# Patient Record
Sex: FEMALE | Race: WHITE | Employment: OTHER | URBAN - METROPOLITAN AREA
[De-identification: names, ages, dates, MRNs, and addresses within clinical notes are randomized per-mention and may not be internally consistent; named-entity substitution may affect disease eponyms.]

---

## 2017-01-03 LAB — DIABETIC RETINOPATHY: NORMAL

## 2017-01-04 ENCOUNTER — OFFICE VISIT (OUTPATIENT)
Dept: CARDIOLOGY CLINIC | Age: 77
End: 2017-01-04

## 2017-01-04 VITALS
SYSTOLIC BLOOD PRESSURE: 124 MMHG | BODY MASS INDEX: 33.38 KG/M2 | HEIGHT: 60 IN | HEART RATE: 60 BPM | DIASTOLIC BLOOD PRESSURE: 68 MMHG | WEIGHT: 170 LBS

## 2017-01-04 DIAGNOSIS — I25.10 CORONARY ARTERY DISEASE INVOLVING NATIVE CORONARY ARTERY OF NATIVE HEART WITHOUT ANGINA PECTORIS: Primary | ICD-10-CM

## 2017-01-04 PROCEDURE — 99214 OFFICE O/P EST MOD 30 MIN: CPT | Performed by: INTERNAL MEDICINE

## 2017-01-09 ENCOUNTER — HOSPITAL ENCOUNTER (OUTPATIENT)
Dept: WOMENS IMAGING | Age: 77
Discharge: HOME | End: 2017-01-09
Attending: INTERNAL MEDICINE | Admitting: INTERNAL MEDICINE

## 2017-01-09 DIAGNOSIS — M81.0 OSTEOPOROSIS: ICD-10-CM

## 2017-01-09 DIAGNOSIS — Z12.31 VISIT FOR SCREENING MAMMOGRAM: ICD-10-CM

## 2017-01-09 DIAGNOSIS — M81.0 AGE-RELATED OSTEOPOROSIS WITHOUT CURRENT PATHOLOGICAL FRACTURE: ICD-10-CM

## 2017-01-13 RX ORDER — SIMVASTATIN 20 MG
TABLET ORAL
Qty: 90 TABLET | Refills: 0 | Status: SHIPPED | OUTPATIENT
Start: 2017-01-13 | End: 2017-04-27 | Stop reason: SDUPTHER

## 2017-01-18 ENCOUNTER — HOSPITAL ENCOUNTER (OUTPATIENT)
Dept: OTHER | Age: 77
Discharge: HOME | End: 2017-01-18
Attending: FAMILY MEDICINE

## 2017-01-21 LAB
CULTURE: NORMAL
ORGANISM: NORMAL
REPORT STATUS: NORMAL
REQUEST PROBLEM: NORMAL
SPECIMEN: NORMAL
TOTAL COLONY COUNT: NORMAL

## 2017-02-01 ENCOUNTER — TELEPHONE (OUTPATIENT)
Dept: INTERNAL MEDICINE CLINIC | Age: 77
End: 2017-02-01

## 2017-02-22 DIAGNOSIS — E11.40 TYPE 2 DIABETES MELLITUS WITH DIABETIC NEUROPATHY, WITHOUT LONG-TERM CURRENT USE OF INSULIN (HCC): ICD-10-CM

## 2017-02-22 LAB
A/G RATIO: 1.6 (ref 1.1–2.2)
ALBUMIN SERPL-MCNC: 4.5 G/DL (ref 3.4–5)
ALP BLD-CCNC: 70 U/L (ref 40–129)
ALT SERPL-CCNC: 28 U/L (ref 10–40)
ANION GAP SERPL CALCULATED.3IONS-SCNC: 19 MMOL/L (ref 3–16)
AST SERPL-CCNC: 33 U/L (ref 15–37)
BASOPHILS ABSOLUTE: 0 K/UL (ref 0–0.2)
BASOPHILS RELATIVE PERCENT: 0.7 %
BILIRUB SERPL-MCNC: 0.8 MG/DL (ref 0–1)
BUN BLDV-MCNC: 20 MG/DL (ref 7–20)
CALCIUM SERPL-MCNC: 9.7 MG/DL (ref 8.3–10.6)
CHLORIDE BLD-SCNC: 97 MMOL/L (ref 99–110)
CHOLESTEROL, TOTAL: 182 MG/DL (ref 0–199)
CO2: 25 MMOL/L (ref 21–32)
CREAT SERPL-MCNC: 0.8 MG/DL (ref 0.6–1.2)
EOSINOPHILS ABSOLUTE: 0.2 K/UL (ref 0–0.6)
EOSINOPHILS RELATIVE PERCENT: 4.1 %
GFR AFRICAN AMERICAN: >60
GFR NON-AFRICAN AMERICAN: >60
GLOBULIN: 2.9 G/DL
GLUCOSE BLD-MCNC: 123 MG/DL (ref 70–99)
HCT VFR BLD CALC: 39.3 % (ref 36–48)
HDLC SERPL-MCNC: 36 MG/DL (ref 40–60)
HEMOGLOBIN: 13 G/DL (ref 12–16)
LDL CHOLESTEROL CALCULATED: 103 MG/DL
LYMPHOCYTES ABSOLUTE: 2.2 K/UL (ref 1–5.1)
LYMPHOCYTES RELATIVE PERCENT: 40.2 %
MCH RBC QN AUTO: 30.5 PG (ref 26–34)
MCHC RBC AUTO-ENTMCNC: 33.2 G/DL (ref 31–36)
MCV RBC AUTO: 92 FL (ref 80–100)
MONOCYTES ABSOLUTE: 0.4 K/UL (ref 0–1.3)
MONOCYTES RELATIVE PERCENT: 7.5 %
NEUTROPHILS ABSOLUTE: 2.6 K/UL (ref 1.7–7.7)
NEUTROPHILS RELATIVE PERCENT: 47.5 %
PDW BLD-RTO: 12.5 % (ref 12.4–15.4)
PLATELET # BLD: 183 K/UL (ref 135–450)
PMV BLD AUTO: 8.3 FL (ref 5–10.5)
POTASSIUM SERPL-SCNC: 4.2 MMOL/L (ref 3.5–5.1)
RBC # BLD: 4.27 M/UL (ref 4–5.2)
SODIUM BLD-SCNC: 141 MMOL/L (ref 136–145)
TOTAL PROTEIN: 7.4 G/DL (ref 6.4–8.2)
TRIGL SERPL-MCNC: 214 MG/DL (ref 0–150)
VLDLC SERPL CALC-MCNC: 43 MG/DL
WBC # BLD: 5.4 K/UL (ref 4–11)

## 2017-02-23 LAB
ESTIMATED AVERAGE GLUCOSE: 128.4 MG/DL
HBA1C MFR BLD: 6.1 %

## 2017-02-28 ENCOUNTER — OFFICE VISIT (OUTPATIENT)
Dept: INTERNAL MEDICINE CLINIC | Age: 77
End: 2017-02-28

## 2017-02-28 VITALS
WEIGHT: 166 LBS | BODY MASS INDEX: 32.42 KG/M2 | HEART RATE: 71 BPM | OXYGEN SATURATION: 96 % | RESPIRATION RATE: 14 BRPM | DIASTOLIC BLOOD PRESSURE: 76 MMHG | SYSTOLIC BLOOD PRESSURE: 120 MMHG

## 2017-02-28 DIAGNOSIS — F32.A ANXIETY AND DEPRESSION: ICD-10-CM

## 2017-02-28 DIAGNOSIS — I63.30 CEREBROVASCULAR ACCIDENT (CVA) DUE TO THROMBOSIS OF CEREBRAL ARTERY (HCC): ICD-10-CM

## 2017-02-28 DIAGNOSIS — F41.9 ANXIETY AND DEPRESSION: ICD-10-CM

## 2017-02-28 DIAGNOSIS — I10 ESSENTIAL HYPERTENSION: ICD-10-CM

## 2017-02-28 DIAGNOSIS — M05.79 RHEUMATOID ARTHRITIS INVOLVING MULTIPLE SITES WITH POSITIVE RHEUMATOID FACTOR (HCC): ICD-10-CM

## 2017-02-28 DIAGNOSIS — E11.40 TYPE 2 DIABETES MELLITUS WITH DIABETIC NEUROPATHY, WITHOUT LONG-TERM CURRENT USE OF INSULIN (HCC): Primary | ICD-10-CM

## 2017-02-28 DIAGNOSIS — I69.351 HEMIPLEGIA AND HEMIPARESIS FOLLOWING CEREBRAL INFARCTION AFFECTING RIGHT DOMINANT SIDE (HCC): ICD-10-CM

## 2017-02-28 DIAGNOSIS — B37.2 CANDIDAL SKIN INFECTION: ICD-10-CM

## 2017-02-28 DIAGNOSIS — I25.10 CORONARY ARTERY DISEASE INVOLVING NATIVE CORONARY ARTERY OF NATIVE HEART WITHOUT ANGINA PECTORIS: ICD-10-CM

## 2017-02-28 PROCEDURE — 4040F PNEUMOC VAC/ADMIN/RCVD: CPT | Performed by: INTERNAL MEDICINE

## 2017-02-28 PROCEDURE — G8484 FLU IMMUNIZE NO ADMIN: HCPCS | Performed by: INTERNAL MEDICINE

## 2017-02-28 PROCEDURE — G8399 PT W/DXA RESULTS DOCUMENT: HCPCS | Performed by: INTERNAL MEDICINE

## 2017-02-28 PROCEDURE — G8598 ASA/ANTIPLAT THER USED: HCPCS | Performed by: INTERNAL MEDICINE

## 2017-02-28 PROCEDURE — 1036F TOBACCO NON-USER: CPT | Performed by: INTERNAL MEDICINE

## 2017-02-28 PROCEDURE — 1090F PRES/ABSN URINE INCON ASSESS: CPT | Performed by: INTERNAL MEDICINE

## 2017-02-28 PROCEDURE — G8427 DOCREV CUR MEDS BY ELIG CLIN: HCPCS | Performed by: INTERNAL MEDICINE

## 2017-02-28 PROCEDURE — 1123F ACP DISCUSS/DSCN MKR DOCD: CPT | Performed by: INTERNAL MEDICINE

## 2017-02-28 PROCEDURE — 99214 OFFICE O/P EST MOD 30 MIN: CPT | Performed by: INTERNAL MEDICINE

## 2017-02-28 PROCEDURE — G8417 CALC BMI ABV UP PARAM F/U: HCPCS | Performed by: INTERNAL MEDICINE

## 2017-02-28 RX ORDER — CLOPIDOGREL BISULFATE 75 MG/1
TABLET ORAL
Qty: 90 TABLET | Refills: 1 | Status: SHIPPED | OUTPATIENT
Start: 2017-02-28 | End: 2018-01-20 | Stop reason: SDUPTHER

## 2017-02-28 RX ORDER — HYDROCHLOROTHIAZIDE 12.5 MG/1
CAPSULE, GELATIN COATED ORAL
Qty: 90 CAPSULE | Refills: 1 | Status: SHIPPED | OUTPATIENT
Start: 2017-02-28 | End: 2017-10-30 | Stop reason: SDUPTHER

## 2017-02-28 RX ORDER — AMLODIPINE BESYLATE 2.5 MG/1
2.5 TABLET ORAL DAILY
Qty: 90 TABLET | Refills: 1 | Status: SHIPPED | OUTPATIENT
Start: 2017-02-28 | End: 2017-08-30 | Stop reason: SDUPTHER

## 2017-02-28 RX ORDER — LOSARTAN POTASSIUM 100 MG/1
TABLET ORAL
Qty: 90 TABLET | Refills: 1 | Status: SHIPPED | OUTPATIENT
Start: 2017-02-28 | End: 2017-10-30 | Stop reason: SDUPTHER

## 2017-02-28 RX ORDER — CLOTRIMAZOLE AND BETAMETHASONE DIPROPIONATE 10; .64 MG/G; MG/G
CREAM TOPICAL
Qty: 45 G | Refills: 1 | Status: SHIPPED | OUTPATIENT
Start: 2017-02-28 | End: 2017-11-30 | Stop reason: ALTCHOICE

## 2017-02-28 RX ORDER — BUSPIRONE HYDROCHLORIDE 10 MG/1
10 TABLET ORAL 2 TIMES DAILY PRN
Qty: 180 TABLET | Refills: 1 | Status: SHIPPED | OUTPATIENT
Start: 2017-02-28 | End: 2018-02-27

## 2017-04-27 RX ORDER — SIMVASTATIN 20 MG
TABLET ORAL
Qty: 90 TABLET | Refills: 0 | Status: SHIPPED | OUTPATIENT
Start: 2017-04-27 | End: 2017-08-02 | Stop reason: SDUPTHER

## 2017-05-01 ENCOUNTER — HOSPITAL ENCOUNTER (OUTPATIENT)
Dept: ULTRASOUND IMAGING | Age: 77
Discharge: HOME | End: 2017-05-01
Attending: INTERNAL MEDICINE | Admitting: INTERNAL MEDICINE

## 2017-05-01 DIAGNOSIS — E04.1 NONTOXIC SINGLE THYROID NODULE: ICD-10-CM

## 2017-05-01 DIAGNOSIS — E04.1 THYROID NODULE: ICD-10-CM

## 2017-05-17 DIAGNOSIS — E11.40 TYPE 2 DIABETES MELLITUS WITH DIABETIC NEUROPATHY, WITHOUT LONG-TERM CURRENT USE OF INSULIN (HCC): ICD-10-CM

## 2017-05-17 DIAGNOSIS — M05.79 RHEUMATOID ARTHRITIS INVOLVING MULTIPLE SITES WITH POSITIVE RHEUMATOID FACTOR (HCC): ICD-10-CM

## 2017-05-17 LAB
A/G RATIO: 1.7 (ref 1.1–2.2)
ALBUMIN SERPL-MCNC: 4.6 G/DL (ref 3.4–5)
ALP BLD-CCNC: 68 U/L (ref 40–129)
ALT SERPL-CCNC: 26 U/L (ref 10–40)
ANION GAP SERPL CALCULATED.3IONS-SCNC: 17 MMOL/L (ref 3–16)
AST SERPL-CCNC: 30 U/L (ref 15–37)
BASOPHILS ABSOLUTE: 0 K/UL (ref 0–0.2)
BASOPHILS RELATIVE PERCENT: 0.3 %
BILIRUB SERPL-MCNC: 0.9 MG/DL (ref 0–1)
BUN BLDV-MCNC: 24 MG/DL (ref 7–20)
CALCIUM SERPL-MCNC: 9.6 MG/DL (ref 8.3–10.6)
CHLORIDE BLD-SCNC: 99 MMOL/L (ref 99–110)
CHOLESTEROL, TOTAL: 164 MG/DL (ref 0–199)
CO2: 24 MMOL/L (ref 21–32)
CREAT SERPL-MCNC: 0.8 MG/DL (ref 0.6–1.2)
EOSINOPHILS ABSOLUTE: 0.2 K/UL (ref 0–0.6)
EOSINOPHILS RELATIVE PERCENT: 3 %
GFR AFRICAN AMERICAN: >60
GFR NON-AFRICAN AMERICAN: >60
GLOBULIN: 2.7 G/DL
GLUCOSE BLD-MCNC: 119 MG/DL (ref 70–99)
HCT VFR BLD CALC: 38.6 % (ref 36–48)
HDLC SERPL-MCNC: 35 MG/DL (ref 40–60)
HEMOGLOBIN: 12.7 G/DL (ref 12–16)
LDL CHOLESTEROL CALCULATED: 89 MG/DL
LYMPHOCYTES ABSOLUTE: 2.2 K/UL (ref 1–5.1)
LYMPHOCYTES RELATIVE PERCENT: 35.3 %
MCH RBC QN AUTO: 30.9 PG (ref 26–34)
MCHC RBC AUTO-ENTMCNC: 32.8 G/DL (ref 31–36)
MCV RBC AUTO: 94.1 FL (ref 80–100)
MONOCYTES ABSOLUTE: 0.5 K/UL (ref 0–1.3)
MONOCYTES RELATIVE PERCENT: 8 %
NEUTROPHILS ABSOLUTE: 3.3 K/UL (ref 1.7–7.7)
NEUTROPHILS RELATIVE PERCENT: 53.4 %
PDW BLD-RTO: 13.2 % (ref 12.4–15.4)
PLATELET # BLD: 193 K/UL (ref 135–450)
PMV BLD AUTO: 8.1 FL (ref 5–10.5)
POTASSIUM SERPL-SCNC: 4.4 MMOL/L (ref 3.5–5.1)
RBC # BLD: 4.1 M/UL (ref 4–5.2)
SODIUM BLD-SCNC: 140 MMOL/L (ref 136–145)
TOTAL PROTEIN: 7.3 G/DL (ref 6.4–8.2)
TRIGL SERPL-MCNC: 202 MG/DL (ref 0–150)
VLDLC SERPL CALC-MCNC: 40 MG/DL
WBC # BLD: 6.2 K/UL (ref 4–11)

## 2017-05-18 LAB
ESTIMATED AVERAGE GLUCOSE: 131.2 MG/DL
HBA1C MFR BLD: 6.2 %
SEDIMENTATION RATE, ERYTHROCYTE: 14 MM/HR (ref 0–30)

## 2017-05-23 ENCOUNTER — HOSPITAL ENCOUNTER (OUTPATIENT)
Dept: GENERAL RADIOLOGY | Age: 77
Discharge: HOME | End: 2017-05-23
Attending: INTERNAL MEDICINE | Admitting: INTERNAL MEDICINE

## 2017-05-23 ENCOUNTER — OFFICE VISIT (OUTPATIENT)
Dept: INTERNAL MEDICINE CLINIC | Age: 77
End: 2017-05-23

## 2017-05-23 VITALS
HEART RATE: 60 BPM | RESPIRATION RATE: 15 BRPM | DIASTOLIC BLOOD PRESSURE: 68 MMHG | OXYGEN SATURATION: 96 % | SYSTOLIC BLOOD PRESSURE: 120 MMHG | WEIGHT: 172 LBS | HEIGHT: 60 IN | BODY MASS INDEX: 33.77 KG/M2

## 2017-05-23 DIAGNOSIS — I63.30 CEREBROVASCULAR ACCIDENT (CVA) DUE TO THROMBOSIS OF CEREBRAL ARTERY (HCC): ICD-10-CM

## 2017-05-23 DIAGNOSIS — I25.10 CORONARY ARTERY DISEASE INVOLVING NATIVE CORONARY ARTERY OF NATIVE HEART WITHOUT ANGINA PECTORIS: ICD-10-CM

## 2017-05-23 DIAGNOSIS — M79.642 LEFT HAND PAIN: Primary | ICD-10-CM

## 2017-05-23 DIAGNOSIS — Z23 NEED FOR PNEUMOCOCCAL VACCINATION: ICD-10-CM

## 2017-05-23 DIAGNOSIS — I10 ESSENTIAL HYPERTENSION: ICD-10-CM

## 2017-05-23 DIAGNOSIS — M79.642 LEFT HAND PAIN: ICD-10-CM

## 2017-05-23 DIAGNOSIS — E11.40 TYPE 2 DIABETES MELLITUS WITH DIABETIC NEUROPATHY, WITHOUT LONG-TERM CURRENT USE OF INSULIN (HCC): ICD-10-CM

## 2017-05-23 PROCEDURE — 90670 PCV13 VACCINE IM: CPT | Performed by: INTERNAL MEDICINE

## 2017-05-23 PROCEDURE — G8598 ASA/ANTIPLAT THER USED: HCPCS | Performed by: INTERNAL MEDICINE

## 2017-05-23 PROCEDURE — G8417 CALC BMI ABV UP PARAM F/U: HCPCS | Performed by: INTERNAL MEDICINE

## 2017-05-23 PROCEDURE — G8427 DOCREV CUR MEDS BY ELIG CLIN: HCPCS | Performed by: INTERNAL MEDICINE

## 2017-05-23 PROCEDURE — 99214 OFFICE O/P EST MOD 30 MIN: CPT | Performed by: INTERNAL MEDICINE

## 2017-05-23 PROCEDURE — 1123F ACP DISCUSS/DSCN MKR DOCD: CPT | Performed by: INTERNAL MEDICINE

## 2017-05-23 PROCEDURE — G0009 ADMIN PNEUMOCOCCAL VACCINE: HCPCS | Performed by: INTERNAL MEDICINE

## 2017-05-23 PROCEDURE — 1036F TOBACCO NON-USER: CPT | Performed by: INTERNAL MEDICINE

## 2017-05-23 PROCEDURE — 1090F PRES/ABSN URINE INCON ASSESS: CPT | Performed by: INTERNAL MEDICINE

## 2017-05-23 PROCEDURE — 4040F PNEUMOC VAC/ADMIN/RCVD: CPT | Performed by: INTERNAL MEDICINE

## 2017-05-23 PROCEDURE — G8399 PT W/DXA RESULTS DOCUMENT: HCPCS | Performed by: INTERNAL MEDICINE

## 2017-08-02 DIAGNOSIS — I10 ESSENTIAL HYPERTENSION: ICD-10-CM

## 2017-08-02 DIAGNOSIS — I25.10 CORONARY ARTERY DISEASE INVOLVING NATIVE CORONARY ARTERY OF NATIVE HEART WITHOUT ANGINA PECTORIS: ICD-10-CM

## 2017-08-02 RX ORDER — SIMVASTATIN 20 MG
TABLET ORAL
Qty: 90 TABLET | Refills: 0 | Status: SHIPPED | OUTPATIENT
Start: 2017-08-02 | End: 2017-11-04 | Stop reason: SDUPTHER

## 2017-08-24 DIAGNOSIS — I63.30 CEREBROVASCULAR ACCIDENT (CVA) DUE TO THROMBOSIS OF CEREBRAL ARTERY (HCC): ICD-10-CM

## 2017-08-24 DIAGNOSIS — I25.10 CORONARY ARTERY DISEASE INVOLVING NATIVE CORONARY ARTERY OF NATIVE HEART WITHOUT ANGINA PECTORIS: ICD-10-CM

## 2017-08-24 DIAGNOSIS — E11.40 TYPE 2 DIABETES MELLITUS WITH DIABETIC NEUROPATHY, WITHOUT LONG-TERM CURRENT USE OF INSULIN (HCC): ICD-10-CM

## 2017-08-24 LAB
A/G RATIO: 1.5 (ref 1.1–2.2)
ALBUMIN SERPL-MCNC: 4.6 G/DL (ref 3.4–5)
ALP BLD-CCNC: 69 U/L (ref 40–129)
ALT SERPL-CCNC: 22 U/L (ref 10–40)
ANION GAP SERPL CALCULATED.3IONS-SCNC: 15 MMOL/L (ref 3–16)
AST SERPL-CCNC: 25 U/L (ref 15–37)
BASOPHILS ABSOLUTE: 0 K/UL (ref 0–0.2)
BASOPHILS RELATIVE PERCENT: 0.6 %
BILIRUB SERPL-MCNC: 0.7 MG/DL (ref 0–1)
BUN BLDV-MCNC: 21 MG/DL (ref 7–20)
CALCIUM SERPL-MCNC: 10.1 MG/DL (ref 8.3–10.6)
CHLORIDE BLD-SCNC: 96 MMOL/L (ref 99–110)
CHOLESTEROL, TOTAL: 185 MG/DL (ref 0–199)
CO2: 27 MMOL/L (ref 21–32)
CREAT SERPL-MCNC: 0.8 MG/DL (ref 0.6–1.2)
EOSINOPHILS ABSOLUTE: 0.2 K/UL (ref 0–0.6)
EOSINOPHILS RELATIVE PERCENT: 4.3 %
GFR AFRICAN AMERICAN: >60
GFR NON-AFRICAN AMERICAN: >60
GLOBULIN: 3.1 G/DL
GLUCOSE BLD-MCNC: 131 MG/DL (ref 70–99)
HCT VFR BLD CALC: 38.8 % (ref 36–48)
HDLC SERPL-MCNC: 35 MG/DL (ref 40–60)
HEMOGLOBIN: 13.2 G/DL (ref 12–16)
LDL CHOLESTEROL CALCULATED: 93 MG/DL
LYMPHOCYTES ABSOLUTE: 2 K/UL (ref 1–5.1)
LYMPHOCYTES RELATIVE PERCENT: 38.7 %
MCH RBC QN AUTO: 31.4 PG (ref 26–34)
MCHC RBC AUTO-ENTMCNC: 33.9 G/DL (ref 31–36)
MCV RBC AUTO: 92.5 FL (ref 80–100)
MONOCYTES ABSOLUTE: 0.4 K/UL (ref 0–1.3)
MONOCYTES RELATIVE PERCENT: 7.6 %
NEUTROPHILS ABSOLUTE: 2.5 K/UL (ref 1.7–7.7)
NEUTROPHILS RELATIVE PERCENT: 48.8 %
PDW BLD-RTO: 12.9 % (ref 12.4–15.4)
PLATELET # BLD: 198 K/UL (ref 135–450)
PMV BLD AUTO: 8 FL (ref 5–10.5)
POTASSIUM SERPL-SCNC: 4.7 MMOL/L (ref 3.5–5.1)
RBC # BLD: 4.19 M/UL (ref 4–5.2)
SODIUM BLD-SCNC: 138 MMOL/L (ref 136–145)
TOTAL PROTEIN: 7.7 G/DL (ref 6.4–8.2)
TRIGL SERPL-MCNC: 285 MG/DL (ref 0–150)
VLDLC SERPL CALC-MCNC: 57 MG/DL
WBC # BLD: 5.1 K/UL (ref 4–11)

## 2017-08-25 LAB
ESTIMATED AVERAGE GLUCOSE: 131.2 MG/DL
HBA1C MFR BLD: 6.2 %

## 2017-08-30 ENCOUNTER — HOSPITAL ENCOUNTER (OUTPATIENT)
Dept: GENERAL RADIOLOGY | Age: 77
Discharge: HOME | End: 2017-08-30
Attending: INTERNAL MEDICINE | Admitting: INTERNAL MEDICINE

## 2017-08-30 ENCOUNTER — OFFICE VISIT (OUTPATIENT)
Dept: INTERNAL MEDICINE CLINIC | Age: 77
End: 2017-08-30

## 2017-08-30 VITALS
OXYGEN SATURATION: 96 % | RESPIRATION RATE: 16 BRPM | HEART RATE: 70 BPM | DIASTOLIC BLOOD PRESSURE: 86 MMHG | SYSTOLIC BLOOD PRESSURE: 138 MMHG | WEIGHT: 173 LBS | BODY MASS INDEX: 33.79 KG/M2

## 2017-08-30 DIAGNOSIS — M25.552 LEFT HIP PAIN: ICD-10-CM

## 2017-08-30 DIAGNOSIS — Z23 NEED FOR INFLUENZA VACCINATION: ICD-10-CM

## 2017-08-30 DIAGNOSIS — I25.10 CORONARY ARTERY DISEASE INVOLVING NATIVE CORONARY ARTERY OF NATIVE HEART WITHOUT ANGINA PECTORIS: ICD-10-CM

## 2017-08-30 DIAGNOSIS — I63.30 CEREBROVASCULAR ACCIDENT (CVA) DUE TO THROMBOSIS OF CEREBRAL ARTERY (HCC): ICD-10-CM

## 2017-08-30 DIAGNOSIS — M17.0 PRIMARY OSTEOARTHRITIS OF BOTH KNEES: ICD-10-CM

## 2017-08-30 DIAGNOSIS — Z00.00 ROUTINE GENERAL MEDICAL EXAMINATION AT A HEALTH CARE FACILITY: Primary | ICD-10-CM

## 2017-08-30 DIAGNOSIS — I10 ESSENTIAL HYPERTENSION: ICD-10-CM

## 2017-08-30 DIAGNOSIS — E11.40 TYPE 2 DIABETES MELLITUS WITH DIABETIC NEUROPATHY, WITHOUT LONG-TERM CURRENT USE OF INSULIN (HCC): ICD-10-CM

## 2017-08-30 PROCEDURE — 90662 IIV NO PRSV INCREASED AG IM: CPT | Performed by: INTERNAL MEDICINE

## 2017-08-30 PROCEDURE — G0008 ADMIN INFLUENZA VIRUS VAC: HCPCS | Performed by: INTERNAL MEDICINE

## 2017-08-30 PROCEDURE — G0439 PPPS, SUBSEQ VISIT: HCPCS | Performed by: INTERNAL MEDICINE

## 2017-08-30 RX ORDER — AMLODIPINE BESYLATE 5 MG/1
5 TABLET ORAL DAILY
Qty: 90 TABLET | Refills: 1 | Status: SHIPPED | OUTPATIENT
Start: 2017-08-30 | End: 2018-02-11 | Stop reason: SDUPTHER

## 2017-08-30 ASSESSMENT — ANXIETY QUESTIONNAIRES: GAD7 TOTAL SCORE: 0

## 2017-08-30 ASSESSMENT — PATIENT HEALTH QUESTIONNAIRE - PHQ9: SUM OF ALL RESPONSES TO PHQ QUESTIONS 1-9: 1

## 2017-08-30 ASSESSMENT — LIFESTYLE VARIABLES: HOW OFTEN DO YOU HAVE A DRINK CONTAINING ALCOHOL: 0

## 2017-10-13 ENCOUNTER — CARE COORDINATION (OUTPATIENT)
Dept: CARE COORDINATION | Age: 77
End: 2017-10-13

## 2017-10-17 ENCOUNTER — TELEPHONE (OUTPATIENT)
Dept: CARDIOLOGY CLINIC | Age: 77
End: 2017-10-17

## 2017-10-17 NOTE — TELEPHONE ENCOUNTER
Alyce Sarkar from [employer] office called. They are doing a revision of her left total knee, it is loose. This is scheduled for November 21st.    Pt is infection free right now and the office wants to make sure her surgery date does not get delayed. Pt has an appointment with us on November 8th. The office is requesting surgical clearance and wants to be sure that if the pt waits until this appointment time and has to have any testing that she will be able to complete it in time. Please advise pt and Alyce Sarkar.     Office phone [phone]  Office fax [phone]

## 2017-10-24 ENCOUNTER — TELEPHONE (OUTPATIENT)
Dept: CARDIOLOGY CLINIC | Age: 77
End: 2017-10-24

## 2017-10-24 NOTE — LETTER
[address]  [facility]  [facility], [address] [place] [address],[address]  Arnold Ortiz  Phone: [phone]    Fax [phone]                  Una Collado MD, Ritchie Todd MD, Liyah Martino MD, Lsia Cardozo MD, MD Vahid Linares MD Luellen Hoffmann, MD    Cardiac Risk Assessment for Surgery    10/24/2017    Surgery Date: 11/21/17       Surgery: Revision (L) Total Knee       Anesthesia Type: General       Fax Number: [phone]  To: Dr. Joann Carrizales  From : Dr. Pepe Mcneill    Patient Name: Pelon Ferris   YOB: 1940      Portia Hernandez was evaluated from a cardiac standpoint for her surgery and based on her history, diagnosis, recent cardiac testing, she is considered a medium risk candidate for any b[place]-operative cardiac complications. Please continue patient's current medical regimen. Patient may hold Aspirin and Plavix for a period of 5-7 days prior to surgery or procedure. Please resume ASAP. Please call with any further questions.     Respectfully,     Una Collado MD, [facility] [place]  FA/bjd

## 2017-10-24 NOTE — TELEPHONE ENCOUNTER
Cardiac Risk assessment letter faxed to Dr. Izaiah Valentine for   Revision (R) Total Knee surgery.   [phone]

## 2017-10-30 DIAGNOSIS — I25.10 CORONARY ARTERY DISEASE INVOLVING NATIVE CORONARY ARTERY OF NATIVE HEART WITHOUT ANGINA PECTORIS: ICD-10-CM

## 2017-10-30 DIAGNOSIS — I10 ESSENTIAL HYPERTENSION: ICD-10-CM

## 2017-10-30 DIAGNOSIS — E11.40 TYPE 2 DIABETES MELLITUS WITH DIABETIC NEUROPATHY, WITHOUT LONG-TERM CURRENT USE OF INSULIN (HCC): ICD-10-CM

## 2017-10-30 RX ORDER — HYDROCHLOROTHIAZIDE 12.5 MG/1
CAPSULE, GELATIN COATED ORAL
Qty: 90 CAPSULE | Refills: 1 | Status: SHIPPED | OUTPATIENT
Start: 2017-10-30 | End: 2018-04-19 | Stop reason: SDUPTHER

## 2017-10-30 RX ORDER — LOSARTAN POTASSIUM 100 MG/1
TABLET ORAL
Qty: 90 TABLET | Refills: 1 | Status: SHIPPED | OUTPATIENT
Start: 2017-10-30 | End: 2018-04-19 | Stop reason: SDUPTHER

## 2017-11-01 ENCOUNTER — OFFICE VISIT (OUTPATIENT)
Dept: CARDIOLOGY CLINIC | Age: 77
End: 2017-11-01

## 2017-11-01 VITALS
SYSTOLIC BLOOD PRESSURE: 164 MMHG | WEIGHT: 171 LBS | DIASTOLIC BLOOD PRESSURE: 84 MMHG | HEIGHT: 60 IN | HEART RATE: 76 BPM | BODY MASS INDEX: 33.57 KG/M2

## 2017-11-01 DIAGNOSIS — I25.10 CORONARY ARTERY DISEASE INVOLVING NATIVE CORONARY ARTERY OF NATIVE HEART WITHOUT ANGINA PECTORIS: Primary | ICD-10-CM

## 2017-11-01 PROCEDURE — 99213 OFFICE O/P EST LOW 20 MIN: CPT | Performed by: INTERNAL MEDICINE

## 2017-11-01 PROCEDURE — 1123F ACP DISCUSS/DSCN MKR DOCD: CPT | Performed by: INTERNAL MEDICINE

## 2017-11-01 PROCEDURE — G8484 FLU IMMUNIZE NO ADMIN: HCPCS | Performed by: INTERNAL MEDICINE

## 2017-11-01 PROCEDURE — 1036F TOBACCO NON-USER: CPT | Performed by: INTERNAL MEDICINE

## 2017-11-01 PROCEDURE — G8427 DOCREV CUR MEDS BY ELIG CLIN: HCPCS | Performed by: INTERNAL MEDICINE

## 2017-11-01 PROCEDURE — 1090F PRES/ABSN URINE INCON ASSESS: CPT | Performed by: INTERNAL MEDICINE

## 2017-11-01 PROCEDURE — G8417 CALC BMI ABV UP PARAM F/U: HCPCS | Performed by: INTERNAL MEDICINE

## 2017-11-01 PROCEDURE — G8598 ASA/ANTIPLAT THER USED: HCPCS | Performed by: INTERNAL MEDICINE

## 2017-11-01 PROCEDURE — 4040F PNEUMOC VAC/ADMIN/RCVD: CPT | Performed by: INTERNAL MEDICINE

## 2017-11-01 PROCEDURE — G8399 PT W/DXA RESULTS DOCUMENT: HCPCS | Performed by: INTERNAL MEDICINE

## 2017-11-01 NOTE — PROGRESS NOTES
CARDIOLOGY NOTE      11/1/2017    RE: Tonio Eason  (1940)                               TO:  Dr. Daniela Rangel MD      Thank you for involving me in taking care of your  patient Tonio Eason, who is a  65y.o. year old      female with past medical  history of  CAD, HTN, hyperlipidimea  is  seen today Patient  during this  visit has no cardiac complains. Lt knee hurts causing high BP. Vitals:    11/01/17 1657   BP: (!) 164/84   Pulse:        Current Outpatient Prescriptions   Medication Sig Dispense Refill    metoprolol tartrate (LOPRESSOR) 25 MG tablet TAKE ONE TABLET BY MOUTH TWICE DAILY 180 tablet 0    hydrochlorothiazide (MICROZIDE) 12.5 MG capsule TAKE ONE CAPSULE BY MOUTH ONCE DAILY IN THE MORNING 90 capsule 1    losartan (COZAAR) 100 MG tablet TAKE ONE TABLET BY MOUTH ONCE DAILY 90 tablet 1    amLODIPine (NORVASC) 5 MG tablet Take 1 tablet by mouth daily 90 tablet 1    simvastatin (ZOCOR) 20 MG tablet TAKE ONE TABLET BY MOUTH ONCE DAILY IN THE EVENING 90 tablet 0    busPIRone (BUSPAR) 10 MG tablet Take 1 tablet by mouth 2 times daily as needed (anxiety) 180 tablet 1    clopidogrel (PLAVIX) 75 MG tablet TAKE ONE TABLET BY MOUTH ONCE DAILY 90 tablet 1    metFORMIN (GLUCOPHAGE) 500 MG tablet Take 500 mg by mouth daily      clotrimazole-betamethasone (LOTRISONE) 1-0.05 % cream Apply topically 2 times daily. 45 g 1    fluticasone (FLONASE) 50 MCG/ACT nasal spray 2 sprays by Nasal route daily into each nostril every day 1 Bottle 3    aspirin 81 MG tablet Take 81 mg by mouth nightly       Garlic 0536 MG CAPS Take 1 tablet by mouth nightly       multivitamin (THERAGRAN) per tablet Take 1 tablet by mouth daily.  Potassium 99 MG TABS Take 1 tablet by mouth nightly       Calcium Carbonate-Vitamin D (CALCIUM + D) 600-200 MG-UNIT TABS Take 1 tablet by mouth nightly       Omega-3 Fatty Acids (FISH OIL BURP-LESS) 1000 MG CAPS Take 1 tablet by mouth 2 times daily.  Misc Natural Products (OSTEO BI-FLEX JOINT SHIELD PO) Take 1 tablet by mouth 2 times daily       nitroGLYCERIN (NITROSTAT) 0.4 MG SL tablet Place 0.4 mg under the tongue. Take one tab sublingually at onset, may take every 5 minutes, no more than 3 tabs in 15 mins, as needed       Omeprazole Magnesium (PRILOSEC OTC PO) Take 1 tablet by mouth See Admin Instructions take1 tablet on Sun TUES THURS AND SAT       No current facility-administered medications for this visit. Allergies: Actonel [risedronate sodium]; Ciprofloxacin; Darvocet [propoxyphene n-acetaminophen]; Lopid [gemfibrozil]; Mevacor [lovastatin];  Neosporin [neomycin-polymyx-gramicid]; and Pravachol [pravastatin sodium]  Past Medical History:   Diagnosis Date    AK (actinic keratosis)     Irizarry Grooms following    CAD (coronary artery disease)     5/2005 S/P PTCA and stents X2 - Dr Jones [facility])     skin    Carotid stenosis     Carotid stenosis, left     monitored by Dr Greg Escamilla- 50% stenosis noted on CTA 10/2016    Coronary artery disease     Dr Greg Escamilla    Cough secondary to angiotensin converting enzyme inhibitor (ACE-I)     inactive    CTS (carpal tunnel syndrome)     inactive-S/P right CTS surg 4/2001- Dr Severo Medina DDD (degenerative disc disease), cervical     Degenerative disc disease, cervical     Diabetes mellitus with neuropathy ([facility] [address]     Dr Vero Sarmiento,     GERD without esophagitis     H/O 24 hour EKG monitoring 9/07 9/27/07 SR, max , min HR 47, supraventricular ectopy is in the fashio of a-fib, very short episodes, infrequent vent and SVT ectopy noted    H/O cardiac catheterization 5/05 5/16/05 Circ stent 80% prior 0% post, EF 60%    H/O cardiovascular stress test 3/29/10,   9/08,    3/29/10 post stress myocardial perfusion show norm pattern of perfusion in all regions, post left vent is norm, rest EF 84%, LV systolic  is norm, vent func preserved,     H/O cardiovascular stress test 9/11/14    EF70% Normal study.     H/O cardiovascular stress test 2/12/2016    lexiscan-normal,70%    H/O Doppler ultrasound 10/2011, 8/09    carotid 10/4/11 moderat stenosis left internal carotid atery est 50-69%, progression in stenotic changes left internal carotid artery, right WNL    H/O echocardiogram 10/2011, 4/09    90/67/[id number] AR , LV systolic function WNL, EF 55%    H/O echocardiogram 9/11/14    EF 60%. Mild aortic insufficiency.  H/O echocardiogram 2/12/2016    EF 55% mild lvh, stage 1 diast dysf, mild PI    H/O mammogram     1/16- recheck 1/17    H/O tilt table evaluation  7/09 7/20/09 WNL    Hyperlipidemia     Hypertension     IFG (impaired fasting glucose)     mother w hx of DM    Memory loss     MMSE 23/30-- 11/15/13    Neuropathy ([address]     Osteoarthritis, knee     Peripheral neuropathy (HCC)     burning discomfort in feet.  Postsurgical menopause     Rheumatoid arthritis(714.0)     SCC (squamous cell carcinoma), leg     Dr Diann Smith removed fr legs 10/16    Thyroid nodule     on u/s 10/2016, recheck May 2017- stable--- RECHECK MAY 2018 RECOMMENDED    TMJ (temporomandibular joint syndrome)     Unspecified cerebral artery occlusion with cerebral infarction     2016-Right hemiparesis, aphasia, dysphagia, gait disturbance,     Past Surgical History:   Procedure Laterality Date    BREAST BIOPSY  1993    bilateral    CARPAL TUNNEL RELEASE      Right wrist - 4/2011-Dr Diann Smith    CATARACT REMOVAL WITH IMPLANT Left 02/06/2017    Dr Hudson Jozef    umbilical   [address]    Right thumb- giant cell tumor    HYSTERECTOMY, TOTAL ABDOMINAL  1963    KNEE ARTHROSCOPY  2/21/2012    Left knee- Dr Rodgers Sero ARTHROSCOPY Left 9/23/14    Dr Aneesh William    ?     PTCA  5/2005    PTCA with stent X2 - Dr Irina Haas  10/16/2013    both legs    TONSILLECTOMY  1972    TOTAL KNEE ARTHROPLASTY Left 7/28/15    Dr Richards  - Dr Leilani Olmos AST 25 08/24/2017     TSH:    Lab Results   Component Value Date    TSH 2.00 02/04/2016         QUALITY MEASURES:  CAD:  Yes   CHOL LOWERING:  Yes- if No Why  ANTIPLATELET:  Yes - if No why  BETA BLOCKER    yes,   IF  NO WHY  SMOKING HISTORY no COUNSELLED no  ATRIAL FIBRILLATIONno ANTICOAG: no    Assessment/ Plan:     Patient seen , interviewed and examined                 -     CORONARY ARTERY DISEASE: Patient  currently as per anginal classification has   No symptoms           - changes in  treatment:   no  All CAD tests available in records reviewed. -  Hypertension: Patients blood pressure is normal. Patient is advised about low sodium diet. Present medical regimen will not be changed. -  LIPID MANAGEMENT:  Available lipid  lab data reviewed  and patient was given dietary advice. NCEP- ATP III guidelines reviewed with patient. -   Changes  in medicines made:  No                         MODERATE CARDIAC RISK FOR KNEE SURGERY

## 2017-11-03 ENCOUNTER — HOSPITAL ENCOUNTER (OUTPATIENT)
Dept: GENERAL RADIOLOGY | Age: 77
Discharge: HOME | End: 2017-11-03
Attending: INTERNAL MEDICINE | Admitting: INTERNAL MEDICINE

## 2017-11-03 DIAGNOSIS — R31.9 URINARY TRACT INFECTION WITH HEMATURIA, SITE UNSPECIFIED: Primary | ICD-10-CM

## 2017-11-03 DIAGNOSIS — N39.0 URINARY TRACT INFECTION WITH HEMATURIA, SITE UNSPECIFIED: Primary | ICD-10-CM

## 2017-11-03 PROBLEM — E11.40 TYPE 2 DIABETES MELLITUS WITH DIABETIC NEUROPATHY, WITHOUT LONG-TERM CURRENT USE OF INSULIN (HCC): Status: ACTIVE | Noted: 2017-11-03

## 2017-11-03 LAB
BACTERIA: NEGATIVE /HPF
BILIRUBIN URINE: NEGATIVE MG/DL
BLOOD, URINE: NEGATIVE
CLARITY: CLEAR
COLOR: ABNORMAL
GLUCOSE, URINE: NEGATIVE MG/DL
KETONES, URINE: NEGATIVE MG/DL
LEUKOCYTE ESTERASE, URINE: NEGATIVE
MUCUS: ABNORMAL HPF
NITRITE URINE, QUANTITATIVE: NEGATIVE
PH, URINE: 7 (ref 5–8)
PROTEIN UA: NEGATIVE MG/DL
RBC URINE: <1 /HPF (ref 0–6)
SPECIFIC GRAVITY UA: 1.01 (ref 1–1.03)
SQUAMOUS EPITHELIAL: <1 /HPF
TRICHOMONAS: ABNORMAL /HPF
UROBILINOGEN, URINE: NORMAL MG/DL (ref 0.2–1)
WBC UA: 1 /HPF (ref 0–5)

## 2017-11-04 LAB
CULTURE: NORMAL
REPORT STATUS: NORMAL
REQUEST PROBLEM: NORMAL
SPECIMEN: NORMAL
TOTAL COLONY COUNT: NORMAL

## 2017-11-06 RX ORDER — SIMVASTATIN 20 MG
TABLET ORAL
Qty: 90 TABLET | Refills: 0 | Status: SHIPPED | OUTPATIENT
Start: 2017-11-06 | End: 2018-02-11 | Stop reason: SDUPTHER

## 2017-11-06 RX ORDER — NITROFURANTOIN 25; 75 MG/1; MG/1
100 CAPSULE ORAL 2 TIMES DAILY
Qty: 14 CAPSULE | Refills: 0 | Status: SHIPPED | OUTPATIENT
Start: 2017-11-06 | End: 2017-11-13

## 2017-11-17 ENCOUNTER — TELEPHONE (OUTPATIENT)
Dept: INTERNAL MEDICINE CLINIC | Age: 77
End: 2017-11-17

## 2017-11-30 ENCOUNTER — OFFICE VISIT (OUTPATIENT)
Dept: INTERNAL MEDICINE CLINIC | Age: 77
End: 2017-11-30

## 2017-11-30 VITALS
RESPIRATION RATE: 16 BRPM | DIASTOLIC BLOOD PRESSURE: 80 MMHG | BODY MASS INDEX: 32.22 KG/M2 | OXYGEN SATURATION: 97 % | WEIGHT: 165 LBS | HEART RATE: 77 BPM | SYSTOLIC BLOOD PRESSURE: 114 MMHG

## 2017-11-30 DIAGNOSIS — E11.40 TYPE 2 DIABETES MELLITUS WITH DIABETIC NEUROPATHY, WITHOUT LONG-TERM CURRENT USE OF INSULIN (HCC): Primary | ICD-10-CM

## 2017-11-30 DIAGNOSIS — I25.10 CORONARY ARTERY DISEASE INVOLVING NATIVE CORONARY ARTERY OF NATIVE HEART WITHOUT ANGINA PECTORIS: ICD-10-CM

## 2017-11-30 DIAGNOSIS — M17.0 PRIMARY OSTEOARTHRITIS OF BOTH KNEES: ICD-10-CM

## 2017-11-30 DIAGNOSIS — I10 ESSENTIAL HYPERTENSION: ICD-10-CM

## 2017-11-30 PROCEDURE — 1123F ACP DISCUSS/DSCN MKR DOCD: CPT | Performed by: INTERNAL MEDICINE

## 2017-11-30 PROCEDURE — G8399 PT W/DXA RESULTS DOCUMENT: HCPCS | Performed by: INTERNAL MEDICINE

## 2017-11-30 PROCEDURE — G8484 FLU IMMUNIZE NO ADMIN: HCPCS | Performed by: INTERNAL MEDICINE

## 2017-11-30 PROCEDURE — 4040F PNEUMOC VAC/ADMIN/RCVD: CPT | Performed by: INTERNAL MEDICINE

## 2017-11-30 PROCEDURE — 1036F TOBACCO NON-USER: CPT | Performed by: INTERNAL MEDICINE

## 2017-11-30 PROCEDURE — G8598 ASA/ANTIPLAT THER USED: HCPCS | Performed by: INTERNAL MEDICINE

## 2017-11-30 PROCEDURE — G8427 DOCREV CUR MEDS BY ELIG CLIN: HCPCS | Performed by: INTERNAL MEDICINE

## 2017-11-30 PROCEDURE — G8417 CALC BMI ABV UP PARAM F/U: HCPCS | Performed by: INTERNAL MEDICINE

## 2017-11-30 PROCEDURE — 99214 OFFICE O/P EST MOD 30 MIN: CPT | Performed by: INTERNAL MEDICINE

## 2017-11-30 PROCEDURE — 1090F PRES/ABSN URINE INCON ASSESS: CPT | Performed by: INTERNAL MEDICINE

## 2017-11-30 RX ORDER — OXYCODONE HYDROCHLORIDE AND ACETAMINOPHEN 5; 325 MG/1; MG/1
2 TABLET ORAL EVERY 4 HOURS PRN
COMMUNITY
Start: 2017-11-15 | End: 2018-02-27 | Stop reason: ALTCHOICE

## 2017-11-30 NOTE — PROGRESS NOTES
reviewed. ASSESSMENT:    1. Type 2 diabetes mellitus with diabetic neuropathy, without long-term current use of insulin ([address]    2. Essential hypertension    3. Coronary artery disease involving native coronary artery of native heart without angina pectoris    4. Primary osteoarthritis of both knees        PLAN:    Pantera Strickland was seen today for 3 month follow-up. Diagnoses and all orders for this visit:    Type 2 diabetes mellitus with diabetic neuropathy, without long-term current use of insulin ([address] - DM relatively well controlled and will continue current regimen. Screening reviewed. See orders. SUGARS LIKELY IMPROVING AFTER DECR PAIN FR REVISION TKR.  -     Hemoglobin A1C  -     Lipid Panel  -     Comprehensive Metabolic Panel  -     CBC Auto Differential    Essential hypertension - Blood pressure stable and will continue current regimen. Will plan periodic monitoring of renal function, electrolytes, lipid profile. BP ALSO IMPROVED W DECR PAIN, ~120S  -     Lipid Panel  -     Comprehensive Metabolic Panel  -     CBC Auto Differential    Coronary artery disease involving native coronary artery of native heart without angina pectoris - Coronary artery disease stable and asymptomatic, will continue to monitor for any signs of instability. Will periodically monitor lipid profile and liver function, cardiac risk factors.   Continue current medical regimen.      -     Lipid Panel  -     Comprehensive Metabolic Panel  -     CBC Auto Differential    Primary osteoarthritis of both knees- S/P L TKR, NOW AMBULATING BETTER AND CONT HHC, ORTHO F/U

## 2017-12-04 LAB
A/G RATIO: 1.3 (CALC) (ref 0.8–2.6)
ALBUMIN SERPL-MCNC: 4.4 GM/DL (ref 3.5–5.2)
ALP BLD-CCNC: 108 U/L (ref 23–144)
ALT SERPL-CCNC: 13 U/L (ref 0–60)
AST SERPL-CCNC: 27 U/L (ref 0–55)
BASOPHILS ABSOLUTE: 0 K/MM3 (ref 0–0.3)
BASOPHILS RELATIVE PERCENT: 0.8 % (ref 0–2)
BILIRUB SERPL-MCNC: 0.7 MG/DL (ref 0–1.2)
BUN / CREAT RATIO: 18 (CALC) (ref 7–25)
BUN BLDV-MCNC: 16 MG/DL (ref 3–29)
CALCIUM SERPL-MCNC: 9.9 MG/DL (ref 8.5–10.5)
CHLORIDE BLD-SCNC: 98 MEQ/L (ref 96–110)
CHOLESTEROL, TOTAL: 185 MG/DL
CO2: 28 MEQ/L (ref 19–32)
COPY(IES) SENT TO:: NORMAL
CREAT SERPL-MCNC: 0.9 MG/DL
EOSINOPHILS ABSOLUTE: 0.3 K/MM3 (ref 0–0.6)
EOSINOPHILS RELATIVE PERCENT: 4.9 % (ref 0–7)
GFR SERPL CREATININE-BSD FRML MDRD: 61 ML/MIN/1.73M2
GLOBULIN: 3.3 GM/DL (CALC) (ref 1.9–3.6)
GLUCOSE BLD-MCNC: 130 MG/DL
HBA1C MFR BLD: 5.2 % TOTAL HGB
HCT VFR BLD CALC: 31.3 % (ref 35–46)
HDLC SERPL-MCNC: 30 MG/DL
HEMOGLOBIN: 10.1 G/DL (ref 12–15.6)
LDL CHOLESTEROL: 98 MG/DL (CALC)
LEUKOCYTES, UA: 5.9 K/MM3 (ref 3.8–10.8)
LYMPHOCYTES ABSOLUTE: 1.7 K/MM3 (ref 0.9–4.1)
LYMPHOCYTES RELATIVE PERCENT: 28.3 % (ref 14–51)
MCH RBC QN AUTO: 30.2 PG (ref 27–33)
MCHC RBC AUTO-ENTMCNC: 32.3 G/DL (ref 32–36)
MCV RBC AUTO: 93.5 FL (ref 80–100)
MONOCYTES ABSOLUTE: 0.5 K/MM3 (ref 0.2–1.1)
MONOCYTES RELATIVE PERCENT: 8.2 % (ref 0–14)
NEUTROPHILS ABSOLUTE: 3.4 K/MM3 (ref 1.5–7.8)
PDW BLD-RTO: 13.8 % (ref 9–15)
PLATELET # BLD: 378 K/MM3 (ref 130–400)
POTASSIUM SERPL-SCNC: 4.5 MEQ/L (ref 3.4–5.3)
RBC # BLD: 3.35 M/MM3 (ref 3.9–5.2)
SEGMENTED NEUTROPHILS RELATIVE PERCENT: 57.8 % (ref 40–76)
SODIUM BLD-SCNC: 140 MEQ/L (ref 135–148)
TOTAL PROTEIN: 7.7 GM/DL (ref 6–8.3)
TRIGL SERPL-MCNC: 286 MG/DL
VLDLC SERPL CALC-MCNC: 57 MG/DL (CALC) (ref 4–38)

## 2017-12-11 ENCOUNTER — CARE COORDINATION (OUTPATIENT)
Dept: CARE COORDINATION | Age: 77
End: 2017-12-11

## 2017-12-11 NOTE — CARE COORDINATION
Call to pt to enroll in Care Coordination, possible needs after knee revision? Was informed she is on another line right now. Provided call back information. Teresita Rosas RN  Nurse Care Coordinator  [phone]   Received call back from pt. She states she is doing well after her knee revision, only minimal swelling at times, denies any s/s of infection. She does not need HC services, she is driving, doing therapy on her knee from the exercises she received last time, and shopping. Denies the need for additional resources or support from [address]. Advised pt to contact physician's office if needs arise.   Teresita Rosas RN  Nurse Care Coordinator  [phone]

## 2018-01-20 DIAGNOSIS — I63.30 CEREBROVASCULAR ACCIDENT (CVA) DUE TO THROMBOSIS OF CEREBRAL ARTERY (HCC): ICD-10-CM

## 2018-01-22 RX ORDER — CLOPIDOGREL BISULFATE 75 MG/1
TABLET ORAL
Qty: 90 TABLET | Refills: 1 | Status: SHIPPED | OUTPATIENT
Start: 2018-01-22 | End: 2018-07-23 | Stop reason: SDUPTHER

## 2018-01-31 DIAGNOSIS — I10 ESSENTIAL HYPERTENSION: ICD-10-CM

## 2018-01-31 DIAGNOSIS — I25.10 CORONARY ARTERY DISEASE INVOLVING NATIVE CORONARY ARTERY OF NATIVE HEART WITHOUT ANGINA PECTORIS: ICD-10-CM

## 2018-02-02 ENCOUNTER — HOSPITAL ENCOUNTER (OUTPATIENT)
Dept: OTHER | Age: 78
Discharge: HOME | End: 2018-02-02

## 2018-02-02 LAB
RAPID INFLUENZA  B AGN: NEGATIVE
RAPID INFLUENZA A AGN: NEGATIVE

## 2018-02-04 LAB
CULTURE: NORMAL
REPORT STATUS: NORMAL
REQUEST PROBLEM: NORMAL
SPECIMEN: NORMAL

## 2018-02-11 DIAGNOSIS — I10 ESSENTIAL HYPERTENSION: ICD-10-CM

## 2018-02-12 RX ORDER — AMLODIPINE BESYLATE 5 MG/1
TABLET ORAL
Qty: 90 TABLET | Refills: 1 | Status: SHIPPED | OUTPATIENT
Start: 2018-02-12 | End: 2018-08-17

## 2018-02-12 RX ORDER — SIMVASTATIN 20 MG
TABLET ORAL
Qty: 90 TABLET | Refills: 0 | Status: SHIPPED | OUTPATIENT
Start: 2018-02-12 | End: 2018-05-10 | Stop reason: SDUPTHER

## 2018-02-27 ENCOUNTER — OFFICE VISIT (OUTPATIENT)
Dept: INTERNAL MEDICINE CLINIC | Age: 78
End: 2018-02-27

## 2018-02-27 VITALS
BODY MASS INDEX: 31.44 KG/M2 | DIASTOLIC BLOOD PRESSURE: 78 MMHG | WEIGHT: 161 LBS | OXYGEN SATURATION: 97 % | HEART RATE: 65 BPM | SYSTOLIC BLOOD PRESSURE: 132 MMHG | RESPIRATION RATE: 16 BRPM

## 2018-02-27 DIAGNOSIS — I10 ESSENTIAL HYPERTENSION: ICD-10-CM

## 2018-02-27 DIAGNOSIS — I69.351 HEMIPLEGIA AND HEMIPARESIS FOLLOWING CEREBRAL INFARCTION AFFECTING RIGHT DOMINANT SIDE (HCC): ICD-10-CM

## 2018-02-27 DIAGNOSIS — M05.79 RHEUMATOID ARTHRITIS INVOLVING MULTIPLE SITES WITH POSITIVE RHEUMATOID FACTOR (HCC): ICD-10-CM

## 2018-02-27 DIAGNOSIS — E11.40 TYPE 2 DIABETES MELLITUS WITH DIABETIC NEUROPATHY, WITHOUT LONG-TERM CURRENT USE OF INSULIN (HCC): Primary | ICD-10-CM

## 2018-02-27 DIAGNOSIS — I25.10 CORONARY ARTERY DISEASE INVOLVING NATIVE CORONARY ARTERY OF NATIVE HEART WITHOUT ANGINA PECTORIS: ICD-10-CM

## 2018-02-27 DIAGNOSIS — I63.30 CEREBROVASCULAR ACCIDENT (CVA) DUE TO THROMBOSIS OF CEREBRAL ARTERY (HCC): ICD-10-CM

## 2018-02-27 PROCEDURE — 4040F PNEUMOC VAC/ADMIN/RCVD: CPT | Performed by: INTERNAL MEDICINE

## 2018-02-27 PROCEDURE — G8484 FLU IMMUNIZE NO ADMIN: HCPCS | Performed by: INTERNAL MEDICINE

## 2018-02-27 PROCEDURE — 1090F PRES/ABSN URINE INCON ASSESS: CPT | Performed by: INTERNAL MEDICINE

## 2018-02-27 PROCEDURE — 1123F ACP DISCUSS/DSCN MKR DOCD: CPT | Performed by: INTERNAL MEDICINE

## 2018-02-27 PROCEDURE — 1036F TOBACCO NON-USER: CPT | Performed by: INTERNAL MEDICINE

## 2018-02-27 PROCEDURE — G8427 DOCREV CUR MEDS BY ELIG CLIN: HCPCS | Performed by: INTERNAL MEDICINE

## 2018-02-27 PROCEDURE — G8399 PT W/DXA RESULTS DOCUMENT: HCPCS | Performed by: INTERNAL MEDICINE

## 2018-02-27 PROCEDURE — G8417 CALC BMI ABV UP PARAM F/U: HCPCS | Performed by: INTERNAL MEDICINE

## 2018-02-27 PROCEDURE — 99214 OFFICE O/P EST MOD 30 MIN: CPT | Performed by: INTERNAL MEDICINE

## 2018-02-27 PROCEDURE — G8598 ASA/ANTIPLAT THER USED: HCPCS | Performed by: INTERNAL MEDICINE

## 2018-02-27 NOTE — PROGRESS NOTES
Gwyn Lipscomb  1940 02/27/18    SUBJECTIVE:    Coronary artery disease has been asymptomatic w/o any ongoing sx of CP, SOB/MOORE, palpitations, lt headedness, diaphoresis. Is continuing medications regularly. HTN- bp at times high to 160 but typically 120-130. No sx cp  Recheck L arm was ok at 134.84    DM- sugars stable ~100-120 and last a1c good range. Lab Results   Component Value Date    LABA1C 5.2 12/04/2017    LABA1C 6.2 08/24/2017    LABA1C 6.2 05/17/2017     Lab Results   Component Value Date    GLUF 128 (H) 10/14/2016    LABMICR YES 11/28/2016    LDLCALC 93 08/24/2017    CREATININE 0.9 12/04/2017     RA- no current joint swelling, has mild stiffness in am    Hx of CVA 2016 w dysarthria and r sided weakness but no focal sx and completely resolved. OBJECTIVE:    /78   Pulse 65   Resp 16   Wt 161 lb (73 kg)   SpO2 97%   BMI 31.44 kg/m²     Physical Exam   Constitutional: She appears well-developed and well-nourished. No distress. HENT:   Head: Normocephalic and atraumatic. Right Ear: External ear normal.   Left Ear: External ear normal.   Nose: Nose normal.   Mouth/Throat: Oropharynx is clear and moist. No oropharyngeal exudate. Eyes: Conjunctivae and EOM are normal. Pupils are equal, round, and reactive to light. Right eye exhibits no discharge. Left eye exhibits no discharge. No scleral icterus. Neck: Neck supple. No tracheal deviation present. Cardiovascular: Normal rate, regular rhythm, normal heart sounds and intact distal pulses. Exam reveals no gallop and no friction rub. No murmur heard. Pulmonary/Chest: Effort normal and breath sounds normal. No respiratory distress. She has no wheezes. She has no rales. Abdominal: Soft. Bowel sounds are normal. She exhibits no distension and no mass. There is no tenderness. There is no rebound and no guarding. Musculoskeletal: She exhibits no edema. Lymphadenopathy:     She has no cervical adenopathy.    Neurological: She is alert. Skin: Skin is warm and dry. Psychiatric: She has a normal mood and affect. Vitals reviewed. ASSESSMENT:    1. Type 2 diabetes mellitus with diabetic neuropathy, without long-term current use of insulin ([address]    2. Essential hypertension    3. Cerebrovascular accident (CVA) due to thrombosis of cerebral artery ([address]    4. Coronary artery disease involving native coronary artery of native heart without angina pectoris    5. Rheumatoid arthritis involving multiple sites with positive rheumatoid factor (HCC)    6. Hemiplegia and hemiparesis following cerebral infarction affecting right dominant side ([address]        PLAN:    Tameka Urbina was seen today for 3 month follow-up. Diagnoses and all orders for this visit:    Type 2 diabetes mellitus with diabetic neuropathy, without long-term current use of insulin ([address] - DM relatively well controlled and will continue current regimen. Screening reviewed. See orders. W LAST A1C LOW WILL MONITOR FOR ANY HYPOGLYCEMIC SPELLS  -     Hemoglobin A1C  -     Lipid Panel  -     Comprehensive Metabolic Panel    Essential hypertension - Blood pressure stable and will continue current regimen. Will plan periodic monitoring of renal function, electrolytes, lipid profile. Cerebrovascular accident (CVA) due to thrombosis of cerebral artery ([address] - Remains stable neurologically w/o evidence of acute changes/focal deficits. Cont regimen. APPEARS TO CLINICALLY HAVE MADE COMPLETE RECOVERY  -     Lipid Panel  -     Comprehensive Metabolic Panel    Coronary artery disease involving native coronary artery of native heart without angina pectoris - Coronary artery disease stable and asymptomatic, will continue to monitor for any signs of instability. Will periodically monitor lipid profile and liver function, cardiac risk factors.   Continue current medical regimen.      -     Lipid Panel  -     Comprehensive Metabolic Panel    Rheumatoid arthritis involving multiple

## 2018-04-19 DIAGNOSIS — I10 ESSENTIAL HYPERTENSION: ICD-10-CM

## 2018-04-19 DIAGNOSIS — E11.40 TYPE 2 DIABETES MELLITUS WITH DIABETIC NEUROPATHY, WITHOUT LONG-TERM CURRENT USE OF INSULIN (HCC): ICD-10-CM

## 2018-04-19 DIAGNOSIS — I25.10 CORONARY ARTERY DISEASE INVOLVING NATIVE CORONARY ARTERY OF NATIVE HEART WITHOUT ANGINA PECTORIS: ICD-10-CM

## 2018-04-19 RX ORDER — LOSARTAN POTASSIUM 100 MG/1
TABLET ORAL
Qty: 90 TABLET | Refills: 0 | Status: SHIPPED | OUTPATIENT
Start: 2018-04-19 | End: 2018-07-23 | Stop reason: SDUPTHER

## 2018-04-19 RX ORDER — HYDROCHLOROTHIAZIDE 12.5 MG/1
CAPSULE, GELATIN COATED ORAL
Qty: 90 CAPSULE | Refills: 0 | Status: SHIPPED | OUTPATIENT
Start: 2018-04-19 | End: 2018-07-23 | Stop reason: SDUPTHER

## 2018-05-11 RX ORDER — SIMVASTATIN 20 MG
TABLET ORAL
Qty: 90 TABLET | Refills: 0 | Status: SHIPPED | OUTPATIENT
Start: 2018-05-11 | End: 2018-08-18 | Stop reason: SDUPTHER

## 2018-05-23 ENCOUNTER — OFFICE VISIT (OUTPATIENT)
Dept: INTERNAL MEDICINE CLINIC | Age: 78
End: 2018-05-23

## 2018-05-23 VITALS
HEART RATE: 68 BPM | SYSTOLIC BLOOD PRESSURE: 140 MMHG | BODY MASS INDEX: 32.61 KG/M2 | RESPIRATION RATE: 16 BRPM | WEIGHT: 167 LBS | DIASTOLIC BLOOD PRESSURE: 82 MMHG

## 2018-05-23 DIAGNOSIS — I25.10 CORONARY ARTERY DISEASE INVOLVING NATIVE CORONARY ARTERY OF NATIVE HEART WITHOUT ANGINA PECTORIS: ICD-10-CM

## 2018-05-23 DIAGNOSIS — I10 ESSENTIAL HYPERTENSION: ICD-10-CM

## 2018-05-23 DIAGNOSIS — I63.30 CEREBROVASCULAR ACCIDENT (CVA) DUE TO THROMBOSIS OF CEREBRAL ARTERY (HCC): ICD-10-CM

## 2018-05-23 DIAGNOSIS — E11.40 TYPE 2 DIABETES MELLITUS WITH DIABETIC NEUROPATHY, WITHOUT LONG-TERM CURRENT USE OF INSULIN (HCC): Primary | ICD-10-CM

## 2018-05-23 PROCEDURE — G8417 CALC BMI ABV UP PARAM F/U: HCPCS | Performed by: INTERNAL MEDICINE

## 2018-05-23 PROCEDURE — 1036F TOBACCO NON-USER: CPT | Performed by: INTERNAL MEDICINE

## 2018-05-23 PROCEDURE — G8399 PT W/DXA RESULTS DOCUMENT: HCPCS | Performed by: INTERNAL MEDICINE

## 2018-05-23 PROCEDURE — G8598 ASA/ANTIPLAT THER USED: HCPCS | Performed by: INTERNAL MEDICINE

## 2018-05-23 PROCEDURE — 4040F PNEUMOC VAC/ADMIN/RCVD: CPT | Performed by: INTERNAL MEDICINE

## 2018-05-23 PROCEDURE — G8427 DOCREV CUR MEDS BY ELIG CLIN: HCPCS | Performed by: INTERNAL MEDICINE

## 2018-05-23 PROCEDURE — 99214 OFFICE O/P EST MOD 30 MIN: CPT | Performed by: INTERNAL MEDICINE

## 2018-05-23 PROCEDURE — 1123F ACP DISCUSS/DSCN MKR DOCD: CPT | Performed by: INTERNAL MEDICINE

## 2018-05-23 PROCEDURE — 1090F PRES/ABSN URINE INCON ASSESS: CPT | Performed by: INTERNAL MEDICINE

## 2018-07-23 DIAGNOSIS — I63.30 CEREBROVASCULAR ACCIDENT (CVA) DUE TO THROMBOSIS OF CEREBRAL ARTERY (HCC): ICD-10-CM

## 2018-07-23 DIAGNOSIS — E11.40 TYPE 2 DIABETES MELLITUS WITH DIABETIC NEUROPATHY, WITHOUT LONG-TERM CURRENT USE OF INSULIN (HCC): ICD-10-CM

## 2018-07-23 DIAGNOSIS — I10 ESSENTIAL HYPERTENSION: ICD-10-CM

## 2018-07-23 RX ORDER — HYDROCHLOROTHIAZIDE 12.5 MG/1
CAPSULE, GELATIN COATED ORAL
Qty: 90 CAPSULE | Refills: 0 | Status: SHIPPED | OUTPATIENT
Start: 2018-07-23 | End: 2019-02-27

## 2018-07-23 RX ORDER — CLOPIDOGREL BISULFATE 75 MG/1
TABLET ORAL
Qty: 90 TABLET | Refills: 1 | Status: SHIPPED | OUTPATIENT
Start: 2018-07-23 | End: 2018-10-01 | Stop reason: SDUPTHER

## 2018-07-23 RX ORDER — LOSARTAN POTASSIUM 100 MG/1
TABLET ORAL
Qty: 90 TABLET | Refills: 0 | Status: SHIPPED | OUTPATIENT
Start: 2018-07-23 | End: 2018-10-01 | Stop reason: SDUPTHER

## 2018-07-24 DIAGNOSIS — I10 ESSENTIAL HYPERTENSION: ICD-10-CM

## 2018-07-24 DIAGNOSIS — I25.10 CORONARY ARTERY DISEASE INVOLVING NATIVE CORONARY ARTERY OF NATIVE HEART WITHOUT ANGINA PECTORIS: ICD-10-CM

## 2018-08-17 ENCOUNTER — OFFICE VISIT (OUTPATIENT)
Dept: CARDIOLOGY CLINIC | Age: 78
End: 2018-08-17

## 2018-08-17 VITALS
BODY MASS INDEX: 32.59 KG/M2 | WEIGHT: 166 LBS | HEIGHT: 60 IN | HEART RATE: 68 BPM | DIASTOLIC BLOOD PRESSURE: 70 MMHG | SYSTOLIC BLOOD PRESSURE: 148 MMHG

## 2018-08-17 DIAGNOSIS — I65.22 CAROTID STENOSIS, LEFT: ICD-10-CM

## 2018-08-17 DIAGNOSIS — R07.9 CHEST PAIN, UNSPECIFIED TYPE: ICD-10-CM

## 2018-08-17 DIAGNOSIS — I10 ESSENTIAL HYPERTENSION: ICD-10-CM

## 2018-08-17 DIAGNOSIS — I25.10 CORONARY ARTERY DISEASE INVOLVING NATIVE CORONARY ARTERY OF NATIVE HEART WITHOUT ANGINA PECTORIS: Primary | ICD-10-CM

## 2018-08-17 DIAGNOSIS — E78.2 MIXED HYPERLIPIDEMIA: ICD-10-CM

## 2018-08-17 DIAGNOSIS — E11.40 TYPE 2 DIABETES MELLITUS WITH DIABETIC NEUROPATHY, WITHOUT LONG-TERM CURRENT USE OF INSULIN (HCC): ICD-10-CM

## 2018-08-17 PROCEDURE — 99214 OFFICE O/P EST MOD 30 MIN: CPT | Performed by: NURSE PRACTITIONER

## 2018-08-17 PROCEDURE — 93000 ELECTROCARDIOGRAM COMPLETE: CPT | Performed by: NURSE PRACTITIONER

## 2018-08-17 RX ORDER — AMLODIPINE BESYLATE 10 MG/1
10 TABLET ORAL DAILY
Qty: 30 TABLET | Refills: 5 | Status: SHIPPED | OUTPATIENT
Start: 2018-08-17 | End: 2018-10-01 | Stop reason: SDUPTHER

## 2018-08-17 NOTE — PROGRESS NOTES
MG TABS Take 1 tablet by mouth nightly       Calcium Carbonate-Vitamin D (CALCIUM + D) 600-200 MG-UNIT TABS Take 1 tablet by mouth nightly       Omega-3 Fatty Acids (FISH OIL BURP-LESS) 1000 MG CAPS Take 1 tablet by mouth 2 times daily.  Misc Natural Products (OSTEO BI-FLEX JOINT SHIELD PO) Take 1 tablet by mouth 2 times daily       Omeprazole Magnesium (PRILOSEC OTC PO) Take 1 tablet by mouth See Admin Instructions take1 tablet on Sun TUES THURS AND SAT      nitroGLYCERIN (NITROSTAT) 0.4 MG SL tablet Place 0.4 mg under the tongue. Take one tab sublingually at onset, may take every 5 minutes, no more than 3 tabs in 15 mins, as needed        No current facility-administered medications for this visit. Allergies: Actonel [risedronate sodium]; Ciprofloxacin; Darvocet [propoxyphene n-acetaminophen]; Lopid [gemfibrozil]; Mevacor [lovastatin];  Neosporin [neomycin-polymyx-gramicid]; and Pravachol [pravastatin sodium]  Past Medical History:   Diagnosis Date    AK (actinic keratosis)     Arelis Varela Grooms following    CAD (coronary artery disease)     5/2005 S/P PTCA and stents X2 - Dr Jeanne Pack [facility])     skin    Carotid stenosis     Carotid stenosis, left     monitored by Dr Alondra Morrissey- 50% stenosis noted on CTA 10/2016    Coronary artery disease     Dr Alondra Morrissey    Cough secondary to angiotensin converting enzyme inhibitor (ACE-I)     inactive    CTS (carpal tunnel syndrome)     inactive-S/P right CTS surg 4/2001- Dr Mariaa Reyes DDD (degenerative disc disease), cervical     Degenerative disc disease, cervical     Diabetes mellitus with neuropathy ([facility] [address]     Dr Pearson Severe,     GERD without esophagitis     H/O 24 hour EKG monitoring 9/07 9/27/07 SR, max , min HR 47, supraventricular ectopy is in the fashio of a-fib, very short episodes, infrequent vent and SVT ectopy noted    H/O cardiac catheterization 5/05 5/16/05 Circ stent 80% prior 0% post, EF 60%    H/O cardiovascular stress test ejection fraction is approximately  55%. Left ventricle shows mild concentric hypertrophy and grade I diastolic  dysfunction. 2.  Borderline size left atrium. 3.  Mild dilated right ventricle. 4.  Normal valves. 5.  No pericardial effusion. 6.  Doppler evaluation reveals mild mitral regurgitation, trace aortic  insufficiency, mild tricuspid regurgitation, mild pulmonary hypertension.     Hyperlipidemia  Labs noted- HDL 37  LDL 94  triglycerides 291   Cot with fish oil and statin       Patient is encouraged to exercise even a brisk walk for 30 minutes at least 3 to 4 times a week. Lifestyle and risk factor modificatons discussed. Various goals are discussed and questions answered. Continue current medications. Appropriate prescriptions are addressed. Questions answered and patient verbalizes understanding.    Call for any problems, questions, or concerns.

## 2018-08-20 RX ORDER — SIMVASTATIN 20 MG
TABLET ORAL
Qty: 90 TABLET | Refills: 0 | Status: SHIPPED | OUTPATIENT
Start: 2018-08-20 | End: 2018-10-01 | Stop reason: SDUPTHER

## 2018-08-22 DIAGNOSIS — I10 ESSENTIAL HYPERTENSION: ICD-10-CM

## 2018-08-22 DIAGNOSIS — E11.40 TYPE 2 DIABETES MELLITUS WITH DIABETIC NEUROPATHY, WITHOUT LONG-TERM CURRENT USE OF INSULIN (HCC): ICD-10-CM

## 2018-08-22 DIAGNOSIS — I25.10 CORONARY ARTERY DISEASE INVOLVING NATIVE CORONARY ARTERY OF NATIVE HEART WITHOUT ANGINA PECTORIS: ICD-10-CM

## 2018-08-22 DIAGNOSIS — I63.30 CEREBROVASCULAR ACCIDENT (CVA) DUE TO THROMBOSIS OF CEREBRAL ARTERY (HCC): ICD-10-CM

## 2018-08-22 LAB
A/G RATIO: 1.8 (ref 1.1–2.2)
ALBUMIN SERPL-MCNC: 5 G/DL (ref 3.4–5)
ALP BLD-CCNC: 83 U/L (ref 40–129)
ALT SERPL-CCNC: 18 U/L (ref 10–40)
ANION GAP SERPL CALCULATED.3IONS-SCNC: 15 MMOL/L (ref 3–16)
AST SERPL-CCNC: 23 U/L (ref 15–37)
BASOPHILS ABSOLUTE: 0 K/UL (ref 0–0.2)
BASOPHILS RELATIVE PERCENT: 0.7 %
BILIRUB SERPL-MCNC: 0.7 MG/DL (ref 0–1)
BUN BLDV-MCNC: 20 MG/DL (ref 7–20)
CALCIUM SERPL-MCNC: 10.4 MG/DL (ref 8.3–10.6)
CHLORIDE BLD-SCNC: 97 MMOL/L (ref 99–110)
CHOLESTEROL, TOTAL: 184 MG/DL (ref 0–199)
CO2: 27 MMOL/L (ref 21–32)
CREAT SERPL-MCNC: 0.9 MG/DL (ref 0.6–1.2)
CREATININE URINE: 19.9 MG/DL (ref 28–259)
EOSINOPHILS ABSOLUTE: 0.2 K/UL (ref 0–0.6)
EOSINOPHILS RELATIVE PERCENT: 2.5 %
GFR AFRICAN AMERICAN: >60
GFR NON-AFRICAN AMERICAN: >60
GLOBULIN: 2.8 G/DL
GLUCOSE BLD-MCNC: 120 MG/DL (ref 70–99)
HCT VFR BLD CALC: 39.1 % (ref 36–48)
HDLC SERPL-MCNC: 34 MG/DL (ref 40–60)
HEMOGLOBIN: 13.2 G/DL (ref 12–16)
LDL CHOLESTEROL CALCULATED: 94 MG/DL
LYMPHOCYTES ABSOLUTE: 2.1 K/UL (ref 1–5.1)
LYMPHOCYTES RELATIVE PERCENT: 30.5 %
MCH RBC QN AUTO: 31 PG (ref 26–34)
MCHC RBC AUTO-ENTMCNC: 33.8 G/DL (ref 31–36)
MCV RBC AUTO: 91.8 FL (ref 80–100)
MICROALBUMIN UR-MCNC: <1.2 MG/DL
MICROALBUMIN/CREAT UR-RTO: ABNORMAL MG/G (ref 0–30)
MONOCYTES ABSOLUTE: 0.4 K/UL (ref 0–1.3)
MONOCYTES RELATIVE PERCENT: 6.3 %
NEUTROPHILS ABSOLUTE: 4.1 K/UL (ref 1.7–7.7)
NEUTROPHILS RELATIVE PERCENT: 60 %
PDW BLD-RTO: 13.3 % (ref 12.4–15.4)
PLATELET # BLD: 234 K/UL (ref 135–450)
PMV BLD AUTO: 7.6 FL (ref 5–10.5)
POTASSIUM SERPL-SCNC: 5 MMOL/L (ref 3.5–5.1)
RBC # BLD: 4.26 M/UL (ref 4–5.2)
SODIUM BLD-SCNC: 139 MMOL/L (ref 136–145)
TOTAL PROTEIN: 7.8 G/DL (ref 6.4–8.2)
TRIGL SERPL-MCNC: 282 MG/DL (ref 0–150)
VLDLC SERPL CALC-MCNC: 56 MG/DL
WBC # BLD: 6.9 K/UL (ref 4–11)

## 2018-08-23 LAB
ESTIMATED AVERAGE GLUCOSE: 134.1 MG/DL
HBA1C MFR BLD: 6.3 %

## 2018-08-27 ENCOUNTER — OFFICE VISIT (OUTPATIENT)
Dept: INTERNAL MEDICINE CLINIC | Age: 78
End: 2018-08-27

## 2018-08-27 VITALS
RESPIRATION RATE: 16 BRPM | DIASTOLIC BLOOD PRESSURE: 88 MMHG | SYSTOLIC BLOOD PRESSURE: 138 MMHG | BODY MASS INDEX: 32.22 KG/M2 | WEIGHT: 165 LBS | HEART RATE: 68 BPM | OXYGEN SATURATION: 97 %

## 2018-08-27 DIAGNOSIS — R30.0 DYSURIA: ICD-10-CM

## 2018-08-27 DIAGNOSIS — I25.10 CORONARY ARTERY DISEASE INVOLVING NATIVE CORONARY ARTERY OF NATIVE HEART WITHOUT ANGINA PECTORIS: ICD-10-CM

## 2018-08-27 DIAGNOSIS — E11.40 TYPE 2 DIABETES MELLITUS WITH DIABETIC NEUROPATHY, WITHOUT LONG-TERM CURRENT USE OF INSULIN (HCC): Primary | ICD-10-CM

## 2018-08-27 DIAGNOSIS — I10 ESSENTIAL HYPERTENSION: ICD-10-CM

## 2018-08-27 DIAGNOSIS — Z23 NEED FOR IMMUNIZATION AGAINST INFLUENZA: ICD-10-CM

## 2018-08-27 LAB
BILIRUBIN, POC: NORMAL
BLOOD URINE, POC: NORMAL
CLARITY, POC: NORMAL
COLOR, POC: YELLOW
GLUCOSE URINE, POC: NORMAL
KETONES, POC: NORMAL
LEUKOCYTE EST, POC: NORMAL
NITRITE, POC: NORMAL
PH, POC: 7
PROTEIN, POC: NORMAL
SPECIFIC GRAVITY, POC: 1.01
UROBILINOGEN, POC: 0.2

## 2018-08-27 PROCEDURE — 1123F ACP DISCUSS/DSCN MKR DOCD: CPT | Performed by: INTERNAL MEDICINE

## 2018-08-27 PROCEDURE — G8399 PT W/DXA RESULTS DOCUMENT: HCPCS | Performed by: INTERNAL MEDICINE

## 2018-08-27 PROCEDURE — 90662 IIV NO PRSV INCREASED AG IM: CPT | Performed by: INTERNAL MEDICINE

## 2018-08-27 PROCEDURE — 99214 OFFICE O/P EST MOD 30 MIN: CPT | Performed by: INTERNAL MEDICINE

## 2018-08-27 PROCEDURE — G0008 ADMIN INFLUENZA VIRUS VAC: HCPCS | Performed by: INTERNAL MEDICINE

## 2018-08-27 PROCEDURE — 1101F PT FALLS ASSESS-DOCD LE1/YR: CPT | Performed by: INTERNAL MEDICINE

## 2018-08-27 PROCEDURE — G8598 ASA/ANTIPLAT THER USED: HCPCS | Performed by: INTERNAL MEDICINE

## 2018-08-27 PROCEDURE — 1036F TOBACCO NON-USER: CPT | Performed by: INTERNAL MEDICINE

## 2018-08-27 PROCEDURE — G8417 CALC BMI ABV UP PARAM F/U: HCPCS | Performed by: INTERNAL MEDICINE

## 2018-08-27 PROCEDURE — 4040F PNEUMOC VAC/ADMIN/RCVD: CPT | Performed by: INTERNAL MEDICINE

## 2018-08-27 PROCEDURE — 81002 URINALYSIS NONAUTO W/O SCOPE: CPT | Performed by: INTERNAL MEDICINE

## 2018-08-27 PROCEDURE — 1090F PRES/ABSN URINE INCON ASSESS: CPT | Performed by: INTERNAL MEDICINE

## 2018-08-27 PROCEDURE — G8427 DOCREV CUR MEDS BY ELIG CLIN: HCPCS | Performed by: INTERNAL MEDICINE

## 2018-08-27 RX ORDER — SULFAMETHOXAZOLE AND TRIMETHOPRIM 800; 160 MG/1; MG/1
1 TABLET ORAL 2 TIMES DAILY
Qty: 14 TABLET | Refills: 0 | Status: SHIPPED | OUTPATIENT
Start: 2018-08-27 | End: 2018-09-03

## 2018-08-27 RX ORDER — METOPROLOL TARTRATE 50 MG/1
TABLET, FILM COATED ORAL
Qty: 180 TABLET | Refills: 1 | Status: SHIPPED | OUTPATIENT
Start: 2018-08-27 | End: 2018-10-01 | Stop reason: SDUPTHER

## 2018-08-27 NOTE — PROGRESS NOTES
Osmin Leija  1940 08/27/18    SUBJECTIVE:  Past two weeks w lower abd pain and dysuria, no fever, chills, hematuria or LBP. UA pos today. CAD_ stents 13 yrs ago, no sx cp or sob    Hypertension: Stable. Denies CP, SOB, cough, visual changes, dizziness, palpitations or HA. cnp w cardio incr amlodipine to 10mg daily which has helped. RECHECK STILL HIGH 150/84 ON L ARM    DM- STATES NO HYPOGLYCEMIA, CONT ON DIET  Lab Results   Component Value Date    LABA1C 6.3 08/22/2018    LABA1C 6.0 05/16/2018    LABA1C 5.2 12/04/2017     Lab Results   Component Value Date    GLUF 128 (H) 10/14/2016    LABMICR <1.20 08/22/2018    LDLCALC 94 08/22/2018    CREATININE 0.9 08/22/2018   has had small but enlarging cysts noted bilat feet, Dr Damon Net monitoring. OBJECTIVE:    BP (!) 158/76   Pulse 68   Resp 16   Wt 165 lb (74.8 kg)   LMP  (LMP Unknown)   SpO2 97%   BMI 32.22 kg/m²     Physical Exam   Constitutional: She appears well-developed and well-nourished. No distress. HENT:   Head: Normocephalic and atraumatic. Right Ear: External ear normal.   Left Ear: External ear normal.   Nose: Nose normal.   Mouth/Throat: Oropharynx is clear and moist. No oropharyngeal exudate. Eyes: Pupils are equal, round, and reactive to light. Conjunctivae and EOM are normal. Right eye exhibits no discharge. Left eye exhibits no discharge. No scleral icterus. Neck: Neck supple. No tracheal deviation present. Cardiovascular: Normal rate, regular rhythm, normal heart sounds and intact distal pulses. Exam reveals no gallop and no friction rub. No murmur heard. Pulmonary/Chest: Effort normal and breath sounds normal. No respiratory distress. She has no wheezes. She has no rales. Abdominal: Soft. Bowel sounds are normal. She exhibits no distension and no mass. There is no tenderness. There is no rebound and no guarding. Musculoskeletal: She exhibits no edema. Feet:    Sl tender cysts noted, ?tendon cysts? Comprehensive Metabolic Panel;  Future  -     CBC Auto Differential; Future  -     metoprolol tartrate (LOPRESSOR) 50 MG tablet; TAKE 1 TABLET BY MOUTH TWICE DAILY    Need for immunization against influenza  -     INFLUENZA, HIGH DOSE, 65 YRS +, IM, PF, PREFILL SYR, 0.5ML (FLUZONE HD)

## 2018-09-25 ENCOUNTER — OFFICE VISIT (OUTPATIENT)
Dept: INTERNAL MEDICINE CLINIC | Age: 78
End: 2018-09-25
Payer: MEDICARE

## 2018-09-25 VITALS
SYSTOLIC BLOOD PRESSURE: 142 MMHG | DIASTOLIC BLOOD PRESSURE: 67 MMHG | RESPIRATION RATE: 15 BRPM | HEART RATE: 62 BPM | OXYGEN SATURATION: 96 %

## 2018-09-25 DIAGNOSIS — I10 ESSENTIAL HYPERTENSION: Primary | ICD-10-CM

## 2018-09-25 PROCEDURE — 1101F PT FALLS ASSESS-DOCD LE1/YR: CPT | Performed by: INTERNAL MEDICINE

## 2018-09-25 PROCEDURE — G8598 ASA/ANTIPLAT THER USED: HCPCS | Performed by: INTERNAL MEDICINE

## 2018-09-25 PROCEDURE — 1123F ACP DISCUSS/DSCN MKR DOCD: CPT | Performed by: INTERNAL MEDICINE

## 2018-09-25 PROCEDURE — 99213 OFFICE O/P EST LOW 20 MIN: CPT | Performed by: INTERNAL MEDICINE

## 2018-09-25 PROCEDURE — G8417 CALC BMI ABV UP PARAM F/U: HCPCS | Performed by: INTERNAL MEDICINE

## 2018-09-25 PROCEDURE — 1036F TOBACCO NON-USER: CPT | Performed by: INTERNAL MEDICINE

## 2018-09-25 PROCEDURE — G8399 PT W/DXA RESULTS DOCUMENT: HCPCS | Performed by: INTERNAL MEDICINE

## 2018-09-25 PROCEDURE — 1090F PRES/ABSN URINE INCON ASSESS: CPT | Performed by: INTERNAL MEDICINE

## 2018-09-25 PROCEDURE — G8427 DOCREV CUR MEDS BY ELIG CLIN: HCPCS | Performed by: INTERNAL MEDICINE

## 2018-09-25 PROCEDURE — 4040F PNEUMOC VAC/ADMIN/RCVD: CPT | Performed by: INTERNAL MEDICINE

## 2018-09-25 RX ORDER — HYDROCHLOROTHIAZIDE 25 MG/1
25 TABLET ORAL DAILY
Qty: 90 TABLET | Refills: 1 | Status: SHIPPED | OUTPATIENT
Start: 2018-09-25 | End: 2018-10-01 | Stop reason: SDUPTHER

## 2018-09-25 ASSESSMENT — PATIENT HEALTH QUESTIONNAIRE - PHQ9
SUM OF ALL RESPONSES TO PHQ QUESTIONS 1-9: 0
1. LITTLE INTEREST OR PLEASURE IN DOING THINGS: 0
2. FEELING DOWN, DEPRESSED OR HOPELESS: 0
SUM OF ALL RESPONSES TO PHQ9 QUESTIONS 1 & 2: 0
SUM OF ALL RESPONSES TO PHQ QUESTIONS 1-9: 0

## 2018-09-25 NOTE — PROGRESS NOTES
Reina Abelardo  1940 09/25/18    SUBJECTIVE:  9/21 had argument w her grandson w severe stress. Had headaches but no sx cp. Lips when  Numb w some problems word finding. Taken to ED-  Has hx of TIA- bp improved later w rest and additional metoprolol, one more norvasc. Eval/ED note as follows. At the time of signout patient's blood pressure continued to be elevated. Dr. Mone Aguilar had just written for metoprolol. Patient's blood pressure did not and proved with this. Patient was given a 2nd dose of her Norvasc. Her blood pressure responded and patient was feeling better. Discussed symptoms that should prompt outpatient return to the ED. Patient to follow up with PCP for further evaluation and an adjustment of her blood pressure. Patient verbalized understanding. Rocio Pugh Miriam,   09/22/18 0105      Back to top of Miscellaneous Notes  ED Provider Notes - Lupillo Romero MD - 09/21/2018 6:08 PM EDT  Formatting of this note may be different from the original.  History     Chief Complaint   Patient presents with    Hypertension   pt arrives to the ED with slurred seech, unsteady gait intially, elevated blood pressure, this all happened after a family \"dispute\", pt is very flushed, headache, lips were numb, pressure behind the eyes, pt states she feels slightly better now     CC: Worried she might be having a stroke    About an hour ago, the patient was involved in an intense family argument. She was very upset. Noticed she was very flushed, headache was developing behind her eyes, her lips went numb, and then she had trouble finding words. Gait felt a little unsteady. Blood pressure elevated. She had 2 previous TIAs, so family was concerned she might be having another stroke. They brought her here for evaluation. At this time she feels better. She still has a pressure behind her eyes, but her speech is clear and she no longer has any numbness around her lips.  She thinks it was likely because she was so sounds are normal. She exhibits no distension. There is no tenderness. Musculoskeletal: She exhibits no edema. Neurological: She is alert. Psychiatric: She has a normal mood and affect. Vitals reviewed. ASSESSMENT:    1. Essential hypertension        PLAN:    Herminia Gonzalez was seen today for follow-up from hospital and hypertension. Diagnoses and all orders for this visit:    Essential hypertension- BP STILL LABILE, MUCH BETTER AFTER HER AC STRESSFUL EPISODE AND W/O RESIDUAL DEFICITS. FOR BETTER LONG TERM CONTROL WILL INCR HCTZ FR 12.5-25MG DAILY AND REASSESS NEXT VISIT  -     hydrochlorothiazide (HYDRODIURIL) 25 MG tablet;  Take 1 tablet by mouth daily

## 2018-09-25 NOTE — PATIENT INSTRUCTIONS
FOR BP INCREASE HCTZ WATER PILL TO 25MG DAILY  CAN FINISH THE 12.5 MG TAKING TWO A DAY TILL GONE.   CALL US BACK W BPS IN TWO WEEKS

## 2018-10-01 DIAGNOSIS — E11.40 TYPE 2 DIABETES MELLITUS WITH DIABETIC NEUROPATHY, WITHOUT LONG-TERM CURRENT USE OF INSULIN (HCC): ICD-10-CM

## 2018-10-01 DIAGNOSIS — I10 ESSENTIAL HYPERTENSION: ICD-10-CM

## 2018-10-01 DIAGNOSIS — I25.10 CORONARY ARTERY DISEASE INVOLVING NATIVE CORONARY ARTERY OF NATIVE HEART WITHOUT ANGINA PECTORIS: ICD-10-CM

## 2018-10-01 DIAGNOSIS — I63.30 CEREBROVASCULAR ACCIDENT (CVA) DUE TO THROMBOSIS OF CEREBRAL ARTERY (HCC): ICD-10-CM

## 2018-10-01 RX ORDER — METOPROLOL TARTRATE 50 MG/1
TABLET, FILM COATED ORAL
Qty: 180 TABLET | Refills: 1 | Status: SHIPPED | OUTPATIENT
Start: 2018-10-01 | End: 2019-02-27 | Stop reason: SDUPTHER

## 2018-10-01 RX ORDER — LOSARTAN POTASSIUM 100 MG/1
TABLET ORAL
Qty: 90 TABLET | Refills: 1 | Status: SHIPPED | OUTPATIENT
Start: 2018-10-01 | End: 2019-02-27 | Stop reason: SDUPTHER

## 2018-10-01 RX ORDER — HYDROCHLOROTHIAZIDE 25 MG/1
25 TABLET ORAL DAILY
Qty: 90 TABLET | Refills: 1 | Status: SHIPPED | OUTPATIENT
Start: 2018-10-01 | End: 2019-02-27 | Stop reason: SDUPTHER

## 2018-10-01 RX ORDER — SIMVASTATIN 20 MG
TABLET ORAL
Qty: 90 TABLET | Refills: 1 | Status: SHIPPED | OUTPATIENT
Start: 2018-10-01 | End: 2018-10-08 | Stop reason: SDUPTHER

## 2018-10-01 RX ORDER — CLOPIDOGREL BISULFATE 75 MG/1
TABLET ORAL
Qty: 90 TABLET | Refills: 1 | Status: SHIPPED | OUTPATIENT
Start: 2018-10-01 | End: 2019-02-27 | Stop reason: SDUPTHER

## 2018-10-01 RX ORDER — AMLODIPINE BESYLATE 10 MG/1
10 TABLET ORAL DAILY
Qty: 90 TABLET | Refills: 1 | Status: SHIPPED | OUTPATIENT
Start: 2018-10-01 | End: 2019-02-27 | Stop reason: SDUPTHER

## 2018-10-08 DIAGNOSIS — E11.40 TYPE 2 DIABETES MELLITUS WITH DIABETIC NEUROPATHY, WITHOUT LONG-TERM CURRENT USE OF INSULIN (HCC): ICD-10-CM

## 2018-10-08 RX ORDER — SIMVASTATIN 20 MG
TABLET ORAL
Qty: 90 TABLET | Refills: 1 | Status: SHIPPED | OUTPATIENT
Start: 2018-10-08 | End: 2019-02-27 | Stop reason: SDUPTHER

## 2018-11-01 LAB — DIABETIC RETINOPATHY: NEGATIVE

## 2018-11-20 DIAGNOSIS — I25.10 CORONARY ARTERY DISEASE INVOLVING NATIVE CORONARY ARTERY OF NATIVE HEART WITHOUT ANGINA PECTORIS: ICD-10-CM

## 2018-11-20 DIAGNOSIS — E11.40 TYPE 2 DIABETES MELLITUS WITH DIABETIC NEUROPATHY, WITHOUT LONG-TERM CURRENT USE OF INSULIN (HCC): ICD-10-CM

## 2018-11-20 DIAGNOSIS — I10 ESSENTIAL HYPERTENSION: ICD-10-CM

## 2018-11-20 LAB
A/G RATIO: 1.8 (ref 1.1–2.2)
ALBUMIN SERPL-MCNC: 4.9 G/DL (ref 3.4–5)
ALP BLD-CCNC: 73 U/L (ref 40–129)
ALT SERPL-CCNC: 20 U/L (ref 10–40)
ANION GAP SERPL CALCULATED.3IONS-SCNC: 16 MMOL/L (ref 3–16)
AST SERPL-CCNC: 30 U/L (ref 15–37)
BASOPHILS ABSOLUTE: 0 K/UL (ref 0–0.2)
BASOPHILS RELATIVE PERCENT: 0.5 %
BILIRUB SERPL-MCNC: 1.1 MG/DL (ref 0–1)
BUN BLDV-MCNC: 18 MG/DL (ref 7–20)
CALCIUM SERPL-MCNC: 10.2 MG/DL (ref 8.3–10.6)
CHLORIDE BLD-SCNC: 102 MMOL/L (ref 99–110)
CHOLESTEROL, TOTAL: 194 MG/DL (ref 0–199)
CO2: 26 MMOL/L (ref 21–32)
CREAT SERPL-MCNC: 0.9 MG/DL (ref 0.6–1.2)
EOSINOPHILS ABSOLUTE: 0.2 K/UL (ref 0–0.6)
EOSINOPHILS RELATIVE PERCENT: 4.5 %
GFR AFRICAN AMERICAN: >60
GFR NON-AFRICAN AMERICAN: >60
GLOBULIN: 2.8 G/DL
GLUCOSE BLD-MCNC: 135 MG/DL (ref 70–99)
HCT VFR BLD CALC: 38.7 % (ref 36–48)
HDLC SERPL-MCNC: 35 MG/DL (ref 40–60)
HEMOGLOBIN: 13 G/DL (ref 12–16)
LDL CHOLESTEROL CALCULATED: 102 MG/DL
LYMPHOCYTES ABSOLUTE: 1.6 K/UL (ref 1–5.1)
LYMPHOCYTES RELATIVE PERCENT: 35.8 %
MCH RBC QN AUTO: 31.3 PG (ref 26–34)
MCHC RBC AUTO-ENTMCNC: 33.6 G/DL (ref 31–36)
MCV RBC AUTO: 93.2 FL (ref 80–100)
MONOCYTES ABSOLUTE: 0.4 K/UL (ref 0–1.3)
MONOCYTES RELATIVE PERCENT: 7.9 %
NEUTROPHILS ABSOLUTE: 2.3 K/UL (ref 1.7–7.7)
NEUTROPHILS RELATIVE PERCENT: 51.3 %
PDW BLD-RTO: 13.3 % (ref 12.4–15.4)
PLATELET # BLD: 228 K/UL (ref 135–450)
PMV BLD AUTO: 8.1 FL (ref 5–10.5)
POTASSIUM SERPL-SCNC: 4.5 MMOL/L (ref 3.5–5.1)
RBC # BLD: 4.15 M/UL (ref 4–5.2)
SODIUM BLD-SCNC: 144 MMOL/L (ref 136–145)
TOTAL PROTEIN: 7.7 G/DL (ref 6.4–8.2)
TRIGL SERPL-MCNC: 285 MG/DL (ref 0–150)
VLDLC SERPL CALC-MCNC: 57 MG/DL
WBC # BLD: 4.5 K/UL (ref 4–11)

## 2018-11-21 LAB
ESTIMATED AVERAGE GLUCOSE: 139.9 MG/DL
HBA1C MFR BLD: 6.5 %

## 2018-11-27 ENCOUNTER — OFFICE VISIT (OUTPATIENT)
Dept: INTERNAL MEDICINE CLINIC | Age: 78
End: 2018-11-27
Payer: MEDICARE

## 2018-11-27 VITALS
OXYGEN SATURATION: 96 % | WEIGHT: 165 LBS | DIASTOLIC BLOOD PRESSURE: 64 MMHG | RESPIRATION RATE: 14 BRPM | SYSTOLIC BLOOD PRESSURE: 130 MMHG | BODY MASS INDEX: 32.39 KG/M2 | HEIGHT: 60 IN | HEART RATE: 65 BPM

## 2018-11-27 DIAGNOSIS — M81.0 AGE-RELATED OSTEOPOROSIS WITHOUT CURRENT PATHOLOGICAL FRACTURE: ICD-10-CM

## 2018-11-27 DIAGNOSIS — E11.40 TYPE 2 DIABETES MELLITUS WITH DIABETIC NEUROPATHY, WITHOUT LONG-TERM CURRENT USE OF INSULIN (HCC): ICD-10-CM

## 2018-11-27 DIAGNOSIS — Z00.00 ROUTINE GENERAL MEDICAL EXAMINATION AT A HEALTH CARE FACILITY: Primary | ICD-10-CM

## 2018-11-27 DIAGNOSIS — I10 ESSENTIAL HYPERTENSION: ICD-10-CM

## 2018-11-27 DIAGNOSIS — Z12.31 VISIT FOR SCREENING MAMMOGRAM: ICD-10-CM

## 2018-11-27 PROCEDURE — G0439 PPPS, SUBSEQ VISIT: HCPCS | Performed by: INTERNAL MEDICINE

## 2018-11-27 PROCEDURE — G8598 ASA/ANTIPLAT THER USED: HCPCS | Performed by: INTERNAL MEDICINE

## 2018-11-27 PROCEDURE — G8482 FLU IMMUNIZE ORDER/ADMIN: HCPCS | Performed by: INTERNAL MEDICINE

## 2018-11-27 PROCEDURE — 4040F PNEUMOC VAC/ADMIN/RCVD: CPT | Performed by: INTERNAL MEDICINE

## 2018-11-27 ASSESSMENT — LIFESTYLE VARIABLES: HOW OFTEN DO YOU HAVE A DRINK CONTAINING ALCOHOL: 0

## 2018-11-27 ASSESSMENT — PATIENT HEALTH QUESTIONNAIRE - PHQ9
SUM OF ALL RESPONSES TO PHQ QUESTIONS 1-9: 0
SUM OF ALL RESPONSES TO PHQ QUESTIONS 1-9: 0

## 2018-11-27 ASSESSMENT — ANXIETY QUESTIONNAIRES: GAD7 TOTAL SCORE: 0

## 2018-11-27 NOTE — PROGRESS NOTES
Medicare Annual Wellness Visit  Name: Yolis Byrne Date: 2018   MRN: [id number] Sex: Female   Age: 77 y.o. Ethnicity: Non-/Non    : 1940 Race: Clemencia Ferrell is here for Medicare AWV and Otalgia    Screenings for behavioral, psychosocial and functional/safety risks, and cognitive dysfunction are all negative except as indicated below. These results, as well as other patient data from the [address] form, are documented in Flowsheets linked to this Encounter. Dm- sugars stable, sl higher w holidays. Lab Results   Component Value Date    LABA1C 6.5 2018    LABA1C 6.3 2018    LABA1C 6.0 2018     Lab Results   Component Value Date    GLUF 128 (H) 10/14/2016    LABMICR <1.20 2018    LDLCALC 102 (H) 2018    CREATININE 0.9 2018     Hypertension: Stable. Denies CP, SOB, cough, visual changes, dizziness, palpitations or HA. Due for mammogram and dexa in     Allergies   Allergen Reactions    Actonel [Risedronate Sodium]      Abdominal pain    Ciprofloxacin      nausea    Darvocet [Propoxyphene N-Acetaminophen]      Lightheaded    Lopid [Gemfibrozil] Other (See Comments)     Leg cramping    Mevacor [Lovastatin] Other (See Comments)     Leg cramping    Neosporin [Neomycin-Polymyx-Gramicid] Other (See Comments)     Pt states the her skin gets welts/streaks    Pravachol [Pravastatin Sodium] Other (See Comments)     Leg cramping     Prior to Visit Medications    Medication Sig Taking?  Authorizing Provider   simvastatin (ZOCOR) 20 MG tablet TAKE 1 TABLET BY MOUTH IN THE EVENING Yes Kylie Clemens MD   metFORMIN (GLUCOPHAGE) 500 MG tablet Take 1 tablet by mouth daily Yes Kylie Clemens MD   metoprolol tartrate (LOPRESSOR) 50 MG tablet TAKE 1 TABLET BY MOUTH TWICE DAILY Yes Kylie Clemens MD   amLODIPine (NORVASC) 10 MG tablet Take 1 tablet by mouth daily Yes Kylie Clemens MD   clopidogrel (PLAVIX) distress  Skin: warm and dry, no rash or erythema  Head: normocephalic and atraumatic  Eyes: pupils equal, round, and reactive to light, extraocular eye movements intact, conjunctivae normal  ENT: -BILAT CANALS AND TMS CLEAR nose without deformity, nasal mucosa and turbinates normal without polyps  Neck: supple and non-tender without mass, no thyromegaly or thyroid nodules, no cervical lymphadenopathy  Pulmonary/Chest: clear to auscultation bilaterally- no wheezes, rales or rhonchi, normal air movement, no respiratory distress  Cardiovascular: normal rate, regular rhythm, normal S1 and S2, no murmurs, rubs, clicks, or gallops, distal pulses intact, no carotid bruits  Abdomen: soft, non-tender, non-distended, normal bowel sounds, no masses or organomegaly  Extremities: no cyanosis, clubbing or edema  Musculoskeletal: normal range of motion, no joint swelling, deformity or tenderness  Neurologic: no cranial nerve deficit, gait, coordination and speech normal    Patient's complete Health Risk Assessment and screening values have been reviewed and are found in Flowsheets. The following problems were reviewed today and where indicated follow up appointments were made and/or referrals ordered. Positive Risk Factor Screenings with Interventions:     Health Habits/Nutrition:  Health Habits/Nutrition  Do you exercise for at least 20 minutes 2-3 times per week?: (!) No  Have you lost any weight without trying in the past 3 months?: No  Do you eat fewer than 2 meals per day?: No  Have you seen a dentist within the past year?: Yes  Body mass index is 32.22 kg/m².   Health Habits/Nutrition Interventions:  · to consider using her exercise equipment at home 20-30min/day    Personalized Preventive Plan   Current Health Maintenance Status  Immunization History   Administered Date(s) Administered    Influenza Vaccine, unspecified formulation 10/06/2016    Influenza Virus Vaccine 09/25/2012    Influenza Whole 10/09/2015   

## 2019-01-04 ENCOUNTER — HOSPITAL ENCOUNTER (OUTPATIENT)
Age: 79
Setting detail: SPECIMEN
Discharge: HOME | End: 2019-01-04
Payer: MEDICARE

## 2019-01-04 PROCEDURE — 87186 SC STD MICRODIL/AGAR DIL: CPT

## 2019-01-04 PROCEDURE — 87077 CULTURE AEROBIC IDENTIFY: CPT

## 2019-01-04 PROCEDURE — 87086 URINE CULTURE/COLONY COUNT: CPT

## 2019-01-07 LAB
CULTURE: NORMAL
Lab: NORMAL
ORGANISM: NORMAL
REPORT STATUS: NORMAL
SPECIMEN: NORMAL
TOTAL COLONY COUNT: NORMAL

## 2019-02-18 ENCOUNTER — HOSPITAL ENCOUNTER (OUTPATIENT)
Dept: WOMENS IMAGING | Age: 79
Discharge: HOME | End: 2019-02-18
Payer: MEDICARE

## 2019-02-18 DIAGNOSIS — Z12.31 ENCOUNTER FOR SCREENING MAMMOGRAM FOR BREAST CANCER: ICD-10-CM

## 2019-02-18 DIAGNOSIS — M81.0 AGE-RELATED OSTEOPOROSIS WITHOUT CURRENT PATHOLOGICAL FRACTURE: ICD-10-CM

## 2019-02-18 PROCEDURE — 77067 SCR MAMMO BI INCL CAD: CPT

## 2019-02-18 PROCEDURE — 77080 DXA BONE DENSITY AXIAL: CPT

## 2019-02-22 DIAGNOSIS — E11.40 TYPE 2 DIABETES MELLITUS WITH DIABETIC NEUROPATHY, WITHOUT LONG-TERM CURRENT USE OF INSULIN (HCC): ICD-10-CM

## 2019-02-22 DIAGNOSIS — I10 ESSENTIAL HYPERTENSION: ICD-10-CM

## 2019-02-22 LAB
A/G RATIO: 1.7 (ref 1.1–2.2)
ALBUMIN SERPL-MCNC: 4.7 G/DL (ref 3.4–5)
ALP BLD-CCNC: 68 U/L (ref 40–129)
ALT SERPL-CCNC: 19 U/L (ref 10–40)
ANION GAP SERPL CALCULATED.3IONS-SCNC: 17 MMOL/L (ref 3–16)
AST SERPL-CCNC: 21 U/L (ref 15–37)
BASOPHILS ABSOLUTE: 0 K/UL (ref 0–0.2)
BASOPHILS RELATIVE PERCENT: 0.4 %
BILIRUB SERPL-MCNC: 0.6 MG/DL (ref 0–1)
BUN BLDV-MCNC: 21 MG/DL (ref 7–20)
CALCIUM SERPL-MCNC: 10 MG/DL (ref 8.3–10.6)
CHLORIDE BLD-SCNC: 98 MMOL/L (ref 99–110)
CHOLESTEROL, TOTAL: 152 MG/DL (ref 0–199)
CO2: 26 MMOL/L (ref 21–32)
CREAT SERPL-MCNC: 0.8 MG/DL (ref 0.6–1.2)
CREATININE URINE: 40.5 MG/DL (ref 28–259)
EOSINOPHILS ABSOLUTE: 0.2 K/UL (ref 0–0.6)
EOSINOPHILS RELATIVE PERCENT: 3.4 %
GFR AFRICAN AMERICAN: >60
GFR NON-AFRICAN AMERICAN: >60
GLOBULIN: 2.8 G/DL
GLUCOSE BLD-MCNC: 128 MG/DL (ref 70–99)
HCT VFR BLD CALC: 39.7 % (ref 36–48)
HDLC SERPL-MCNC: 32 MG/DL (ref 40–60)
HEMOGLOBIN: 13.2 G/DL (ref 12–16)
LDL CHOLESTEROL CALCULATED: 77 MG/DL
LYMPHOCYTES ABSOLUTE: 2 K/UL (ref 1–5.1)
LYMPHOCYTES RELATIVE PERCENT: 35.7 %
MCH RBC QN AUTO: 30.8 PG (ref 26–34)
MCHC RBC AUTO-ENTMCNC: 33.3 G/DL (ref 31–36)
MCV RBC AUTO: 92.7 FL (ref 80–100)
MICROALBUMIN UR-MCNC: 1.4 MG/DL
MICROALBUMIN/CREAT UR-RTO: 34.6 MG/G (ref 0–30)
MONOCYTES ABSOLUTE: 0.4 K/UL (ref 0–1.3)
MONOCYTES RELATIVE PERCENT: 7.7 %
NEUTROPHILS ABSOLUTE: 2.9 K/UL (ref 1.7–7.7)
NEUTROPHILS RELATIVE PERCENT: 52.8 %
PDW BLD-RTO: 12.5 % (ref 12.4–15.4)
PLATELET # BLD: 259 K/UL (ref 135–450)
PMV BLD AUTO: 8 FL (ref 5–10.5)
POTASSIUM SERPL-SCNC: 4.6 MMOL/L (ref 3.5–5.1)
RBC # BLD: 4.28 M/UL (ref 4–5.2)
SODIUM BLD-SCNC: 141 MMOL/L (ref 136–145)
TOTAL PROTEIN: 7.5 G/DL (ref 6.4–8.2)
TRIGL SERPL-MCNC: 213 MG/DL (ref 0–150)
VLDLC SERPL CALC-MCNC: 43 MG/DL
WBC # BLD: 5.6 K/UL (ref 4–11)

## 2019-02-23 LAB
ESTIMATED AVERAGE GLUCOSE: 134.1 MG/DL
HBA1C MFR BLD: 6.3 %

## 2019-02-27 ENCOUNTER — OFFICE VISIT (OUTPATIENT)
Dept: INTERNAL MEDICINE CLINIC | Age: 79
End: 2019-02-27
Payer: MEDICARE

## 2019-02-27 VITALS
RESPIRATION RATE: 16 BRPM | WEIGHT: 161 LBS | BODY MASS INDEX: 31.44 KG/M2 | OXYGEN SATURATION: 96 % | DIASTOLIC BLOOD PRESSURE: 64 MMHG | SYSTOLIC BLOOD PRESSURE: 132 MMHG | HEART RATE: 68 BPM

## 2019-02-27 DIAGNOSIS — M05.79 RHEUMATOID ARTHRITIS INVOLVING MULTIPLE SITES WITH POSITIVE RHEUMATOID FACTOR (HCC): ICD-10-CM

## 2019-02-27 DIAGNOSIS — E11.21 DIABETIC NEPHROPATHY ASSOCIATED WITH TYPE 2 DIABETES MELLITUS (HCC): ICD-10-CM

## 2019-02-27 DIAGNOSIS — I25.10 CORONARY ARTERY DISEASE INVOLVING NATIVE CORONARY ARTERY OF NATIVE HEART WITHOUT ANGINA PECTORIS: ICD-10-CM

## 2019-02-27 DIAGNOSIS — I63.30 CEREBROVASCULAR ACCIDENT (CVA) DUE TO THROMBOSIS OF CEREBRAL ARTERY (HCC): ICD-10-CM

## 2019-02-27 DIAGNOSIS — E11.40 TYPE 2 DIABETES MELLITUS WITH DIABETIC NEUROPATHY, WITHOUT LONG-TERM CURRENT USE OF INSULIN (HCC): Primary | ICD-10-CM

## 2019-02-27 DIAGNOSIS — I10 ESSENTIAL HYPERTENSION: ICD-10-CM

## 2019-02-27 PROCEDURE — G8417 CALC BMI ABV UP PARAM F/U: HCPCS | Performed by: INTERNAL MEDICINE

## 2019-02-27 PROCEDURE — 99214 OFFICE O/P EST MOD 30 MIN: CPT | Performed by: INTERNAL MEDICINE

## 2019-02-27 PROCEDURE — 1123F ACP DISCUSS/DSCN MKR DOCD: CPT | Performed by: INTERNAL MEDICINE

## 2019-02-27 PROCEDURE — 4040F PNEUMOC VAC/ADMIN/RCVD: CPT | Performed by: INTERNAL MEDICINE

## 2019-02-27 PROCEDURE — 1101F PT FALLS ASSESS-DOCD LE1/YR: CPT | Performed by: INTERNAL MEDICINE

## 2019-02-27 PROCEDURE — G8482 FLU IMMUNIZE ORDER/ADMIN: HCPCS | Performed by: INTERNAL MEDICINE

## 2019-02-27 PROCEDURE — G8399 PT W/DXA RESULTS DOCUMENT: HCPCS | Performed by: INTERNAL MEDICINE

## 2019-02-27 PROCEDURE — G8598 ASA/ANTIPLAT THER USED: HCPCS | Performed by: INTERNAL MEDICINE

## 2019-02-27 PROCEDURE — G8427 DOCREV CUR MEDS BY ELIG CLIN: HCPCS | Performed by: INTERNAL MEDICINE

## 2019-02-27 PROCEDURE — 1090F PRES/ABSN URINE INCON ASSESS: CPT | Performed by: INTERNAL MEDICINE

## 2019-02-27 PROCEDURE — 1036F TOBACCO NON-USER: CPT | Performed by: INTERNAL MEDICINE

## 2019-02-27 RX ORDER — LOSARTAN POTASSIUM 100 MG/1
TABLET ORAL
Qty: 90 TABLET | Refills: 1 | Status: SHIPPED | OUTPATIENT
Start: 2019-02-27 | End: 2019-08-22 | Stop reason: SDUPTHER

## 2019-02-27 RX ORDER — CLOPIDOGREL BISULFATE 75 MG/1
TABLET ORAL
Qty: 90 TABLET | Refills: 1 | Status: SHIPPED | OUTPATIENT
Start: 2019-02-27 | End: 2019-08-22 | Stop reason: SDUPTHER

## 2019-02-27 RX ORDER — HYDRALAZINE HYDROCHLORIDE 10 MG/1
10 TABLET, FILM COATED ORAL 2 TIMES DAILY
Qty: 180 TABLET | Refills: 1 | Status: SHIPPED | OUTPATIENT
Start: 2019-02-27 | End: 2019-05-13 | Stop reason: SDUPTHER

## 2019-02-27 RX ORDER — AMLODIPINE BESYLATE 10 MG/1
10 TABLET ORAL DAILY
Qty: 90 TABLET | Refills: 1 | Status: SHIPPED | OUTPATIENT
Start: 2019-02-27 | End: 2019-08-22 | Stop reason: SDUPTHER

## 2019-02-27 RX ORDER — SIMVASTATIN 20 MG
TABLET ORAL
Qty: 90 TABLET | Refills: 1 | Status: SHIPPED | OUTPATIENT
Start: 2019-02-27 | End: 2019-08-22 | Stop reason: SDUPTHER

## 2019-02-27 RX ORDER — HYDROCHLOROTHIAZIDE 25 MG/1
25 TABLET ORAL DAILY
Qty: 90 TABLET | Refills: 1 | Status: SHIPPED | OUTPATIENT
Start: 2019-02-27 | End: 2019-05-09 | Stop reason: SDUPTHER

## 2019-02-27 RX ORDER — METOPROLOL TARTRATE 50 MG/1
TABLET, FILM COATED ORAL
Qty: 180 TABLET | Refills: 1 | Status: SHIPPED | OUTPATIENT
Start: 2019-02-27 | End: 2019-08-22 | Stop reason: SDUPTHER

## 2019-02-27 ASSESSMENT — PATIENT HEALTH QUESTIONNAIRE - PHQ9
2. FEELING DOWN, DEPRESSED OR HOPELESS: 0
1. LITTLE INTEREST OR PLEASURE IN DOING THINGS: 0
SUM OF ALL RESPONSES TO PHQ9 QUESTIONS 1 & 2: 0
SUM OF ALL RESPONSES TO PHQ QUESTIONS 1-9: 0
SUM OF ALL RESPONSES TO PHQ QUESTIONS 1-9: 0

## 2019-04-15 ENCOUNTER — OFFICE VISIT (OUTPATIENT)
Dept: INTERNAL MEDICINE CLINIC | Age: 79
End: 2019-04-15
Payer: MEDICARE

## 2019-04-15 VITALS
WEIGHT: 164.6 LBS | RESPIRATION RATE: 22 BRPM | BODY MASS INDEX: 32.31 KG/M2 | DIASTOLIC BLOOD PRESSURE: 80 MMHG | HEIGHT: 60 IN | SYSTOLIC BLOOD PRESSURE: 150 MMHG | OXYGEN SATURATION: 94 % | HEART RATE: 75 BPM

## 2019-04-15 DIAGNOSIS — I10 ESSENTIAL HYPERTENSION: Primary | ICD-10-CM

## 2019-04-15 PROCEDURE — 1090F PRES/ABSN URINE INCON ASSESS: CPT | Performed by: INTERNAL MEDICINE

## 2019-04-15 PROCEDURE — G8399 PT W/DXA RESULTS DOCUMENT: HCPCS | Performed by: INTERNAL MEDICINE

## 2019-04-15 PROCEDURE — G8417 CALC BMI ABV UP PARAM F/U: HCPCS | Performed by: INTERNAL MEDICINE

## 2019-04-15 PROCEDURE — 99213 OFFICE O/P EST LOW 20 MIN: CPT | Performed by: INTERNAL MEDICINE

## 2019-04-15 PROCEDURE — 4040F PNEUMOC VAC/ADMIN/RCVD: CPT | Performed by: INTERNAL MEDICINE

## 2019-04-15 PROCEDURE — G8427 DOCREV CUR MEDS BY ELIG CLIN: HCPCS | Performed by: INTERNAL MEDICINE

## 2019-04-15 PROCEDURE — 1123F ACP DISCUSS/DSCN MKR DOCD: CPT | Performed by: INTERNAL MEDICINE

## 2019-04-15 PROCEDURE — G8598 ASA/ANTIPLAT THER USED: HCPCS | Performed by: INTERNAL MEDICINE

## 2019-04-15 PROCEDURE — 1036F TOBACCO NON-USER: CPT | Performed by: INTERNAL MEDICINE

## 2019-04-15 NOTE — PATIENT INSTRUCTIONS
Restart hydrochlorothiazide 25mg daily and CONTINUE hydralazine 10mg twice/day    CALL US W YOUR BP IN ONE WEEK

## 2019-04-15 NOTE — PROGRESS NOTES
Alicja Beltran  1940  04/15/19    SUBJECTIVE:  HTN- bp is high ranging ~150-170 at home and some headaches noted at times, no cp or sob. Legs swell at times. Been off hctz but she thought I'd wanted to stop, I'd wanted to cont this. OBJECTIVE:    BP (!) 150/80   Pulse 75   Resp 22   Ht 5' (1.524 m)   Wt 164 lb 9.6 oz (74.7 kg)   LMP  (LMP Unknown)   SpO2 94%   BMI 32.15 kg/m²     Physical Exam   Constitutional: She appears well-developed and well-nourished. Neck: Neck supple. Cardiovascular: Normal rate, regular rhythm and normal heart sounds. Exam reveals no gallop and no friction rub. No murmur heard. Pulmonary/Chest: Effort normal and breath sounds normal. No respiratory distress. She has no wheezes. She has no rales. Abdominal: Soft. Bowel sounds are normal. She exhibits no distension. There is no tenderness. Musculoskeletal: She exhibits no edema. Neurological: She is alert. Psychiatric: She has a normal mood and affect. Vitals reviewed. ASSESSMENT:    1. Essential hypertension        PLAN:  BP LIKELY HIGH FR STOPPING HCTZ. CONT HYDRAL AND RESTART HCTZ, TO CALL BACK W BP ONE WEEK AND KEEP NEXT APPT AS WELL    Julieta Harper was seen today for hypertension.     Diagnoses and all orders for this visit:    Essential hypertension

## 2019-04-23 ENCOUNTER — TELEPHONE (OUTPATIENT)
Dept: INTERNAL MEDICINE CLINIC | Age: 79
End: 2019-04-23

## 2019-04-23 NOTE — TELEPHONE ENCOUNTER
Pt called and stated that her blood pressure seems to be doing better. Pt stated that her blood pressure this morning was 128/60 and she feels that the medication changes have helped a lot.

## 2019-05-09 DIAGNOSIS — I10 ESSENTIAL HYPERTENSION: ICD-10-CM

## 2019-05-09 RX ORDER — HYDROCHLOROTHIAZIDE 25 MG/1
25 TABLET ORAL DAILY
Qty: 90 TABLET | Refills: 1 | Status: SHIPPED | OUTPATIENT
Start: 2019-05-09 | End: 2019-08-22 | Stop reason: SDUPTHER

## 2019-05-10 ENCOUNTER — OFFICE VISIT (OUTPATIENT)
Dept: CARDIOLOGY CLINIC | Age: 79
End: 2019-05-10
Payer: MEDICARE

## 2019-05-10 VITALS
DIASTOLIC BLOOD PRESSURE: 64 MMHG | WEIGHT: 164.8 LBS | SYSTOLIC BLOOD PRESSURE: 130 MMHG | HEART RATE: 64 BPM | BODY MASS INDEX: 32.35 KG/M2 | HEIGHT: 60 IN

## 2019-05-10 DIAGNOSIS — I25.10 CORONARY ARTERY DISEASE INVOLVING NATIVE CORONARY ARTERY OF NATIVE HEART WITHOUT ANGINA PECTORIS: Primary | ICD-10-CM

## 2019-05-10 DIAGNOSIS — I10 ESSENTIAL HYPERTENSION: ICD-10-CM

## 2019-05-10 DIAGNOSIS — E78.2 MIXED HYPERLIPIDEMIA: ICD-10-CM

## 2019-05-10 PROCEDURE — G8399 PT W/DXA RESULTS DOCUMENT: HCPCS | Performed by: NURSE PRACTITIONER

## 2019-05-10 PROCEDURE — 4040F PNEUMOC VAC/ADMIN/RCVD: CPT | Performed by: NURSE PRACTITIONER

## 2019-05-10 PROCEDURE — G8417 CALC BMI ABV UP PARAM F/U: HCPCS | Performed by: NURSE PRACTITIONER

## 2019-05-10 PROCEDURE — 1036F TOBACCO NON-USER: CPT | Performed by: NURSE PRACTITIONER

## 2019-05-10 PROCEDURE — G8427 DOCREV CUR MEDS BY ELIG CLIN: HCPCS | Performed by: NURSE PRACTITIONER

## 2019-05-10 PROCEDURE — 1123F ACP DISCUSS/DSCN MKR DOCD: CPT | Performed by: NURSE PRACTITIONER

## 2019-05-10 PROCEDURE — 1090F PRES/ABSN URINE INCON ASSESS: CPT | Performed by: NURSE PRACTITIONER

## 2019-05-10 PROCEDURE — 99213 OFFICE O/P EST LOW 20 MIN: CPT | Performed by: NURSE PRACTITIONER

## 2019-05-10 PROCEDURE — G8598 ASA/ANTIPLAT THER USED: HCPCS | Performed by: NURSE PRACTITIONER

## 2019-05-10 ASSESSMENT — ENCOUNTER SYMPTOMS
BACK PAIN: 0
COUGH: 0
ABDOMINAL DISTENTION: 0
CONSTIPATION: 0
SINUS PRESSURE: 0
EYE REDNESS: 0
SINUS PAIN: 0
EYE ITCHING: 0
VOMITING: 0
ABDOMINAL PAIN: 0
SHORTNESS OF BREATH: 0
NAUSEA: 0
DIARRHEA: 0
CHEST TIGHTNESS: 0

## 2019-05-10 NOTE — PROGRESS NOTES
CARDIOLOGY  NOTE      5/10/2019    RE: Dari Payton  (1940)                               TO:  Dr. Rosanna Staples MD  The primary cardiologist is Dr Jo Curran:  Denies the following; Chest Pain  Palpitations  Shortness of Breath  Edema  Dizziness  Syncope    Here for follow up on CAD, HTN and HLD    HPI: Thank you for involving me in taking care of your patient Dari Payton, who is a  77y.o. year old female with a history of CAD, HTN, HLD. She is seen today for a 9 follow up on CAD, HTN, HLD. She during this visit  denies chest pain, palpitations, shortness of breath, edema, dizziness or syncope. She reports she is feeling well. She states she and Dr Paddy Ma are working on her blood pressure. Vitals:    05/10/19 1155   BP: 130/64   Pulse: 64       Current Outpatient Medications   Medication Sig Dispense Refill    hydrochlorothiazide (HYDRODIURIL) 25 MG tablet Take 1 tablet by mouth daily 90 tablet 1    simvastatin (ZOCOR) 20 MG tablet TAKE 1 TABLET BY MOUTH IN THE EVENING 90 tablet 1    metFORMIN (GLUCOPHAGE) 500 MG tablet Take 1 tablet by mouth daily 90 tablet 1    metoprolol tartrate (LOPRESSOR) 50 MG tablet TAKE 1 TABLET BY MOUTH TWICE DAILY 180 tablet 1    amLODIPine (NORVASC) 10 MG tablet Take 1 tablet by mouth daily 90 tablet 1    clopidogrel (PLAVIX) 75 MG tablet TAKE ONE TABLET BY MOUTH ONCE DAILY 90 tablet 1    losartan (COZAAR) 100 MG tablet TAKE 1 TABLET BY MOUTH ONCE DAILY 90 tablet 1    hydrALAZINE (APRESOLINE) 10 MG tablet Take 1 tablet by mouth 2 times daily 180 tablet 1    Acetaminophen (TYLENOL ARTHRITIS PAIN PO) Take 2 tablets by mouth 2 times daily      fluticasone (FLONASE) 50 MCG/ACT nasal spray 2 sprays by Nasal route daily into each nostril every day 1 Bottle 3    aspirin 81 MG tablet Take 81 mg by mouth 2 times daily       multivitamin (THERAGRAN) per tablet Take 1 tablet by mouth daily.         Potassium 99 MG TABS Take 1 tablet by mouth nightly       Calcium Carbonate-Vitamin D (CALCIUM + D) 600-200 MG-UNIT TABS Take 1 tablet by mouth nightly       Omega-3 Fatty Acids (FISH OIL BURP-LESS) 1000 MG CAPS Take 1 tablet by mouth 2 times daily.  Omeprazole Magnesium (PRILOSEC OTC PO) Take 1 tablet by mouth See Admin Instructions take1 tablet on Sun TUES THURS AND SAT       No current facility-administered medications for this visit. Allergies: Actonel [risedronate sodium]; Ciprofloxacin; Darvocet [propoxyphene n-acetaminophen]; Lopid [gemfibrozil]; Mevacor [lovastatin]; Neosporin [neomycin-polymyx-gramicid]; and Pravachol [pravastatin sodium]  Past Medical History:   Diagnosis Date    AK (actinic keratosis)     [facility] Grooms following    CAD (coronary artery disease)     5/2005 S/P PTCA and stents X2 - Dr Pickens [facility])     skin    Carotid stenosis     Carotid stenosis, left     monitored by Dr Herminia Saeed- 50% stenosis noted on CTA 10/2016    Coronary artery disease     Dr Herminia Saeed    Cough secondary to angiotensin converting enzyme inhibitor (ACE-I)     inactive    CTS (carpal tunnel syndrome)     inactive-S/P right CTS surg 4/2001- Dr Eze Green DDD (degenerative disc disease), cervical     Degenerative disc disease, cervical     Diabetes mellitus with neuropathy ([address]     Dr Buckner Side,     GERD without esophagitis     H/O 24 hour EKG monitoring 9/07 9/27/07 SR, max , min HR 47, supraventricular ectopy is in the fashio of a-fib, very short episodes, infrequent vent and SVT ectopy noted    H/O cardiac catheterization 5/05 5/16/05 Circ stent 80% prior 0% post, EF 60%    H/O cardiovascular stress test 3/29/10,   9/08,    3/29/10 post stress myocardial perfusion show norm pattern of perfusion in all regions, post left vent is norm, rest EF 60%, LV systolic  is norm, vent func preserved,     H/O cardiovascular stress test 9/11/14    EF70% Normal study.      H/O cardiovascular stress test 2/12/2016    lexiscan-normal,70%    H/O Doppler ultrasound 10/2011, 8/09    carotid 10/4/11 moderat stenosis left internal carotid atery est 50-69%, progression in stenotic changes left internal carotid artery, right WNL    H/O echocardiogram 10/2011, 4/09    53/62/67ACDLNWUI AR , LV systolic function WNL, EF 55%    H/O echocardiogram 9/11/14    EF 60%. Mild aortic insufficiency.  H/O echocardiogram 2/12/2016    EF 55% mild lvh, stage 1 diast dysf, mild PI    H/O mammogram     1/16- recheck 1/17    H/O tilt table evaluation  7/09 7/20/09 WNL    Hyperlipidemia     Hypertension     IFG (impaired fasting glucose)     mother w hx of DM    Memory loss     MMSE 23/30-- 11/15/13    Neuropathy     Osteoarthritis, knee     Peripheral neuropathy     burning discomfort in feet.  Postsurgical menopause     Rheumatoid arthritis(714.0)     SCC (squamous cell carcinoma), leg     Dr Arturo Fierro removed fr legs 10/16    Thyroid nodule     on u/s 10/2016, recheck May 2017- stable--- RECHECK MAY 2018 RECOMMENDED    TMJ (temporomandibular joint syndrome)     Unspecified cerebral artery occlusion with cerebral infarction     2016-Right hemiparesis, aphasia, dysphagia, gait disturbance,     Past Surgical History:   Procedure Laterality Date    BREAST BIOPSY  1993    bilateral    CARPAL TUNNEL RELEASE      Right wrist - 4/2011-Dr Arturo Fierro    CATARACT REMOVAL WITH IMPLANT Left 02/06/2017    Dr Maximiliano Siddiqui    umbilical   [address]    Right thumb- giant cell tumor    HYSTERECTOMY, TOTAL ABDOMINAL  1963    KNEE ARTHROSCOPY  2/21/2012    Left knee- Dr Mati Strong ARTHROSCOPY Left 9/23/14    Dr Rodríguez Noriega    ?     PTCA  5/2005    PTCA with stent X2 - Dr Rahul Fall  10/16/2013    both legs    TONSILLECTOMY  1972    TOTAL KNEE ARTHROPLASTY Left 7/28/15    Dr Seema Gaston Left 11/14/2017    Dr Pam Bruner at [facility] AND [facility] [place] Nataly Palmer - Dr Kevin Sheth WRIST SURGERY  2011     Family History   Problem Relation Age of Onset    Coronary Art Dis Mother          of [facility] Diabetes Mother     Heart Attack Mother     Hypertension Mother     Cancer Father     Thyroid Disease Daughter         hypothyroid    Diabetes Brother      Social History     Tobacco Use    Smoking status: Former Smoker     Types: Cigarettes     Last attempt to quit: 1967     Years since quittin.3    Smokeless tobacco: Never Used    Tobacco comment: 2/10/16   Substance Use Topics    Alcohol use: No        Review of Systems   Constitutional: Negative for activity change, appetite change and fatigue. HENT: Negative for congestion, sinus pressure and sinus pain. Eyes: Negative for redness and itching. Respiratory: Negative for cough, chest tightness and shortness of breath. Cardiovascular: Negative for chest pain and palpitations. Gastrointestinal: Negative for abdominal distention, abdominal pain, constipation, diarrhea, nausea and vomiting. Genitourinary: Negative for dyspareunia. Musculoskeletal: Positive for arthralgias. Negative for back pain and gait problem. Neurological: Negative for dizziness, syncope and light-headedness. Psychiatric/Behavioral: Negative for agitation and confusion. The patient is not nervous/anxious. Objective:      Physical Exam:  /64   Pulse 64   Ht 5' (1.524 m)   Wt 164 lb 12.8 oz (74.8 kg)   LMP  (LMP Unknown)   BMI 32.19 kg/m²   Wt Readings from Last 3 Encounters:   05/10/19 164 lb 12.8 oz (74.8 kg)   04/15/19 164 lb 9.6 oz (74.7 kg)   19 161 lb (73 kg)     Body mass index is 32.19 kg/m². Physical Exam   Constitutional: She is oriented to person, place, and time. She appears well-developed and well-nourished. HENT:   Head: Normocephalic and atraumatic. Eyes: Pupils are equal, round, and reactive to light.  Conjunctivae are normal. Right eye exhibits no discharge. Left eye exhibits no discharge. Neck: Normal range of motion. No JVD present. No thyromegaly present. Cardiovascular: Normal rate, regular rhythm, normal heart sounds and intact distal pulses. Exam reveals no gallop and no friction rub. No murmur heard. Pulmonary/Chest: Effort normal and breath sounds normal. No respiratory distress. She has no wheezes. She has no rales. Abdominal: Soft. Bowel sounds are normal. She exhibits no distension. There is no guarding. Musculoskeletal: She exhibits no edema or deformity. Neurological: She is alert and oriented to person, place, and time. Skin: Skin is warm and dry. Psychiatric: Judgment and thought content normal.       DATA:  No results found for: CKTOTAL, CKMB, CKMBINDEX, TROPONINI  BNP:  No results found for: BNP  PT/INR:  No results found for: PTINR  Lab Results   Component Value Date    LABA1C 6.3 02/22/2019    LABA1C 6.5 11/20/2018     Lab Results   Component Value Date    CHOL 152 02/22/2019    TRIG 213 (H) 02/22/2019    HDL 32 (L) 02/22/2019    LDLCALC 77 02/22/2019    LDLDIRECT 89 10/13/2016     Lab Results   Component Value Date    ALT 19 02/22/2019    AST 21 02/22/2019     TSH:    Lab Results   Component Value Date    TSH 2.00 02/04/2016         Assessment/ Plan:    Patient seen, interviewed and examined. Testing was reviewed. CAD (coronary artery disease)  Denies cp   Feeling well  conitnue norvasc, cozaar, lopressor, zocor, asa    Essential hypertension  Stable  Vitals:    05/10/19 1155   BP: 130/64   Pulse: 64     Has seen Dr Arthur Martinez who is managing her BP  Recommend to continue norvasc, cozaar, lopressor, apresoline    Hyperlipidemia  Labs per PCP  On zocor      Patient is encouraged to exercise even a brisk walk for 30 minutes at least 3 to 4 times a week. Lifestyle and risk factor modificatons discussed. Various goals are discussed and questions answered. Continue current medications.  Appropriate prescriptions are addressed. Questions answered and patient verbalizes understanding. Call for any problems, questions, or concerns.

## 2019-05-13 DIAGNOSIS — I10 ESSENTIAL HYPERTENSION: ICD-10-CM

## 2019-05-13 RX ORDER — HYDRALAZINE HYDROCHLORIDE 10 MG/1
10 TABLET, FILM COATED ORAL 2 TIMES DAILY
Qty: 180 TABLET | Refills: 1 | Status: SHIPPED | OUTPATIENT
Start: 2019-05-13 | End: 2019-08-22 | Stop reason: SDUPTHER

## 2019-05-16 DIAGNOSIS — I63.30 CEREBROVASCULAR ACCIDENT (CVA) DUE TO THROMBOSIS OF CEREBRAL ARTERY (HCC): ICD-10-CM

## 2019-05-16 DIAGNOSIS — E11.40 TYPE 2 DIABETES MELLITUS WITH DIABETIC NEUROPATHY, WITHOUT LONG-TERM CURRENT USE OF INSULIN (HCC): ICD-10-CM

## 2019-05-16 DIAGNOSIS — E11.21 DIABETIC NEPHROPATHY ASSOCIATED WITH TYPE 2 DIABETES MELLITUS (HCC): ICD-10-CM

## 2019-05-16 LAB
A/G RATIO: 1.5 (ref 1.1–2.2)
ALBUMIN SERPL-MCNC: 4.8 G/DL (ref 3.4–5)
ALP BLD-CCNC: 84 U/L (ref 40–129)
ALT SERPL-CCNC: 18 U/L (ref 10–40)
ANION GAP SERPL CALCULATED.3IONS-SCNC: 15 MMOL/L (ref 3–16)
AST SERPL-CCNC: 24 U/L (ref 15–37)
BILIRUB SERPL-MCNC: 1 MG/DL (ref 0–1)
BUN BLDV-MCNC: 19 MG/DL (ref 7–20)
CALCIUM SERPL-MCNC: 10.2 MG/DL (ref 8.3–10.6)
CHLORIDE BLD-SCNC: 98 MMOL/L (ref 99–110)
CHOLESTEROL, TOTAL: 177 MG/DL (ref 0–199)
CO2: 26 MMOL/L (ref 21–32)
CREAT SERPL-MCNC: 0.9 MG/DL (ref 0.6–1.2)
CREATININE URINE: 17.9 MG/DL (ref 28–259)
GFR AFRICAN AMERICAN: >60
GFR NON-AFRICAN AMERICAN: >60
GLOBULIN: 3.3 G/DL
GLUCOSE BLD-MCNC: 117 MG/DL (ref 70–99)
HDLC SERPL-MCNC: 37 MG/DL (ref 40–60)
LDL CHOLESTEROL CALCULATED: 85 MG/DL
MICROALBUMIN UR-MCNC: <1.2 MG/DL
MICROALBUMIN/CREAT UR-RTO: ABNORMAL MG/G (ref 0–30)
POTASSIUM SERPL-SCNC: 5.2 MMOL/L (ref 3.5–5.1)
SODIUM BLD-SCNC: 139 MMOL/L (ref 136–145)
TOTAL PROTEIN: 8.1 G/DL (ref 6.4–8.2)
TRIGL SERPL-MCNC: 274 MG/DL (ref 0–150)
VLDLC SERPL CALC-MCNC: 55 MG/DL

## 2019-05-17 LAB
ESTIMATED AVERAGE GLUCOSE: 137 MG/DL
HBA1C MFR BLD: 6.4 %

## 2019-05-22 ENCOUNTER — OFFICE VISIT (OUTPATIENT)
Dept: INTERNAL MEDICINE CLINIC | Age: 79
End: 2019-05-22
Payer: MEDICARE

## 2019-05-22 VITALS
RESPIRATION RATE: 18 BRPM | HEART RATE: 67 BPM | DIASTOLIC BLOOD PRESSURE: 60 MMHG | OXYGEN SATURATION: 97 % | WEIGHT: 165.8 LBS | SYSTOLIC BLOOD PRESSURE: 138 MMHG | HEIGHT: 60 IN | BODY MASS INDEX: 32.55 KG/M2

## 2019-05-22 DIAGNOSIS — E11.40 TYPE 2 DIABETES MELLITUS WITH DIABETIC NEUROPATHY, WITHOUT LONG-TERM CURRENT USE OF INSULIN (HCC): ICD-10-CM

## 2019-05-22 DIAGNOSIS — I10 ESSENTIAL HYPERTENSION: Primary | ICD-10-CM

## 2019-05-22 PROCEDURE — G8417 CALC BMI ABV UP PARAM F/U: HCPCS | Performed by: INTERNAL MEDICINE

## 2019-05-22 PROCEDURE — 99213 OFFICE O/P EST LOW 20 MIN: CPT | Performed by: INTERNAL MEDICINE

## 2019-05-22 PROCEDURE — 1123F ACP DISCUSS/DSCN MKR DOCD: CPT | Performed by: INTERNAL MEDICINE

## 2019-05-22 PROCEDURE — G8427 DOCREV CUR MEDS BY ELIG CLIN: HCPCS | Performed by: INTERNAL MEDICINE

## 2019-05-22 PROCEDURE — 1036F TOBACCO NON-USER: CPT | Performed by: INTERNAL MEDICINE

## 2019-05-22 PROCEDURE — 1090F PRES/ABSN URINE INCON ASSESS: CPT | Performed by: INTERNAL MEDICINE

## 2019-05-22 PROCEDURE — 4040F PNEUMOC VAC/ADMIN/RCVD: CPT | Performed by: INTERNAL MEDICINE

## 2019-05-22 PROCEDURE — G8598 ASA/ANTIPLAT THER USED: HCPCS | Performed by: INTERNAL MEDICINE

## 2019-05-22 PROCEDURE — G8399 PT W/DXA RESULTS DOCUMENT: HCPCS | Performed by: INTERNAL MEDICINE

## 2019-05-22 NOTE — PROGRESS NOTES
Tulio Greene  1940 05/22/19    SUBJECTIVE:  bp improved back on HCTZ but still fluctuating 994-043 systolic. No leg swelling and heaches have resolved. Denies sx cp or sob. Some stress w grandson living w her but he will be moving out soon    DM- sugars stable on meds. Last checks ok w a1c  Lab Results   Component Value Date    LABA1C 6.4 05/16/2019    LABA1C 6.3 02/22/2019    LABA1C 6.5 11/20/2018     Lab Results   Component Value Date    GLUF 128 (H) 10/14/2016    LABMICR <1.20 05/16/2019    LDLCALC 85 05/16/2019    CREATININE 0.9 05/16/2019       OBJECTIVE:    BP (!) 140/64   Pulse 67   Resp 18   Ht 5' (1.524 m)   Wt 165 lb 12.8 oz (75.2 kg)   LMP  (LMP Unknown)   SpO2 97%   BMI 32.38 kg/m²     Physical Exam   Constitutional: She appears well-developed and well-nourished. Neck: Neck supple. Cardiovascular: Normal rate, regular rhythm and normal heart sounds. Exam reveals no gallop and no friction rub. No murmur heard. Pulmonary/Chest: Effort normal and breath sounds normal. No respiratory distress. She has no wheezes. She has no rales. Abdominal: Soft. Bowel sounds are normal. She exhibits no distension. There is no tenderness. Musculoskeletal: She exhibits no edema. Neurological: She is alert. Psychiatric: She has a normal mood and affect. Vitals reviewed. ASSESSMENT:    1. Essential hypertension    2. Type 2 diabetes mellitus with diabetic neuropathy, without long-term current use of insulin ([address]        PLAN:    Baldemar Martinez was seen today for 1 month follow-up. Diagnoses and all orders for this visit:    Essential hypertension- BP STABLE AND IMPROVING, CONT REGIMEN. Type 2 diabetes mellitus with diabetic neuropathy, without long-term current use of insulin ([address]- The current medical regimen is effective;  continue present plan and medications.

## 2019-08-06 ENCOUNTER — OFFICE VISIT (OUTPATIENT)
Dept: INTERNAL MEDICINE CLINIC | Age: 79
End: 2019-08-06
Payer: MEDICARE

## 2019-08-06 VITALS
OXYGEN SATURATION: 98 % | DIASTOLIC BLOOD PRESSURE: 61 MMHG | HEIGHT: 60 IN | SYSTOLIC BLOOD PRESSURE: 132 MMHG | HEART RATE: 68 BPM | BODY MASS INDEX: 33.16 KG/M2 | WEIGHT: 168.9 LBS | RESPIRATION RATE: 22 BRPM

## 2019-08-06 DIAGNOSIS — J01.00 ACUTE NON-RECURRENT MAXILLARY SINUSITIS: Primary | ICD-10-CM

## 2019-08-06 PROCEDURE — 1090F PRES/ABSN URINE INCON ASSESS: CPT | Performed by: NURSE PRACTITIONER

## 2019-08-06 PROCEDURE — G8598 ASA/ANTIPLAT THER USED: HCPCS | Performed by: NURSE PRACTITIONER

## 2019-08-06 PROCEDURE — G8417 CALC BMI ABV UP PARAM F/U: HCPCS | Performed by: NURSE PRACTITIONER

## 2019-08-06 PROCEDURE — 4040F PNEUMOC VAC/ADMIN/RCVD: CPT | Performed by: NURSE PRACTITIONER

## 2019-08-06 PROCEDURE — 1036F TOBACCO NON-USER: CPT | Performed by: NURSE PRACTITIONER

## 2019-08-06 PROCEDURE — G8399 PT W/DXA RESULTS DOCUMENT: HCPCS | Performed by: NURSE PRACTITIONER

## 2019-08-06 PROCEDURE — 1123F ACP DISCUSS/DSCN MKR DOCD: CPT | Performed by: NURSE PRACTITIONER

## 2019-08-06 PROCEDURE — 99213 OFFICE O/P EST LOW 20 MIN: CPT | Performed by: NURSE PRACTITIONER

## 2019-08-06 PROCEDURE — G8427 DOCREV CUR MEDS BY ELIG CLIN: HCPCS | Performed by: NURSE PRACTITIONER

## 2019-08-06 RX ORDER — AMOXICILLIN 500 MG/1
500 CAPSULE ORAL 2 TIMES DAILY
Qty: 20 CAPSULE | Refills: 0 | Status: SHIPPED | OUTPATIENT
Start: 2019-08-06 | End: 2019-08-16

## 2019-08-06 RX ORDER — PREDNISONE 10 MG/1
TABLET ORAL
Qty: 20 TABLET | Refills: 0 | Status: SHIPPED | OUTPATIENT
Start: 2019-08-06 | End: 2019-08-22 | Stop reason: ALTCHOICE

## 2019-08-06 RX ORDER — BENZONATATE 100 MG/1
100 CAPSULE ORAL 3 TIMES DAILY PRN
Qty: 30 CAPSULE | Refills: 0 | Status: SHIPPED | OUTPATIENT
Start: 2019-08-06 | End: 2019-08-13

## 2019-08-06 ASSESSMENT — ENCOUNTER SYMPTOMS
CHEST TIGHTNESS: 0
DIARRHEA: 0
ABDOMINAL PAIN: 0
SINUS PRESSURE: 1
VOMITING: 0
SINUS PAIN: 1
APNEA: 0
COUGH: 1
COLOR CHANGE: 0
NAUSEA: 0
RHINORRHEA: 1
SHORTNESS OF BREATH: 0

## 2019-08-22 ENCOUNTER — OFFICE VISIT (OUTPATIENT)
Dept: INTERNAL MEDICINE CLINIC | Age: 79
End: 2019-08-22
Payer: MEDICARE

## 2019-08-22 VITALS
HEART RATE: 68 BPM | SYSTOLIC BLOOD PRESSURE: 135 MMHG | OXYGEN SATURATION: 97 % | DIASTOLIC BLOOD PRESSURE: 62 MMHG | RESPIRATION RATE: 17 BRPM

## 2019-08-22 DIAGNOSIS — I10 ESSENTIAL HYPERTENSION: ICD-10-CM

## 2019-08-22 DIAGNOSIS — I25.10 CORONARY ARTERY DISEASE INVOLVING NATIVE CORONARY ARTERY OF NATIVE HEART WITHOUT ANGINA PECTORIS: ICD-10-CM

## 2019-08-22 DIAGNOSIS — E11.40 TYPE 2 DIABETES MELLITUS WITH DIABETIC NEUROPATHY, WITHOUT LONG-TERM CURRENT USE OF INSULIN (HCC): Primary | ICD-10-CM

## 2019-08-22 DIAGNOSIS — I63.30 CEREBROVASCULAR ACCIDENT (CVA) DUE TO THROMBOSIS OF CEREBRAL ARTERY (HCC): ICD-10-CM

## 2019-08-22 LAB — HBA1C MFR BLD: 6 %

## 2019-08-22 PROCEDURE — 1090F PRES/ABSN URINE INCON ASSESS: CPT | Performed by: INTERNAL MEDICINE

## 2019-08-22 PROCEDURE — G8399 PT W/DXA RESULTS DOCUMENT: HCPCS | Performed by: INTERNAL MEDICINE

## 2019-08-22 PROCEDURE — 99214 OFFICE O/P EST MOD 30 MIN: CPT | Performed by: INTERNAL MEDICINE

## 2019-08-22 PROCEDURE — 4040F PNEUMOC VAC/ADMIN/RCVD: CPT | Performed by: INTERNAL MEDICINE

## 2019-08-22 PROCEDURE — 1123F ACP DISCUSS/DSCN MKR DOCD: CPT | Performed by: INTERNAL MEDICINE

## 2019-08-22 PROCEDURE — 83036 HEMOGLOBIN GLYCOSYLATED A1C: CPT | Performed by: INTERNAL MEDICINE

## 2019-08-22 PROCEDURE — G8598 ASA/ANTIPLAT THER USED: HCPCS | Performed by: INTERNAL MEDICINE

## 2019-08-22 PROCEDURE — G8417 CALC BMI ABV UP PARAM F/U: HCPCS | Performed by: INTERNAL MEDICINE

## 2019-08-22 PROCEDURE — G8427 DOCREV CUR MEDS BY ELIG CLIN: HCPCS | Performed by: INTERNAL MEDICINE

## 2019-08-22 PROCEDURE — 1036F TOBACCO NON-USER: CPT | Performed by: INTERNAL MEDICINE

## 2019-08-22 RX ORDER — SIMVASTATIN 20 MG
TABLET ORAL
Qty: 90 TABLET | Refills: 1 | Status: SHIPPED | OUTPATIENT
Start: 2019-08-22 | End: 2020-02-07 | Stop reason: SDUPTHER

## 2019-08-22 RX ORDER — HYDROCHLOROTHIAZIDE 25 MG/1
25 TABLET ORAL DAILY
Qty: 90 TABLET | Refills: 1 | Status: SHIPPED | OUTPATIENT
Start: 2019-08-22 | End: 2020-02-07 | Stop reason: SDUPTHER

## 2019-08-22 RX ORDER — AMLODIPINE BESYLATE 5 MG/1
5 TABLET ORAL DAILY
Qty: 90 TABLET | Refills: 1 | Status: SHIPPED | OUTPATIENT
Start: 2019-08-22 | End: 2020-02-07 | Stop reason: SDUPTHER

## 2019-08-22 RX ORDER — LOSARTAN POTASSIUM 100 MG/1
TABLET ORAL
Qty: 90 TABLET | Refills: 1 | Status: SHIPPED | OUTPATIENT
Start: 2019-08-22 | End: 2020-02-07 | Stop reason: SDUPTHER

## 2019-08-22 RX ORDER — HYDRALAZINE HYDROCHLORIDE 25 MG/1
25 TABLET, FILM COATED ORAL 2 TIMES DAILY
Qty: 180 TABLET | Refills: 1 | Status: SHIPPED | OUTPATIENT
Start: 2019-08-22 | End: 2020-02-07 | Stop reason: SDUPTHER

## 2019-08-22 RX ORDER — CLOPIDOGREL BISULFATE 75 MG/1
TABLET ORAL
Qty: 90 TABLET | Refills: 1 | Status: SHIPPED | OUTPATIENT
Start: 2019-08-22 | End: 2020-02-07 | Stop reason: SDUPTHER

## 2019-08-22 RX ORDER — METOPROLOL TARTRATE 50 MG/1
TABLET, FILM COATED ORAL
Qty: 180 TABLET | Refills: 1 | Status: SHIPPED | OUTPATIENT
Start: 2019-08-22 | End: 2020-02-07 | Stop reason: SDUPTHER

## 2019-08-22 NOTE — PROGRESS NOTES
Uyen Cormier  1940 08/22/19    SUBJECTIVE:  HTN, BP high to ~160s but w some leg swelling. Denies cp or sob. Already on hctz. Is on norvasc 10mg. Coronary artery disease has been asymptomatic w/o any ongoing sx of CP, SOB/MOORE, palpitations, lt headedness, diaphoresis. Is continuing medications regularly. Seeing Dr Hollie Caruso    DM- adhering to diet and a1c has been stable. Lab Results   Component Value Date    LABA1C 6.4 05/16/2019    LABA1C 6.3 02/22/2019    LABA1C 6.5 11/20/2018     Lab Results   Component Value Date    GLUF 128 (H) 10/14/2016    LABMICR <1.20 05/16/2019    LDLCALC 85 05/16/2019    CREATININE 0.9 05/16/2019     Lipids:  Is continuing statin therapy and low fat diet. Tolerating medications w/o myalgias or GI upset. CVA- no new focal weakness or numbness. OBJECTIVE:    /62   Pulse 68   Resp 17   LMP  (LMP Unknown)   SpO2 97%     Physical Exam   Constitutional: She appears well-developed and well-nourished. No distress. HENT:   Head: Normocephalic and atraumatic. Nose: Nose normal.   Mouth/Throat: Oropharynx is clear and moist. No oropharyngeal exudate. Eyes: Pupils are equal, round, and reactive to light. Conjunctivae and EOM are normal. Right eye exhibits no discharge. Left eye exhibits no discharge. No scleral icterus. Neck: Neck supple. No tracheal deviation present. Cardiovascular: Normal rate, regular rhythm, normal heart sounds and intact distal pulses. Exam reveals no gallop and no friction rub. No murmur heard. Pulmonary/Chest: Effort normal and breath sounds normal. No respiratory distress. She has no wheezes. She has no rales. Abdominal: Soft. Bowel sounds are normal. She exhibits no mass. There is no tenderness. There is no rebound and no guarding. Musculoskeletal: She exhibits no edema. Lymphadenopathy:     She has no cervical adenopathy. Neurological: She is alert. She has normal reflexes. Skin: Skin is warm and dry.    Psychiatric:

## 2019-09-26 ENCOUNTER — CARE COORDINATION (OUTPATIENT)
Dept: CARE COORDINATION | Age: 79
End: 2019-09-26

## 2019-09-26 NOTE — CARE COORDINATION
ACM called patient at preferred phone number listed in EPIC. Left message and requested pt  return call. This is first attempt to reach pt for possible enrollment CCP. Introduction letter mailed to pt. Contact info provided on [employer]. Consuelo Goncalves RN  Ambulatory Care Manager  [phone]  [email or web address]

## 2019-10-15 ENCOUNTER — CARE COORDINATION (OUTPATIENT)
Dept: CARE COORDINATION | Age: 79
End: 2019-10-15

## 2019-11-05 LAB — DIABETIC RETINOPATHY: NEGATIVE

## 2019-11-27 DIAGNOSIS — E11.40 TYPE 2 DIABETES MELLITUS WITH DIABETIC NEUROPATHY, WITHOUT LONG-TERM CURRENT USE OF INSULIN (HCC): ICD-10-CM

## 2019-11-27 DIAGNOSIS — E11.40 TYPE 2 DIABETES MELLITUS WITH DIABETIC NEUROPATHY, WITHOUT LONG-TERM CURRENT USE OF INSULIN (HCC): Primary | ICD-10-CM

## 2019-11-27 LAB
A/G RATIO: 1.7 (ref 1.1–2.2)
ALBUMIN SERPL-MCNC: 4.7 G/DL (ref 3.4–5)
ALP BLD-CCNC: 62 U/L (ref 40–129)
ALT SERPL-CCNC: 19 U/L (ref 10–40)
ANION GAP SERPL CALCULATED.3IONS-SCNC: 15 MMOL/L (ref 3–16)
AST SERPL-CCNC: 23 U/L (ref 15–37)
BASOPHILS ABSOLUTE: 0 K/UL (ref 0–0.2)
BASOPHILS RELATIVE PERCENT: 0.7 %
BILIRUB SERPL-MCNC: 0.9 MG/DL (ref 0–1)
BUN BLDV-MCNC: 14 MG/DL (ref 7–20)
CALCIUM SERPL-MCNC: 10 MG/DL (ref 8.3–10.6)
CHLORIDE BLD-SCNC: 99 MMOL/L (ref 99–110)
CHOLESTEROL, TOTAL: 176 MG/DL (ref 0–199)
CO2: 25 MMOL/L (ref 21–32)
CREAT SERPL-MCNC: 0.9 MG/DL (ref 0.6–1.2)
EOSINOPHILS ABSOLUTE: 0.2 K/UL (ref 0–0.6)
EOSINOPHILS RELATIVE PERCENT: 4.7 %
GFR AFRICAN AMERICAN: >60
GFR NON-AFRICAN AMERICAN: >60
GLOBULIN: 2.8 G/DL
GLUCOSE BLD-MCNC: 123 MG/DL (ref 70–99)
HCT VFR BLD CALC: 38 % (ref 36–48)
HDLC SERPL-MCNC: 35 MG/DL (ref 40–60)
HEMOGLOBIN: 12.9 G/DL (ref 12–16)
LDL CHOLESTEROL CALCULATED: 82 MG/DL
LYMPHOCYTES ABSOLUTE: 1.9 K/UL (ref 1–5.1)
LYMPHOCYTES RELATIVE PERCENT: 38.6 %
MCH RBC QN AUTO: 32.1 PG (ref 26–34)
MCHC RBC AUTO-ENTMCNC: 34.1 G/DL (ref 31–36)
MCV RBC AUTO: 94 FL (ref 80–100)
MONOCYTES ABSOLUTE: 0.4 K/UL (ref 0–1.3)
MONOCYTES RELATIVE PERCENT: 7.8 %
NEUTROPHILS ABSOLUTE: 2.4 K/UL (ref 1.7–7.7)
NEUTROPHILS RELATIVE PERCENT: 48.2 %
PDW BLD-RTO: 12.5 % (ref 12.4–15.4)
PLATELET # BLD: 210 K/UL (ref 135–450)
PMV BLD AUTO: 8.2 FL (ref 5–10.5)
POTASSIUM SERPL-SCNC: 4.8 MMOL/L (ref 3.5–5.1)
RBC # BLD: 4.04 M/UL (ref 4–5.2)
SODIUM BLD-SCNC: 139 MMOL/L (ref 136–145)
TOTAL PROTEIN: 7.5 G/DL (ref 6.4–8.2)
TRIGL SERPL-MCNC: 297 MG/DL (ref 0–150)
VLDLC SERPL CALC-MCNC: 59 MG/DL
WBC # BLD: 5 K/UL (ref 4–11)

## 2019-11-27 PROCEDURE — 36415 COLL VENOUS BLD VENIPUNCTURE: CPT | Performed by: INTERNAL MEDICINE

## 2019-11-28 LAB
ESTIMATED AVERAGE GLUCOSE: 128.4 MG/DL
HBA1C MFR BLD: 6.1 %

## 2019-12-03 ENCOUNTER — OFFICE VISIT (OUTPATIENT)
Dept: INTERNAL MEDICINE CLINIC | Age: 79
End: 2019-12-03
Payer: MEDICARE

## 2019-12-03 VITALS
BODY MASS INDEX: 32.2 KG/M2 | RESPIRATION RATE: 14 BRPM | WEIGHT: 164 LBS | HEART RATE: 60 BPM | DIASTOLIC BLOOD PRESSURE: 72 MMHG | HEIGHT: 60 IN | SYSTOLIC BLOOD PRESSURE: 124 MMHG | OXYGEN SATURATION: 97 %

## 2019-12-03 DIAGNOSIS — I25.10 CORONARY ARTERY DISEASE INVOLVING NATIVE CORONARY ARTERY OF NATIVE HEART WITHOUT ANGINA PECTORIS: ICD-10-CM

## 2019-12-03 DIAGNOSIS — I63.30 CEREBROVASCULAR ACCIDENT (CVA) DUE TO THROMBOSIS OF CEREBRAL ARTERY (HCC): ICD-10-CM

## 2019-12-03 DIAGNOSIS — E11.21 DIABETIC NEPHROPATHY ASSOCIATED WITH TYPE 2 DIABETES MELLITUS (HCC): ICD-10-CM

## 2019-12-03 DIAGNOSIS — M05.79 RHEUMATOID ARTHRITIS INVOLVING MULTIPLE SITES WITH POSITIVE RHEUMATOID FACTOR (HCC): ICD-10-CM

## 2019-12-03 DIAGNOSIS — Z00.00 ROUTINE GENERAL MEDICAL EXAMINATION AT A HEALTH CARE FACILITY: Primary | ICD-10-CM

## 2019-12-03 DIAGNOSIS — E11.40 TYPE 2 DIABETES MELLITUS WITH DIABETIC NEUROPATHY, WITHOUT LONG-TERM CURRENT USE OF INSULIN (HCC): ICD-10-CM

## 2019-12-03 DIAGNOSIS — I10 ESSENTIAL HYPERTENSION: ICD-10-CM

## 2019-12-03 PROCEDURE — G8598 ASA/ANTIPLAT THER USED: HCPCS | Performed by: INTERNAL MEDICINE

## 2019-12-03 PROCEDURE — G8482 FLU IMMUNIZE ORDER/ADMIN: HCPCS | Performed by: INTERNAL MEDICINE

## 2019-12-03 PROCEDURE — 4040F PNEUMOC VAC/ADMIN/RCVD: CPT | Performed by: INTERNAL MEDICINE

## 2019-12-03 PROCEDURE — G0439 PPPS, SUBSEQ VISIT: HCPCS | Performed by: INTERNAL MEDICINE

## 2019-12-03 PROCEDURE — 1123F ACP DISCUSS/DSCN MKR DOCD: CPT | Performed by: INTERNAL MEDICINE

## 2019-12-03 ASSESSMENT — PATIENT HEALTH QUESTIONNAIRE - PHQ9
SUM OF ALL RESPONSES TO PHQ QUESTIONS 1-9: 0
SUM OF ALL RESPONSES TO PHQ QUESTIONS 1-9: 0

## 2019-12-03 ASSESSMENT — LIFESTYLE VARIABLES: HOW OFTEN DO YOU HAVE A DRINK CONTAINING ALCOHOL: 0

## 2019-12-19 ENCOUNTER — CARE COORDINATION (OUTPATIENT)
Dept: CARE COORDINATION | Age: 79
End: 2019-12-19

## 2019-12-20 ENCOUNTER — CARE COORDINATION (OUTPATIENT)
Dept: CARE COORDINATION | Age: 79
End: 2019-12-20

## 2020-02-07 ENCOUNTER — TELEPHONE (OUTPATIENT)
Dept: INTERNAL MEDICINE CLINIC | Age: 80
End: 2020-02-07

## 2020-02-07 RX ORDER — AMLODIPINE BESYLATE 5 MG/1
5 TABLET ORAL DAILY
Qty: 90 TABLET | Refills: 1 | Status: SHIPPED | OUTPATIENT
Start: 2020-02-07 | End: 2020-07-20

## 2020-02-07 RX ORDER — HYDROCHLOROTHIAZIDE 25 MG/1
25 TABLET ORAL DAILY
Qty: 90 TABLET | Refills: 1 | Status: SHIPPED | OUTPATIENT
Start: 2020-02-07 | End: 2020-07-20

## 2020-02-07 RX ORDER — METOPROLOL TARTRATE 50 MG/1
TABLET, FILM COATED ORAL
Qty: 180 TABLET | Refills: 1 | Status: SHIPPED | OUTPATIENT
Start: 2020-02-07 | End: 2020-07-28 | Stop reason: SDUPTHER

## 2020-02-07 RX ORDER — LOSARTAN POTASSIUM 100 MG/1
TABLET ORAL
Qty: 90 TABLET | Refills: 1 | Status: SHIPPED | OUTPATIENT
Start: 2020-02-07 | End: 2020-07-20

## 2020-02-07 RX ORDER — HYDRALAZINE HYDROCHLORIDE 25 MG/1
25 TABLET, FILM COATED ORAL 2 TIMES DAILY
Qty: 180 TABLET | Refills: 1 | Status: SHIPPED | OUTPATIENT
Start: 2020-02-07 | End: 2020-07-20

## 2020-02-07 RX ORDER — SIMVASTATIN 20 MG
TABLET ORAL
Qty: 90 TABLET | Refills: 1 | Status: SHIPPED | OUTPATIENT
Start: 2020-02-07 | End: 2020-07-20

## 2020-02-07 RX ORDER — CLOPIDOGREL BISULFATE 75 MG/1
TABLET ORAL
Qty: 90 TABLET | Refills: 1 | Status: SHIPPED | OUTPATIENT
Start: 2020-02-07 | End: 2020-07-20

## 2020-02-12 ENCOUNTER — OFFICE VISIT (OUTPATIENT)
Dept: CARDIOLOGY CLINIC | Age: 80
End: 2020-02-12
Payer: MEDICARE

## 2020-02-12 VITALS
DIASTOLIC BLOOD PRESSURE: 60 MMHG | HEIGHT: 60 IN | SYSTOLIC BLOOD PRESSURE: 140 MMHG | HEART RATE: 64 BPM | WEIGHT: 164.2 LBS | BODY MASS INDEX: 32.24 KG/M2

## 2020-02-12 PROCEDURE — G8399 PT W/DXA RESULTS DOCUMENT: HCPCS | Performed by: INTERNAL MEDICINE

## 2020-02-12 PROCEDURE — 1123F ACP DISCUSS/DSCN MKR DOCD: CPT | Performed by: INTERNAL MEDICINE

## 2020-02-12 PROCEDURE — G8482 FLU IMMUNIZE ORDER/ADMIN: HCPCS | Performed by: INTERNAL MEDICINE

## 2020-02-12 PROCEDURE — 99214 OFFICE O/P EST MOD 30 MIN: CPT | Performed by: INTERNAL MEDICINE

## 2020-02-12 PROCEDURE — G8417 CALC BMI ABV UP PARAM F/U: HCPCS | Performed by: INTERNAL MEDICINE

## 2020-02-12 PROCEDURE — G8427 DOCREV CUR MEDS BY ELIG CLIN: HCPCS | Performed by: INTERNAL MEDICINE

## 2020-02-12 PROCEDURE — 4040F PNEUMOC VAC/ADMIN/RCVD: CPT | Performed by: INTERNAL MEDICINE

## 2020-02-12 PROCEDURE — 1036F TOBACCO NON-USER: CPT | Performed by: INTERNAL MEDICINE

## 2020-02-12 PROCEDURE — 1090F PRES/ABSN URINE INCON ASSESS: CPT | Performed by: INTERNAL MEDICINE

## 2020-02-12 PROCEDURE — 93000 ELECTROCARDIOGRAM COMPLETE: CPT | Performed by: INTERNAL MEDICINE

## 2020-02-12 RX ORDER — UREA 10 %
800 LOTION (ML) TOPICAL DAILY
COMMUNITY

## 2020-02-12 NOTE — PROGRESS NOTES
 H/O echocardiogram 2016    EF 55% mild lvh, stage 1 diast dysf, mild PI    H/O mammogram     - recheck     H/O tilt table evaluation  09 WNL    Hyperlipidemia     Hypertension     IFG (impaired fasting glucose)     mother w hx of DM    Memory loss     MMSE -- 11/15/13    Neuropathy     Osteoarthritis, knee     Peripheral neuropathy     burning discomfort in feet.  Postsurgical menopause     Rheumatoid arthritis(714.0)     SCC (squamous cell carcinoma), leg     Dr Adeline Hernandez removed fr legs 10/16    Thyroid nodule     on u/s 10/2016, recheck May 2017- stable--- RECHECK MAY 2018 RECOMMENDED    TMJ (temporomandibular joint syndrome)     Unspecified cerebral artery occlusion with cerebral infarction     -Right hemiparesis, aphasia, dysphagia, gait disturbance,     Past Surgical History:   Procedure Laterality Date    BREAST BIOPSY      bilateral    CARPAL TUNNEL RELEASE      Right wrist - 2011-Dr Adeline Hernandez    CATARACT REMOVAL WITH IMPLANT Left 2017    Dr Jose Salgado    umbilical   [address]    Right thumb- giant cell tumor    HYSTERECTOMY, TOTAL ABDOMINAL  1963    KNEE ARTHROSCOPY  2012    Left knee- Dr Cantor Colonel ARTHROSCOPY Left 14    Dr Ursula Chandra    ?     PTCA  2005    PTCA with stent X2 - Dr Clement Billings  10/16/2013    both legs    TONSILLECTOMY  1972    TOTAL KNEE ARTHROPLASTY Left 7/28/15    Dr Juan José Kramer Left 2017    Dr Micheal Kearns [facility] - Dr Robe Phillip   [address]        As reviewed   Family History   Problem Relation Age of Onset    Coronary Art Dis Mother          of MI   [place] Diabetes Mother     Heart Attack Mother     Hypertension Mother     Cancer Father     Thyroid Disease Daughter         hypothyroid    Diabetes Brother      Social History     Tobacco Use    Smoking status: Former Smoker     Types: Cigarettes     Last attempt to quit: 1967     Years since quittin.1    Smokeless tobacco: Never Used    Tobacco comment: 2/10/16   Substance Use Topics    Alcohol use: No      Review of Systems:    Constitutional: Negative for diaphoresis and fatigue  Psychological:Negative for anxiety or depression  HENT: Negative for headaches, nasal congestion, sinus pain or vertigo  Eyes: Negative for visual disturbance. Endocrine: Negative for polydipsia/polyuria  Respiratory: Negative for shortness of breath  Cardiovascular: Negative for chest pain, dyspnea on exertion, claudication, edema, irregular heartbeat, murmur, palpitations or shortness of breath  Gastrointestinal: Negative for abdominal pain or heartburn  Genito-Urinary: Negative for urinary frequency/urgency  Musculoskeletal: Negative for muscle pain, muscular weakness, negative for pain in arm and leg or swelling in foot and leg  Neurological: Negative for dizziness, headaches, memory loss, numbness/tingling, visual changes, syncope  Dermatological: Negative for rash    Objective:  BP (!) 140/60 (Site: Left Upper Arm, Position: Sitting, Cuff Size: Large Adult)   Pulse 64   Ht 5' (1.524 m)   Wt 164 lb 3.2 oz (74.5 kg)   LMP  (LMP Unknown)   BMI 32.07 kg/m²   Wt Readings from Last 3 Encounters:   20 164 lb 3.2 oz (74.5 kg)   19 164 lb (74.4 kg)   19 168 lb 14.4 oz (76.6 kg)     Body mass index is 32.07 kg/m². GENERAL - Alert, oriented, pleasant, in no apparent distress. EYES: No jaundice, no conjunctival pallor. SKIN: It is warm & dry. No rashes. No Echhymosis    HEENT - No clinically significant abnormalities seen. Neck - Supple. No jugular venous distention noted. No carotid bruits. Cardiovascular - Normal S1 and S2 without obvious murmur or gallop. Extremities - No cyanosis, clubbing, or significant edema. Pulmonary - No respiratory distress. No wheezes or rales.     Abdomen - No masses,

## 2020-02-27 LAB
A/G RATIO: 1.8 (ref 1.1–2.2)
ALBUMIN SERPL-MCNC: 4.8 G/DL (ref 3.4–5)
ALP BLD-CCNC: 68 U/L (ref 40–129)
ALT SERPL-CCNC: 18 U/L (ref 10–40)
ANION GAP SERPL CALCULATED.3IONS-SCNC: 14 MMOL/L (ref 3–16)
AST SERPL-CCNC: 20 U/L (ref 15–37)
BACTERIA: ABNORMAL /HPF
BASOPHILS ABSOLUTE: 0 K/UL (ref 0–0.2)
BASOPHILS RELATIVE PERCENT: 0.4 %
BILIRUB SERPL-MCNC: 0.9 MG/DL (ref 0–1)
BILIRUBIN URINE: NEGATIVE
BLOOD, URINE: ABNORMAL
BUN BLDV-MCNC: 15 MG/DL (ref 7–20)
CALCIUM SERPL-MCNC: 9.9 MG/DL (ref 8.3–10.6)
CHLORIDE BLD-SCNC: 94 MMOL/L (ref 99–110)
CHOLESTEROL, TOTAL: 173 MG/DL (ref 0–199)
CLARITY: ABNORMAL
CO2: 26 MMOL/L (ref 21–32)
COLOR: YELLOW
COMMENT UA: ABNORMAL
CREAT SERPL-MCNC: 0.8 MG/DL (ref 0.6–1.2)
CREATININE URINE: 32.6 MG/DL (ref 28–259)
EOSINOPHILS ABSOLUTE: 0.2 K/UL (ref 0–0.6)
EOSINOPHILS RELATIVE PERCENT: 4.4 %
EPITHELIAL CELLS, UA: 1 /HPF (ref 0–5)
GFR AFRICAN AMERICAN: >60
GFR NON-AFRICAN AMERICAN: >60
GLOBULIN: 2.7 G/DL
GLUCOSE BLD-MCNC: 119 MG/DL (ref 70–99)
GLUCOSE URINE: NEGATIVE MG/DL
HCT VFR BLD CALC: 38.2 % (ref 36–48)
HDLC SERPL-MCNC: 32 MG/DL (ref 40–60)
HEMOGLOBIN: 13.3 G/DL (ref 12–16)
KETONES, URINE: NEGATIVE MG/DL
LDL CHOLESTEROL CALCULATED: ABNORMAL MG/DL
LDL CHOLESTEROL DIRECT: 82 MG/DL
LEUKOCYTE ESTERASE, URINE: ABNORMAL
LYMPHOCYTES ABSOLUTE: 1.9 K/UL (ref 1–5.1)
LYMPHOCYTES RELATIVE PERCENT: 36.4 %
MCH RBC QN AUTO: 32.4 PG (ref 26–34)
MCHC RBC AUTO-ENTMCNC: 34.7 G/DL (ref 31–36)
MCV RBC AUTO: 93.4 FL (ref 80–100)
MICROALBUMIN UR-MCNC: 3.1 MG/DL
MICROALBUMIN/CREAT UR-RTO: 95.1 MG/G (ref 0–30)
MICROSCOPIC EXAMINATION: YES
MONOCYTES ABSOLUTE: 0.4 K/UL (ref 0–1.3)
MONOCYTES RELATIVE PERCENT: 8.6 %
NEUTROPHILS ABSOLUTE: 2.6 K/UL (ref 1.7–7.7)
NEUTROPHILS RELATIVE PERCENT: 50.2 %
NITRITE, URINE: POSITIVE
PDW BLD-RTO: 12.4 % (ref 12.4–15.4)
PH UA: 7 (ref 5–8)
PLATELET # BLD: 217 K/UL (ref 135–450)
PMV BLD AUTO: 7.8 FL (ref 5–10.5)
POTASSIUM SERPL-SCNC: 4.8 MMOL/L (ref 3.5–5.1)
PROTEIN UA: ABNORMAL MG/DL
RBC # BLD: 4.09 M/UL (ref 4–5.2)
RBC UA: 0 /HPF (ref 0–4)
SODIUM BLD-SCNC: 134 MMOL/L (ref 136–145)
SPECIFIC GRAVITY UA: 1.01 (ref 1–1.03)
TOTAL PROTEIN: 7.5 G/DL (ref 6.4–8.2)
TRIGL SERPL-MCNC: 311 MG/DL (ref 0–150)
URINE TYPE: ABNORMAL
UROBILINOGEN, URINE: 0.2 E.U./DL
VLDLC SERPL CALC-MCNC: ABNORMAL MG/DL
WBC # BLD: 5.2 K/UL (ref 4–11)
WBC UA: >900 /HPF (ref 0–5)

## 2020-02-27 PROCEDURE — 36415 COLL VENOUS BLD VENIPUNCTURE: CPT | Performed by: INTERNAL MEDICINE

## 2020-02-27 PROCEDURE — 81003 URINALYSIS AUTO W/O SCOPE: CPT | Performed by: INTERNAL MEDICINE

## 2020-02-28 LAB
ESTIMATED AVERAGE GLUCOSE: 116.9 MG/DL
HBA1C MFR BLD: 5.7 %

## 2020-02-28 RX ORDER — NITROFURANTOIN 25; 75 MG/1; MG/1
100 CAPSULE ORAL 2 TIMES DAILY
Qty: 20 CAPSULE | Refills: 0 | Status: SHIPPED | OUTPATIENT
Start: 2020-02-28 | End: 2020-03-02 | Stop reason: ALTCHOICE

## 2020-03-02 LAB
ORGANISM: ABNORMAL
URINE CULTURE, ROUTINE: ABNORMAL

## 2020-03-02 RX ORDER — SULFAMETHOXAZOLE AND TRIMETHOPRIM 800; 160 MG/1; MG/1
1 TABLET ORAL 2 TIMES DAILY
Qty: 20 TABLET | Refills: 0 | Status: SHIPPED | OUTPATIENT
Start: 2020-03-02 | End: 2020-03-12

## 2020-03-02 NOTE — RESULT ENCOUNTER NOTE
Call pt, THE BLADDER INFECTION'S BACTERIA IS ONLY INTERMEDIATE SENSITIVITY TO MACROBID    REC TO STOP THIS AND SWITCH TO BACTRIM DS BID FOR 10D, NEED RECHECK UA ONE WEEK.

## 2020-03-03 ENCOUNTER — OFFICE VISIT (OUTPATIENT)
Dept: INTERNAL MEDICINE CLINIC | Age: 80
End: 2020-03-03
Payer: MEDICARE

## 2020-03-03 ENCOUNTER — HOSPITAL ENCOUNTER (OUTPATIENT)
Dept: GENERAL RADIOLOGY | Age: 80
Discharge: HOME | End: 2020-03-03
Payer: MEDICARE

## 2020-03-03 ENCOUNTER — HOSPITAL ENCOUNTER (OUTPATIENT)
Age: 80
Discharge: HOME | End: 2020-03-03
Payer: MEDICARE

## 2020-03-03 VITALS
DIASTOLIC BLOOD PRESSURE: 60 MMHG | HEART RATE: 57 BPM | OXYGEN SATURATION: 95 % | SYSTOLIC BLOOD PRESSURE: 126 MMHG | WEIGHT: 165.6 LBS | BODY MASS INDEX: 32.34 KG/M2

## 2020-03-03 PROCEDURE — 1036F TOBACCO NON-USER: CPT | Performed by: INTERNAL MEDICINE

## 2020-03-03 PROCEDURE — 72100 X-RAY EXAM L-S SPINE 2/3 VWS: CPT

## 2020-03-03 PROCEDURE — G8482 FLU IMMUNIZE ORDER/ADMIN: HCPCS | Performed by: INTERNAL MEDICINE

## 2020-03-03 PROCEDURE — G8399 PT W/DXA RESULTS DOCUMENT: HCPCS | Performed by: INTERNAL MEDICINE

## 2020-03-03 PROCEDURE — G8427 DOCREV CUR MEDS BY ELIG CLIN: HCPCS | Performed by: INTERNAL MEDICINE

## 2020-03-03 PROCEDURE — G8510 SCR DEP NEG, NO PLAN REQD: HCPCS | Performed by: INTERNAL MEDICINE

## 2020-03-03 PROCEDURE — 1090F PRES/ABSN URINE INCON ASSESS: CPT | Performed by: INTERNAL MEDICINE

## 2020-03-03 PROCEDURE — 99214 OFFICE O/P EST MOD 30 MIN: CPT | Performed by: INTERNAL MEDICINE

## 2020-03-03 PROCEDURE — 1123F ACP DISCUSS/DSCN MKR DOCD: CPT | Performed by: INTERNAL MEDICINE

## 2020-03-03 PROCEDURE — G8417 CALC BMI ABV UP PARAM F/U: HCPCS | Performed by: INTERNAL MEDICINE

## 2020-03-03 PROCEDURE — 72040 X-RAY EXAM NECK SPINE 2-3 VW: CPT

## 2020-03-03 PROCEDURE — 4040F PNEUMOC VAC/ADMIN/RCVD: CPT | Performed by: INTERNAL MEDICINE

## 2020-03-03 NOTE — PROGRESS NOTES
Carter Medina  1940 03/03/20    SUBJECTIVE:  Significant K pneumonia cystitis noted, now on bactrim, had >900wbc. Sx improving and no fever, chills. For recheck UA next week. DM-  Sugars stable and consistent w meds and diet. Lab Results   Component Value Date    LABA1C 5.7 02/27/2020    LABA1C 6.1 11/27/2019    LABA1C 6.0 08/22/2019     Lab Results   Component Value Date    GLUF 128 (H) 10/14/2016    LABMICR 3.10 (H) 02/27/2020    LABMICR YES 02/27/2020    LDLCALC see below 02/27/2020    CREATININE 0.8 02/27/2020     RA- now w some incr low back and neck pain. Requested xrays. Hypertension: Stable. Denies CP, SOB, cough, visual changes, dizziness, palpitations or HA. OBJECTIVE:    /60 (Site: Left Upper Arm, Position: Sitting, Cuff Size: Medium Adult)   Pulse 57   Wt 165 lb 9.6 oz (75.1 kg)   LMP  (LMP Unknown)   SpO2 95%   BMI 32.34 kg/m²     Physical Exam  Vitals signs reviewed. Constitutional:       Appearance: She is well-developed. HENT:      Head: Normocephalic and atraumatic. Nose: Nose normal.      Mouth/Throat:      Mouth: Mucous membranes are moist.      Pharynx: Oropharynx is clear. No oropharyngeal exudate. Eyes:      General: No scleral icterus. Right eye: No discharge. Left eye: No discharge. Conjunctiva/sclera: Conjunctivae normal.      Pupils: Pupils are equal, round, and reactive to light. Neck:      Musculoskeletal: Neck supple. Thyroid: No thyromegaly. Vascular: No JVD. Trachea: No tracheal deviation. Cardiovascular:      Rate and Rhythm: Normal rate and regular rhythm. Heart sounds: Normal heart sounds. No murmur. No friction rub. No gallop. Pulmonary:      Effort: Pulmonary effort is normal. No respiratory distress. Breath sounds: Normal breath sounds. No wheezing or rales. Abdominal:      General: Bowel sounds are normal.      Palpations: Abdomen is soft. There is no mass.       Tenderness:

## 2020-03-03 NOTE — RESULT ENCOUNTER NOTE
Call pt, SHE HAS DIFFUSE DEGENERATIVE CHANGES IN NECK AND LOW BACK WHICH IS CONSISTENT W OSTEOARTHRITIS

## 2020-03-10 LAB
BILIRUBIN URINE: NEGATIVE
BLOOD, URINE: NEGATIVE
CLARITY: CLEAR
COLOR: YELLOW
EPITHELIAL CELLS, UA: 2 /HPF (ref 0–5)
GLUCOSE URINE: NEGATIVE MG/DL
HYALINE CASTS: 1 /LPF (ref 0–8)
KETONES, URINE: NEGATIVE MG/DL
LEUKOCYTE ESTERASE, URINE: NEGATIVE
MICROSCOPIC EXAMINATION: NORMAL
NITRITE, URINE: NEGATIVE
PH UA: 6 (ref 5–8)
PROTEIN UA: NEGATIVE MG/DL
RBC UA: 1 /HPF (ref 0–4)
SPECIFIC GRAVITY UA: 1.01 (ref 1–1.03)
URINE TYPE: NORMAL
UROBILINOGEN, URINE: 0.2 E.U./DL
WBC UA: 2 /HPF (ref 0–5)

## 2020-03-25 PROBLEM — E11.40 TYPE 2 DIABETES MELLITUS WITH DIABETIC NEUROPATHY, WITHOUT LONG-TERM CURRENT USE OF INSULIN (HCC): Status: RESOLVED | Noted: 2020-03-25 | Resolved: 2020-03-24

## 2020-05-11 ENCOUNTER — OFFICE VISIT (OUTPATIENT)
Dept: INTERNAL MEDICINE CLINIC | Age: 80
End: 2020-05-11
Payer: MEDICARE

## 2020-05-11 VITALS
SYSTOLIC BLOOD PRESSURE: 137 MMHG | HEART RATE: 60 BPM | RESPIRATION RATE: 16 BRPM | OXYGEN SATURATION: 97 % | DIASTOLIC BLOOD PRESSURE: 76 MMHG

## 2020-05-11 PROCEDURE — 1090F PRES/ABSN URINE INCON ASSESS: CPT | Performed by: INTERNAL MEDICINE

## 2020-05-11 PROCEDURE — G8427 DOCREV CUR MEDS BY ELIG CLIN: HCPCS | Performed by: INTERNAL MEDICINE

## 2020-05-11 PROCEDURE — 1036F TOBACCO NON-USER: CPT | Performed by: INTERNAL MEDICINE

## 2020-05-11 PROCEDURE — 1123F ACP DISCUSS/DSCN MKR DOCD: CPT | Performed by: INTERNAL MEDICINE

## 2020-05-11 PROCEDURE — 99213 OFFICE O/P EST LOW 20 MIN: CPT | Performed by: INTERNAL MEDICINE

## 2020-05-11 PROCEDURE — G8399 PT W/DXA RESULTS DOCUMENT: HCPCS | Performed by: INTERNAL MEDICINE

## 2020-05-11 PROCEDURE — 4040F PNEUMOC VAC/ADMIN/RCVD: CPT | Performed by: INTERNAL MEDICINE

## 2020-05-11 PROCEDURE — G8417 CALC BMI ABV UP PARAM F/U: HCPCS | Performed by: INTERNAL MEDICINE

## 2020-05-11 RX ORDER — VALACYCLOVIR HYDROCHLORIDE 1 G/1
1000 TABLET, FILM COATED ORAL 3 TIMES DAILY
Qty: 21 TABLET | Refills: 1 | Status: SHIPPED | OUTPATIENT
Start: 2020-05-11 | End: 2020-05-18

## 2020-05-11 RX ORDER — HYDROCODONE BITARTRATE AND ACETAMINOPHEN 5; 325 MG/1; MG/1
1 TABLET ORAL 2 TIMES DAILY PRN
Qty: 14 TABLET | Refills: 0 | Status: SHIPPED | OUTPATIENT
Start: 2020-05-11 | End: 2020-05-18

## 2020-06-04 LAB
BASOPHILS ABSOLUTE: 0 K/UL (ref 0–0.2)
BASOPHILS RELATIVE PERCENT: 0.5 %
BILIRUBIN URINE: NEGATIVE
BLOOD, URINE: NEGATIVE
CLARITY: CLEAR
COLOR: YELLOW
EOSINOPHILS ABSOLUTE: 0.2 K/UL (ref 0–0.6)
EOSINOPHILS RELATIVE PERCENT: 3.9 %
EPITHELIAL CELLS, UA: 1 /HPF (ref 0–5)
GLUCOSE URINE: NEGATIVE MG/DL
HCT VFR BLD CALC: 37.4 % (ref 36–48)
HEMOGLOBIN: 12.7 G/DL (ref 12–16)
HYALINE CASTS: 2 /LPF (ref 0–8)
KETONES, URINE: NEGATIVE MG/DL
LEUKOCYTE ESTERASE, URINE: ABNORMAL
LYMPHOCYTES ABSOLUTE: 2.2 K/UL (ref 1–5.1)
LYMPHOCYTES RELATIVE PERCENT: 43.1 %
MCH RBC QN AUTO: 32.7 PG (ref 26–34)
MCHC RBC AUTO-ENTMCNC: 33.9 G/DL (ref 31–36)
MCV RBC AUTO: 96.6 FL (ref 80–100)
MICROSCOPIC EXAMINATION: YES
MONOCYTES ABSOLUTE: 0.5 K/UL (ref 0–1.3)
MONOCYTES RELATIVE PERCENT: 10.4 %
NEUTROPHILS ABSOLUTE: 2.1 K/UL (ref 1.7–7.7)
NEUTROPHILS RELATIVE PERCENT: 42.1 %
NITRITE, URINE: NEGATIVE
PDW BLD-RTO: 13.7 % (ref 12.4–15.4)
PH UA: 7 (ref 5–8)
PLATELET # BLD: 208 K/UL (ref 135–450)
PMV BLD AUTO: 7.9 FL (ref 5–10.5)
PROTEIN UA: NEGATIVE MG/DL
RBC # BLD: 3.88 M/UL (ref 4–5.2)
RBC UA: 1 /HPF (ref 0–4)
SPECIFIC GRAVITY UA: 1.02 (ref 1–1.03)
URINE TYPE: ABNORMAL
UROBILINOGEN, URINE: 0.2 E.U./DL
WBC # BLD: 5.1 K/UL (ref 4–11)
WBC UA: 1 /HPF (ref 0–5)

## 2020-06-04 PROCEDURE — 81003 URINALYSIS AUTO W/O SCOPE: CPT | Performed by: INTERNAL MEDICINE

## 2020-06-04 PROCEDURE — 36415 COLL VENOUS BLD VENIPUNCTURE: CPT | Performed by: INTERNAL MEDICINE

## 2020-06-05 LAB
A/G RATIO: 1.7 (ref 1.1–2.2)
ALBUMIN SERPL-MCNC: 4.6 G/DL (ref 3.4–5)
ALP BLD-CCNC: 65 U/L (ref 40–129)
ALT SERPL-CCNC: 16 U/L (ref 10–40)
ANION GAP SERPL CALCULATED.3IONS-SCNC: 13 MMOL/L (ref 3–16)
AST SERPL-CCNC: 21 U/L (ref 15–37)
BILIRUB SERPL-MCNC: 0.9 MG/DL (ref 0–1)
BUN BLDV-MCNC: 30 MG/DL (ref 7–20)
CALCIUM SERPL-MCNC: 9.6 MG/DL (ref 8.3–10.6)
CHLORIDE BLD-SCNC: 98 MMOL/L (ref 99–110)
CHOLESTEROL, TOTAL: 172 MG/DL (ref 0–199)
CO2: 24 MMOL/L (ref 21–32)
CREAT SERPL-MCNC: 1 MG/DL (ref 0.6–1.2)
ESTIMATED AVERAGE GLUCOSE: 119.8 MG/DL
GFR AFRICAN AMERICAN: >60
GFR NON-AFRICAN AMERICAN: 53
GLOBULIN: 2.7 G/DL
GLUCOSE BLD-MCNC: 109 MG/DL (ref 70–99)
HBA1C MFR BLD: 5.8 %
HDLC SERPL-MCNC: 32 MG/DL (ref 40–60)
LDL CHOLESTEROL CALCULATED: ABNORMAL MG/DL
LDL CHOLESTEROL DIRECT: 93 MG/DL
POTASSIUM SERPL-SCNC: 4.5 MMOL/L (ref 3.5–5.1)
SODIUM BLD-SCNC: 135 MMOL/L (ref 136–145)
TOTAL PROTEIN: 7.3 G/DL (ref 6.4–8.2)
TRIGL SERPL-MCNC: 301 MG/DL (ref 0–150)
VLDLC SERPL CALC-MCNC: ABNORMAL MG/DL

## 2020-06-05 NOTE — RESULT ENCOUNTER NOTE
Chol, sugars, labs appear in stable range, DM is controlled- urine is also clear w/o bladder infection. notify pt please.

## 2020-06-10 ENCOUNTER — OFFICE VISIT (OUTPATIENT)
Dept: INTERNAL MEDICINE CLINIC | Age: 80
End: 2020-06-10
Payer: MEDICARE

## 2020-06-10 VITALS
SYSTOLIC BLOOD PRESSURE: 122 MMHG | DIASTOLIC BLOOD PRESSURE: 70 MMHG | RESPIRATION RATE: 16 BRPM | WEIGHT: 165 LBS | HEART RATE: 70 BPM | OXYGEN SATURATION: 98 % | BODY MASS INDEX: 32.22 KG/M2

## 2020-06-10 PROCEDURE — G8427 DOCREV CUR MEDS BY ELIG CLIN: HCPCS | Performed by: INTERNAL MEDICINE

## 2020-06-10 PROCEDURE — 1090F PRES/ABSN URINE INCON ASSESS: CPT | Performed by: INTERNAL MEDICINE

## 2020-06-10 PROCEDURE — G8399 PT W/DXA RESULTS DOCUMENT: HCPCS | Performed by: INTERNAL MEDICINE

## 2020-06-10 PROCEDURE — 99214 OFFICE O/P EST MOD 30 MIN: CPT | Performed by: INTERNAL MEDICINE

## 2020-06-10 PROCEDURE — 1036F TOBACCO NON-USER: CPT | Performed by: INTERNAL MEDICINE

## 2020-06-10 PROCEDURE — 1123F ACP DISCUSS/DSCN MKR DOCD: CPT | Performed by: INTERNAL MEDICINE

## 2020-06-10 PROCEDURE — 4040F PNEUMOC VAC/ADMIN/RCVD: CPT | Performed by: INTERNAL MEDICINE

## 2020-06-10 PROCEDURE — G8417 CALC BMI ABV UP PARAM F/U: HCPCS | Performed by: INTERNAL MEDICINE

## 2020-06-10 NOTE — PROGRESS NOTES
Miguel Oviedo  1940  06/10/20    SUBJECTIVE:    PRIOR R FLANK/WAIST SHINGLES HAS RESOLVED. NO RESIDUAL PAIN NOTED. DM-  Sugars well controlled. Lab Results   Component Value Date    LABA1C 5.8 06/04/2020    LABA1C 5.7 02/27/2020    LABA1C 6.1 11/27/2019     Lab Results   Component Value Date    GLUF 128 (H) 10/14/2016    LABMICR YES 06/04/2020    LDLCALC see below 06/04/2020    CREATININE 1.0 06/04/2020     CAD/HTN- Hypertension: Stable. Denies CP, SOB, cough, visual changes, dizziness, palpitations or HA. Seeing Dr Goldie Nagy regularly. OBJECTIVE:    /70   Pulse 70   Resp 16   Wt 165 lb (74.8 kg)   LMP  (LMP Unknown)   SpO2 98%   BMI 32.22 kg/m²     Physical Exam  Vitals signs reviewed. Constitutional:       Appearance: She is well-developed. HENT:      Head: Normocephalic and atraumatic. Nose: Nose normal.      Mouth/Throat:      Mouth: Mucous membranes are moist.      Pharynx: Oropharynx is clear. No oropharyngeal exudate. Eyes:      General: No scleral icterus. Right eye: No discharge. Left eye: No discharge. Conjunctiva/sclera: Conjunctivae normal.      Pupils: Pupils are equal, round, and reactive to light. Neck:      Musculoskeletal: Neck supple. Thyroid: No thyromegaly. Vascular: No JVD. Trachea: No tracheal deviation. Cardiovascular:      Rate and Rhythm: Normal rate and regular rhythm. Heart sounds: Normal heart sounds. No murmur. No friction rub. No gallop. Pulmonary:      Effort: Pulmonary effort is normal. No respiratory distress. Breath sounds: Normal breath sounds. No wheezing or rales. Abdominal:      General: Bowel sounds are normal.      Palpations: Abdomen is soft. There is no mass. Tenderness: There is no abdominal tenderness. There is no guarding or rebound. Lymphadenopathy:      Cervical: No cervical adenopathy. Skin:     General: Skin is warm and dry.       Comments: FOOT EXAM  Visual

## 2020-07-28 RX ORDER — METOPROLOL TARTRATE 50 MG/1
TABLET, FILM COATED ORAL
Qty: 180 TABLET | Refills: 1 | Status: SHIPPED | OUTPATIENT
Start: 2020-07-28 | End: 2020-12-14 | Stop reason: SDUPTHER

## 2020-08-13 ENCOUNTER — OFFICE VISIT (OUTPATIENT)
Dept: INTERNAL MEDICINE CLINIC | Age: 80
End: 2020-08-13
Payer: MEDICARE

## 2020-08-13 VITALS
RESPIRATION RATE: 16 BRPM | OXYGEN SATURATION: 96 % | HEART RATE: 69 BPM | DIASTOLIC BLOOD PRESSURE: 64 MMHG | SYSTOLIC BLOOD PRESSURE: 118 MMHG

## 2020-08-13 LAB
BILIRUBIN, POC: NORMAL
BLOOD URINE, POC: NORMAL
CLARITY, POC: NORMAL
COLOR, POC: YELLOW
GLUCOSE URINE, POC: NORMAL
KETONES, POC: NORMAL
LEUKOCYTE EST, POC: NORMAL
NITRITE, POC: NORMAL
PH, POC: 6
PROTEIN, POC: NORMAL
SPECIFIC GRAVITY, POC: 1.01
UROBILINOGEN, POC: 0.2

## 2020-08-13 PROCEDURE — G8399 PT W/DXA RESULTS DOCUMENT: HCPCS | Performed by: INTERNAL MEDICINE

## 2020-08-13 PROCEDURE — 4040F PNEUMOC VAC/ADMIN/RCVD: CPT | Performed by: INTERNAL MEDICINE

## 2020-08-13 PROCEDURE — 99213 OFFICE O/P EST LOW 20 MIN: CPT | Performed by: INTERNAL MEDICINE

## 2020-08-13 PROCEDURE — 1036F TOBACCO NON-USER: CPT | Performed by: INTERNAL MEDICINE

## 2020-08-13 PROCEDURE — G8427 DOCREV CUR MEDS BY ELIG CLIN: HCPCS | Performed by: INTERNAL MEDICINE

## 2020-08-13 PROCEDURE — 1123F ACP DISCUSS/DSCN MKR DOCD: CPT | Performed by: INTERNAL MEDICINE

## 2020-08-13 PROCEDURE — 1090F PRES/ABSN URINE INCON ASSESS: CPT | Performed by: INTERNAL MEDICINE

## 2020-08-13 PROCEDURE — 81002 URINALYSIS NONAUTO W/O SCOPE: CPT | Performed by: INTERNAL MEDICINE

## 2020-08-13 PROCEDURE — G8417 CALC BMI ABV UP PARAM F/U: HCPCS | Performed by: INTERNAL MEDICINE

## 2020-08-13 RX ORDER — SULFAMETHOXAZOLE AND TRIMETHOPRIM 800; 160 MG/1; MG/1
1 TABLET ORAL 2 TIMES DAILY
Qty: 14 TABLET | Refills: 0 | Status: SHIPPED | OUTPATIENT
Start: 2020-08-13 | End: 2020-08-20

## 2020-08-13 NOTE — PROGRESS NOTES
Merna Yumiko  1940 08/13/20    SUBJECTIVE:  THE PAST TWO WEEKS W ONSET OF DYSURIA AND INCR FREQUENCY, PELVIC PRESSURE BUT NO LBP. Hypertension: Stable. Denies CP, SOB, cough, visual changes, dizziness, palpitations or HA. Dm- sugars have been good w recent a1c. Lab Results   Component Value Date    LABA1C 5.8 06/04/2020    LABA1C 5.7 02/27/2020    LABA1C 6.1 11/27/2019     Lab Results   Component Value Date    GLUF 128 (H) 10/14/2016    LABMICR YES 06/04/2020    LDLCALC see below 06/04/2020    CREATININE 1.0 06/04/2020               OBJECTIVE:    /64   Pulse 69   Resp 16   LMP  (LMP Unknown)   SpO2 96%     Physical Exam  Vitals signs reviewed. Constitutional:       Appearance: She is well-developed. Neck:      Musculoskeletal: Neck supple. Cardiovascular:      Rate and Rhythm: Normal rate and regular rhythm. Heart sounds: Normal heart sounds. No murmur. No friction rub. No gallop. Pulmonary:      Effort: Pulmonary effort is normal. No respiratory distress. Breath sounds: Normal breath sounds. No wheezing or rales. Abdominal:      General: Bowel sounds are normal. There is no distension. Palpations: Abdomen is soft. Tenderness: There is no abdominal tenderness. Neurological:      Mental Status: She is alert. ASSESSMENT:    1. Dysuria    2. Type 2 diabetes mellitus with diabetic neuropathy, without long-term current use of insulin ([address]    3. Essential hypertension        PLAN:    Raissa Crandall was seen today for dysuria. Diagnoses and all orders for this visit:    Dysuria - Will rx UTI w atbs as noted, also encouraged pushing fluids. To call back one week if sx persist.    -     POCT Urinalysis no Micro  -     sulfamethoxazole-trimethoprim (BACTRIM DS;SEPTRA DS) 800-160 MG per tablet;  Take 1 tablet by mouth 2 times daily for 7 days    Type 2 diabetes mellitus with diabetic neuropathy, without long-term current use of insulin ([facility])  - sugars stable,

## 2020-09-04 LAB
A/G RATIO: 2 (ref 1.1–2.2)
ALBUMIN SERPL-MCNC: 4.6 G/DL (ref 3.4–5)
ALP BLD-CCNC: 75 U/L (ref 40–129)
ALT SERPL-CCNC: 17 U/L (ref 10–40)
ANION GAP SERPL CALCULATED.3IONS-SCNC: 13 MMOL/L (ref 3–16)
AST SERPL-CCNC: 20 U/L (ref 15–37)
BASOPHILS ABSOLUTE: 0 K/UL (ref 0–0.2)
BASOPHILS RELATIVE PERCENT: 0.7 %
BILIRUB SERPL-MCNC: 0.8 MG/DL (ref 0–1)
BUN BLDV-MCNC: 17 MG/DL (ref 7–20)
CALCIUM SERPL-MCNC: 9.9 MG/DL (ref 8.3–10.6)
CHLORIDE BLD-SCNC: 102 MMOL/L (ref 99–110)
CHOLESTEROL, TOTAL: 176 MG/DL (ref 0–199)
CO2: 25 MMOL/L (ref 21–32)
CREAT SERPL-MCNC: 0.9 MG/DL (ref 0.6–1.2)
EOSINOPHILS ABSOLUTE: 0.3 K/UL (ref 0–0.6)
EOSINOPHILS RELATIVE PERCENT: 4.7 %
GFR AFRICAN AMERICAN: >60
GFR NON-AFRICAN AMERICAN: >60
GLOBULIN: 2.3 G/DL
GLUCOSE BLD-MCNC: 121 MG/DL (ref 70–99)
HCT VFR BLD CALC: 36.8 % (ref 36–48)
HDLC SERPL-MCNC: 34 MG/DL (ref 40–60)
HEMOGLOBIN: 12.6 G/DL (ref 12–16)
LDL CHOLESTEROL CALCULATED: 91 MG/DL
LYMPHOCYTES ABSOLUTE: 2.3 K/UL (ref 1–5.1)
LYMPHOCYTES RELATIVE PERCENT: 41.4 %
MCH RBC QN AUTO: 32.7 PG (ref 26–34)
MCHC RBC AUTO-ENTMCNC: 34.2 G/DL (ref 31–36)
MCV RBC AUTO: 95.5 FL (ref 80–100)
MONOCYTES ABSOLUTE: 0.6 K/UL (ref 0–1.3)
MONOCYTES RELATIVE PERCENT: 10.3 %
NEUTROPHILS ABSOLUTE: 2.3 K/UL (ref 1.7–7.7)
NEUTROPHILS RELATIVE PERCENT: 42.9 %
PDW BLD-RTO: 12.5 % (ref 12.4–15.4)
PLATELET # BLD: 197 K/UL (ref 135–450)
PMV BLD AUTO: 8 FL (ref 5–10.5)
POTASSIUM SERPL-SCNC: 4.5 MMOL/L (ref 3.5–5.1)
RBC # BLD: 3.85 M/UL (ref 4–5.2)
SODIUM BLD-SCNC: 140 MMOL/L (ref 136–145)
TOTAL PROTEIN: 6.9 G/DL (ref 6.4–8.2)
TRIGL SERPL-MCNC: 253 MG/DL (ref 0–150)
VLDLC SERPL CALC-MCNC: 51 MG/DL
WBC # BLD: 5.4 K/UL (ref 4–11)

## 2020-09-04 PROCEDURE — 36415 COLL VENOUS BLD VENIPUNCTURE: CPT | Performed by: INTERNAL MEDICINE

## 2020-09-05 LAB
ESTIMATED AVERAGE GLUCOSE: 125.5 MG/DL
HBA1C MFR BLD: 6 %

## 2020-09-09 ENCOUNTER — OFFICE VISIT (OUTPATIENT)
Dept: INTERNAL MEDICINE CLINIC | Age: 80
End: 2020-09-09
Payer: MEDICARE

## 2020-09-09 VITALS
BODY MASS INDEX: 32.79 KG/M2 | OXYGEN SATURATION: 97 % | SYSTOLIC BLOOD PRESSURE: 140 MMHG | DIASTOLIC BLOOD PRESSURE: 82 MMHG | HEIGHT: 60 IN | HEART RATE: 51 BPM | WEIGHT: 167 LBS | RESPIRATION RATE: 16 BRPM

## 2020-09-09 PROCEDURE — 4040F PNEUMOC VAC/ADMIN/RCVD: CPT | Performed by: INTERNAL MEDICINE

## 2020-09-09 PROCEDURE — 99214 OFFICE O/P EST MOD 30 MIN: CPT | Performed by: INTERNAL MEDICINE

## 2020-09-09 PROCEDURE — G8417 CALC BMI ABV UP PARAM F/U: HCPCS | Performed by: INTERNAL MEDICINE

## 2020-09-09 PROCEDURE — G8399 PT W/DXA RESULTS DOCUMENT: HCPCS | Performed by: INTERNAL MEDICINE

## 2020-09-09 PROCEDURE — 1036F TOBACCO NON-USER: CPT | Performed by: INTERNAL MEDICINE

## 2020-09-09 PROCEDURE — 1123F ACP DISCUSS/DSCN MKR DOCD: CPT | Performed by: INTERNAL MEDICINE

## 2020-09-09 PROCEDURE — 1090F PRES/ABSN URINE INCON ASSESS: CPT | Performed by: INTERNAL MEDICINE

## 2020-09-09 PROCEDURE — G8427 DOCREV CUR MEDS BY ELIG CLIN: HCPCS | Performed by: INTERNAL MEDICINE

## 2020-09-09 NOTE — PROGRESS NOTES
Augustina Palomino  1940 09/09/20    SUBJECTIVE:    oa gen, worse recently  Uses voltaren gel w some relief. Suggested ALSO TO CONSIDER CBD OIL. DM- sl worsening foot neuropathy. SUGARS ARE GOOD RANGE ~90-110S  Lab Results   Component Value Date    LABA1C 6.0 09/04/2020    LABA1C 5.8 06/04/2020    LABA1C 5.7 02/27/2020     Lab Results   Component Value Date    GLUF 128 (H) 10/14/2016    LABMICR YES 06/04/2020    LDLCALC 91 09/04/2020    CREATININE 0.9 09/04/2020       Hypertension: Stable. Denies CP, SOB, cough, visual changes, dizziness, palpitations or HA. Coronary artery disease has been asymptomatic w/o any ongoing sx of CP, SOB/MOORE, palpitations, lt headedness, diaphoresis. Is continuing medications regularly. STATES SEES DR Holly Rodriguez IN NOV    HX OF CVA, NO NEW FOCAL DEFICITS, SLURRED SPEECH, BLURRED VISION, NUMBNESS OR WEAKNESS      OBJECTIVE:    BP (!) 140/82   Pulse 51   Resp 16   Ht 5' (1.524 m)   Wt 167 lb (75.8 kg)   LMP  (LMP Unknown)   SpO2 97%   BMI 32.61 kg/m²     Physical Exam  Vitals signs reviewed. Constitutional:       Appearance: She is well-developed. HENT:      Head: Normocephalic and atraumatic. Nose: Nose normal.      Mouth/Throat:      Mouth: Mucous membranes are moist.      Pharynx: Oropharynx is clear. No oropharyngeal exudate. Eyes:      General: No scleral icterus. Right eye: No discharge. Left eye: No discharge. Conjunctiva/sclera: Conjunctivae normal.      Pupils: Pupils are equal, round, and reactive to light. Neck:      Musculoskeletal: Neck supple. Thyroid: No thyromegaly. Vascular: No JVD. Trachea: No tracheal deviation. Cardiovascular:      Rate and Rhythm: Normal rate and regular rhythm. Heart sounds: Normal heart sounds. No murmur. No friction rub. No gallop. Pulmonary:      Effort: Pulmonary effort is normal. No respiratory distress. Breath sounds: Normal breath sounds. No wheezing or rales. Abdominal:      General: Bowel sounds are normal. There is no distension. Palpations: Abdomen is soft. There is no mass. Tenderness: There is no abdominal tenderness. There is no guarding or rebound. Hernia: No hernia is present. Lymphadenopathy:      Cervical: No cervical adenopathy. Skin:     General: Skin is warm and dry. Comments: FOOT EXAM  Visual inspection:  Deformity/amputation: absent  Skin lesions/pre-ulcerative calluses: absent  Edema: right- negative, left- negative    Sensory exam:  Monofilament sensation: DIMINISHED  (minimum of 5 random plantar locations tested, avoiding callused areas - > 1 area with absence of sensation is + for neuropathy)      Pulses: normal       Neurological:      General: No focal deficit present. Mental Status: She is alert. Psychiatric:         Mood and Affect: Mood normal.         ASSESSMENT:    1. Type 2 diabetes mellitus with diabetic neuropathy, without long-term current use of insulin ([address]    2. Essential hypertension    3. Cerebrovascular accident (CVA) due to thrombosis of cerebral artery ([address]    4. Coronary artery disease involving native coronary artery of native heart without angina pectoris        PLAN:    Stone Len was seen today for diabetes. Diagnoses and all orders for this visit:    Type 2 diabetes mellitus with diabetic neuropathy, without long-term current use of insulin ([address]- DM relatively well controlled and will continue current regimen. Screening reviewed. See orders. -     Comprehensive Metabolic Panel; Future  -     CBC Auto Differential; Future  -     Lipid Panel; Future  -     Hemoglobin A1C; Future  -      DIABETES FOOT EXAM; Future    Essential hypertension  - Blood pressure stable and will continue current regimen. Will plan periodic monitoring of renal function, electrolytes, lipid profile. -     Comprehensive Metabolic Panel;  Future  -     CBC Auto Differential; Future  -     Lipid Panel; Future    Cerebrovascular accident (CVA) due to thrombosis of cerebral artery ([facility] - Remains stable neurologically w/o evidence of acute changes/focal deficits. Cont regimen. -     Comprehensive Metabolic Panel; Future  -     CBC Auto Differential; Future  -     Lipid Panel; Future    Coronary artery disease involving native coronary artery of native heart without angina pectoris  - Coronary artery disease stable and asymptomatic, will continue to monitor for any signs of instability. Will periodically monitor lipid profile and liver function, cardiac risk factors. Continue current medical regimen. -     Comprehensive Metabolic Panel; Future  -     CBC Auto Differential; Future  -     Lipid Panel;  Future

## 2020-11-03 PROBLEM — I25.10 CORONARY ARTERY DISEASE: Status: RESOLVED | Noted: 2020-11-03 | Resolved: 2020-11-03

## 2020-11-13 ENCOUNTER — OFFICE VISIT (OUTPATIENT)
Dept: CARDIOLOGY CLINIC | Age: 80
End: 2020-11-13
Payer: MEDICARE

## 2020-11-13 VITALS
SYSTOLIC BLOOD PRESSURE: 134 MMHG | HEART RATE: 66 BPM | DIASTOLIC BLOOD PRESSURE: 82 MMHG | BODY MASS INDEX: 33.89 KG/M2 | HEIGHT: 60 IN | WEIGHT: 172.6 LBS

## 2020-11-13 PROCEDURE — G8417 CALC BMI ABV UP PARAM F/U: HCPCS | Performed by: NURSE PRACTITIONER

## 2020-11-13 PROCEDURE — 1123F ACP DISCUSS/DSCN MKR DOCD: CPT | Performed by: NURSE PRACTITIONER

## 2020-11-13 PROCEDURE — 4040F PNEUMOC VAC/ADMIN/RCVD: CPT | Performed by: NURSE PRACTITIONER

## 2020-11-13 PROCEDURE — 1036F TOBACCO NON-USER: CPT | Performed by: NURSE PRACTITIONER

## 2020-11-13 PROCEDURE — 99214 OFFICE O/P EST MOD 30 MIN: CPT | Performed by: NURSE PRACTITIONER

## 2020-11-13 PROCEDURE — 1090F PRES/ABSN URINE INCON ASSESS: CPT | Performed by: NURSE PRACTITIONER

## 2020-11-13 PROCEDURE — G8484 FLU IMMUNIZE NO ADMIN: HCPCS | Performed by: NURSE PRACTITIONER

## 2020-11-13 PROCEDURE — G8428 CUR MEDS NOT DOCUMENT: HCPCS | Performed by: NURSE PRACTITIONER

## 2020-11-13 PROCEDURE — G8399 PT W/DXA RESULTS DOCUMENT: HCPCS | Performed by: NURSE PRACTITIONER

## 2020-11-13 PROCEDURE — 93000 ELECTROCARDIOGRAM COMPLETE: CPT | Performed by: NURSE PRACTITIONER

## 2020-11-13 ASSESSMENT — ENCOUNTER SYMPTOMS
EYE REDNESS: 0
EYE ITCHING: 0
NAUSEA: 0
ABDOMINAL DISTENTION: 0
CHEST TIGHTNESS: 0
DIARRHEA: 0
SHORTNESS OF BREATH: 1
ABDOMINAL PAIN: 0
SINUS PRESSURE: 0
SINUS PAIN: 0
VOMITING: 0
CONSTIPATION: 0
BACK PAIN: 0
COUGH: 0

## 2020-11-13 NOTE — PATIENT INSTRUCTIONS
Please hold on to these instructions the  will call you within 1-9 business days when we receive authorization from your insurance. Echocardiogram    WHAT TO EXPECT:   ? This test will take approximately 45 minutes. ? It is an ultrasound of the heart. ? It can look at the valves and chambers inside the heart   ? There is no special instructions for this test.     If you are unable to keep this appointment, please notify us 24 hours prior to test at [phone]. Please hold on to these instructions the  will call you within 1-9 business days when we receive authorization from your insurance. Nuclear Stress Test    WHAT TO EXPECT:   ? You will need to confirm the test or it could be cancelled. ? This test will take approximately 2 hours: 1 hour in the AM &    1 hour in the PM. You will be given a time by the Technologist after the first part is completed to come back. ? You will be given a medication, through an IV in the hand, this will safely simulate exercise. This IV is also needed to inject the radioactive isotope unless you are able toe walk the treadmill. ? You will receive an injection in the AM & PM before the pictures. ? Using a special camera, you will have one set of pictures of your heart taken in the AM and a set of pictures in the PM.     PREPARATION FOR TEST:  ? Eat a light breakfast such as water or juice and toast.  ? If you are DIABETIC: Eat a normal breakfast with NO CAFFEINE and take your insulin as normal.   ? AVOID ALL FOODS & DRINKS containing CAFFEINE 12 HOURS PRIOR TO THE TEST: Including coffee, Tea, Jose and other soft drinks even those labeled  caffeine free or decaffeinated.  ? HOLD THESE MEDICATIONS Persantine & Theophylline (Theodur)  24 hours prior & bring your inhaler with you.    The physician will specify if the following Beta blockers need to be held for 24 hours prior to test: Lopressor (metoprolol)

## 2020-11-13 NOTE — LETTER
[facility]    Dr. Lisset Chisholm  1940  [id number]    Have you had any Chest Pain - Yes  If Yes DO EKG - How does it feel - Dull Ache   How long does the pain last - minutes   How long have you been having the pain - Months   Did you take a    And did it relieve the pain -     Have you had any Shortness of Breath - Yes  If Yes - When on exertion    Have you had any dizziness - No  If Yes DO ORTHOSTATIC BP - when do you feel dizzy    How long does it last     Have you had any palpitations -   If Yes DO EKG - Do you feel your heart   How long does it last -      Is the patient on any of the following medications -   If Yes DO EKG    Do you have any edema - swelling in bilateral ankles, legs and feet    If Yes - CHECK TO SEE IF THE EDEMA IS PITTING  How long have they been having edema - Years  If Yes - Have they worn compression stockings Yes    Check Venous \"LEG PROBLEM Questionnaire\"    Do you have a surgery or procedure scheduled in the near future - No  If Yes- DO EKG  If Yes - Who is the surgery with?    Phone number of physician   Fax number of physician   Type of surgery   BE SURE TO GIVE THIS INFORMATION to [facility]    Ask patient if they want to sign up for [employer] if they are not already signed up    Check to see if we have an E-MAIL on file for the patient    Check medication list thoroughly!!!  BE SURE TO ASK PATIENT IF THEY NEED MEDICATION REFILLS    At check out add to every patient's \"wrap up\" the following dot phrase AFTERHOURSEDUCATION and ensure we explain this to our patients

## 2020-11-13 NOTE — PROGRESS NOTES
CARDIOLOGY  NOTE      11/13/2020    RE: Nicole Smith  (1940)                               TO:  Dr. Ryann Miguel MD  The primary cardiologist is Dr Lanie Cates:  Denies the following; Complains of chest pain and shortness of breath and high blood pressure    Here for follow up on CAD, HTN and HLD    HPI: Thank you for involving me in taking care of your patient Nicole Smith, who is a  79y.o. year old female with a history of CAD post PCI, HTN, HLD. She is seen today for a 9 follow up on CAD, HTN, HLD. She reports that within the last 9 months she has had 2 episodes of chest pain. She reports that one episode happened while she was sleeping. She reports she awakened to crushing chest pain 9 out of 10 radiated to her back and up her left side of her face. She reports that it came on quickly and resolved on its own. She additionally reports of shortness of breath with exertion and lower leg swelling. She reports that the shortness of breath will resolve when she rests. She reports that the lower leg swelling will resolve after propping of her feet.   She states that her blood pressure has been elevated and Dr. Yareli Cormier is working on managing her blood pressure          Vitals:    11/13/20 1046   BP: 134/82   Pulse: 66       Current Outpatient Medications   Medication Sig Dispense Refill    metoprolol tartrate (LOPRESSOR) 50 MG tablet TAKE 1 TABLET BY MOUTH TWICE DAILY 180 tablet 1    losartan (COZAAR) 100 MG tablet Take 1 tablet by mouth daily 90 tablet 1    hydrALAZINE (APRESOLINE) 25 MG tablet TAKE 1 TABLET TWICE A  tablet 1    clopidogrel (PLAVIX) 75 MG tablet Take 1 tablet by mouth daily 90 tablet 1    simvastatin (ZOCOR) 20 MG tablet Take 1 tablet by mouth nightly 90 tablet 1    amLODIPine (NORVASC) 5 MG tablet TAKE 1 TABLET DAILY 90 tablet 1    hydroCHLOROthiazide (HYDRODIURIL) 25 MG tablet TAKE 1 TABLET DAILY 90 tablet 1    metFORMIN (GLUCOPHAGE) 500 MG tablet TAKE 1 TABLET DAILY 90 tablet 1    folic acid (FOLVITE) 969 MCG tablet Take 800 mcg by mouth daily      diclofenac sodium 1 % GEL Apply 2 g topically 4 times daily 100 g 2    Acetaminophen (TYLENOL ARTHRITIS PAIN PO) Take 2 tablets by mouth 2 times daily      fluticasone (FLONASE) 50 MCG/ACT nasal spray 2 sprays by Nasal route daily into each nostril every day 1 Bottle 3    aspirin 81 MG tablet Take 81 mg by mouth 2 times daily       multivitamin (THERAGRAN) per tablet Take 1 tablet by mouth daily.  Potassium 99 MG TABS Take 1 tablet by mouth nightly       Calcium Carbonate-Vitamin D (CALCIUM + D) 600-200 MG-UNIT TABS Take 1 tablet by mouth nightly       Omega-3 Fatty Acids (FISH OIL BURP-LESS) 1000 MG CAPS Take 1 tablet by mouth 2 times daily.  Omeprazole Magnesium (PRILOSEC OTC PO) Take 1 tablet by mouth See Admin Instructions take1 tablet on Sun TUES THURS AND SAT       No current facility-administered medications for this visit. Allergies: Actonel [risedronate sodium]; Ciprofloxacin; Darvocet [propoxyphene n-acetaminophen]; Lopid [gemfibrozil]; Mevacor [lovastatin];  Neosporin [neomycin-polymyx-gramicid]; and Pravachol [pravastatin sodium]  Past Medical History:   Diagnosis Date    AK (actinic keratosis)     Minna Piles [facility]rooms following    CAD (coronary artery disease)     5/2005 S/P PTCA and stents X2 - Dr Elle Crouch [facility])     skin    Carotid stenosis     Carotid stenosis, left     monitored by Dr Patrizia Shearer- 50% stenosis noted on CTA 10/2016    Coronary artery disease     Dr Patrizia Shearer    Cough secondary to angiotensin converting enzyme inhibitor (ACE-I)     inactive    CTS (carpal tunnel syndrome)     inactive-S/P right CTS surg 4/2001- Dr Charla Meredith DDD (degenerative disc disease), cervical     DDD (degenerative disc disease), cervical     DDD (degenerative disc disease), lumbar     Degenerative disc disease, cervical     Diabetes mellitus with neuropathy ([facility] [address]     Dr Travis Gutiérrez,     GERD without esophagitis     H/O 24 hour EKG monitoring 9/07 9/27/07 SR, max , min HR 47, supraventricular ectopy is in the fashio of a-fib, very short episodes, infrequent vent and SVT ectopy noted    H/O cardiac catheterization 5/05 5/16/05 Circ stent 80% prior 0% post, EF 60%    H/O cardiovascular stress test 3/29/10,   9/08,    3/29/10 post stress myocardial perfusion show norm pattern of perfusion in all regions, post left vent is norm, rest EF 63%, LV systolic  is norm, vent func preserved,     H/O cardiovascular stress test 9/11/14    EF70% Normal study.  H/O cardiovascular stress test 2/12/2016    lexiscan-normal,70%    H/O Doppler ultrasound 10/2011, 8/09    carotid 10/4/11 moderat stenosis left internal carotid atery est 50-69%, progression in stenotic changes left internal carotid artery, right WNL    H/O echocardiogram 10/2011, 4/09    48/78/06NPLBCPIS AR , LV systolic function WNL, EF 55%    H/O echocardiogram 9/11/14    EF 60%. Mild aortic insufficiency.  H/O echocardiogram 2/12/2016    EF 55% mild lvh, stage 1 diast dysf, mild PI    H/O mammogram     1/16- recheck 1/17    H/O tilt table evaluation  7/09 7/20/09 WNL    Hyperlipidemia     Hypertension     Memory loss     MMSE 23/30-- 11/15/13    Neuropathy     Osteoarthritis, knee     Peripheral neuropathy     burning discomfort in feet.     Postsurgical menopause     Rheumatoid arthritis(714.0)     SCC (squamous cell carcinoma), leg     Dr Edwin Holguin removed fr legs 10/16    Thyroid nodule     on u/s 10/2016, recheck May 2017- stable--- RECHECK MAY 2018 RECOMMENDED    TMJ (temporomandibular joint syndrome)     Unspecified cerebral artery occlusion with cerebral infarction     2016-Right hemiparesis, aphasia, dysphagia, gait disturbance,     Past Surgical History:   Procedure Laterality Date    BREAST BIOPSY  1993    bilateral    CARPAL TUNNEL RELEASE Right wrist - 2011-Dr Eusebio Alonso CATARACT REMOVAL WITH IMPLANT Left 2017    Dr Des Mittal    umbilical    FINGER SURGERY      Right thumb- giant cell tumor    HYSTERECTOMY, TOTAL ABDOMINAL  1963    KNEE ARTHROSCOPY  2012    Left knee- Dr Liu Doom ARTHROSCOPY Left 14    Dr Alberto Hernandez    ?  PTCA  2005    PTCA with stent X2 - Dr Viktoria Bell  10/16/2013    both legs    TONSILLECTOMY  1972    TOTAL KNEE ARTHROPLASTY Left 7/28/15    Dr Brendan Tillman Left 2017    Dr Bailee Hutson at [address] [facility] - Dr Abigail Wayne WRIST SURGERY       Family History   Problem Relation Age of Onset    Coronary Art Dis Mother          of MI   Merrill Adam Diabetes Mother     Heart Attack Mother     Hypertension Mother     Cancer Father     Thyroid Disease Daughter         hypothyroid    Diabetes Brother      Social History     Tobacco Use    Smoking status: Former Smoker     Packs/day: 0.25     Years: 14.00     Pack years: 3.50     Types: Cigarettes     Start date:      Last attempt to quit: 1967     Years since quittin.9    Smokeless tobacco: Never Used    Tobacco comment: 2/10/16   Substance Use Topics    Alcohol use: No     Comment: caffeine 1 cup of coffee a day        Review of Systems   Constitutional: Negative for activity change, appetite change and fatigue. HENT: Negative for congestion, sinus pressure and sinus pain. Eyes: Negative for redness and itching. Respiratory: Positive for shortness of breath. Negative for cough and chest tightness. Cardiovascular: Positive for chest pain and leg swelling. Negative for palpitations. Gastrointestinal: Negative for abdominal distention, abdominal pain, constipation, diarrhea, nausea and vomiting. Genitourinary: Negative for dyspareunia. Musculoskeletal: Positive for arthralgias. Negative for back pain and gait problem. Neurological: Negative for dizziness, syncope and light-headedness. Psychiatric/Behavioral: Negative for agitation and confusion. The patient is not nervous/anxious. Objective:      Physical Exam:  /82   Pulse 66   Ht 5' (1.524 m)   Wt 172 lb 9.6 oz (78.3 kg)   LMP  (LMP Unknown)   BMI 33.71 kg/m²   Wt Readings from Last 3 Encounters:   11/13/20 172 lb 9.6 oz (78.3 kg)   09/09/20 167 lb (75.8 kg)   06/10/20 165 lb (74.8 kg)     Body mass index is 33.71 kg/m². Physical Exam  Constitutional:       Appearance: Normal appearance. She is well-developed. She is obese. She is not ill-appearing or diaphoretic. HENT:      Head: Normocephalic and atraumatic. Right Ear: External ear normal.      Left Ear: External ear normal.      Nose: No rhinorrhea. Mouth/Throat:      Mouth: Mucous membranes are moist.      Pharynx: Oropharynx is clear. No oropharyngeal exudate or posterior oropharyngeal erythema. Eyes:      General:         Right eye: No discharge. Left eye: No discharge. Conjunctiva/sclera: Conjunctivae normal.      Pupils: Pupils are equal, round, and reactive to light. Neck:      Musculoskeletal: Normal range of motion and neck supple. No muscular tenderness. Thyroid: No thyromegaly. Vascular: No carotid bruit or JVD. Cardiovascular:      Rate and Rhythm: Normal rate and regular rhythm. Heart sounds: Normal heart sounds. No murmur. No friction rub. No gallop. Pulmonary:      Effort: Pulmonary effort is normal. No respiratory distress. Breath sounds: Normal breath sounds. No wheezing or rales. Abdominal:      General: Bowel sounds are normal. There is no distension. Palpations: Abdomen is soft. Tenderness: There is no guarding. Musculoskeletal:         General: No deformity. Right lower leg: No edema. Left lower leg: No edema. Skin:     General: Skin is warm and dry.    Neurological:      Mental Status: She is alert and oriented to person, place, and time. Psychiatric:         Thought Content: Thought content normal.         Judgment: Judgment normal.         DATA:  No results found for: CKTOTAL, CKMB, CKMBINDEX, TROPONINI  BNP:  No results found for: BNP  PT/INR:  No results found for: PTINR  Lab Results   Component Value Date    LABA1C 6.0 09/04/2020    LABA1C 5.8 06/04/2020     Lab Results   Component Value Date    CHOL 176 09/04/2020    TRIG 253 (H) 09/04/2020    HDL 34 (L) 09/04/2020    LDLCALC 91 09/04/2020    LDLDIRECT 93 06/04/2020     Lab Results   Component Value Date    ALT 17 09/04/2020    AST 20 09/04/2020     TSH:    Lab Results   Component Value Date    TSH 2.00 02/04/2016         Assessment/ Plan:    Patient seen, interviewed and examined. Testing was reviewed. CAD post PCI  Patient here today with complaints of intermittent chest pain. Noted to have 2 episodes in the last 9 months  Patient does have a history of hypertension, coronary artery disease, hyperlipidemia and diabetes  She has 2 stents in the past  We will get stress test for further risk stratification  He was unable to walk on treadmill due to shortness of breath with exertion  EKG obtained sinus rhythm noted with no acute ST changes    HTN   Vitals:    11/13/20 1046   BP: 134/82   Pulse: 66     Patient's blood pressure stable today however she states that she is been having issues with controlling her blood pressure. She states that  has been managing her blood pressure.       HLD  Last lipid panel 9/4/2020  HDL 34 near goal  LDL 91 at goal  Continue Zocor 20 mg daily and fish oil    Diabetes type 2  A1c 6.0  On metformin managed per PCP    Hypertriglyceridemia  Triglycerides 253  Most likely due to her diabetes  Recommend to follow carb controlled diet    Shortness of breath with exertion  Last echo 10/12/2016 reviewed  Will get echo to rule out VHD    Patient to follow-up with Dr. Karla Funez once cardiac work-up has been

## 2020-11-20 PROBLEM — Z98.890 S/P COLONOSCOPIC POLYPECTOMY: Status: ACTIVE | Noted: 2020-11-20

## 2020-11-24 LAB — DIABETIC RETINOPATHY: NEGATIVE

## 2020-12-03 ENCOUNTER — TELEPHONE (OUTPATIENT)
Dept: CARDIOLOGY CLINIC | Age: 80
End: 2020-12-03

## 2020-12-03 NOTE — TELEPHONE ENCOUNTER
Patient called to cancel Nuclear and echo   She was called with positive covid results   She will call to r/s

## 2020-12-14 ENCOUNTER — OFFICE VISIT (OUTPATIENT)
Dept: INTERNAL MEDICINE CLINIC | Age: 80
End: 2020-12-14
Payer: MEDICARE

## 2020-12-14 VITALS
WEIGHT: 168 LBS | HEIGHT: 60 IN | OXYGEN SATURATION: 98 % | HEART RATE: 74 BPM | BODY MASS INDEX: 32.98 KG/M2 | SYSTOLIC BLOOD PRESSURE: 120 MMHG | DIASTOLIC BLOOD PRESSURE: 74 MMHG | RESPIRATION RATE: 14 BRPM

## 2020-12-14 LAB
BILIRUBIN, POC: NORMAL
BLOOD URINE, POC: NORMAL
CLARITY, POC: NORMAL
COLOR, POC: YELLOW
GLUCOSE URINE, POC: NORMAL
KETONES, POC: NORMAL
LEUKOCYTE EST, POC: NORMAL
NITRITE, POC: NORMAL
PH, POC: 7.5
PROTEIN, POC: NORMAL
SPECIFIC GRAVITY, POC: 1.01
UROBILINOGEN, POC: 1

## 2020-12-14 PROCEDURE — 1123F ACP DISCUSS/DSCN MKR DOCD: CPT | Performed by: INTERNAL MEDICINE

## 2020-12-14 PROCEDURE — G0439 PPPS, SUBSEQ VISIT: HCPCS | Performed by: INTERNAL MEDICINE

## 2020-12-14 PROCEDURE — 4040F PNEUMOC VAC/ADMIN/RCVD: CPT | Performed by: INTERNAL MEDICINE

## 2020-12-14 PROCEDURE — 81002 URINALYSIS NONAUTO W/O SCOPE: CPT | Performed by: INTERNAL MEDICINE

## 2020-12-14 PROCEDURE — G8484 FLU IMMUNIZE NO ADMIN: HCPCS | Performed by: INTERNAL MEDICINE

## 2020-12-14 RX ORDER — SIMVASTATIN 20 MG
20 TABLET ORAL NIGHTLY
Qty: 90 TABLET | Refills: 1 | Status: SHIPPED | OUTPATIENT
Start: 2020-12-14 | End: 2022-01-03 | Stop reason: SDUPTHER

## 2020-12-14 RX ORDER — SULFAMETHOXAZOLE AND TRIMETHOPRIM 800; 160 MG/1; MG/1
1 TABLET ORAL 2 TIMES DAILY
Qty: 14 TABLET | Refills: 0 | Status: SHIPPED | OUTPATIENT
Start: 2020-12-14 | End: 2020-12-21

## 2020-12-14 RX ORDER — CLOPIDOGREL BISULFATE 75 MG/1
75 TABLET ORAL DAILY
Qty: 90 TABLET | Refills: 1 | Status: SHIPPED | OUTPATIENT
Start: 2020-12-14 | End: 2021-07-12 | Stop reason: SDUPTHER

## 2020-12-14 RX ORDER — HYDROCHLOROTHIAZIDE 25 MG/1
25 TABLET ORAL DAILY
Qty: 90 TABLET | Refills: 1 | Status: SHIPPED | OUTPATIENT
Start: 2020-12-14 | End: 2021-11-03 | Stop reason: SDUPTHER

## 2020-12-14 RX ORDER — SULFAMETHOXAZOLE AND TRIMETHOPRIM 800; 160 MG/1; MG/1
1 TABLET ORAL 2 TIMES DAILY
Qty: 14 TABLET | Refills: 0 | Status: SHIPPED | OUTPATIENT
Start: 2020-12-14 | End: 2020-12-14 | Stop reason: SDUPTHER

## 2020-12-14 RX ORDER — LOSARTAN POTASSIUM 100 MG/1
100 TABLET ORAL DAILY
Qty: 90 TABLET | Refills: 1 | Status: SHIPPED | OUTPATIENT
Start: 2020-12-14 | End: 2021-07-12 | Stop reason: SDUPTHER

## 2020-12-14 RX ORDER — HYDRALAZINE HYDROCHLORIDE 25 MG/1
25 TABLET, FILM COATED ORAL 2 TIMES DAILY
Qty: 180 TABLET | Refills: 1 | Status: SHIPPED | OUTPATIENT
Start: 2020-12-14 | End: 2021-07-12 | Stop reason: SDUPTHER

## 2020-12-14 RX ORDER — METOPROLOL TARTRATE 50 MG/1
50 TABLET, FILM COATED ORAL 2 TIMES DAILY
Qty: 180 TABLET | Refills: 1 | Status: SHIPPED | OUTPATIENT
Start: 2020-12-14 | End: 2021-10-26 | Stop reason: SDUPTHER

## 2020-12-14 RX ORDER — AMLODIPINE BESYLATE 5 MG/1
5 TABLET ORAL DAILY
Qty: 90 TABLET | Refills: 1 | Status: SHIPPED | OUTPATIENT
Start: 2020-12-14 | End: 2021-07-12 | Stop reason: SDUPTHER

## 2020-12-14 ASSESSMENT — LIFESTYLE VARIABLES: HOW OFTEN DO YOU HAVE A DRINK CONTAINING ALCOHOL: 0

## 2020-12-14 ASSESSMENT — PATIENT HEALTH QUESTIONNAIRE - PHQ9
1. LITTLE INTEREST OR PLEASURE IN DOING THINGS: 0
2. FEELING DOWN, DEPRESSED OR HOPELESS: 0
SUM OF ALL RESPONSES TO PHQ QUESTIONS 1-9: 0
SUM OF ALL RESPONSES TO PHQ9 QUESTIONS 1 & 2: 0
SUM OF ALL RESPONSES TO PHQ QUESTIONS 1-9: 0
SUM OF ALL RESPONSES TO PHQ QUESTIONS 1-9: 0

## 2020-12-14 NOTE — PROGRESS NOTES
Medicare Annual Wellness Visit  Name: Fercho Sanchez Date: 2020   MRN: [id number] Sex: Female   Age: 79 y.o. Ethnicity: Non-/Non    : 1940 Race: Evangelina Harris is here for Medicare AWV and Urinary Tract Infection    Screenings for behavioral, psychosocial and functional/safety risks, and cognitive dysfunction are all negative except as indicated below. These results, as well as other patient data from the [address] form, are documented in Flowsheets linked to this Encounter. 3d hx of worsening ur freq, nocturia and pelvic pressure but minimal dysuria. UA today is pos, no f/c. Had mild cough last mo and tested pos for COVID. Sx now resolved. DM- recent glc ok and cont on diet. Lab Results   Component Value Date    LABA1C 6.4 (H) 2020    LABA1C 6.0 2020    LABA1C 5.8 2020     Lab Results   Component Value Date    GLUF 128 (H) 10/14/2016    LABMICR YES 2020    LDLCALC 68 2020    CREATININE 0.9 2020     Hypertension: Stable. Denies CP, SOB, cough, visual changes, dizziness, palpitations or HA. CVA- no recurring, new focal deficits. Coronary artery disease has been asymptomatic w/o any ongoing sx of CP, SOB/MOORE, palpitations, lt headedness, diaphoresis. Is continuing medications regularly. Had polyp removed by Dr Shauna Jarvis recently and recheck .     HAS HAD LONGSTANDING LIPOMA NOTED L CALF AND R TRICEPS AREAS    FOR HER PERIPH NEUROPATHY IS LIKELY FR DM, BUT ALSO STATES DR BAILEY MAY DO A NEVE BX TOO TO CONFIRM    Allergies   Allergen Reactions    Actonel [Risedronate Sodium]      Abdominal pain    Ciprofloxacin      nausea    Darvocet [Propoxyphene N-Acetaminophen]      Lightheaded    Lopid [Gemfibrozil] Other (See Comments)     Leg cramping    Mevacor [Lovastatin] Other (See Comments)     Leg cramping    Neosporin [Neomycin-Polymyx-Gramicid] Other (See Comments) Omeprazole Magnesium (PRILOSEC OTC PO) Take 1 tablet by mouth See Admin Instructions take1 tablet on Sun TUES THURS AND SAT Yes Historical Provider, MD       Past Medical History:   Diagnosis Date    AK (actinic keratosis)     Jovana Small following    CAD (coronary artery disease)     5/2005 S/P PTCA and stents X2 - Dr Almeida Mantel [facility])     skin    Carotid stenosis     Carotid stenosis, left     monitored by Dr Patrizia Shearer- 50% stenosis noted on CTA 10/2016    Coronary artery disease     Dr Patrizia Shearer    Cough secondary to angiotensin converting enzyme inhibitor (ACE-I)     inactive    CTS (carpal tunnel syndrome)     inactive-S/P right CTS surg 4/2001- Dr Charla Meredith DDD (degenerative disc disease), cervical     DDD (degenerative disc disease), cervical     DDD (degenerative disc disease), lumbar     Degenerative disc disease, cervical     Diabetes mellitus with neuropathy ([facility] [address]     Dr Rigo Juarez,     GERD without esophagitis     H/O 24 hour EKG monitoring 9/07 9/27/07 SR, max , min HR 47, supraventricular ectopy is in the fashio of a-fib, very short episodes, infrequent vent and SVT ectopy noted    H/O cardiac catheterization 5/05 5/16/05 Circ stent 80% prior 0% post, EF 60%    H/O cardiovascular stress test 3/29/10,   9/08,    3/29/10 post stress myocardial perfusion show norm pattern of perfusion in all regions, post left vent is norm, rest EF 98%, LV systolic  is norm, vent func preserved,     H/O cardiovascular stress test 9/11/14    EF70% Normal study.  H/O cardiovascular stress test 2/12/2016    lexiscan-normal,70%    H/O Doppler ultrasound 10/2011, 8/09    carotid 10/4/11 moderat stenosis left internal carotid atery est 50-69%, progression in stenotic changes left internal carotid artery, right WNL    H/O echocardiogram 10/2011, 4/09    86/05/79OMRTQMZD AR , LV systolic function WNL, EF 55%    H/O echocardiogram 9/11/14    EF 60%. Mild aortic insufficiency. Patient's complete Health Risk Assessment and screening values have been reviewed and are found in Flowsheets. The following problems were reviewed today and where indicated follow up appointments were made and/or referrals ordered. Positive Risk Factor Screenings with Interventions:          General Health and ACP:  General  In general, how would you say your health is?: Good  In the past 7 days, have you experienced any of the following? New or Increased Pain, New or Increased Fatigue, Loneliness, Social Isolation, Stress or Anger?: None of These  Do you get the social and emotional support that you need?: Yes  Do you have a Living Will?: Yes  Advance Directives     Power of [facility] Will ACP-Advance Directive ACP-Power of     Not on File Not on File Not on File Not on File      General Health Risk Interventions:  · no issues above    Health Habits/Nutrition:  Health Habits/Nutrition  Do you exercise for at least 20 minutes 2-3 times per week?: (!) No  Have you lost any weight without trying in the past 3 months?: No  Do you eat fewer than 2 meals per day?: No  Have you seen a dentist within the past year?: (!) No  Body mass index: (!) 32.81  Health Habits/Nutrition Interventions:  · is edentulous. advised for working on diet, exercise, wt loss.        Personalized Preventive Plan   Current Health Maintenance Status  Immunization History   Administered Date(s) Administered    Influenza 09/25/2012    Influenza Vaccine, unspecified formulation 10/06/2016    Influenza Whole 10/09/2015    Influenza, High Dose (Fluzone 65 yrs and older) 11/15/2013, 10/08/2014, 08/30/2017, 08/27/2018, 10/08/2019    Influenza, High-dose, Quadv, 65 yrs +, IM (Fluzone) 09/09/2020    Pneumococcal Conjugate 13-valent ([id number]) 05/23/2017    Pneumococcal Conjugate 7-valent (Prevnar7) 09/12/2018    Pneumococcal Polysaccharide ([id number]) 11/20/2015    Zoster Live (Zostavax) 11/16/2012  Zoster Recombinant (Shingrix) 07/28/2020, 09/26/2020        Health Maintenance   Topic Date Due    DTaP/Tdap/Td vaccine (1 - Tdap) 06/26/1959    Annual Wellness Visit (AWV)  05/29/2019    Diabetic foot exam  08/22/2020    A1C test (Diabetic or Prediabetic)  06/03/2021    Diabetic retinal exam  11/24/2021    Lipid screen  12/03/2021    Potassium monitoring  12/03/2021    Creatinine monitoring  12/03/2021    DEXA (modify frequency per FRAX score)  Completed    Flu vaccine  Completed    Shingles Vaccine  Completed    Pneumococcal 65+ years Vaccine  Completed    Hepatitis A vaccine  Aged Out    Hib vaccine  Aged Out    Meningococcal (ACWY) vaccine  Aged Out     Recommendations for [employer] Due: see orders and patient instructions/AVS.  . Recommended screening schedule for the next 5-10 years is provided to the patient in written form: see Patient Instructions/AVS.    Enrique Mode was seen today for medicare awv and urinary tract infection. Diagnoses and all orders for this visit:    Routine general medical examination at a health care facility - remains independent, functional and active, no indications/needs for other interventions noted at this time- except as noted below and also findings noted on screening medicare questions. Type 2 diabetes mellitus with diabetic neuropathy, without long-term current use of insulin (HCC)  - DM relatively well controlled and will continue current regimen. Screening reviewed. See orders. -     losartan (COZAAR) 100 MG tablet; Take 1 tablet by mouth daily  -     simvastatin (ZOCOR) 20 MG tablet; Take 1 tablet by mouth nightly  -     metFORMIN (GLUCOPHAGE) 500 MG tablet; Take 1 tablet by mouth daily  -     Comprehensive Metabolic Panel; Future  -     CBC Auto Differential; Future  -     Lipid Panel; Future  -     Hemoglobin A1C; Future  -     Microalbumin / Creatinine Urine Ratio;  Future Essential hypertension  - Blood pressure stable and will continue current regimen. Will plan periodic monitoring of renal function, electrolytes, lipid profile. -     hydrALAZINE (APRESOLINE) 25 MG tablet; Take 1 tablet by mouth 2 times daily  -     amLODIPine (NORVASC) 5 MG tablet; Take 1 tablet by mouth daily  -     hydroCHLOROthiazide (HYDRODIURIL) 25 MG tablet; Take 1 tablet by mouth daily  -     metoprolol tartrate (LOPRESSOR) 50 MG tablet; Take 1 tablet by mouth 2 times daily  -     Comprehensive Metabolic Panel; Future  -     CBC Auto Differential; Future  -     Lipid Panel; Future    Cerebrovascular accident (CVA) due to thrombosis of cerebral artery ([facility] [address] - Remains stable neurologically w/o evidence of acute changes/focal deficits. Cont regimen. -     clopidogrel (PLAVIX) 75 MG tablet; Take 1 tablet by mouth daily    Coronary artery disease involving native coronary artery of native heart without angina pectoris  - Coronary artery disease stable and asymptomatic, will continue to monitor for any signs of instability. Will periodically monitor lipid profile and liver function, cardiac risk factors. Continue current medical regimen. STATES SCHED FOR ECHO AND STRESS TEST LATER THIS MONTH  -     simvastatin (ZOCOR) 20 MG tablet; Take 1 tablet by mouth nightly  -     metoprolol tartrate (LOPRESSOR) 50 MG tablet; Take 1 tablet by mouth 2 times daily  -     Comprehensive Metabolic Panel; Future  -     CBC Auto Differential; Future  -     Lipid Panel; Future    COVID-19 virus infection- NOW CLINICALLY RESOLVED    Lipoma, unspecified site- PT STATES LONGSTANDING, WILL MONITOR. IF ANY PROGRESSION OR ONSET OF SX WILL REFER FOR EXCISION    Acute cystitis without hematuria - Will rx UTI w atbs as noted, also encouraged pushing fluids. To call back one week if sx persist.    -     sulfamethoxazole-trimethoprim (BACTRIM DS;SEPTRA DS) 800-160 MG per tablet;  Take 1 tablet by mouth 2 times daily for 7 days Dysuria  -     POCT Urinalysis no Micro

## 2020-12-14 NOTE — PATIENT INSTRUCTIONS
Patient Education        Diabetic Neuropathy: Care Instructions  Your Care Instructions     When you have diabetes, your blood sugar level may get too high. Over time, high blood sugar levels can damage nerves. This is called diabetic neuropathy. Nerve damage can cause pain, burning, tingling, and numbness and may leave you feeling weak. The feet are often affected. When you have nerve damage in your feet, you cannot feel your feet and toes as well as normal and may not notice cuts or sores. Even a small injury can lead to a serious infection. It is very important that you follow your doctor's advice on foot care. Sometimes diabetes damages nerves that help the body function. If this happens, your blood pressure, sweating, digestion, and urination might be affected. Your doctor may give you a target blood sugar level that is higher or lower than you are used to. Try to keep your blood sugar very close to this target level to prevent more damage. Follow-up care is a key part of your treatment and safety. Be sure to make and go to all appointments, and call your doctor if you are having problems. It's also a good idea to know your test results and keep a list of the medicines you take. How can you care for yourself at home? · Take your medicines exactly as prescribed. Call your doctor if you think you are having a problem with your medicine. It is very important that you take your insulin or diabetes pills as your doctor tells you. · Try to keep blood sugar at your target level. ? Eat a variety of healthy foods, with carbohydrate spread out in your meals. A dietitian can help you plan meals. ? Try to get at least 30 minutes of exercise on most days. ? Check your blood sugar as many times each day as your doctor recommends. · Take and record your blood pressure at home if your doctor tells you to. Learn the importance of the two measures of blood pressure (such as 130 over 80, or 130/80). To take your blood pressure at home:  ? Ask your doctor to check your blood pressure monitor to be sure it is accurate and the cuff fits you. Also ask your doctor to watch you to make sure that you are using it right. ? Do not use medicine known to raise blood pressure (such as some nasal decongestant sprays) before taking your blood pressure. ? Avoid taking your blood pressure if you have just exercised or are nervous or upset. Rest at least 15 minutes before you take a reading. · Take pain medicines exactly as directed. ? If the doctor gave you a prescription medicine for pain, take it as prescribed. ? If you are not taking a prescription pain medicine, ask your doctor if you can take an over-the-counter medicine. · Do not smoke. Smoking can increase your chance for a heart attack or stroke. If you need help quitting, talk to your doctor about stop-smoking programs and medicines. These can increase your chances of quitting for good. · Limit alcohol to 2 drinks a day for men and 1 drink a day for women. Too much alcohol can cause health problems. · Eat small meals often, rather than 2 or 3 large meals a day. To care for your feet  · Prevent injury by wearing shoes at all times, even when you are indoors. · Do foot care as part of your daily routine. Wash your feet and then rub lotion on your feet, but not between your toes. Use a handheld mirror or magnifying mirror to inspect your feet for blisters, cuts, cracks, or sores. · Have your toenails trimmed and filed straight across. · Wear shoes and socks that fit well. Soft shoes that have good support and that fit well (such as tennis shoes) are best for your feet. · Check your shoes for any loose objects or rough edges before you put them on. Some of these benefits include a comprehensive review of your medical history including lifestyle, illnesses that may run in your family, and various assessments and screenings as appropriate. After reviewing your medical record and screening and assessments performed today your provider may have ordered immunizations, labs, imaging, and/or referrals for you. A list of these orders (if applicable) as well as your Preventive Care list are included within your After Visit Summary for your review. Other Preventive Recommendations:    · A preventive eye exam performed by an eye specialist is recommended every 1-2 years to screen for glaucoma; cataracts, macular degeneration, and other eye disorders. · A preventive dental visit is recommended every 6 months. · Try to get at least 150 minutes of exercise per week or 10,000 steps per day on a pedometer . · Order or download the FREE \"Exercise & Physical Activity: Your Everyday Guide\" from [employer]. Call 6-376.290.5952 or search [employer] online. · You need 3595-9486 mg of calcium and 2460-3112 IU of vitamin D per day. It is possible to meet your calcium requirement with diet alone, but a vitamin D supplement is usually necessary to meet this goal.  · When exposed to the sun, use a sunscreen that protects against both UVA and UVB radiation with an SPF of 30 or greater. Reapply every 2 to 3 hours or after sweating, drying off with a towel, or swimming. · Always wear a seat belt when traveling in a car. Always wear a helmet when riding a bicycle or motorcycle.

## 2020-12-29 ENCOUNTER — PROCEDURE VISIT (OUTPATIENT)
Dept: CARDIOLOGY CLINIC | Age: 80
End: 2020-12-29
Payer: MEDICARE

## 2020-12-29 LAB
LV EF: 58 %
LV EF: 60 %
LVEF MODALITY: NORMAL
LVEF MODALITY: NORMAL

## 2020-12-29 PROCEDURE — 93018 CV STRESS TEST I&R ONLY: CPT | Performed by: INTERNAL MEDICINE

## 2020-12-29 PROCEDURE — 93017 CV STRESS TEST TRACING ONLY: CPT | Performed by: INTERNAL MEDICINE

## 2020-12-29 PROCEDURE — A9500 TC99M SESTAMIBI: HCPCS | Performed by: INTERNAL MEDICINE

## 2020-12-29 PROCEDURE — 78452 HT MUSCLE IMAGE SPECT MULT: CPT | Performed by: INTERNAL MEDICINE

## 2020-12-29 PROCEDURE — 93306 TTE W/DOPPLER COMPLETE: CPT | Performed by: INTERNAL MEDICINE

## 2020-12-29 PROCEDURE — 93016 CV STRESS TEST SUPVJ ONLY: CPT | Performed by: INTERNAL MEDICINE

## 2020-12-30 ENCOUNTER — TELEPHONE (OUTPATIENT)
Dept: CARDIOLOGY CLINIC | Age: 80
End: 2020-12-30

## 2020-12-30 NOTE — TELEPHONE ENCOUNTER
Patient notified of results. Conclusions      Summary   Left ventricular function is normal, EF is estimated at 55-60%. Mild left ventricular hypertrophy. Normal diastolic filling pattern for age. Mildly dilated left atrium. Mild tricuspid, pulmonic and aortic regurgitation. RVSP is 30 mmHg. No evidence of pericardial effusion. [facility] physician Dr. David Mckee .   Ayad Loan tracer uptake in all segments of myocardium on stress ans rest    images. Normal Lexiscan nuclear scintigraphic study suggestive of normal    myocardial perfusion. Gated images demonstrate normal left ventricular    systolic function with EF of 60 %.         Recommendation    Continue present medical therapy and followup in office as scheduled.        Signatures

## 2021-03-04 DIAGNOSIS — E11.40 TYPE 2 DIABETES MELLITUS WITH DIABETIC NEUROPATHY, WITHOUT LONG-TERM CURRENT USE OF INSULIN (HCC): ICD-10-CM

## 2021-03-04 DIAGNOSIS — I25.10 CORONARY ARTERY DISEASE INVOLVING NATIVE CORONARY ARTERY OF NATIVE HEART WITHOUT ANGINA PECTORIS: ICD-10-CM

## 2021-03-04 DIAGNOSIS — I10 ESSENTIAL HYPERTENSION: ICD-10-CM

## 2021-03-04 LAB
A/G RATIO: 1.4 (ref 1.1–2.2)
ALBUMIN SERPL-MCNC: 4.6 G/DL (ref 3.4–5)
ALP BLD-CCNC: 64 U/L (ref 40–129)
ALT SERPL-CCNC: 14 U/L (ref 10–40)
ANION GAP SERPL CALCULATED.3IONS-SCNC: 12 MMOL/L (ref 3–16)
AST SERPL-CCNC: 20 U/L (ref 15–37)
BASOPHILS ABSOLUTE: 0 K/UL (ref 0–0.2)
BASOPHILS RELATIVE PERCENT: 0.2 %
BILIRUB SERPL-MCNC: 0.8 MG/DL (ref 0–1)
BUN BLDV-MCNC: 23 MG/DL (ref 7–20)
CALCIUM SERPL-MCNC: 9.9 MG/DL (ref 8.3–10.6)
CHLORIDE BLD-SCNC: 94 MMOL/L (ref 99–110)
CHOLESTEROL, TOTAL: 171 MG/DL (ref 0–199)
CO2: 27 MMOL/L (ref 21–32)
CREAT SERPL-MCNC: 0.9 MG/DL (ref 0.6–1.2)
CREATININE URINE: 31.1 MG/DL (ref 28–259)
EOSINOPHILS ABSOLUTE: 0.2 K/UL (ref 0–0.6)
EOSINOPHILS RELATIVE PERCENT: 4.1 %
GFR AFRICAN AMERICAN: >60
GFR NON-AFRICAN AMERICAN: 60
GLOBULIN: 3.2 G/DL
GLUCOSE BLD-MCNC: 118 MG/DL (ref 70–99)
HCT VFR BLD CALC: 36.7 % (ref 36–48)
HDLC SERPL-MCNC: 30 MG/DL (ref 40–60)
HEMOGLOBIN: 12.6 G/DL (ref 12–16)
LDL CHOLESTEROL CALCULATED: 87 MG/DL
LYMPHOCYTES ABSOLUTE: 2.1 K/UL (ref 1–5.1)
LYMPHOCYTES RELATIVE PERCENT: 42.5 %
MCH RBC QN AUTO: 31.9 PG (ref 26–34)
MCHC RBC AUTO-ENTMCNC: 34.3 G/DL (ref 31–36)
MCV RBC AUTO: 92.8 FL (ref 80–100)
MICROALBUMIN UR-MCNC: <1.2 MG/DL
MICROALBUMIN/CREAT UR-RTO: NORMAL MG/G (ref 0–30)
MONOCYTES ABSOLUTE: 0.5 K/UL (ref 0–1.3)
MONOCYTES RELATIVE PERCENT: 9.4 %
NEUTROPHILS ABSOLUTE: 2.2 K/UL (ref 1.7–7.7)
NEUTROPHILS RELATIVE PERCENT: 43.8 %
PDW BLD-RTO: 12.3 % (ref 12.4–15.4)
PLATELET # BLD: 229 K/UL (ref 135–450)
PMV BLD AUTO: 7.9 FL (ref 5–10.5)
POTASSIUM SERPL-SCNC: 4.5 MMOL/L (ref 3.5–5.1)
RBC # BLD: 3.95 M/UL (ref 4–5.2)
SODIUM BLD-SCNC: 133 MMOL/L (ref 136–145)
TOTAL PROTEIN: 7.8 G/DL (ref 6.4–8.2)
TRIGL SERPL-MCNC: 268 MG/DL (ref 0–150)
VLDLC SERPL CALC-MCNC: 54 MG/DL
WBC # BLD: 5 K/UL (ref 4–11)

## 2021-03-04 PROCEDURE — 36415 COLL VENOUS BLD VENIPUNCTURE: CPT | Performed by: INTERNAL MEDICINE

## 2021-03-05 LAB
ESTIMATED AVERAGE GLUCOSE: 122.6 MG/DL
HBA1C MFR BLD: 5.9 %

## 2021-03-10 ENCOUNTER — OFFICE VISIT (OUTPATIENT)
Dept: INTERNAL MEDICINE CLINIC | Age: 81
End: 2021-03-10
Payer: MEDICARE

## 2021-03-10 VITALS
BODY MASS INDEX: 32.42 KG/M2 | SYSTOLIC BLOOD PRESSURE: 128 MMHG | RESPIRATION RATE: 14 BRPM | WEIGHT: 166 LBS | OXYGEN SATURATION: 97 % | DIASTOLIC BLOOD PRESSURE: 62 MMHG | HEART RATE: 68 BPM

## 2021-03-10 DIAGNOSIS — E11.40 TYPE 2 DIABETES MELLITUS WITH DIABETIC NEUROPATHY, WITHOUT LONG-TERM CURRENT USE OF INSULIN (HCC): Primary | ICD-10-CM

## 2021-03-10 DIAGNOSIS — I25.10 CORONARY ARTERY DISEASE INVOLVING NATIVE CORONARY ARTERY OF NATIVE HEART WITHOUT ANGINA PECTORIS: ICD-10-CM

## 2021-03-10 DIAGNOSIS — M81.0 AGE-RELATED OSTEOPOROSIS WITHOUT CURRENT PATHOLOGICAL FRACTURE: ICD-10-CM

## 2021-03-10 DIAGNOSIS — I10 ESSENTIAL HYPERTENSION: ICD-10-CM

## 2021-03-10 DIAGNOSIS — L60.8 NAIL DEFORMITY: ICD-10-CM

## 2021-03-10 DIAGNOSIS — I63.30 CEREBROVASCULAR ACCIDENT (CVA) DUE TO THROMBOSIS OF CEREBRAL ARTERY (HCC): ICD-10-CM

## 2021-03-10 DIAGNOSIS — Z12.31 ENCOUNTER FOR SCREENING MAMMOGRAM FOR BREAST CANCER: ICD-10-CM

## 2021-03-10 PROCEDURE — G8484 FLU IMMUNIZE NO ADMIN: HCPCS | Performed by: INTERNAL MEDICINE

## 2021-03-10 PROCEDURE — G8427 DOCREV CUR MEDS BY ELIG CLIN: HCPCS | Performed by: INTERNAL MEDICINE

## 2021-03-10 PROCEDURE — 1090F PRES/ABSN URINE INCON ASSESS: CPT | Performed by: INTERNAL MEDICINE

## 2021-03-10 PROCEDURE — G8417 CALC BMI ABV UP PARAM F/U: HCPCS | Performed by: INTERNAL MEDICINE

## 2021-03-10 PROCEDURE — G8399 PT W/DXA RESULTS DOCUMENT: HCPCS | Performed by: INTERNAL MEDICINE

## 2021-03-10 PROCEDURE — 1036F TOBACCO NON-USER: CPT | Performed by: INTERNAL MEDICINE

## 2021-03-10 PROCEDURE — 4040F PNEUMOC VAC/ADMIN/RCVD: CPT | Performed by: INTERNAL MEDICINE

## 2021-03-10 PROCEDURE — 99214 OFFICE O/P EST MOD 30 MIN: CPT | Performed by: INTERNAL MEDICINE

## 2021-03-10 PROCEDURE — 1123F ACP DISCUSS/DSCN MKR DOCD: CPT | Performed by: INTERNAL MEDICINE

## 2021-03-10 RX ORDER — LANOLIN ALCOHOL/MO/W.PET/CERES
1 CREAM (GRAM) TOPICAL DAILY
Qty: 30 TABLET | Refills: 3 | Status: SHIPPED | OUTPATIENT
Start: 2021-03-10

## 2021-03-10 ASSESSMENT — PATIENT HEALTH QUESTIONNAIRE - PHQ9
SUM OF ALL RESPONSES TO PHQ QUESTIONS 1-9: 0
1. LITTLE INTEREST OR PLEASURE IN DOING THINGS: 0

## 2021-03-10 NOTE — PATIENT INSTRUCTIONS
FOR DIABETES CUT METFORMIN TO 1/2 TAB EVERY MORNING.     FOR YOUR NAILS TRY BIOTIN OTC 300MG DAILY    DUE FOR BOTH MAMMOGRAM AND BONE DENSITY

## 2021-03-10 NOTE — PROGRESS NOTES
Preston Tamayo  1940  03/10/21    SUBJECTIVE:    DM- recent sugars stable and at home . On 500mg daily. Suggested decr to 1/2 tab/day. SEEING DR BAILEY AND DIABETIC SHOES ARE COMING IN. Lab Results   Component Value Date    LABA1C 5.9 03/04/2021    LABA1C 6.4 (H) 12/03/2020    LABA1C 6.0 09/04/2020     Lab Results   Component Value Date    GLUF 128 (H) 10/14/2016    LABMICR <1.20 03/04/2021    LDLCALC 87 03/04/2021    CREATININE 0.9 03/04/2021     Did have COVID infection in Nov.  Also now completed her shots/vaccines. Hypertension: Stable. Denies CP, SOB, cough, visual changes, dizziness, palpitations or HA. Cad- STATES RECENT TESTING W DR Yaneli Melo BENIGN. TESTING REVIEWED, NL EF AND STRESS TEST NEG IN DEC, 12.29. Hx of CVA, no new focal deficits. HER NAILS HAVE BEEN GROWING DOWNWARD CURVED, SUGGESTED TRYING BIOTIN OTC SUPPLEMENT 500MG DAILY    OBJECTIVE:    /62   Pulse 68   Resp 14   Wt 166 lb (75.3 kg)   LMP  (LMP Unknown)   SpO2 97%   BMI 32.42 kg/m²     Physical Exam  Vitals signs reviewed. Constitutional:       General: She is not in acute distress. Appearance: She is well-developed. HENT:      Head: Normocephalic and atraumatic. Right Ear: External ear normal.      Left Ear: External ear normal.   Eyes:      General: No scleral icterus. Right eye: No discharge. Left eye: No discharge. Conjunctiva/sclera: Conjunctivae normal.      Pupils: Pupils are equal, round, and reactive to light. Neck:      Musculoskeletal: Neck supple. Thyroid: No thyromegaly. Vascular: No JVD. Trachea: No tracheal deviation. Cardiovascular:      Rate and Rhythm: Normal rate and regular rhythm. Heart sounds: Normal heart sounds. No murmur. No friction rub. No gallop. Pulmonary:      Effort: Pulmonary effort is normal. No respiratory distress. Breath sounds: Normal breath sounds. No wheezing or rales.    Abdominal:      General: Bowel sounds are normal. There is no distension. Palpations: Abdomen is soft. There is no mass. Tenderness: There is no abdominal tenderness. There is no guarding or rebound. Lymphadenopathy:      Cervical: No cervical adenopathy. Skin:     General: Skin is warm and dry. Comments: SOME DOWNWARD CURVATURE OF FINGERNAILS NOTED. Neurological:      Mental Status: She is alert. Psychiatric:         Mood and Affect: Mood normal.         ASSESSMENT:    1. Type 2 diabetes mellitus with diabetic neuropathy, without long-term current use of insulin ([address]    2. Cerebrovascular accident (CVA) due to thrombosis of cerebral artery ([address]    3. Encounter for screening mammogram for breast cancer    4. Essential hypertension    5. Coronary artery disease involving native coronary artery of native heart without angina pectoris    6. Age-related osteoporosis without current pathological fracture    7. Nail deformity        PLAN:    Chau Isaacs was seen today for diabetes and stress. Diagnoses and all orders for this visit:    Type 2 diabetes mellitus with diabetic neuropathy, without long-term current use of insulin ([address] - DM relatively well controlled and will continue current regimen. Screening reviewed. See orders. -     metFORMIN (GLUCOPHAGE) 500 MG tablet; Take 0.5 tablets by mouth daily  -     Comprehensive Metabolic Panel; Future  -     CBC Auto Differential; Future  -     Lipid Panel; Future  -     Hemoglobin A1C; Future    Cerebrovascular accident (CVA) due to thrombosis of cerebral artery ([address] - Remains stable neurologically w/o evidence of acute changes/focal deficits. Cont regimen. Encounter for screening mammogram for breast cancer  -     HERB DIGITAL SCREEN W CAD BILATERAL; Future    Essential hypertension - Blood pressure stable and will continue current regimen. Will plan periodic monitoring of renal function, electrolytes, lipid profile. -     Comprehensive Metabolic Panel;  Future  - Lipid Panel; Future    Coronary artery disease involving native coronary artery of native heart without angina pectoris  - Coronary artery disease stable and asymptomatic, will continue to monitor for any signs of instability. Will periodically monitor lipid profile and liver function, cardiac risk factors. Continue current medical regimen. CONT FU CARDIO  -     Comprehensive Metabolic Panel; Future  -     CBC Auto Differential; Future  -     Lipid Panel;  Future    Age-related osteoporosis without current pathological fracture  -     DEXA BONE DENSITY AXIAL SKELETON; Future    Nail deformity- MONITOR AND TRIAL OF OTC BIOTIN  -     Biotin 300 MCG TABS; Take 1 tablet by mouth daily

## 2021-03-29 ENCOUNTER — HOSPITAL ENCOUNTER (OUTPATIENT)
Dept: WOMENS IMAGING | Age: 81
Discharge: HOME | End: 2021-03-29
Payer: MEDICARE

## 2021-03-29 DIAGNOSIS — M81.0 AGE-RELATED OSTEOPOROSIS WITHOUT CURRENT PATHOLOGICAL FRACTURE: ICD-10-CM

## 2021-03-29 DIAGNOSIS — Z12.31 ENCOUNTER FOR SCREENING MAMMOGRAM FOR BREAST CANCER: ICD-10-CM

## 2021-03-29 PROCEDURE — 77063 BREAST TOMOSYNTHESIS BI: CPT

## 2021-03-29 PROCEDURE — 77080 DXA BONE DENSITY AXIAL: CPT

## 2021-05-19 ENCOUNTER — OFFICE VISIT (OUTPATIENT)
Dept: CARDIOLOGY CLINIC | Age: 81
End: 2021-05-19
Payer: MEDICARE

## 2021-05-19 VITALS
HEIGHT: 60 IN | BODY MASS INDEX: 33.57 KG/M2 | HEART RATE: 72 BPM | DIASTOLIC BLOOD PRESSURE: 70 MMHG | WEIGHT: 171 LBS | SYSTOLIC BLOOD PRESSURE: 126 MMHG

## 2021-05-19 DIAGNOSIS — I25.10 CORONARY ARTERY DISEASE INVOLVING NATIVE CORONARY ARTERY OF NATIVE HEART WITHOUT ANGINA PECTORIS: Primary | ICD-10-CM

## 2021-05-19 DIAGNOSIS — U07.1 COVID-19 VIRUS INFECTION: ICD-10-CM

## 2021-05-19 PROCEDURE — 1123F ACP DISCUSS/DSCN MKR DOCD: CPT | Performed by: INTERNAL MEDICINE

## 2021-05-19 PROCEDURE — 1036F TOBACCO NON-USER: CPT | Performed by: INTERNAL MEDICINE

## 2021-05-19 PROCEDURE — 4040F PNEUMOC VAC/ADMIN/RCVD: CPT | Performed by: INTERNAL MEDICINE

## 2021-05-19 PROCEDURE — 99213 OFFICE O/P EST LOW 20 MIN: CPT | Performed by: INTERNAL MEDICINE

## 2021-05-19 PROCEDURE — G8399 PT W/DXA RESULTS DOCUMENT: HCPCS | Performed by: INTERNAL MEDICINE

## 2021-05-19 PROCEDURE — G8427 DOCREV CUR MEDS BY ELIG CLIN: HCPCS | Performed by: INTERNAL MEDICINE

## 2021-05-19 PROCEDURE — G8417 CALC BMI ABV UP PARAM F/U: HCPCS | Performed by: INTERNAL MEDICINE

## 2021-05-19 PROCEDURE — 1090F PRES/ABSN URINE INCON ASSESS: CPT | Performed by: INTERNAL MEDICINE

## 2021-05-19 RX ORDER — CEPHALEXIN 500 MG/1
CAPSULE ORAL
COMMUNITY
End: 2021-07-12

## 2021-05-19 NOTE — PROGRESS NOTES
Girish Falk  is a  Established patient  ,79 y.o.   female here for evaluation of the following chief complaint(s):    Here for 6 m fu        SUBJECTIVE/OBJECTIVE:  HPI : h/o  Cad, htn, hyperlipidimea, dm now here  Has o cardiac complains  Had covid   No issues  Review of Systems    neg    Vitals:    05/19/21 1215   BP: 126/70   Pulse: 72   Weight: 171 lb (77.6 kg)   Height: 5' (1.524 m)     /70   Pulse 72   Ht 5' (1.524 m)   Wt 171 lb (77.6 kg)   LMP  (LMP Unknown)   BMI 33.40 kg/m²   No flowsheet data found. Wt Readings from Last 3 Encounters:   05/19/21 171 lb (77.6 kg)   03/10/21 166 lb (75.3 kg)   12/14/20 168 lb (76.2 kg)     Body mass index is 33.4 kg/m². Physical Exam     Neck: JVD  no    Lungs : clear    Cardio : Si and S2 audilble      Ext: edema  no    All pertinent data reviewed  y    Meds : reviewed   y      Tests ordered    n    ASSESSMENT/PLAN:               -     CORONARY ARTERY DISEASE:  asymptomatic     All available  tests in chart reviewed. Management discussed . Testing ordered  no                                   -  Hypertension: Patients blood pressure is normal. Patient is advised about low sodium diet. Present medical regimen will not be changed. -  LIPID MANAGEMENT:  Importance of lipid levels discussed with patient   and patient was given dietary advice. NCEP- ATP III guidelines reviewed with patient. -   Changes  in medicines made: No                                -   DIABETES MELLITUS: Available pertinent lab data reviewed   and  patient was given dietary advice . Advised to check blood glucose level on a regular basis. -   Changes  in medicines made: No                    An electronic signature was used to authenticate this note.     --Ezequielchad New MD

## 2021-05-19 NOTE — LETTER
Bry Zuleta  1940  [id number]    Have you had any Chest Pain that is not new? - No      Have you had any Shortness of Breath - Yes  If Yes - When on exertion    Have you had any dizziness - No    Have you had any palpitations that are not new?  - No      Is the patient on any of the following medications - no    Do you have any edema - swelling in feet and ankles    If Yes - Have they worn compression stockings No      Do you have a surgery or procedure scheduled in the near future - No

## 2021-06-04 RX ORDER — NITROGLYCERIN 0.4 MG/1
0.4 TABLET SUBLINGUAL EVERY 5 MIN PRN
Qty: 25 TABLET | Refills: 0 | Status: SHIPPED | OUTPATIENT
Start: 2021-06-04 | End: 2021-06-08

## 2021-06-08 RX ORDER — NITROGLYCERIN 0.4 MG/1
TABLET SUBLINGUAL
Qty: 25 TABLET | Refills: 0 | Status: SHIPPED | OUTPATIENT
Start: 2021-06-08

## 2021-07-07 DIAGNOSIS — I25.10 CORONARY ARTERY DISEASE INVOLVING NATIVE CORONARY ARTERY OF NATIVE HEART WITHOUT ANGINA PECTORIS: ICD-10-CM

## 2021-07-07 DIAGNOSIS — E11.40 TYPE 2 DIABETES MELLITUS WITH DIABETIC NEUROPATHY, WITHOUT LONG-TERM CURRENT USE OF INSULIN (HCC): ICD-10-CM

## 2021-07-07 DIAGNOSIS — I10 ESSENTIAL HYPERTENSION: ICD-10-CM

## 2021-07-07 LAB
A/G RATIO: 1.8 (ref 1.1–2.2)
ALBUMIN SERPL-MCNC: 4.6 G/DL (ref 3.4–5)
ALP BLD-CCNC: 81 U/L (ref 40–129)
ALT SERPL-CCNC: 15 U/L (ref 10–40)
ANION GAP SERPL CALCULATED.3IONS-SCNC: 13 MMOL/L (ref 3–16)
AST SERPL-CCNC: 19 U/L (ref 15–37)
BASOPHILS ABSOLUTE: 0 K/UL (ref 0–0.2)
BASOPHILS RELATIVE PERCENT: 0.5 %
BILIRUB SERPL-MCNC: 0.5 MG/DL (ref 0–1)
BUN BLDV-MCNC: 29 MG/DL (ref 7–20)
CALCIUM SERPL-MCNC: 9.4 MG/DL (ref 8.3–10.6)
CHLORIDE BLD-SCNC: 94 MMOL/L (ref 99–110)
CHOLESTEROL, TOTAL: 178 MG/DL (ref 0–199)
CO2: 25 MMOL/L (ref 21–32)
CREAT SERPL-MCNC: 1 MG/DL (ref 0.6–1.2)
EOSINOPHILS ABSOLUTE: 0.3 K/UL (ref 0–0.6)
EOSINOPHILS RELATIVE PERCENT: 4.5 %
GFR AFRICAN AMERICAN: >60
GFR NON-AFRICAN AMERICAN: 53
GLOBULIN: 2.6 G/DL
GLUCOSE BLD-MCNC: 117 MG/DL (ref 70–99)
HCT VFR BLD CALC: 35.1 % (ref 36–48)
HDLC SERPL-MCNC: 31 MG/DL (ref 40–60)
HEMOGLOBIN: 12.4 G/DL (ref 12–16)
LDL CHOLESTEROL CALCULATED: ABNORMAL MG/DL
LDL CHOLESTEROL DIRECT: 84 MG/DL
LYMPHOCYTES ABSOLUTE: 2.2 K/UL (ref 1–5.1)
LYMPHOCYTES RELATIVE PERCENT: 35.7 %
MCH RBC QN AUTO: 32.3 PG (ref 26–34)
MCHC RBC AUTO-ENTMCNC: 35.4 G/DL (ref 31–36)
MCV RBC AUTO: 91.2 FL (ref 80–100)
MONOCYTES ABSOLUTE: 0.6 K/UL (ref 0–1.3)
MONOCYTES RELATIVE PERCENT: 9.4 %
NEUTROPHILS ABSOLUTE: 3 K/UL (ref 1.7–7.7)
NEUTROPHILS RELATIVE PERCENT: 49.9 %
PDW BLD-RTO: 12.2 % (ref 12.4–15.4)
PLATELET # BLD: 198 K/UL (ref 135–450)
PMV BLD AUTO: 7.9 FL (ref 5–10.5)
POTASSIUM SERPL-SCNC: 4.2 MMOL/L (ref 3.5–5.1)
RBC # BLD: 3.85 M/UL (ref 4–5.2)
SODIUM BLD-SCNC: 132 MMOL/L (ref 136–145)
TOTAL PROTEIN: 7.2 G/DL (ref 6.4–8.2)
TRIGL SERPL-MCNC: 366 MG/DL (ref 0–150)
VLDLC SERPL CALC-MCNC: ABNORMAL MG/DL
WBC # BLD: 6 K/UL (ref 4–11)

## 2021-07-07 PROCEDURE — 36415 COLL VENOUS BLD VENIPUNCTURE: CPT | Performed by: INTERNAL MEDICINE

## 2021-07-08 LAB
ESTIMATED AVERAGE GLUCOSE: 131.2 MG/DL
HBA1C MFR BLD: 6.2 %

## 2021-07-08 NOTE — RESULT ENCOUNTER NOTE
Call pt, labs ok/chol ok except TRIGLYCERIDE CHOL REMAINS SL HIGH. SUGARS ARE STABLE.   TRIGLYCERIDES INCREASED FROM PRIOR [phone] SO ADVISE TO PLS CONT WORK W LOW FAT DIET

## 2021-07-12 ENCOUNTER — OFFICE VISIT (OUTPATIENT)
Dept: INTERNAL MEDICINE CLINIC | Age: 81
End: 2021-07-12
Payer: MEDICARE

## 2021-07-12 VITALS
WEIGHT: 170 LBS | HEART RATE: 75 BPM | BODY MASS INDEX: 33.2 KG/M2 | OXYGEN SATURATION: 98 % | DIASTOLIC BLOOD PRESSURE: 70 MMHG | SYSTOLIC BLOOD PRESSURE: 136 MMHG | RESPIRATION RATE: 16 BRPM

## 2021-07-12 DIAGNOSIS — E11.40 TYPE 2 DIABETES MELLITUS WITH DIABETIC NEUROPATHY, WITHOUT LONG-TERM CURRENT USE OF INSULIN (HCC): ICD-10-CM

## 2021-07-12 DIAGNOSIS — I63.30 CEREBROVASCULAR ACCIDENT (CVA) DUE TO THROMBOSIS OF CEREBRAL ARTERY (HCC): ICD-10-CM

## 2021-07-12 DIAGNOSIS — I10 ESSENTIAL HYPERTENSION: ICD-10-CM

## 2021-07-12 DIAGNOSIS — K57.92 ACUTE DIVERTICULITIS: Primary | ICD-10-CM

## 2021-07-12 DIAGNOSIS — M05.79 RHEUMATOID ARTHRITIS INVOLVING MULTIPLE SITES WITH POSITIVE RHEUMATOID FACTOR (HCC): ICD-10-CM

## 2021-07-12 PROCEDURE — 1123F ACP DISCUSS/DSCN MKR DOCD: CPT | Performed by: INTERNAL MEDICINE

## 2021-07-12 PROCEDURE — 4040F PNEUMOC VAC/ADMIN/RCVD: CPT | Performed by: INTERNAL MEDICINE

## 2021-07-12 PROCEDURE — 1090F PRES/ABSN URINE INCON ASSESS: CPT | Performed by: INTERNAL MEDICINE

## 2021-07-12 PROCEDURE — G8417 CALC BMI ABV UP PARAM F/U: HCPCS | Performed by: INTERNAL MEDICINE

## 2021-07-12 PROCEDURE — 99214 OFFICE O/P EST MOD 30 MIN: CPT | Performed by: INTERNAL MEDICINE

## 2021-07-12 PROCEDURE — 1036F TOBACCO NON-USER: CPT | Performed by: INTERNAL MEDICINE

## 2021-07-12 PROCEDURE — G8399 PT W/DXA RESULTS DOCUMENT: HCPCS | Performed by: INTERNAL MEDICINE

## 2021-07-12 PROCEDURE — G8427 DOCREV CUR MEDS BY ELIG CLIN: HCPCS | Performed by: INTERNAL MEDICINE

## 2021-07-12 RX ORDER — LOSARTAN POTASSIUM 100 MG/1
100 TABLET ORAL DAILY
Qty: 90 TABLET | Refills: 1 | Status: SHIPPED | OUTPATIENT
Start: 2021-07-12 | End: 2021-12-27

## 2021-07-12 RX ORDER — METRONIDAZOLE 500 MG/1
500 TABLET ORAL 3 TIMES DAILY
Qty: 21 TABLET | Refills: 0 | Status: SHIPPED | OUTPATIENT
Start: 2021-07-12 | End: 2021-07-19

## 2021-07-12 RX ORDER — HYDRALAZINE HYDROCHLORIDE 25 MG/1
25 TABLET, FILM COATED ORAL 2 TIMES DAILY
Qty: 180 TABLET | Refills: 1 | Status: SHIPPED | OUTPATIENT
Start: 2021-07-12 | End: 2022-01-03

## 2021-07-12 RX ORDER — AMLODIPINE BESYLATE 5 MG/1
5 TABLET ORAL DAILY
Qty: 90 TABLET | Refills: 1 | Status: SHIPPED | OUTPATIENT
Start: 2021-07-12 | End: 2022-01-03

## 2021-07-12 RX ORDER — SULFAMETHOXAZOLE AND TRIMETHOPRIM 800; 160 MG/1; MG/1
1 TABLET ORAL 2 TIMES DAILY
Qty: 14 TABLET | Refills: 0 | Status: SHIPPED | OUTPATIENT
Start: 2021-07-12 | End: 2021-07-19

## 2021-07-12 RX ORDER — CLOPIDOGREL BISULFATE 75 MG/1
75 TABLET ORAL DAILY
Qty: 90 TABLET | Refills: 1 | Status: SHIPPED | OUTPATIENT
Start: 2021-07-12 | End: 2022-01-03

## 2021-07-12 NOTE — PROGRESS NOTES
Olga De La Rosa  1940 07/12/21    SUBJECTIVE:  Some abd pain lower abd the past two weeks, no fever or chills, blood in still but feels similar to prior flares of diverticulitis. Fingernails have come back healthy after PRIOR TREATMENT    DM- last a1c ok, at home ~110-120s  Lab Results   Component Value Date    LABA1C 6.2 07/07/2021    LABA1C 5.9 03/04/2021    LABA1C 6.4 (H) 12/03/2020     Lab Results   Component Value Date    GLUF 128 (H) 10/14/2016    LABMICR <1.20 03/04/2021    LDLCALC see below 07/07/2021    CREATININE 1.0 07/07/2021     Lipids:  Is continuing statin therapy and low fat diet. Tolerating medications w/o myalgias or GI upset. RA- currently stable w/ some chr aches but no swelling. Seeing ortho Dr Frantz More periodically    Prior hx of CVA, no new focal deficits noted, numbness, weakness slurred speech    OBJECTIVE:    /70   Pulse 75   Resp 16   Wt 170 lb (77.1 kg)   LMP  (LMP Unknown)   SpO2 98%   BMI 33.20 kg/m²     Physical Exam  Vitals reviewed. Constitutional:       Appearance: She is well-developed. Cardiovascular:      Rate and Rhythm: Normal rate and regular rhythm. Heart sounds: Normal heart sounds. No murmur heard. No friction rub. No gallop. Pulmonary:      Effort: Pulmonary effort is normal. No respiratory distress. Breath sounds: Normal breath sounds. No wheezing or rales. Abdominal:      General: Bowel sounds are normal. There is no distension. Palpations: Abdomen is soft. There is no mass. Tenderness: There is abdominal tenderness (mild llq). Hernia: No hernia is present. Musculoskeletal:      Cervical back: Neck supple. Neurological:      Mental Status: She is alert. ASSESSMENT:    1. Acute diverticulitis    2. Essential hypertension    3.  Type 2 diabetes mellitus with diabetic neuropathy, without long-term current use of insulin ([address]    4. Cerebrovascular accident (CVA) due to thrombosis of cerebral artery ([address]    5. Rheumatoid arthritis involving multiple sites with positive rheumatoid factor ([facility]        PLAN:    Brii Tomlinson was seen today for diabetes. Diagnoses and all orders for this visit:    Acute diverticulitis -  Consistent w presentation, rec rx as below and fluids, rest- CLEAR LIQUID/LOW RESIDUE DIET FOR A FEW DAYS. Pt to call back one week if not improving.        -     sulfamethoxazole-trimethoprim (BACTRIM DS;SEPTRA DS) 800-160 MG per tablet; Take 1 tablet by mouth 2 times daily for 7 days  -     metroNIDAZOLE (FLAGYL) 500 MG tablet; Take 1 tablet by mouth 3 times daily for 7 days    Essential hypertension - Blood pressure stable and will continue current regimen. Will plan periodic monitoring of renal function, electrolytes, lipid profile. -     hydrALAZINE (APRESOLINE) 25 MG tablet; Take 1 tablet by mouth 2 times daily  -     amLODIPine (NORVASC) 5 MG tablet; Take 1 tablet by mouth daily  -     Comprehensive Metabolic Panel; Future  -     CBC Auto Differential; Future  -     Lipid Panel; Future    Type 2 diabetes mellitus with diabetic neuropathy, without long-term current use of insulin ([facility]  - DM relatively well controlled and will continue current regimen. Screening reviewed. See orders. -     losartan (COZAAR) 100 MG tablet; Take 1 tablet by mouth daily  -     Comprehensive Metabolic Panel; Future  -     CBC Auto Differential; Future  -     Lipid Panel; Future  -     Hemoglobin A1C; Future    Cerebrovascular accident (CVA) due to thrombosis of cerebral artery ([facility] - Remains stable neurologically w/o evidence of acute changes/focal deficits. Cont regimen. -     clopidogrel (PLAVIX) 75 MG tablet; Take 1 tablet by mouth daily    Rheumatoid arthritis involving multiple sites with positive rheumatoid factor ([facility]- .

## 2021-09-08 ENCOUNTER — VIRTUAL VISIT (OUTPATIENT)
Dept: INTERNAL MEDICINE CLINIC | Age: 81
End: 2021-09-08
Payer: MEDICARE

## 2021-09-08 DIAGNOSIS — J01.00 ACUTE NON-RECURRENT MAXILLARY SINUSITIS: Primary | ICD-10-CM

## 2021-09-08 DIAGNOSIS — R68.89 FLU-LIKE SYMPTOMS: ICD-10-CM

## 2021-09-08 PROCEDURE — 99213 OFFICE O/P EST LOW 20 MIN: CPT | Performed by: INTERNAL MEDICINE

## 2021-09-08 PROCEDURE — G8427 DOCREV CUR MEDS BY ELIG CLIN: HCPCS | Performed by: INTERNAL MEDICINE

## 2021-09-08 PROCEDURE — G8417 CALC BMI ABV UP PARAM F/U: HCPCS | Performed by: INTERNAL MEDICINE

## 2021-09-08 PROCEDURE — 1090F PRES/ABSN URINE INCON ASSESS: CPT | Performed by: INTERNAL MEDICINE

## 2021-09-08 PROCEDURE — G8399 PT W/DXA RESULTS DOCUMENT: HCPCS | Performed by: INTERNAL MEDICINE

## 2021-09-08 PROCEDURE — 1036F TOBACCO NON-USER: CPT | Performed by: INTERNAL MEDICINE

## 2021-09-08 PROCEDURE — 4040F PNEUMOC VAC/ADMIN/RCVD: CPT | Performed by: INTERNAL MEDICINE

## 2021-09-08 PROCEDURE — 1123F ACP DISCUSS/DSCN MKR DOCD: CPT | Performed by: INTERNAL MEDICINE

## 2021-09-08 RX ORDER — DOXYCYCLINE HYCLATE 100 MG
100 TABLET ORAL 2 TIMES DAILY
Qty: 20 TABLET | Refills: 0 | Status: SHIPPED | OUTPATIENT
Start: 2021-09-08 | End: 2021-09-18

## 2021-09-08 RX ORDER — PREDNISONE 1 MG/1
TABLET ORAL
Qty: 21 TABLET | Refills: 0 | Status: SHIPPED | OUTPATIENT
Start: 2021-09-08 | End: 2021-11-03 | Stop reason: ALTCHOICE

## 2021-09-08 NOTE — PROGRESS NOTES
moist.     External Ears [] Normal  [] Abnormal-     Neck: [] No visualized mass     Pulmonary/Chest: [] Respiratory effort normal.  [] No visualized signs of difficulty breathing or respiratory distress        [] Abnormal-      Musculoskeletal:   [] Normal gait with no signs of ataxia         [] Normal range of motion of neck        [] Abnormal-       Neurological:        [] No Facial Asymmetry (Cranial nerve 7 motor function) (limited exam to video visit)          [] No gaze palsy        [] Abnormal-         Skin:        [] No significant exanthematous lesions or discoloration noted on facial skin         [] Abnormal-            Psychiatric:       [] Normal Affect [] No Hallucinations        [] Abnormal-     Other pertinent observable physical exam findings-     ASSESSMENT/PLAN:  1. Acute non-recurrent maxillary sinusitis  PHONE VISIT TODAY, I'M CONCERNED SHE MAY HAVE A BREAKTHR COVID INFECTION AN D WILL ORDER TESTING. ALSO EMPIRIC RX AS BELOW. IF TESTS POS FOR COVID MAY BE CANDIDATE FOR [facility]. - doxycycline hyclate (VIBRA-TABS) 100 MG tablet; Take 1 tablet by mouth 2 times daily for 10 days  Dispense: 20 tablet; Refill: 0  - predniSONE (DELTASONE) 5 MG tablet; Take 6 tablets by mouth on day 1, 5 on day 2, 4 on day 3, 3 on day 4, 2 on day 5, 1 on day 6. Dispense: 21 tablet; Refill: 0    2. Flu-like symptoms     - Covid-19 Ambulatory; Future      Return if symptoms worsen or fail to improve. Dilma Sotelo, was evaluated through a synchronous (real-time) audio-video encounter. The patient (or guardian if applicable) is aware that this is a billable service. Verbal consent to proceed has been obtained within the past 12 months. The visit was conducted pursuant to the emergency declaration under the [facility], [address] [place] [address] authority and the [employer] and [employer] General Act.   Patient identification was verified, and a caregiver was

## 2021-09-11 LAB
COMMENT: NORMAL
SARS-COV-2 RNA, RT PCR: NOT DETECTED
SOURCE: NORMAL

## 2021-10-26 DIAGNOSIS — I10 ESSENTIAL HYPERTENSION: ICD-10-CM

## 2021-10-26 DIAGNOSIS — I25.10 CORONARY ARTERY DISEASE INVOLVING NATIVE CORONARY ARTERY OF NATIVE HEART WITHOUT ANGINA PECTORIS: ICD-10-CM

## 2021-10-26 RX ORDER — METOPROLOL TARTRATE 50 MG/1
50 TABLET, FILM COATED ORAL 2 TIMES DAILY
Qty: 180 TABLET | Refills: 1 | Status: SHIPPED | OUTPATIENT
Start: 2021-10-26 | End: 2022-02-07 | Stop reason: SDUPTHER

## 2021-10-27 DIAGNOSIS — I10 ESSENTIAL HYPERTENSION: ICD-10-CM

## 2021-10-27 DIAGNOSIS — E11.40 TYPE 2 DIABETES MELLITUS WITH DIABETIC NEUROPATHY, WITHOUT LONG-TERM CURRENT USE OF INSULIN (HCC): ICD-10-CM

## 2021-10-27 LAB
A/G RATIO: 1.9 (ref 1.1–2.2)
ALBUMIN SERPL-MCNC: 4.9 G/DL (ref 3.4–5)
ALP BLD-CCNC: 78 U/L (ref 40–129)
ALT SERPL-CCNC: 16 U/L (ref 10–40)
ANION GAP SERPL CALCULATED.3IONS-SCNC: 16 MMOL/L (ref 3–16)
AST SERPL-CCNC: 22 U/L (ref 15–37)
BASOPHILS ABSOLUTE: 0 K/UL (ref 0–0.2)
BASOPHILS RELATIVE PERCENT: 0.6 %
BILIRUB SERPL-MCNC: 0.8 MG/DL (ref 0–1)
BUN BLDV-MCNC: 19 MG/DL (ref 7–20)
CALCIUM SERPL-MCNC: 10 MG/DL (ref 8.3–10.6)
CHLORIDE BLD-SCNC: 93 MMOL/L (ref 99–110)
CHOLESTEROL, TOTAL: 159 MG/DL (ref 0–199)
CO2: 22 MMOL/L (ref 21–32)
CREAT SERPL-MCNC: 0.9 MG/DL (ref 0.6–1.2)
EOSINOPHILS ABSOLUTE: 0.2 K/UL (ref 0–0.6)
EOSINOPHILS RELATIVE PERCENT: 4.6 %
GFR AFRICAN AMERICAN: >60
GFR NON-AFRICAN AMERICAN: 60
GLOBULIN: 2.6 G/DL
GLUCOSE BLD-MCNC: 119 MG/DL (ref 70–99)
HCT VFR BLD CALC: 37.3 % (ref 36–48)
HDLC SERPL-MCNC: 35 MG/DL (ref 40–60)
HEMOGLOBIN: 12.6 G/DL (ref 12–16)
LDL CHOLESTEROL CALCULATED: 81 MG/DL
LYMPHOCYTES ABSOLUTE: 2.1 K/UL (ref 1–5.1)
LYMPHOCYTES RELATIVE PERCENT: 42.4 %
MCH RBC QN AUTO: 31 PG (ref 26–34)
MCHC RBC AUTO-ENTMCNC: 33.8 G/DL (ref 31–36)
MCV RBC AUTO: 91.8 FL (ref 80–100)
MONOCYTES ABSOLUTE: 0.4 K/UL (ref 0–1.3)
MONOCYTES RELATIVE PERCENT: 8.7 %
NEUTROPHILS ABSOLUTE: 2.1 K/UL (ref 1.7–7.7)
NEUTROPHILS RELATIVE PERCENT: 43.7 %
PDW BLD-RTO: 12.8 % (ref 12.4–15.4)
PLATELET # BLD: 213 K/UL (ref 135–450)
PMV BLD AUTO: 8.1 FL (ref 5–10.5)
POTASSIUM SERPL-SCNC: 4.4 MMOL/L (ref 3.5–5.1)
RBC # BLD: 4.06 M/UL (ref 4–5.2)
SODIUM BLD-SCNC: 131 MMOL/L (ref 136–145)
TOTAL PROTEIN: 7.5 G/DL (ref 6.4–8.2)
TRIGL SERPL-MCNC: 217 MG/DL (ref 0–150)
VLDLC SERPL CALC-MCNC: 43 MG/DL
WBC # BLD: 4.9 K/UL (ref 4–11)

## 2021-10-28 DIAGNOSIS — E87.1 HYPONATREMIA: Primary | ICD-10-CM

## 2021-10-28 LAB
ESTIMATED AVERAGE GLUCOSE: 128.4 MG/DL
HBA1C MFR BLD: 6.1 %

## 2021-11-02 DIAGNOSIS — E87.1 HYPONATREMIA: ICD-10-CM

## 2021-11-02 LAB
ANION GAP SERPL CALCULATED.3IONS-SCNC: 15 MMOL/L (ref 3–16)
BUN BLDV-MCNC: 22 MG/DL (ref 7–20)
CALCIUM SERPL-MCNC: 10.1 MG/DL (ref 8.3–10.6)
CHLORIDE BLD-SCNC: 95 MMOL/L (ref 99–110)
CO2: 24 MMOL/L (ref 21–32)
CREAT SERPL-MCNC: 0.9 MG/DL (ref 0.6–1.2)
GFR AFRICAN AMERICAN: >60
GFR NON-AFRICAN AMERICAN: 60
GLUCOSE BLD-MCNC: 119 MG/DL (ref 70–99)
POTASSIUM SERPL-SCNC: 4.9 MMOL/L (ref 3.5–5.1)
SODIUM BLD-SCNC: 134 MMOL/L (ref 136–145)

## 2021-11-02 PROCEDURE — 36415 COLL VENOUS BLD VENIPUNCTURE: CPT | Performed by: INTERNAL MEDICINE

## 2021-11-03 ENCOUNTER — OFFICE VISIT (OUTPATIENT)
Dept: INTERNAL MEDICINE CLINIC | Age: 81
End: 2021-11-03
Payer: MEDICARE

## 2021-11-03 VITALS
WEIGHT: 168 LBS | SYSTOLIC BLOOD PRESSURE: 140 MMHG | RESPIRATION RATE: 16 BRPM | BODY MASS INDEX: 32.81 KG/M2 | HEART RATE: 57 BPM | OXYGEN SATURATION: 97 % | DIASTOLIC BLOOD PRESSURE: 80 MMHG

## 2021-11-03 DIAGNOSIS — I63.30 CEREBROVASCULAR ACCIDENT (CVA) DUE TO THROMBOSIS OF CEREBRAL ARTERY (HCC): ICD-10-CM

## 2021-11-03 DIAGNOSIS — E11.40 TYPE 2 DIABETES MELLITUS WITH DIABETIC NEUROPATHY, WITHOUT LONG-TERM CURRENT USE OF INSULIN (HCC): Primary | ICD-10-CM

## 2021-11-03 DIAGNOSIS — E78.2 MIXED HYPERLIPIDEMIA: ICD-10-CM

## 2021-11-03 DIAGNOSIS — I10 ESSENTIAL HYPERTENSION: ICD-10-CM

## 2021-11-03 DIAGNOSIS — I25.10 CORONARY ARTERY DISEASE INVOLVING NATIVE CORONARY ARTERY OF NATIVE HEART WITHOUT ANGINA PECTORIS: ICD-10-CM

## 2021-11-03 PROCEDURE — G8484 FLU IMMUNIZE NO ADMIN: HCPCS | Performed by: INTERNAL MEDICINE

## 2021-11-03 PROCEDURE — G8427 DOCREV CUR MEDS BY ELIG CLIN: HCPCS | Performed by: INTERNAL MEDICINE

## 2021-11-03 PROCEDURE — G8399 PT W/DXA RESULTS DOCUMENT: HCPCS | Performed by: INTERNAL MEDICINE

## 2021-11-03 PROCEDURE — 99214 OFFICE O/P EST MOD 30 MIN: CPT | Performed by: INTERNAL MEDICINE

## 2021-11-03 PROCEDURE — 1123F ACP DISCUSS/DSCN MKR DOCD: CPT | Performed by: INTERNAL MEDICINE

## 2021-11-03 PROCEDURE — 4040F PNEUMOC VAC/ADMIN/RCVD: CPT | Performed by: INTERNAL MEDICINE

## 2021-11-03 PROCEDURE — G8417 CALC BMI ABV UP PARAM F/U: HCPCS | Performed by: INTERNAL MEDICINE

## 2021-11-03 PROCEDURE — 1036F TOBACCO NON-USER: CPT | Performed by: INTERNAL MEDICINE

## 2021-11-03 PROCEDURE — 1090F PRES/ABSN URINE INCON ASSESS: CPT | Performed by: INTERNAL MEDICINE

## 2021-11-03 RX ORDER — HYDROCHLOROTHIAZIDE 25 MG/1
25 TABLET ORAL DAILY
Qty: 90 TABLET | Refills: 1 | Status: SHIPPED | OUTPATIENT
Start: 2021-11-03 | End: 2022-02-07 | Stop reason: SDUPTHER

## 2021-11-03 RX ORDER — GABAPENTIN 100 MG/1
100 CAPSULE ORAL 2 TIMES DAILY
Qty: 180 CAPSULE | Refills: 1 | Status: SHIPPED | OUTPATIENT
Start: 2021-11-03 | End: 2022-02-07 | Stop reason: SDUPTHER

## 2021-11-03 NOTE — LETTER
[facility]  [address] [facility] [address]  Phone: [phone]  Fax: [phone]    Haylee Mike MD         November 3, 2021     Patient: Shantell Bright   YOB: 1940   Date of Visit: 11/3/2021       To Whom It May Concern: It is my medical opinion that Ketty Amezquita requires a disability parking placard for the following reasons:  Osteoarthritis  Duration of need: 5 years    If you have any questions or concerns, please don't hesitate to call.     Sincerely,        Haylee Mike MD

## 2021-11-03 NOTE — PROGRESS NOTES
heard.   No friction rub. No gallop. Pulmonary:      Effort: Pulmonary effort is normal. No respiratory distress. Breath sounds: Normal breath sounds. No wheezing or rales. Abdominal:      General: Bowel sounds are normal. There is no distension. Palpations: Abdomen is soft. There is no mass. Tenderness: There is no abdominal tenderness. There is no guarding or rebound. Hernia: No hernia is present. Musculoskeletal:      Cervical back: Neck supple. Lymphadenopathy:      Cervical: No cervical adenopathy. Skin:     General: Skin is warm and dry. Neurological:      Mental Status: She is alert. Psychiatric:         Mood and Affect: Mood normal.         ASSESSMENT:    1. Type 2 diabetes mellitus with diabetic neuropathy, without long-term current use of insulin ([address]    2. Essential hypertension    3. Cerebrovascular accident (CVA) due to thrombosis of cerebral artery ([address]    4. Coronary artery disease involving native coronary artery of native heart without angina pectoris    5. Mixed hyperlipidemia        PLAN:    Steve Smith was seen today for diabetes and discuss medications. Diagnoses and all orders for this visit:    Type 2 diabetes mellitus with diabetic neuropathy, without long-term current use of insulin ([address]  - DM relatively well controlled and will continue current regimen. Screening reviewed. See orders. WE'LL ALSO TRY NEURONTIN FOR HER NEUROPATHIC SX LEG BURNING DISCOMFORT  -     gabapentin (NEURONTIN) 100 MG capsule; Take 1 capsule by mouth 2 times daily for 90 days. -     Comprehensive Metabolic Panel; Future  -     CBC Auto Differential; Future  -     Lipid Panel; Future  -     Hemoglobin A1C; Future  -     Microalbumin / Creatinine Urine Ratio; Future    Essential hypertension - Blood pressure stable and will continue current regimen. Will plan periodic monitoring of renal function, electrolytes, lipid profile.      -     hydroCHLOROthiazide (HYDRODIURIL) 25 MG tablet; Take 1 tablet by mouth daily  -     Comprehensive Metabolic Panel; Future  -     CBC Auto Differential; Future  -     Lipid Panel; Future    Cerebrovascular accident (CVA) due to thrombosis of cerebral artery ([address]  - Remains stable neurologically w/o evidence of acute changes/focal deficits. Cont regimen. -     Comprehensive Metabolic Panel; Future  -     CBC Auto Differential; Future  -     Lipid Panel; Future    Coronary artery disease involving native coronary artery of native heart without angina pectoris  - Coronary artery disease stable and asymptomatic, will continue to monitor for any signs of instability. Will periodically monitor lipid profile and liver function, cardiac risk factors. Continue current medical regimen. -     Comprehensive Metabolic Panel; Future  -     CBC Auto Differential; Future  -     Lipid Panel; Future    Mixed hyperlipidemia  - Pt will continue to work on a low fat diet and also exercise, wt loss as appropriate. Will continue periodic monitoring of fasting lipid profile, glucose, liver function. -     Comprehensive Metabolic Panel; Future  -     Lipid Panel;  Future

## 2021-11-30 LAB — DIABETIC RETINOPATHY: NEGATIVE

## 2021-12-27 DIAGNOSIS — E11.40 TYPE 2 DIABETES MELLITUS WITH DIABETIC NEUROPATHY, WITHOUT LONG-TERM CURRENT USE OF INSULIN (HCC): ICD-10-CM

## 2021-12-27 RX ORDER — LOSARTAN POTASSIUM 100 MG/1
100 TABLET ORAL DAILY
Qty: 90 TABLET | Refills: 1 | Status: SHIPPED | OUTPATIENT
Start: 2021-12-27 | End: 2022-06-27

## 2022-01-03 DIAGNOSIS — I63.30 CEREBROVASCULAR ACCIDENT (CVA) DUE TO THROMBOSIS OF CEREBRAL ARTERY (HCC): ICD-10-CM

## 2022-01-03 DIAGNOSIS — I10 ESSENTIAL HYPERTENSION: ICD-10-CM

## 2022-01-03 DIAGNOSIS — E11.40 TYPE 2 DIABETES MELLITUS WITH DIABETIC NEUROPATHY, WITHOUT LONG-TERM CURRENT USE OF INSULIN (HCC): ICD-10-CM

## 2022-01-03 DIAGNOSIS — I25.10 CORONARY ARTERY DISEASE INVOLVING NATIVE CORONARY ARTERY OF NATIVE HEART WITHOUT ANGINA PECTORIS: ICD-10-CM

## 2022-01-03 RX ORDER — SIMVASTATIN 20 MG
20 TABLET ORAL NIGHTLY
Qty: 90 TABLET | Refills: 1 | Status: SHIPPED | OUTPATIENT
Start: 2022-01-03 | End: 2022-06-27

## 2022-01-03 RX ORDER — CLOPIDOGREL BISULFATE 75 MG/1
75 TABLET ORAL DAILY
Qty: 90 TABLET | Refills: 1 | Status: SHIPPED | OUTPATIENT
Start: 2022-01-03 | End: 2022-02-07 | Stop reason: SDUPTHER

## 2022-01-03 RX ORDER — HYDRALAZINE HYDROCHLORIDE 25 MG/1
TABLET, FILM COATED ORAL
Qty: 180 TABLET | Refills: 1 | Status: SHIPPED | OUTPATIENT
Start: 2022-01-03 | End: 2022-02-07 | Stop reason: SDUPTHER

## 2022-01-03 RX ORDER — AMLODIPINE BESYLATE 5 MG/1
5 TABLET ORAL DAILY
Qty: 90 TABLET | Refills: 1 | Status: SHIPPED | OUTPATIENT
Start: 2022-01-03 | End: 2022-02-07 | Stop reason: SDUPTHER

## 2022-02-01 DIAGNOSIS — E11.40 TYPE 2 DIABETES MELLITUS WITH DIABETIC NEUROPATHY, WITHOUT LONG-TERM CURRENT USE OF INSULIN (HCC): ICD-10-CM

## 2022-02-01 DIAGNOSIS — I25.10 CORONARY ARTERY DISEASE INVOLVING NATIVE CORONARY ARTERY OF NATIVE HEART WITHOUT ANGINA PECTORIS: ICD-10-CM

## 2022-02-01 DIAGNOSIS — E78.2 MIXED HYPERLIPIDEMIA: ICD-10-CM

## 2022-02-01 DIAGNOSIS — I63.30 CEREBROVASCULAR ACCIDENT (CVA) DUE TO THROMBOSIS OF CEREBRAL ARTERY (HCC): ICD-10-CM

## 2022-02-01 DIAGNOSIS — I10 ESSENTIAL HYPERTENSION: ICD-10-CM

## 2022-02-01 LAB
A/G RATIO: 1.5 (ref 1.1–2.2)
ALBUMIN SERPL-MCNC: 4.8 G/DL (ref 3.4–5)
ALP BLD-CCNC: 75 U/L (ref 40–129)
ALT SERPL-CCNC: 17 U/L (ref 10–40)
ANION GAP SERPL CALCULATED.3IONS-SCNC: 17 MMOL/L (ref 3–16)
AST SERPL-CCNC: 20 U/L (ref 15–37)
BASOPHILS ABSOLUTE: 0 K/UL (ref 0–0.2)
BASOPHILS RELATIVE PERCENT: 0.5 %
BILIRUB SERPL-MCNC: 0.8 MG/DL (ref 0–1)
BUN BLDV-MCNC: 24 MG/DL (ref 7–20)
CALCIUM SERPL-MCNC: 10.1 MG/DL (ref 8.3–10.6)
CHLORIDE BLD-SCNC: 96 MMOL/L (ref 99–110)
CHOLESTEROL, TOTAL: 168 MG/DL (ref 0–199)
CO2: 25 MMOL/L (ref 21–32)
CREAT SERPL-MCNC: 0.9 MG/DL (ref 0.6–1.2)
CREATININE URINE: 41.1 MG/DL (ref 28–259)
EOSINOPHILS ABSOLUTE: 0.3 K/UL (ref 0–0.6)
EOSINOPHILS RELATIVE PERCENT: 5.4 %
GFR AFRICAN AMERICAN: >60
GFR NON-AFRICAN AMERICAN: 60
GLUCOSE BLD-MCNC: 137 MG/DL (ref 70–99)
HCT VFR BLD CALC: 37.7 % (ref 36–48)
HDLC SERPL-MCNC: 35 MG/DL (ref 40–60)
HEMOGLOBIN: 12.7 G/DL (ref 12–16)
LDL CHOLESTEROL CALCULATED: 84 MG/DL
LYMPHOCYTES ABSOLUTE: 1.7 K/UL (ref 1–5.1)
LYMPHOCYTES RELATIVE PERCENT: 35.7 %
MCH RBC QN AUTO: 31.2 PG (ref 26–34)
MCHC RBC AUTO-ENTMCNC: 33.6 G/DL (ref 31–36)
MCV RBC AUTO: 92.9 FL (ref 80–100)
MICROALBUMIN UR-MCNC: 1.4 MG/DL
MICROALBUMIN/CREAT UR-RTO: 34.1 MG/G (ref 0–30)
MONOCYTES ABSOLUTE: 0.5 K/UL (ref 0–1.3)
MONOCYTES RELATIVE PERCENT: 9.5 %
NEUTROPHILS ABSOLUTE: 2.4 K/UL (ref 1.7–7.7)
NEUTROPHILS RELATIVE PERCENT: 48.9 %
PDW BLD-RTO: 12.4 % (ref 12.4–15.4)
PLATELET # BLD: 188 K/UL (ref 135–450)
PMV BLD AUTO: 8.1 FL (ref 5–10.5)
POTASSIUM SERPL-SCNC: 4.6 MMOL/L (ref 3.5–5.1)
RBC # BLD: 4.06 M/UL (ref 4–5.2)
SODIUM BLD-SCNC: 138 MMOL/L (ref 136–145)
TOTAL PROTEIN: 7.9 G/DL (ref 6.4–8.2)
TRIGL SERPL-MCNC: 245 MG/DL (ref 0–150)
VLDLC SERPL CALC-MCNC: 49 MG/DL
WBC # BLD: 4.9 K/UL (ref 4–11)

## 2022-02-01 PROCEDURE — 36415 COLL VENOUS BLD VENIPUNCTURE: CPT | Performed by: INTERNAL MEDICINE

## 2022-02-02 LAB
ESTIMATED AVERAGE GLUCOSE: 134.1 MG/DL
HBA1C MFR BLD: 6.3 %

## 2022-02-02 NOTE — RESULT ENCOUNTER NOTE
Chol, sugars, labs appear in stable range, DM is controlled- EXCEPT HAS SOME INCR PROTEIN IN URINE. IS IMPORTANT THAT EDIS STAY WELL HYDRATED W AT LEAST 3 GLASSES OF WATER/DAY AND ALSO CONT DM DIET, AVOID Anti inflammatory meds such as advil, aleve, motrin, ibuprofen    notify pt please.

## 2022-02-07 ENCOUNTER — OFFICE VISIT (OUTPATIENT)
Dept: INTERNAL MEDICINE CLINIC | Age: 82
End: 2022-02-07
Payer: MEDICARE

## 2022-02-07 VITALS
HEIGHT: 60 IN | OXYGEN SATURATION: 98 % | SYSTOLIC BLOOD PRESSURE: 122 MMHG | WEIGHT: 170 LBS | DIASTOLIC BLOOD PRESSURE: 76 MMHG | BODY MASS INDEX: 33.38 KG/M2 | RESPIRATION RATE: 16 BRPM | HEART RATE: 71 BPM

## 2022-02-07 DIAGNOSIS — I10 ESSENTIAL HYPERTENSION: ICD-10-CM

## 2022-02-07 DIAGNOSIS — M05.79 RHEUMATOID ARTHRITIS INVOLVING MULTIPLE SITES WITH POSITIVE RHEUMATOID FACTOR (HCC): ICD-10-CM

## 2022-02-07 DIAGNOSIS — I63.30 CEREBROVASCULAR ACCIDENT (CVA) DUE TO THROMBOSIS OF CEREBRAL ARTERY (HCC): ICD-10-CM

## 2022-02-07 DIAGNOSIS — E11.40 TYPE 2 DIABETES MELLITUS WITH DIABETIC NEUROPATHY, WITHOUT LONG-TERM CURRENT USE OF INSULIN (HCC): ICD-10-CM

## 2022-02-07 DIAGNOSIS — I25.10 CORONARY ARTERY DISEASE INVOLVING NATIVE CORONARY ARTERY OF NATIVE HEART WITHOUT ANGINA PECTORIS: ICD-10-CM

## 2022-02-07 DIAGNOSIS — Z00.00 ROUTINE GENERAL MEDICAL EXAMINATION AT A HEALTH CARE FACILITY: Primary | ICD-10-CM

## 2022-02-07 PROBLEM — C44.41 BASAL CELL CARCINOMA OF NECK: Status: ACTIVE | Noted: 2021-04-03

## 2022-02-07 PROBLEM — L72.0 MILIA: Status: ACTIVE | Noted: 2022-02-07

## 2022-02-07 PROBLEM — Z95.5 PRESENCE OF STENT IN CORONARY ARTERY: Status: ACTIVE | Noted: 2022-02-07

## 2022-02-07 PROBLEM — E66.9 OBESITY (BMI 30.0-34.9): Status: RESOLVED | Noted: 2020-01-03 | Resolved: 2022-02-07

## 2022-02-07 PROBLEM — L72.0 MILIA: Status: RESOLVED | Noted: 2022-02-07 | Resolved: 2022-02-07

## 2022-02-07 PROBLEM — L57.0 ACTINIC KERATOSIS: Status: RESOLVED | Noted: 2022-02-07 | Resolved: 2022-02-07

## 2022-02-07 PROBLEM — L57.0 ACTINIC KERATOSIS: Status: ACTIVE | Noted: 2022-02-07

## 2022-02-07 PROBLEM — D04.70: Status: RESOLVED | Noted: 2022-02-07 | Resolved: 2022-02-07

## 2022-02-07 PROBLEM — L30.9 DERMATITIS: Status: ACTIVE | Noted: 2022-02-07

## 2022-02-07 PROBLEM — Z95.5 PRESENCE OF STENT IN CORONARY ARTERY: Status: RESOLVED | Noted: 2022-02-07 | Resolved: 2022-02-07

## 2022-02-07 PROBLEM — E66.811 OBESITY (BMI 30.0-34.9): Status: ACTIVE | Noted: 2020-01-03

## 2022-02-07 PROBLEM — K21.9 GASTROESOPHAGEAL REFLUX DISEASE: Status: RESOLVED | Noted: 2022-02-07 | Resolved: 2022-02-07

## 2022-02-07 PROBLEM — E66.811 OBESITY (BMI 30.0-34.9): Status: RESOLVED | Noted: 2020-01-03 | Resolved: 2022-02-07

## 2022-02-07 PROBLEM — L82.1 SEBORRHEIC KERATOSIS: Status: ACTIVE | Noted: 2022-02-07

## 2022-02-07 PROBLEM — C44.41 BASAL CELL CARCINOMA OF NECK: Status: RESOLVED | Noted: 2021-04-03 | Resolved: 2022-02-07

## 2022-02-07 PROBLEM — K21.9 GASTROESOPHAGEAL REFLUX DISEASE: Status: ACTIVE | Noted: 2022-02-07

## 2022-02-07 PROBLEM — L30.9 DERMATITIS: Status: RESOLVED | Noted: 2022-02-07 | Resolved: 2022-02-07

## 2022-02-07 PROBLEM — E66.9 OBESITY (BMI 30.0-34.9): Status: ACTIVE | Noted: 2020-01-03

## 2022-02-07 PROBLEM — T84.099A MECHANICAL COMPLICATION OF INTERNAL JOINT PROSTHESIS (HCC): Status: ACTIVE | Noted: 2022-02-07

## 2022-02-07 PROBLEM — L82.1 SEBORRHEIC KERATOSIS: Status: RESOLVED | Noted: 2022-02-07 | Resolved: 2022-02-07

## 2022-02-07 PROBLEM — D04.70: Status: ACTIVE | Noted: 2022-02-07

## 2022-02-07 PROBLEM — T84.099A MECHANICAL COMPLICATION OF INTERNAL JOINT PROSTHESIS (HCC): Status: RESOLVED | Noted: 2022-02-07 | Resolved: 2022-02-07

## 2022-02-07 PROCEDURE — G8399 PT W/DXA RESULTS DOCUMENT: HCPCS | Performed by: INTERNAL MEDICINE

## 2022-02-07 PROCEDURE — 1090F PRES/ABSN URINE INCON ASSESS: CPT | Performed by: INTERNAL MEDICINE

## 2022-02-07 PROCEDURE — G0439 PPPS, SUBSEQ VISIT: HCPCS | Performed by: INTERNAL MEDICINE

## 2022-02-07 PROCEDURE — G8417 CALC BMI ABV UP PARAM F/U: HCPCS | Performed by: INTERNAL MEDICINE

## 2022-02-07 PROCEDURE — 4040F PNEUMOC VAC/ADMIN/RCVD: CPT | Performed by: INTERNAL MEDICINE

## 2022-02-07 PROCEDURE — 1123F ACP DISCUSS/DSCN MKR DOCD: CPT | Performed by: INTERNAL MEDICINE

## 2022-02-07 PROCEDURE — 99214 OFFICE O/P EST MOD 30 MIN: CPT | Performed by: INTERNAL MEDICINE

## 2022-02-07 PROCEDURE — G8427 DOCREV CUR MEDS BY ELIG CLIN: HCPCS | Performed by: INTERNAL MEDICINE

## 2022-02-07 PROCEDURE — 1036F TOBACCO NON-USER: CPT | Performed by: INTERNAL MEDICINE

## 2022-02-07 PROCEDURE — G8484 FLU IMMUNIZE NO ADMIN: HCPCS | Performed by: INTERNAL MEDICINE

## 2022-02-07 RX ORDER — GABAPENTIN 100 MG/1
100 CAPSULE ORAL 2 TIMES DAILY
Qty: 180 CAPSULE | Refills: 1 | Status: SHIPPED | OUTPATIENT
Start: 2022-02-07 | End: 2022-08-11 | Stop reason: SDUPTHER

## 2022-02-07 RX ORDER — HYDRALAZINE HYDROCHLORIDE 25 MG/1
25 TABLET, FILM COATED ORAL 2 TIMES DAILY
Qty: 180 TABLET | Refills: 1 | Status: SHIPPED | OUTPATIENT
Start: 2022-02-07

## 2022-02-07 RX ORDER — AMLODIPINE BESYLATE 5 MG/1
5 TABLET ORAL DAILY
Qty: 90 TABLET | Refills: 1 | Status: SHIPPED | OUTPATIENT
Start: 2022-02-07

## 2022-02-07 RX ORDER — HYDROXYZINE HYDROCHLORIDE 10 MG/1
TABLET, FILM COATED ORAL
COMMUNITY

## 2022-02-07 RX ORDER — CLOPIDOGREL BISULFATE 75 MG/1
75 TABLET ORAL DAILY
Qty: 90 TABLET | Refills: 1 | Status: SHIPPED | OUTPATIENT
Start: 2022-02-07

## 2022-02-07 RX ORDER — HYDROCHLOROTHIAZIDE 25 MG/1
25 TABLET ORAL DAILY
Qty: 90 TABLET | Refills: 1 | Status: SHIPPED | OUTPATIENT
Start: 2022-02-07 | End: 2022-09-23

## 2022-02-07 RX ORDER — METOPROLOL TARTRATE 50 MG/1
50 TABLET, FILM COATED ORAL 2 TIMES DAILY
Qty: 180 TABLET | Refills: 1 | Status: SHIPPED | OUTPATIENT
Start: 2022-02-07 | End: 2022-09-23

## 2022-02-07 ASSESSMENT — PATIENT HEALTH QUESTIONNAIRE - PHQ9
1. LITTLE INTEREST OR PLEASURE IN DOING THINGS: 0
SUM OF ALL RESPONSES TO PHQ QUESTIONS 1-9: 0
2. FEELING DOWN, DEPRESSED OR HOPELESS: 0
SUM OF ALL RESPONSES TO PHQ9 QUESTIONS 1 & 2: 0
SUM OF ALL RESPONSES TO PHQ QUESTIONS 1-9: 0

## 2022-02-07 ASSESSMENT — LIFESTYLE VARIABLES
HOW OFTEN DO YOU HAVE A DRINK CONTAINING ALCOHOL: NEVER
AUDIT TOTAL SCORE: INCOMPLETE
HOW OFTEN DO YOU HAVE A DRINK CONTAINING ALCOHOL: 0
AUDIT-C TOTAL SCORE: INCOMPLETE

## 2022-02-07 NOTE — PROGRESS NOTES
Medicare Annual Wellness Visit  Name: Masood Zuniga Date: 2022   MRN: [id number] Sex: Female   Age: 79 y.o. Ethnicity: Non- / Non    : 1940 Race: White (non-)      Carissa Genao is here for Medicare AWV and Chest Pain (one episode SAT- took NTG- resolved)    Screenings for behavioral, psychosocial and functional/safety risks, and cognitive dysfunction are all negative except as indicated below. These results, as well as other patient data from the [address] form, are documented in Flowsheets linked to this Encounter. HAD SHOVELED SNOW 2D AGO AND THEN WAS RESTING ON THE COUCH, HAD ONSET OF SSCP FOR 5-7MIN THEN RESOLVED AFTER NTG.  DOES HAVE SCHED APPT DR Gordon Hernandez FOR  LATER THIS MO. LAST STRESS TEST 2020 WAS BENIGN. **ADVISED NO SNOW SHOVELING TILL HER APPT**    HTN- BP STABLE ON MEDS, NO RECURRING SX CP. DM-  LAST GLC IN GOOD RANGE 110-130S RANGE AND A1C 6.5. NEUROPATHY MUCH BETTER TOO ON NEURONTIN. Lab Results   Component Value Date    LABA1C 6.3 2022    LABA1C 6.1 10/27/2021    LABA1C 6.2 2021     Lab Results   Component Value Date    GLUF 128 (H) 10/14/2016    LABMICR 1.40 2022    LDLCALC 84 2022    CREATININE 0.9 2022     CVA NO WEAKNESS OR NUMBNESS, SLURRED SPEECH. RA- SOME ACHES BUT NO JOINT SWELLING NOTED. JOINT ACHES BETTER TOO W NEURONTIN. Controlled substances monitoring: possible medication side effects, risk of tolerance and/or dependence, and alternative treatments discussed, no signs of potential drug abuse or diversion identified and [place] report reviewed today- activity consistent with treatment plan.           Allergies   Allergen Reactions    Actonel [Risedronate Sodium]      Abdominal pain    Ciprofloxacin      nausea    Darvocet [Propoxyphene N-Acetaminophen]      Lightheaded    Lopid [Gemfibrozil] Other (See Comments)     Leg cramping    Mevacor [Lovastatin] Other (See Comments)     Leg cramping    Neosporin [Neomycin-Polymyx-Gramicid] Other (See Comments)     Pt states the her skin gets welts/streaks    Nsaids      HAS EPISODIC PROTEINURIA ALONG W DM    Pravachol [Pravastatin Sodium] Other (See Comments)     Leg cramping       Prior to Visit Medications    Medication Sig Taking? Authorizing Provider   POTASSIUM PO DAILY Yes Historical Provider, MD   OMEPRAZOLE PO DAILY Yes Historical Provider, MD   OMEGA-3 FATTY ACIDS-VITAMIN E PO 1 capsule Yes Historical Provider, MD   Calcium Carb-Cholecalciferol (CALCIUM CARBONATE-VITAMIN D3 PO) DAILY Yes Historical Provider, MD   hydrOXYzine (ATARAX) 10 MG tablet hydroxyzine HCl 10 mg tablet   TAKE 1 TO 2 TABLETS BY MOUTH EVERY NIGHT 1 HOUR BEFORE BEDTIME FOR ITCHING  Historical Provider, MD   amLODIPine (NORVASC) 5 MG tablet TAKE 1 TABLET BY MOUTH DAILY  Sheridan Bright MD   hydrALAZINE (APRESOLINE) 25 MG tablet TAKE 1 TABLET BY MOUTH TWICE DAILY  Sheridan Bright MD   clopidogrel (PLAVIX) 75 MG tablet TAKE 1 TABLET BY MOUTH DAILY  Sheridan Bright MD   simvastatin (ZOCOR) 20 MG tablet Take 1 tablet by mouth nightly  Sheridan Bright MD   losartan (COZAAR) 100 MG tablet TAKE 1 TABLET BY MOUTH DAILY  Sheridan Bright MD   hydroCHLOROthiazide (HYDRODIURIL) 25 MG tablet Take 1 tablet by mouth daily  Sheridan Bright MD   gabapentin (NEURONTIN) 100 MG capsule Take 1 capsule by mouth 2 times daily for 90 days. Sheridan Bright MD   metoprolol tartrate (LOPRESSOR) 50 MG tablet Take 1 tablet by mouth 2 times daily  Sheridan Bright MD   nitroGLYCERIN (NITROSTAT) 0.4 MG SL tablet DISSOLVE 1 TABLET UNDER THE TONGUE EVERY 5 MINUTES AS NEEDED FOR CHEST PAIN. MAX OF 3 DOSES IN 15 MINUTES.   Sheridan Bright MD   metFORMIN (GLUCOPHAGE) 500 MG tablet Take 0.5 tablets by mouth daily  Sheridan Bright MD   Biotin 300 MCG TABS Take 1 tablet by mouth daily  Sheridan Bright MD   folic acid (FOLVITE) 429 MCG tablet Take 800 mcg by mouth daily  Historical Provider, MD   diclofenac sodium 1 % GEL Apply 2 g topically 4 times daily  Finn Resendez MD   Acetaminophen (TYLENOL ARTHRITIS PAIN PO) Take 2 tablets by mouth 2 times daily  Historical Provider, MD   fluticasone (FLONASE) 50 MCG/ACT nasal spray 2 sprays by Nasal route daily into each nostril every day  Finn Resendez MD   aspirin 81 MG tablet Take 81 mg by mouth 2 times daily   Historical Provider, MD   multivitamin [facility]) per tablet Take 1 tablet by mouth daily. Historical Provider, MD   Potassium 99 MG TABS Take 1 tablet by mouth nightly   Historical Provider, MD   Calcium Carbonate-Vitamin D (CALCIUM + D) 600-200 MG-UNIT TABS Take 1 tablet by mouth nightly   Historical Provider, MD   Omega-3 Fatty Acids (FISH OIL BURP-LESS) 1000 MG CAPS Take 1 tablet by mouth 2 times daily. Historical Provider, MD   Omeprazole Magnesium (PRILOSEC OTC PO) Take 1 tablet by mouth See Admin Instructions take1 tablet on Sun TUES THURS AND SAT  Historical Provider, MD       Past Medical History:   Diagnosis Date    AK (actinic keratosis)     Vandana Muller following    CAD (coronary artery disease)     5/2005 S/P PTCA and stents X2 - Dr Missy Mariee [facility])     skin    Carotid stenosis     Carotid stenosis, left     monitored by Dr Jose Martin Fagan- 50% stenosis noted on CTA 10/2016    Coronary artery disease     Dr Gaby Simpson Cough secondary to angiotensin converting enzyme inhibitor (ACE-I)     inactive    COVID-19 virus infection     tested pos in Nov 2020- mild cough. now resolved.     CTS (carpal tunnel syndrome)     inactive-S/P right CTS surg 4/2001- Dr Lucía Grijalva DDD (degenerative disc disease), cervical     DDD (degenerative disc disease), cervical     DDD (degenerative disc disease), lumbar     Degenerative disc disease, cervical     Diabetes mellitus with neuropathy ([facility] [address]     Dr Alvarado/sheyla,     Diffuse cystic mastopathy     GERD without esophagitis     H/O 24 hour EKG monitoring 09/2007 9/27/07 SR, max , min HR 47, supraventricular ectopy is in the fashio of a-fib, very short episodes, infrequent vent and SVT ectopy noted    H/O cardiac catheterization 05/2005 5/16/05 Circ stent 80% prior 0% post, EF 60%    H/O cardiovascular stress test 3/29/10,   9/08,    3/29/10 post stress myocardial perfusion show norm pattern of perfusion in all regions, post left vent is norm, rest EF 90%, LV systolic  is norm, vent func preserved,     H/O cardiovascular stress test 09/11/2014    EF70% Normal study.  H/O cardiovascular stress test 02/12/2016    lexiscan-normal,70%    H/O cardiovascular stress test 12/29/2020    ef 60% normal lexiscan    H/O Doppler ultrasound 10/2011, 8/09    carotid 10/4/11 moderat stenosis left internal carotid atery est 50-69%, progression in stenotic changes left internal carotid artery, right WNL    H/O echocardiogram 10/2011, 4/09    15/82/79THVHORRS AR , LV systolic function WNL, EF 55%    H/O echocardiogram 09/11/2014    EF 60%. Mild aortic insufficiency.  H/O echocardiogram 02/12/2016    EF 55% mild lvh, stage 1 diast dysf, mild PI    H/O echocardiogram 12/29/2020    EF 55-60% mild TR AR and pulmonic regurg    H/O mammogram     1/16- recheck 1/17    H/O tilt table evaluation 07/2009 7/20/09 WNL    Hyperlipidemia     Hypertension     Memory loss     MMSE 23/30-- 11/15/13    Neuropathy     Osteoarthritis, knee     Peripheral neuropathy     burning discomfort in feet.     Postsurgical menopause     Rheumatoid arthritis(714.0)     S/P colonoscopic polypectomy 11/20/2020    Dr Juana More, recheck 3 yrs- SESSILE SERATED POLYP    SCC (squamous cell carcinoma), leg     Dr Bryanna Guevara removed fr legs 10/16    Thyroid nodule     on u/s 10/2016, recheck May 2017- stable--- RECHECK MAY 2018 RECOMMENDED    TMJ (temporomandibular joint syndrome)     Unspecified cerebral artery occlusion with cerebral infarction     2016-Right hemiparesis, aphasia, dysphagia, gait disturbance,       Past Surgical History:   Procedure Laterality Date    BREAST BIOPSY      bilateral    CARPAL TUNNEL RELEASE      Right wrist - 2011-Dr Olile Inman    CATARACT REMOVAL WITH IMPLANT Left 2017    Dr Aileen Gutierrez    umbilical   [address]    Right thumb- giant cell tumor    HYSTERECTOMY, TOTAL ABDOMINAL  1963    KNEE ARTHROSCOPY  2012    Left knee- Dr Osvaldo Winters ARTHROSCOPY Left 2014    Dr Fofana Stager    ?  PTCA  2005    PTCA with stent X2 - Dr Jami Mas  10/16/2013    both legs    TONSILLECTOMY  1972    TOTAL KNEE ARTHROPLASTY Left 2015    Dr Randall Trimble Left 2017    Dr Pilar Hodges at [facility] - Dr Marcelle Nash   [address]         Family History   Problem Relation Age of Onset    Coronary Art Dis Mother          of MI   Sheree Larger Diabetes Mother     Heart Attack Mother     Hypertension Mother     Cancer Father     Thyroid Disease Daughter         hypothyroid    Diabetes Brother        CareTeam (Including outside providers/suppliers regularly involved in providing care):   Patient Care Team:  Raúl Mayorga MD as PCP - General (Internal Medicine)  Raúl Mayorga MD as PCP - REHABILITATION [facility] Empaneled Provider  Judy Camargo MD as Consulting Physician (Cardiology)    Wt Readings from Last 3 Encounters:   22 170 lb (77.1 kg)   21 168 lb (76.2 kg)   21 170 lb (77.1 kg)     Vitals:    22 1311   BP: 122/76   Pulse: 71   Resp: 16   SpO2: 98%   Weight: 170 lb (77.1 kg)   Height: 5' (1.524 m)     Body mass index is 33.2 kg/m². Based upon direct observation of the patient, evaluation of cognition reveals recent and remote memory intact.     General Appearance: alert and oriented to person, place and time, well developed and well- nourished, in no acute distress  Skin: warm and dry, no rash or erythema  Head: normocephalic and atraumatic  Eyes: pupils equal, round, and reactive to light, extraocular eye movements intact, conjunctivae normal  Neck: supple and non-tender without mass, no thyromegaly or thyroid nodules, no cervical lymphadenopathy  Pulmonary/Chest: clear to auscultation bilaterally- no wheezes, rales or rhonchi, normal air movement, no respiratory distress  Cardiovascular: normal rate, regular rhythm, normal S1 and S2, no murmurs, rubs, clicks, or gallops, distal pulses intact, no carotid bruits  Abdomen: soft, non-tender, non-distended, normal bowel sounds, no masses or organomegaly  Extremities: no cyanosis, clubbing or edema  Musculoskeletal: normal range of motion, no joint swelling, deformity or tenderness  Neurologic: reflexes normal and symmetric, no cranial nerve deficit, gait, coordination and speech normal    Patient's complete Health Risk Assessment and screening values have been reviewed and are found in Flowsheets. The following problems were reviewed today and where indicated follow up appointments were made and/or referrals ordered. Positive Risk Factor Screenings with Interventions:            General Health and ACP:  General  In general, how would you say your health is?: Good  In the past 7 days, have you experienced any of the following?  New or Increased Pain, New or Increased Fatigue, Loneliness, Social Isolation, Stress or Anger?: None of These  Do you get the social and emotional support that you need?: Yes  Do you have a Living Will?: Yes  Advance Directives     Power of [address] [place] [address] Will ACP-Advance Directive ACP-Power of     Not on File Not on File Not on File Not on File      General Health Risk Interventions:  · [facility] Habits/Nutrition:  Health Habits/Nutrition  Do you exercise for at least 20 minutes 2-3 times per week?: (!) No  Have you lost any weight without trying in the past 3 months?: No  Do you eat only one meal per day?: No  Have you seen the dentist within the past year?: Yes  Body mass index: (!) 33.2  Health Habits/Nutrition Interventions:  · NO EXERTION TILL CARDIAC ASSESSMENT. SUGARS AND WT ARE STABLE         Personalized Preventive Plan   Current Health Maintenance Status  Immunization History   Administered Date(s) Administered    COVID-19, Pfizer Purple top, DILUTE for use, 12+ yrs, 30mcg/0.3mL dose 01/21/2021, 02/11/2021, 10/04/2021, 10/15/2021    Influenza 09/25/2012    Influenza Vaccine, unspecified formulation 10/06/2016    Influenza Virus Vaccine 10/06/2021    Influenza Whole 10/09/2015    Influenza, High Dose (Fluzone 65 yrs and older) 11/15/2013, 10/08/2014, 08/30/2017, 08/27/2018, 10/08/2019    Influenza, High-dose, Shannon Dayhoff, 65 yrs +, IM (Fluzone) 09/09/2020    Influenza, Quadv, adjuvanted, 65 yrs +, IM, PF (Fluad) 10/08/2021    Pneumococcal Conjugate 13-valent ([id number]) 05/23/2017    Pneumococcal Conjugate 7-valent (Prevnar7) 09/12/2018    Pneumococcal Polysaccharide ([id number]) 11/20/2015    Zoster Live (Zostavax) 11/16/2012    Zoster Recombinant (Shingrix) 07/28/2020, 09/26/2020        Health Maintenance   Topic Date Due    DTaP/Tdap/Td vaccine (1 - Tdap) Never done    Annual Wellness Visit (AWV)  12/15/2021    Depression Screen  03/10/2022    Diabetic foot exam  06/23/2022    A1C test (Diabetic or Prediabetic)  08/01/2022    Diabetic retinal exam  11/30/2022    Lipid screen  02/01/2023    Potassium monitoring  02/01/2023    Creatinine monitoring  02/01/2023    DEXA (modify frequency per FRAX score)  Completed    Flu vaccine  Completed    Shingles Vaccine  Completed    Pneumococcal 65+ years Vaccine  Completed    COVID-19 Vaccine  Completed    Hepatitis A vaccine  Aged Out    Hib vaccine  Aged Out    Meningococcal (ACWY) vaccine  Aged Out     Recommendations for [employer] Due: see orders and patient instructions/AVS.  .   Recommended screening schedule for the next 5-10 years is provided to the patient in written form: see Patient Instructions/AVS.    Girish Falk was seen today for medicare awv and chest pain. Diagnoses and all orders for this visit:    Routine general medical examination at a health care facility - remains independent, functional and active, no indications/needs for other interventions noted at this time- except as noted below and also findings noted on screening medicare questions. Type 2 diabetes mellitus with diabetic neuropathy, without long-term current use of insulin (HCC) - DM relatively well controlled and will continue current regimen. Screening reviewed. See orders.    -     gabapentin (NEURONTIN) 100 MG capsule; Take 1 capsule by mouth 2 times daily for 90 days. -     Comprehensive Metabolic Panel; Future  -     CBC Auto Differential; Future  -     Hemoglobin A1C; Future  -     Lipid Panel; Future    Essential hypertension - Blood pressure stable and will continue current regimen. Will plan periodic monitoring of renal function, electrolytes, lipid profile. -     hydrALAZINE (APRESOLINE) 25 MG tablet; Take 1 tablet by mouth 2 times daily  -     amLODIPine (NORVASC) 5 MG tablet; Take 1 tablet by mouth daily  -     metoprolol tartrate (LOPRESSOR) 50 MG tablet; Take 1 tablet by mouth 2 times daily  -     hydroCHLOROthiazide (HYDRODIURIL) 25 MG tablet; Take 1 tablet by mouth daily  -     Comprehensive Metabolic Panel; Future  -     CBC Auto Differential; Future  -     Hemoglobin A1C; Future  -     Lipid Panel; Future    Cerebrovascular accident (CVA) due to thrombosis of cerebral artery ([facility] [address] - Remains stable neurologically w/o evidence of acute changes/focal deficits. Cont regimen. -     clopidogrel (PLAVIX) 75 MG tablet; Take 1 tablet by mouth daily  -     Comprehensive Metabolic Panel; Future  -     CBC Auto Differential; Future  -     Lipid Panel;  Future    Coronary artery disease involving native coronary artery of native heart without angina pectoris - Coronary artery disease stable and asymptomatic, will continue to monitor for any signs of instability. Will periodically monitor lipid profile and liver function, cardiac risk factors. Continue current medical regimen. -     metoprolol tartrate (LOPRESSOR) 50 MG tablet; Take 1 tablet by mouth 2 times daily  -     Comprehensive Metabolic Panel; Future  -     CBC Auto Differential; Future  -     Lipid Panel; Future    Rheumatoid arthritis involving multiple sites with positive [facility] factor [facility]- clinically w/o current synovitis, stable.

## 2022-02-07 NOTE — PATIENT INSTRUCTIONS
Personalized Preventive Plan for [facility] - 2/7/2022  Medicare offers a range of preventive health benefits. Some of the tests and screenings are paid in full while other may be subject to a deductible, co-insurance, and/or copay. Some of these benefits include a comprehensive review of your medical history including lifestyle, illnesses that may run in your family, and various assessments and screenings as appropriate. After reviewing your medical record and screening and assessments performed today your provider may have ordered immunizations, labs, imaging, and/or referrals for you. A list of these orders (if applicable) as well as your Preventive Care list are included within your After Visit Summary for your review. Other Preventive Recommendations:    · A preventive eye exam performed by an eye specialist is recommended every 1-2 years to screen for glaucoma; cataracts, macular degeneration, and other eye disorders. · A preventive dental visit is recommended every 6 months. · Try to get at least 150 minutes of exercise per week or 10,000 steps per day on a pedometer . · Order or download the FREE \"Exercise & Physical Activity: Your Everyday Guide\" from [employer]. Call 4-178.113.9074 or search [employer] online. · You need 9030-4453 mg of calcium and 8449-1091 IU of vitamin D per day. It is possible to meet your calcium requirement with diet alone, but a vitamin D supplement is usually necessary to meet this goal.  · When exposed to the sun, use a sunscreen that protects against both UVA and UVB radiation with an SPF of 30 or greater. Reapply every 2 to 3 hours or after sweating, drying off with a towel, or swimming. · Always wear a seat belt when traveling in a car. Always wear a helmet when riding a bicycle or motorcycle.

## 2022-02-21 ENCOUNTER — OFFICE VISIT (OUTPATIENT)
Dept: CARDIOLOGY CLINIC | Age: 82
End: 2022-02-21
Payer: MEDICARE

## 2022-02-21 VITALS
HEIGHT: 63 IN | BODY MASS INDEX: 29.95 KG/M2 | WEIGHT: 169 LBS | OXYGEN SATURATION: 97 % | SYSTOLIC BLOOD PRESSURE: 118 MMHG | HEART RATE: 65 BPM | DIASTOLIC BLOOD PRESSURE: 68 MMHG

## 2022-02-21 DIAGNOSIS — I25.10 CORONARY ARTERY DISEASE INVOLVING NATIVE CORONARY ARTERY OF NATIVE HEART WITHOUT ANGINA PECTORIS: Primary | ICD-10-CM

## 2022-02-21 PROCEDURE — G8417 CALC BMI ABV UP PARAM F/U: HCPCS | Performed by: INTERNAL MEDICINE

## 2022-02-21 PROCEDURE — 93000 ELECTROCARDIOGRAM COMPLETE: CPT | Performed by: INTERNAL MEDICINE

## 2022-02-21 PROCEDURE — G8484 FLU IMMUNIZE NO ADMIN: HCPCS | Performed by: INTERNAL MEDICINE

## 2022-02-21 PROCEDURE — 1090F PRES/ABSN URINE INCON ASSESS: CPT | Performed by: INTERNAL MEDICINE

## 2022-02-21 PROCEDURE — 4040F PNEUMOC VAC/ADMIN/RCVD: CPT | Performed by: INTERNAL MEDICINE

## 2022-02-21 PROCEDURE — G8399 PT W/DXA RESULTS DOCUMENT: HCPCS | Performed by: INTERNAL MEDICINE

## 2022-02-21 PROCEDURE — 1123F ACP DISCUSS/DSCN MKR DOCD: CPT | Performed by: INTERNAL MEDICINE

## 2022-02-21 PROCEDURE — 99214 OFFICE O/P EST MOD 30 MIN: CPT | Performed by: INTERNAL MEDICINE

## 2022-02-21 PROCEDURE — 1036F TOBACCO NON-USER: CPT | Performed by: INTERNAL MEDICINE

## 2022-02-21 PROCEDURE — G8427 DOCREV CUR MEDS BY ELIG CLIN: HCPCS | Performed by: INTERNAL MEDICINE

## 2022-02-21 NOTE — PROGRESS NOTES
CARDIOLOGY NOTE      2/21/2022    RE: Delmis Tillmannicole  (1940)                               TO:  Dr. Narendra Newell MD            CHIEF COMPLAINT   Gustavo Lee is a 79 y.o. female who was seen today for management of coronary disease                                    HPI:                   Pt has h/o coronary artery disease, hypertension, hyperlipidemia, non-insulin-dependent diabetes seen today for follow-up.  Pt has has had  chest pain in the center of the chest radiating to the jaws lasted for 10 to 15 minutes nonexertional had to take nitroglycerin with improvement  Has history of PCI in remote past    Delmis Freire has the following history recorded in care path:  Patient Active Problem List    Diagnosis Date Noted    Cerebrovascular accident (CVA) due to thrombosis of cerebral artery ([address] 10/12/2016    Essential hypertension     Thyroid nodule     Dysarthria due to recent cerebral infarction 10/15/2016    Oropharyngeal dysphagia 10/15/2016    Gait disturbance 10/15/2016    Coronary artery disease involving native coronary artery of native heart without angina pectoris     Diabetes mellitus with neuropathy (HCC)     SCC (squamous cell carcinoma), leg     Memory loss     IFG (impaired fasting glucose)     Carotid stenosis, left     Hyperlipidemia     Osteoarthritis, knee     CAD (coronary artery disease)     Rheumatoid arthritis ([address]     Peripheral neuropathy ([address]     COVID-19 virus infection     S/P colonoscopic polypectomy 11/20/2020    DDD (degenerative disc disease), cervical     DDD (degenerative disc disease), lumbar     Diabetic nephropathy associated with type 2 diabetes mellitus ([address] 02/27/2019    Type 2 diabetes mellitus with diabetic neuropathy, without long-term current use of insulin (HCC) 11/03/2017     Current Outpatient Medications   Medication Sig Dispense Refill    hydrOXYzine (ATARAX) 10 MG tablet hydroxyzine HCl 10 mg tablet   TAKE 1 TO 2 TABLETS BY MOUTH EVERY NIGHT 1 HOUR BEFORE BEDTIME FOR ITCHING      POTASSIUM PO DAILY      OMEPRAZOLE PO DAILY      OMEGA-3 FATTY ACIDS-VITAMIN E PO 1 capsule      Calcium Carb-Cholecalciferol (CALCIUM CARBONATE-VITAMIN D3 PO) DAILY      hydrALAZINE (APRESOLINE) 25 MG tablet Take 1 tablet by mouth 2 times daily 180 tablet 1    clopidogrel (PLAVIX) 75 MG tablet Take 1 tablet by mouth daily 90 tablet 1    amLODIPine (NORVASC) 5 MG tablet Take 1 tablet by mouth daily 90 tablet 1    metoprolol tartrate (LOPRESSOR) 50 MG tablet Take 1 tablet by mouth 2 times daily 180 tablet 1    hydroCHLOROthiazide (HYDRODIURIL) 25 MG tablet Take 1 tablet by mouth daily 90 tablet 1    gabapentin (NEURONTIN) 100 MG capsule Take 1 capsule by mouth 2 times daily for 90 days. 180 capsule 1    simvastatin (ZOCOR) 20 MG tablet Take 1 tablet by mouth nightly 90 tablet 1    losartan (COZAAR) 100 MG tablet TAKE 1 TABLET BY MOUTH DAILY 90 tablet 1    nitroGLYCERIN (NITROSTAT) 0.4 MG SL tablet DISSOLVE 1 TABLET UNDER THE TONGUE EVERY 5 MINUTES AS NEEDED FOR CHEST PAIN. MAX OF 3 DOSES IN 15 MINUTES. 25 tablet 0    metFORMIN (GLUCOPHAGE) 500 MG tablet Take 0.5 tablets by mouth daily 90 tablet 1    Biotin 300 MCG TABS Take 1 tablet by mouth daily 30 tablet 3    folic acid (FOLVITE) 161 MCG tablet Take 800 mcg by mouth daily      diclofenac sodium 1 % GEL Apply 2 g topically 4 times daily 100 g 2    Acetaminophen (TYLENOL ARTHRITIS PAIN PO) Take 2 tablets by mouth 2 times daily      fluticasone (FLONASE) 50 MCG/ACT nasal spray 2 sprays by Nasal route daily into each nostril every day 1 Bottle 3    aspirin 81 MG tablet Take 81 mg by mouth 2 times daily       multivitamin (THERAGRAN) per tablet Take 1 tablet by mouth daily.         Potassium 99 MG TABS Take 1 tablet by mouth nightly       Calcium Carbonate-Vitamin D (CALCIUM + D) 600-200 MG-UNIT TABS Take 1 tablet by mouth nightly       Omega-3 Fatty Acids (FISH OIL BURP-LESS) 1000 MG CAPS Take 1 tablet by mouth 2 times daily.  Omeprazole Magnesium (PRILOSEC OTC PO) Take 1 tablet by mouth See Admin Instructions take1 tablet on Sun TUES THURS AND SAT       No current facility-administered medications for this visit. Allergies: Actonel [risedronate sodium], Ciprofloxacin, Darvocet [propoxyphene n-acetaminophen], Lopid [gemfibrozil], Mevacor [lovastatin], Neosporin [neomycin-polymyx-gramicid], Nsaids, and Pravachol [pravastatin sodium]  Past Medical History:   Diagnosis Date    AK (actinic keratosis)     Jose Sanchez following    CAD (coronary artery disease)     5/2005 S/P PTCA and stents X2 - Dr Gerald Lundborg [facility])     skin    Carotid stenosis     Carotid stenosis, left     monitored by Dr Ramila Musa- 50% stenosis noted on CTA 10/2016    Coronary artery disease     Dr Yanet Loving Cough secondary to angiotensin converting enzyme inhibitor (ACE-I)     inactive    COVID-19 virus infection     tested pos in Nov 2020- mild cough. now resolved.     CTS (carpal tunnel syndrome)     inactive-S/P right CTS surg 4/2001- Dr Macy Hall DDD (degenerative disc disease), cervical     DDD (degenerative disc disease), cervical     DDD (degenerative disc disease), lumbar     Degenerative disc disease, cervical     Diabetes mellitus with neuropathy ([facility]     Dr Alvarado/sheyla, - ON NEURONTIN    Diffuse cystic mastopathy     GERD without esophagitis     H/O 24 hour EKG monitoring 09/2007 9/27/07 SR, max , min HR 47, supraventricular ectopy is in the fashio of a-fib, very short episodes, infrequent vent and SVT ectopy noted    H/O cardiac catheterization 05/2005 5/16/05 Circ stent 80% prior 0% post, EF 60%    H/O cardiovascular stress test 3/29/10,   9/08,    3/29/10 post stress myocardial perfusion show norm pattern of perfusion in all regions, post left vent is norm, rest EF 36%, LV systolic  is norm, vent func preserved,     H/O cardiovascular stress test 09/11/2014    EF70% Normal study.     H/O cardiovascular stress test 02/12/2016    lexiscan-normal,70%    H/O cardiovascular stress test 12/29/2020    ef 60% normal lexiscan    H/O Doppler ultrasound 10/2011, 8/09    carotid 10/4/11 moderat stenosis left internal carotid atery est 50-69%, progression in stenotic changes left internal carotid artery, right WNL    H/O echocardiogram 10/2011, 4/09    81/50/[id number] AR , LV systolic function WNL, EF 55%    H/O echocardiogram 09/11/2014    EF 60%. Mild aortic insufficiency.  H/O echocardiogram 02/12/2016    EF 55% mild lvh, stage 1 diast dysf, mild PI    H/O echocardiogram 12/29/2020    EF 55-60% mild TR AR and pulmonic regurg    H/O mammogram     1/16- recheck 1/17    H/O tilt table evaluation 07/2009 7/20/09 WNL    Hyperlipidemia     Hypertension     Memory loss     MMSE 23/30-- 11/15/13    Neuropathy     Osteoarthritis, knee     Peripheral neuropathy     burning discomfort in feet.  Postsurgical menopause     Rheumatoid arthritis(714.0)     S/P colonoscopic polypectomy 11/20/2020    Dr Bev Ortiz, recheck 3 yrs- SESSILE SERATED POLYP    SCC (squamous cell carcinoma), leg     Dr Shady Jeter removed fr legs 10/16    Thyroid nodule     on u/s 10/2016, recheck May 2017- stable--- RECHECK MAY 2018 RECOMMENDED    TMJ (temporomandibular joint syndrome)     Unspecified cerebral artery occlusion with cerebral infarction     2016-Right hemiparesis, aphasia, dysphagia, gait disturbance,     Past Surgical History:   Procedure Laterality Date    BREAST BIOPSY  1993    bilateral    CARPAL TUNNEL RELEASE      Right wrist - 4/2011-Dr Shady Jeter    CATARACT REMOVAL WITH IMPLANT Left 02/06/2017    Dr Cindy Glover    umbilical   [address]    Right thumb- giant cell tumor    HYSTERECTOMY, TOTAL ABDOMINAL  1963    KNEE ARTHROSCOPY  02/21/2012    Left knee- Dr Serjio Saavedra ARTHROSCOPY Left 09/23/2014    Dr Daryl Ramirez    ?     PTCA  05/2005    PTCA with stent X2 - Dr Gildardo Sawant  10/16/2013    both legs    TONSILLECTOMY  1972    TOTAL KNEE ARTHROPLASTY Left 2015    Dr Mona Chinchilla Left 2017    Dr Shantel [facility]ancur at [facility]   [place] - Dr Owen Galdamez   [address]        As reviewed   Family History   Problem Relation Age of Onset    Coronary Art Dis Mother          of MI   Lang Diabetes Mother     Heart Attack Mother     Hypertension Mother     Cancer Father     Thyroid Disease Daughter         hypothyroid    Diabetes Brother      Social History     Tobacco Use    Smoking status: Former Smoker     Packs/day: 0.25     Years: 14.00     Pack years: 3.50     Types: Cigarettes     Start date: 5     Quit date: 1967     Years since quittin.1    Smokeless tobacco: Never Used    Tobacco comment: 2/10/16   Substance Use Topics    Alcohol use: No     Comment: caffeine 1 cup of coffee a day        Objective:    Vitals:    22 1342   BP: 118/68   Pulse: 65   SpO2: 97%   Weight: 169 lb (76.7 kg)   Height: 5' 3\" (1.6 m)     /68   Pulse 65   Ht 5' 3\" (1.6 m)   Wt 169 lb (76.7 kg)   LMP  (LMP Unknown)   SpO2 97%   BMI 29.94 kg/m²     No flowsheet data found. Wt Readings from Last 3 Encounters:   22 169 lb (76.7 kg)   22 170 lb (77.1 kg)   21 168 lb (76.2 kg)     Body mass index is 29.94 kg/m². GENERAL - Alert, oriented, pleasant, in no apparent distress. EYES: No jaundice, no conjunctival pallor. SKIN: It is warm & dry. No rashes. No Echhymosis    HEENT  No clinically significant abnormalities seen. Neck - Supple. No jugular venous distention noted. No carotid bruits. Cardiovascular  Normal S1 and S2 without obvious murmur or gallop. Extremities - No cyanosis, clubbing, or significant edema. Pulmonary  No respiratory distress. No wheezes or rales. Abdomen  No masses, tenderness, or organomegaly.   Musculoskeletal  No significant edema. No joint deformities. No muscle wasting. Neurologic  Cranial nerves II through XII are grossly intact. There were no gross focal neurologic abnormalities. Lab Review   No results found for: CKTOTAL, CKMB, CKMBINDEX, TROPONINT  BNP:  No results found for: BNP  PT/INR:  No results found for: INR  Lab Results   Component Value Date    LABA1C 6.3 02/01/2022    LABA1C 6.1 10/27/2021     Lab Results   Component Value Date    WBC 4.9 02/01/2022    HCT 37.7 02/01/2022    MCV 92.9 02/01/2022     02/01/2022     Lab Results   Component Value Date    CHOL 168 02/01/2022    TRIG 245 (H) 02/01/2022    HDL 35 (L) 02/01/2022    LDLCALC 84 02/01/2022    LDLDIRECT 84 07/07/2021     Lab Results   Component Value Date    ALT 17 02/01/2022    AST 20 02/01/2022     BMP:    Lab Results   Component Value Date     02/01/2022    K 4.6 02/01/2022    CL 96 02/01/2022    CO2 25 02/01/2022    BUN 24 02/01/2022    CREATININE 0.9 02/01/2022     CMP:   Lab Results   Component Value Date     02/01/2022    K 4.6 02/01/2022    CL 96 02/01/2022    CO2 25 02/01/2022    BUN 24 02/01/2022    PROT 7.9 02/01/2022    PROT 7.3 02/07/2013     TSH:    Lab Results   Component Value Date    TSH 2.00 02/04/2016           Assessment & Plan:               -     CORONARY ARTERY DISEASE:  symptomatic     All available  tests in chart reviewed. Management discussed . Testing ordered  stress                On Plavix compliant with medicine will be continued    Patient history of CAD and now with anginal symptoms and multiple cardiac risk factors there is a high risk of restenosis and recurrence of CAD hence will obtain a Cardiolite stress test, patient has knee issues and will not be able to walk on treadmill hence will obtain a Lexiscan on her  We will also check an EKG today               -  Hypertension: Patients blood pressure is normal. Patient is advised about low sodium diet.  Present medical regimen will not be changed. Amlodipine l 5 mg p.o. daily p.o. twice daily, losartan 100 aspirin daily which will become               -  LIPID MANAGEMENT:  Importance of lipid levels discussed with patient   and patient was given dietary advice. NCEP- ATP III guidelines reviewed with patient. -   Changes  in medicines made: No     On simvastatin 20 mg p.o. daily omega-3 fatty acids       LDL is noted to be 84, triglycerides at 245                    -   DIABETES MELLITUS: Available pertinent lab data reviewed   and  patient was given dietary advice . Advised to check blood glucose level on a regular basis. -   Changes  in medicines made: No        On Metformin hemoglobin A1c 6.3               Ashlyn Loya MD    [facility] [place]    Please note this report has been partially produced using speech recognition software and may contain errors related to that system including errors in grammar, punctuation, and spelling, as well as words and phrases that may be inappropriate. If there are any questions or concerns please feel free to contact the dictating provider for clarification.

## 2022-02-21 NOTE — PATIENT INSTRUCTIONS
Please be informed that if you contact our office outside of normal business hours the physician on call cannot help with any scheduling or rescheduling issues, procedure instruction questions or any type of medication issue. We advise you for any urgent/emergency that you go to the nearest emergency room! PLEASE CALL OUR OFFICE DURING NORMAL BUSINESS HOURS    Monday - Friday   8 am to 5 pm    [facility] [address]: [phone]    [place]:  [phone]    **It is YOUR responsibilty to bring medication bottles and/or updated medication list to [address] [place] [address]. This will allow us to better serve you and all your healthcare needs**    Please hold on to these instructions the  will call you within 1-9 business days when we receive authorization from your insurance. Nuclear Stress Test    WHAT TO EXPECT:   ? You will need to confirm the test or it could be cancelled. ? This test will take approximately 2 hours: 1 hour in the AM &    1 hour in the PM. You will be given a time by the Technologist after the first part is completed to come back. ? You will be given a medication, through an IV in the hand, this will safely simulate exercise. This IV is also needed to inject the radioactive isotope unless you are able toe walk the treadmill. ? You will receive an injection in the AM & PM before the pictures. ? Using a special camera, you will have one set of pictures of your heart taken in the AM and a set of pictures in the PM.     PREPARATION FOR TEST:  ? Eat a light breakfast such as water or juice and toast.  ? If you are DIABETIC: Eat a normal breakfast with NO CAFFEINE and take your insulin as normal.   ? AVOID ALL FOODS & DRINKS containing CAFFEINE 12 HOURS PRIOR TO THE TEST: Including coffee, Tea, Jose and other soft drinks even those labeled  caffeine free or decaffeinated.  ?  HOLD THESE MEDICATIONS Persantine & Theophylline (Theodur)  24 hours prior & bring your inhaler with you.   The physician will specify if the following Beta blockers need to be held for 24 hours prior to test: Lopressor (metoprolol)

## 2022-03-08 ENCOUNTER — PROCEDURE VISIT (OUTPATIENT)
Dept: CARDIOLOGY CLINIC | Age: 82
End: 2022-03-08
Payer: MEDICARE

## 2022-03-08 DIAGNOSIS — I25.10 CORONARY ARTERY DISEASE INVOLVING NATIVE CORONARY ARTERY OF NATIVE HEART WITHOUT ANGINA PECTORIS: ICD-10-CM

## 2022-03-08 LAB
LV EF: 60 %
LVEF MODALITY: NORMAL

## 2022-03-08 PROCEDURE — 93017 CV STRESS TEST TRACING ONLY: CPT | Performed by: INTERNAL MEDICINE

## 2022-03-08 PROCEDURE — A9500 TC99M SESTAMIBI: HCPCS | Performed by: INTERNAL MEDICINE

## 2022-03-08 PROCEDURE — 93016 CV STRESS TEST SUPVJ ONLY: CPT | Performed by: INTERNAL MEDICINE

## 2022-03-08 PROCEDURE — 78452 HT MUSCLE IMAGE SPECT MULT: CPT | Performed by: INTERNAL MEDICINE

## 2022-03-08 PROCEDURE — 93018 CV STRESS TEST I&R ONLY: CPT | Performed by: INTERNAL MEDICINE

## 2022-03-11 ENCOUNTER — TELEPHONE (OUTPATIENT)
Dept: CARDIOLOGY CLINIC | Age: 82
End: 2022-03-11

## 2022-04-11 DIAGNOSIS — I10 ESSENTIAL HYPERTENSION: ICD-10-CM

## 2022-04-11 DIAGNOSIS — I25.10 CORONARY ARTERY DISEASE INVOLVING NATIVE CORONARY ARTERY OF NATIVE HEART WITHOUT ANGINA PECTORIS: ICD-10-CM

## 2022-04-11 RX ORDER — METOPROLOL TARTRATE 50 MG/1
TABLET, FILM COATED ORAL
Qty: 180 TABLET | Refills: 1 | OUTPATIENT
Start: 2022-04-11

## 2022-05-04 DIAGNOSIS — I63.30 CEREBROVASCULAR ACCIDENT (CVA) DUE TO THROMBOSIS OF CEREBRAL ARTERY (HCC): ICD-10-CM

## 2022-05-04 DIAGNOSIS — I10 ESSENTIAL HYPERTENSION: ICD-10-CM

## 2022-05-04 DIAGNOSIS — E11.40 TYPE 2 DIABETES MELLITUS WITH DIABETIC NEUROPATHY, WITHOUT LONG-TERM CURRENT USE OF INSULIN (HCC): ICD-10-CM

## 2022-05-04 DIAGNOSIS — I25.10 CORONARY ARTERY DISEASE INVOLVING NATIVE CORONARY ARTERY OF NATIVE HEART WITHOUT ANGINA PECTORIS: ICD-10-CM

## 2022-05-04 LAB
A/G RATIO: 1.9 (ref 1.1–2.2)
ALBUMIN SERPL-MCNC: 4.8 G/DL (ref 3.4–5)
ALP BLD-CCNC: 70 U/L (ref 40–129)
ALT SERPL-CCNC: 18 U/L (ref 10–40)
ANION GAP SERPL CALCULATED.3IONS-SCNC: 14 MMOL/L (ref 3–16)
AST SERPL-CCNC: 23 U/L (ref 15–37)
BASOPHILS ABSOLUTE: 0 K/UL (ref 0–0.2)
BASOPHILS RELATIVE PERCENT: 0.9 %
BILIRUB SERPL-MCNC: 0.6 MG/DL (ref 0–1)
BUN BLDV-MCNC: 16 MG/DL (ref 7–20)
CALCIUM SERPL-MCNC: 9.8 MG/DL (ref 8.3–10.6)
CHLORIDE BLD-SCNC: 98 MMOL/L (ref 99–110)
CHOLESTEROL, TOTAL: 157 MG/DL (ref 0–199)
CO2: 24 MMOL/L (ref 21–32)
CREAT SERPL-MCNC: 0.9 MG/DL (ref 0.6–1.2)
EOSINOPHILS ABSOLUTE: 0.3 K/UL (ref 0–0.6)
EOSINOPHILS RELATIVE PERCENT: 5.3 %
GFR AFRICAN AMERICAN: >60
GFR NON-AFRICAN AMERICAN: 60
GLUCOSE BLD-MCNC: 130 MG/DL (ref 70–99)
HCT VFR BLD CALC: 36.6 % (ref 36–48)
HDLC SERPL-MCNC: 31 MG/DL (ref 40–60)
HEMOGLOBIN: 12.2 G/DL (ref 12–16)
LDL CHOLESTEROL CALCULATED: 76 MG/DL
LYMPHOCYTES ABSOLUTE: 1.9 K/UL (ref 1–5.1)
LYMPHOCYTES RELATIVE PERCENT: 39.7 %
MCH RBC QN AUTO: 31.1 PG (ref 26–34)
MCHC RBC AUTO-ENTMCNC: 33.2 G/DL (ref 31–36)
MCV RBC AUTO: 93.6 FL (ref 80–100)
MONOCYTES ABSOLUTE: 0.5 K/UL (ref 0–1.3)
MONOCYTES RELATIVE PERCENT: 9.8 %
NEUTROPHILS ABSOLUTE: 2.1 K/UL (ref 1.7–7.7)
NEUTROPHILS RELATIVE PERCENT: 44.3 %
PDW BLD-RTO: 12.7 % (ref 12.4–15.4)
PLATELET # BLD: 201 K/UL (ref 135–450)
PMV BLD AUTO: 8.1 FL (ref 5–10.5)
POTASSIUM SERPL-SCNC: 4.7 MMOL/L (ref 3.5–5.1)
RBC # BLD: 3.91 M/UL (ref 4–5.2)
SODIUM BLD-SCNC: 136 MMOL/L (ref 136–145)
TOTAL PROTEIN: 7.3 G/DL (ref 6.4–8.2)
TRIGL SERPL-MCNC: 251 MG/DL (ref 0–150)
VLDLC SERPL CALC-MCNC: 50 MG/DL
WBC # BLD: 4.7 K/UL (ref 4–11)

## 2022-05-04 PROCEDURE — 36415 COLL VENOUS BLD VENIPUNCTURE: CPT | Performed by: INTERNAL MEDICINE

## 2022-05-05 LAB
ESTIMATED AVERAGE GLUCOSE: 128.4 MG/DL
HBA1C MFR BLD: 6.1 %

## 2022-05-11 ENCOUNTER — OFFICE VISIT (OUTPATIENT)
Dept: INTERNAL MEDICINE CLINIC | Age: 82
End: 2022-05-11
Payer: MEDICARE

## 2022-05-11 VITALS
SYSTOLIC BLOOD PRESSURE: 122 MMHG | HEIGHT: 63 IN | OXYGEN SATURATION: 98 % | HEART RATE: 62 BPM | WEIGHT: 170.25 LBS | BODY MASS INDEX: 30.16 KG/M2 | DIASTOLIC BLOOD PRESSURE: 68 MMHG | RESPIRATION RATE: 14 BRPM

## 2022-05-11 DIAGNOSIS — E11.40 TYPE 2 DIABETES MELLITUS WITH DIABETIC NEUROPATHY, WITHOUT LONG-TERM CURRENT USE OF INSULIN (HCC): Primary | ICD-10-CM

## 2022-05-11 DIAGNOSIS — I10 ESSENTIAL HYPERTENSION: ICD-10-CM

## 2022-05-11 DIAGNOSIS — I25.10 CORONARY ARTERY DISEASE INVOLVING NATIVE CORONARY ARTERY OF NATIVE HEART WITHOUT ANGINA PECTORIS: ICD-10-CM

## 2022-05-11 DIAGNOSIS — B35.1 ONYCHOMYCOSIS OF LEFT GREAT TOE: ICD-10-CM

## 2022-05-11 PROCEDURE — 1123F ACP DISCUSS/DSCN MKR DOCD: CPT | Performed by: INTERNAL MEDICINE

## 2022-05-11 PROCEDURE — 99214 OFFICE O/P EST MOD 30 MIN: CPT | Performed by: INTERNAL MEDICINE

## 2022-05-11 PROCEDURE — G8417 CALC BMI ABV UP PARAM F/U: HCPCS | Performed by: INTERNAL MEDICINE

## 2022-05-11 PROCEDURE — 3044F HG A1C LEVEL LT 7.0%: CPT | Performed by: INTERNAL MEDICINE

## 2022-05-11 PROCEDURE — G8427 DOCREV CUR MEDS BY ELIG CLIN: HCPCS | Performed by: INTERNAL MEDICINE

## 2022-05-11 PROCEDURE — 1090F PRES/ABSN URINE INCON ASSESS: CPT | Performed by: INTERNAL MEDICINE

## 2022-05-11 PROCEDURE — 4040F PNEUMOC VAC/ADMIN/RCVD: CPT | Performed by: INTERNAL MEDICINE

## 2022-05-11 PROCEDURE — G8399 PT W/DXA RESULTS DOCUMENT: HCPCS | Performed by: INTERNAL MEDICINE

## 2022-05-11 PROCEDURE — 1036F TOBACCO NON-USER: CPT | Performed by: INTERNAL MEDICINE

## 2022-05-11 RX ORDER — KETOCONAZOLE 20 MG/G
CREAM TOPICAL
Qty: 30 G | Refills: 1 | Status: SHIPPED | OUTPATIENT
Start: 2022-05-11

## 2022-05-11 SDOH — ECONOMIC STABILITY: FOOD INSECURITY: WITHIN THE PAST 12 MONTHS, YOU WORRIED THAT YOUR FOOD WOULD RUN OUT BEFORE YOU GOT MONEY TO BUY MORE.: PATIENT DECLINED

## 2022-05-11 SDOH — ECONOMIC STABILITY: FOOD INSECURITY: WITHIN THE PAST 12 MONTHS, THE FOOD YOU BOUGHT JUST DIDN'T LAST AND YOU DIDN'T HAVE MONEY TO GET MORE.: PATIENT DECLINED

## 2022-05-11 ASSESSMENT — SOCIAL DETERMINANTS OF HEALTH (SDOH): HOW HARD IS IT FOR YOU TO PAY FOR THE VERY BASICS LIKE FOOD, HOUSING, MEDICAL CARE, AND HEATING?: PATIENT DECLINED

## 2022-05-11 NOTE — PROGRESS NOTES
[place]  1940 05/11/22    SUBJECTIVE:    She mentioned some aching of clavicular area at rest, not activity, did have benign stress cardiolite in march this yr. She thinks may be related to anxiety but feels does NOT need anxiety meds. Two weeks of L lagteral toenail fungal infection, we'll try kuric    Dm-  Adhering to diet and meds, exercise. Recent a1c stable and improved. Lab Results   Component Value Date    LABA1C 6.1 05/04/2022    LABA1C 6.3 02/01/2022    LABA1C 6.1 10/27/2021     Lab Results   Component Value Date    GLUF 128 (H) 10/14/2016    LABMICR 1.40 02/01/2022    LDLCALC 76 05/04/2022    CREATININE 0.9 05/04/2022     Hypertension: Stable. Denies CP, SOB, cough, visual changes, dizziness, palpitations or HA. OBJECTIVE:    /68   Pulse 62   Resp 14   Ht 5' 3\" (1.6 m)   Wt 170 lb 4 oz (77.2 kg)   LMP  (LMP Unknown)   SpO2 98%   Breastfeeding No   BMI 30.16 kg/m²     Physical Exam  Vitals reviewed. Constitutional:       Appearance: She is well-developed. HENT:      Head: Normocephalic and atraumatic. Eyes:      General: No scleral icterus. Right eye: No discharge. Left eye: No discharge. Conjunctiva/sclera: Conjunctivae normal.      Pupils: Pupils are equal, round, and reactive to light. Neck:      Thyroid: No thyromegaly. Vascular: No JVD. Trachea: No tracheal deviation. Cardiovascular:      Rate and Rhythm: Normal rate and regular rhythm. Heart sounds: Normal heart sounds. No murmur heard. No friction rub. No gallop. Pulmonary:      Effort: Pulmonary effort is normal. No respiratory distress. Breath sounds: Normal breath sounds. No wheezing or rales. Abdominal:      General: Bowel sounds are normal. There is no distension. Palpations: Abdomen is soft. There is no mass. Tenderness: There is no abdominal tenderness. There is no guarding or rebound. Hernia: No hernia is present. Musculoskeletal:      Cervical back: Neck supple. Feet:    Feet:      Comments: l great toe lateral sliver onychomycosis  Lymphadenopathy:      Cervical: No cervical adenopathy. Skin:     General: Skin is warm and dry. Neurological:      Mental Status: She is alert. Psychiatric:         Mood and Affect: Mood normal.         ASSESSMENT:    1. Type 2 diabetes mellitus with diabetic neuropathy, without long-term current use of insulin ([address]    2. Coronary artery disease involving native coronary artery of native heart without angina pectoris    3. Essential hypertension    4. Onychomycosis of left great toe        PLAN:    Sho Mcmahon was seen today for 3 month follow-up, chest pain and nail problem. Diagnoses and all orders for this visit:    Type 2 diabetes mellitus with diabetic neuropathy, without long-term current use of insulin ([address] - DM relatively well controlled and will continue current regimen. Screening reviewed. See orders. -     Comprehensive Metabolic Panel; Future  -     CBC with Auto Differential; Future  -     Hemoglobin A1C; Future  -     Lipid Panel; Future    Coronary artery disease involving native coronary artery of native heart without angina pectoris - Coronary artery disease stable and asymptomatic, will continue to monitor for any signs of instability. Will periodically monitor lipid profile and liver function, cardiac risk factors. Continue current medical regimen. -     Comprehensive Metabolic Panel; Future  -     CBC with Auto Differential; Future  -     Lipid Panel; Future    Essential hypertension - Blood pressure stable and will continue current regimen. Will plan periodic monitoring of renal function, electrolytes, lipid profile. -     Comprehensive Metabolic Panel;  Future  -     CBC with Auto Differential; Future  -     Hemoglobin A1C; Future    Onychomycosis of left great toe-  clinically c/w presentation,  treatment as noted below- To call back one week if not improving.      -     ketoconazole (NIZORAL) 2 % cream; Apply topically daily to toenail area

## 2022-05-31 ENCOUNTER — OFFICE VISIT (OUTPATIENT)
Dept: INTERNAL MEDICINE CLINIC | Age: 82
End: 2022-05-31
Payer: MEDICARE

## 2022-05-31 VITALS
RESPIRATION RATE: 16 BRPM | HEART RATE: 86 BPM | DIASTOLIC BLOOD PRESSURE: 72 MMHG | SYSTOLIC BLOOD PRESSURE: 146 MMHG | OXYGEN SATURATION: 96 %

## 2022-05-31 DIAGNOSIS — N30.01 ACUTE CYSTITIS WITH HEMATURIA: Primary | ICD-10-CM

## 2022-05-31 DIAGNOSIS — R30.0 DYSURIA: ICD-10-CM

## 2022-05-31 DIAGNOSIS — N30.01 ACUTE CYSTITIS WITH HEMATURIA: ICD-10-CM

## 2022-05-31 LAB
BILIRUBIN, POC: NORMAL
BLOOD URINE, POC: NORMAL
CLARITY, POC: NORMAL
COLOR, POC: YELLOW
GLUCOSE URINE, POC: NORMAL
KETONES, POC: NORMAL
LEUKOCYTE EST, POC: NORMAL
NITRITE, POC: NORMAL
PH, POC: 7
PROTEIN, POC: NORMAL
SPECIFIC GRAVITY, POC: 1.02
UROBILINOGEN, POC: 0.2

## 2022-05-31 PROCEDURE — G8417 CALC BMI ABV UP PARAM F/U: HCPCS | Performed by: INTERNAL MEDICINE

## 2022-05-31 PROCEDURE — 1123F ACP DISCUSS/DSCN MKR DOCD: CPT | Performed by: INTERNAL MEDICINE

## 2022-05-31 PROCEDURE — 99213 OFFICE O/P EST LOW 20 MIN: CPT | Performed by: INTERNAL MEDICINE

## 2022-05-31 PROCEDURE — G8399 PT W/DXA RESULTS DOCUMENT: HCPCS | Performed by: INTERNAL MEDICINE

## 2022-05-31 PROCEDURE — 1090F PRES/ABSN URINE INCON ASSESS: CPT | Performed by: INTERNAL MEDICINE

## 2022-05-31 PROCEDURE — 81002 URINALYSIS NONAUTO W/O SCOPE: CPT | Performed by: INTERNAL MEDICINE

## 2022-05-31 PROCEDURE — 1036F TOBACCO NON-USER: CPT | Performed by: INTERNAL MEDICINE

## 2022-05-31 PROCEDURE — G8427 DOCREV CUR MEDS BY ELIG CLIN: HCPCS | Performed by: INTERNAL MEDICINE

## 2022-05-31 RX ORDER — NITROFURANTOIN 25; 75 MG/1; MG/1
100 CAPSULE ORAL 2 TIMES DAILY
Qty: 14 CAPSULE | Refills: 0 | Status: SHIPPED | OUTPATIENT
Start: 2022-05-31 | End: 2022-06-07

## 2022-05-31 NOTE — PROGRESS NOTES
Dilma Sotelo  1940 05/31/22    SUBJECTIVE:    Just back [place] this weekend visiting family. The past 5d w onset of worsening dysuria, no hematuria, fever or chills. Urine is cloudy. OBJECTIVE:    BP (!) 146/72   Pulse 86   Resp 16   LMP  (LMP Unknown)   SpO2 96%     Physical Exam  Vitals reviewed. Constitutional:       Appearance: She is well-developed. Cardiovascular:      Rate and Rhythm: Normal rate and regular rhythm. Heart sounds: Normal heart sounds. No murmur heard. No friction rub. No gallop. Pulmonary:      Effort: Pulmonary effort is normal. No respiratory distress. Breath sounds: Normal breath sounds. No wheezing or rales. Abdominal:      General: Bowel sounds are normal. There is no distension. Palpations: Abdomen is soft. Tenderness: There is no abdominal tenderness. There is no right CVA tenderness or left CVA tenderness. Musculoskeletal:      Cervical back: Neck supple. Neurological:      Mental Status: She is alert. ASSESSMENT:    1. Acute cystitis with hematuria    2. Dysuria        PLAN:    Sarah Mendez was seen today for dysuria. Diagnoses and all orders for this visit:    Acute cystitis with hematuria - Will rx UTI w atbs as noted, also encouraged pushing fluids. To call back one week if sx persist.    -     Cancel: Culture, Urine  -     nitrofurantoin, macrocrystal-monohydrate, (MACROBID) 100 MG capsule; Take 1 capsule by mouth 2 times daily for 7 days  -     Culture, Urine; Future    Dysuria  -     POCT Urinalysis no Micro  -     Cancel: Culture, Urine  -     nitrofurantoin, macrocrystal-monohydrate, (MACROBID) 100 MG capsule; Take 1 capsule by mouth 2 times daily for 7 days  -     Culture, Urine;  Future
yes

## 2022-06-03 DIAGNOSIS — R30.0 DYSURIA: Primary | ICD-10-CM

## 2022-06-03 LAB
ORGANISM: ABNORMAL
URINE CULTURE, ROUTINE: ABNORMAL

## 2022-06-06 DIAGNOSIS — R30.0 DYSURIA: ICD-10-CM

## 2022-06-06 LAB
BACTERIA: ABNORMAL /HPF
BILIRUBIN URINE: NEGATIVE
BLOOD, URINE: NEGATIVE
CLARITY: CLEAR
COLOR: YELLOW
EPITHELIAL CELLS, UA: 5 /HPF (ref 0–5)
GLUCOSE URINE: NEGATIVE MG/DL
HYALINE CASTS: 0 /LPF (ref 0–8)
KETONES, URINE: NEGATIVE MG/DL
LEUKOCYTE ESTERASE, URINE: ABNORMAL
MICROSCOPIC EXAMINATION: YES
NITRITE, URINE: NEGATIVE
PH UA: 6 (ref 5–8)
PROTEIN UA: NEGATIVE MG/DL
RBC UA: 1 /HPF (ref 0–4)
SPECIFIC GRAVITY UA: 1.01 (ref 1–1.03)
URINE TYPE: ABNORMAL
UROBILINOGEN, URINE: 0.2 E.U./DL
WBC UA: 6 /HPF (ref 0–5)

## 2022-06-06 PROCEDURE — 81003 URINALYSIS AUTO W/O SCOPE: CPT | Performed by: INTERNAL MEDICINE

## 2022-06-07 DIAGNOSIS — N30.01 ACUTE CYSTITIS WITH HEMATURIA: Primary | ICD-10-CM

## 2022-06-14 DIAGNOSIS — N30.01 ACUTE CYSTITIS WITH HEMATURIA: ICD-10-CM

## 2022-06-14 LAB
BACTERIA: ABNORMAL /HPF
BILIRUBIN URINE: NEGATIVE
BLOOD, URINE: NEGATIVE
CLARITY: CLEAR
COLOR: YELLOW
EPITHELIAL CELLS, UA: 3 /HPF (ref 0–5)
GLUCOSE URINE: NEGATIVE MG/DL
HYALINE CASTS: 0 /LPF (ref 0–8)
KETONES, URINE: NEGATIVE MG/DL
LEUKOCYTE ESTERASE, URINE: ABNORMAL
MICROSCOPIC EXAMINATION: YES
NITRITE, URINE: NEGATIVE
PH UA: 6.5 (ref 5–8)
PROTEIN UA: NEGATIVE MG/DL
RBC UA: 2 /HPF (ref 0–4)
SPECIFIC GRAVITY UA: 1.01 (ref 1–1.03)
URINE TYPE: ABNORMAL
UROBILINOGEN, URINE: 0.2 E.U./DL
WBC UA: 3 /HPF (ref 0–5)

## 2022-06-25 DIAGNOSIS — I25.10 CORONARY ARTERY DISEASE INVOLVING NATIVE CORONARY ARTERY OF NATIVE HEART WITHOUT ANGINA PECTORIS: ICD-10-CM

## 2022-06-25 DIAGNOSIS — E11.40 TYPE 2 DIABETES MELLITUS WITH DIABETIC NEUROPATHY, WITHOUT LONG-TERM CURRENT USE OF INSULIN (HCC): ICD-10-CM

## 2022-06-27 DIAGNOSIS — I10 ESSENTIAL HYPERTENSION: ICD-10-CM

## 2022-06-27 DIAGNOSIS — I63.30 CEREBROVASCULAR ACCIDENT (CVA) DUE TO THROMBOSIS OF CEREBRAL ARTERY (HCC): ICD-10-CM

## 2022-06-27 RX ORDER — LOSARTAN POTASSIUM 100 MG/1
TABLET ORAL
Qty: 90 TABLET | Refills: 1 | Status: SHIPPED | OUTPATIENT
Start: 2022-06-27

## 2022-06-27 RX ORDER — SIMVASTATIN 20 MG
TABLET ORAL
Qty: 90 TABLET | Refills: 1 | Status: SHIPPED | OUTPATIENT
Start: 2022-06-27

## 2022-06-27 RX ORDER — AMLODIPINE BESYLATE 5 MG/1
5 TABLET ORAL DAILY
Qty: 90 TABLET | Refills: 1 | OUTPATIENT
Start: 2022-06-27

## 2022-06-27 RX ORDER — CLOPIDOGREL BISULFATE 75 MG/1
75 TABLET ORAL DAILY
Qty: 90 TABLET | Refills: 1 | OUTPATIENT
Start: 2022-06-27

## 2022-06-27 RX ORDER — HYDRALAZINE HYDROCHLORIDE 25 MG/1
TABLET, FILM COATED ORAL
Qty: 180 TABLET | Refills: 1 | OUTPATIENT
Start: 2022-06-27

## 2022-08-04 ENCOUNTER — HOSPITAL ENCOUNTER (OUTPATIENT)
Age: 82
Setting detail: OBSERVATION
Discharge: HOME | End: 2022-08-05
Attending: STUDENT IN AN ORGANIZED HEALTH CARE EDUCATION/TRAINING PROGRAM | Admitting: STUDENT IN AN ORGANIZED HEALTH CARE EDUCATION/TRAINING PROGRAM
Payer: MEDICARE

## 2022-08-04 DIAGNOSIS — I25.10 CORONARY ARTERY DISEASE INVOLVING NATIVE CORONARY ARTERY OF NATIVE HEART WITHOUT ANGINA PECTORIS: ICD-10-CM

## 2022-08-04 DIAGNOSIS — I10 ESSENTIAL HYPERTENSION: ICD-10-CM

## 2022-08-04 DIAGNOSIS — E11.40 TYPE 2 DIABETES MELLITUS WITH DIABETIC NEUROPATHY, WITHOUT LONG-TERM CURRENT USE OF INSULIN (HCC): ICD-10-CM

## 2022-08-04 PROBLEM — R51.9 HEADACHE: Status: ACTIVE | Noted: 2022-08-04

## 2022-08-04 LAB
A/G RATIO: 1.8 (ref 1.1–2.2)
ALBUMIN SERPL-MCNC: 4.9 G/DL (ref 3.4–5)
ALP BLD-CCNC: 69 U/L (ref 40–129)
ALT SERPL-CCNC: 17 U/L (ref 10–40)
ANION GAP SERPL CALCULATED.3IONS-SCNC: 15 MMOL/L (ref 3–16)
AST SERPL-CCNC: 22 U/L (ref 15–37)
BASOPHILS ABSOLUTE: 0 K/UL (ref 0–0.2)
BASOPHILS RELATIVE PERCENT: 0.4 %
BILIRUB SERPL-MCNC: 1 MG/DL (ref 0–1)
BUN BLDV-MCNC: 21 MG/DL (ref 7–20)
CALCIUM SERPL-MCNC: 10 MG/DL (ref 8.3–10.6)
CHLORIDE BLD-SCNC: 98 MMOL/L (ref 99–110)
CHOLESTEROL, TOTAL: 179 MG/DL (ref 0–199)
CO2: 26 MMOL/L (ref 21–32)
CREAT SERPL-MCNC: 0.8 MG/DL (ref 0.6–1.2)
EOSINOPHILS ABSOLUTE: 0.2 K/UL (ref 0–0.6)
EOSINOPHILS RELATIVE PERCENT: 4.7 %
GFR AFRICAN AMERICAN: >60
GFR NON-AFRICAN AMERICAN: >60
GLUCOSE BLD-MCNC: 126 MG/DL (ref 70–99)
HCT VFR BLD CALC: 36.6 % (ref 36–48)
HDLC SERPL-MCNC: 32 MG/DL (ref 40–60)
HEMOGLOBIN: 12.7 G/DL (ref 12–16)
LDL CHOLESTEROL CALCULATED: ABNORMAL MG/DL
LDL CHOLESTEROL DIRECT: 84 MG/DL
LYMPHOCYTES ABSOLUTE: 2.2 K/UL (ref 1–5.1)
LYMPHOCYTES RELATIVE PERCENT: 46.3 %
MCH RBC QN AUTO: 31.5 PG (ref 26–34)
MCHC RBC AUTO-ENTMCNC: 34.7 G/DL (ref 31–36)
MCV RBC AUTO: 90.8 FL (ref 80–100)
MONOCYTES ABSOLUTE: 0.4 K/UL (ref 0–1.3)
MONOCYTES RELATIVE PERCENT: 8.7 %
NEUTROPHILS ABSOLUTE: 1.9 K/UL (ref 1.7–7.7)
NEUTROPHILS RELATIVE PERCENT: 39.9 %
PDW BLD-RTO: 12.5 % (ref 12.4–15.4)
PLATELET # BLD: 200 K/UL (ref 135–450)
PMV BLD AUTO: 7.7 FL (ref 5–10.5)
POTASSIUM SERPL-SCNC: 4.4 MMOL/L (ref 3.5–5.1)
RBC # BLD: 4.03 M/UL (ref 4–5.2)
SODIUM BLD-SCNC: 139 MMOL/L (ref 136–145)
TOTAL PROTEIN: 7.7 G/DL (ref 6.4–8.2)
TRIGL SERPL-MCNC: 329 MG/DL (ref 0–150)
VLDLC SERPL CALC-MCNC: ABNORMAL MG/DL
WBC # BLD: 4.8 K/UL (ref 4–11)

## 2022-08-04 PROCEDURE — G0379 DIRECT REFER HOSPITAL OBSERV: HCPCS

## 2022-08-04 PROCEDURE — 36415 COLL VENOUS BLD VENIPUNCTURE: CPT | Performed by: INTERNAL MEDICINE

## 2022-08-04 PROCEDURE — G0378 HOSPITAL OBSERVATION PER HR: HCPCS

## 2022-08-04 RX ORDER — ASPIRIN 81 MG/1
81 TABLET ORAL DAILY
Status: DISCONTINUED | OUTPATIENT
Start: 2022-08-05 | End: 2022-08-05 | Stop reason: HOSPADM

## 2022-08-04 RX ORDER — HYDRALAZINE HYDROCHLORIDE 25 MG/1
25 TABLET, FILM COATED ORAL 2 TIMES DAILY
Status: DISCONTINUED | OUTPATIENT
Start: 2022-08-05 | End: 2022-08-05 | Stop reason: HOSPADM

## 2022-08-04 RX ORDER — METOPROLOL TARTRATE 50 MG/1
50 TABLET, FILM COATED ORAL 2 TIMES DAILY
Status: DISCONTINUED | OUTPATIENT
Start: 2022-08-04 | End: 2022-08-05 | Stop reason: HOSPADM

## 2022-08-04 RX ORDER — PANTOPRAZOLE SODIUM 20 MG/1
20 TABLET, DELAYED RELEASE ORAL
Status: DISCONTINUED | OUTPATIENT
Start: 2022-08-06 | End: 2022-08-05 | Stop reason: HOSPADM

## 2022-08-04 RX ORDER — ENOXAPARIN SODIUM 100 MG/ML
30 INJECTION SUBCUTANEOUS DAILY
Status: DISCONTINUED | OUTPATIENT
Start: 2022-08-05 | End: 2022-08-05 | Stop reason: HOSPADM

## 2022-08-04 RX ORDER — HYDROCHLOROTHIAZIDE 25 MG/1
25 TABLET ORAL DAILY
Status: DISCONTINUED | OUTPATIENT
Start: 2022-08-05 | End: 2022-08-05 | Stop reason: HOSPADM

## 2022-08-04 RX ORDER — DEXTROSE MONOHYDRATE 100 MG/ML
INJECTION, SOLUTION INTRAVENOUS CONTINUOUS PRN
Status: DISCONTINUED | OUTPATIENT
Start: 2022-08-04 | End: 2022-08-05 | Stop reason: HOSPADM

## 2022-08-04 RX ORDER — POLYETHYLENE GLYCOL 3350 17 G/17G
17 POWDER, FOR SOLUTION ORAL DAILY PRN
Status: DISCONTINUED | OUTPATIENT
Start: 2022-08-04 | End: 2022-08-05 | Stop reason: HOSPADM

## 2022-08-04 RX ORDER — OMEPRAZOLE 20 MG/1
20 CAPSULE, DELAYED RELEASE ORAL
COMMUNITY

## 2022-08-04 RX ORDER — FOLIC ACID 1 MG/1
1 TABLET ORAL DAILY
Status: DISCONTINUED | OUTPATIENT
Start: 2022-08-05 | End: 2022-08-05 | Stop reason: HOSPADM

## 2022-08-04 RX ORDER — GABAPENTIN 100 MG/1
100 CAPSULE ORAL 2 TIMES DAILY
Status: DISCONTINUED | OUTPATIENT
Start: 2022-08-04 | End: 2022-08-05 | Stop reason: HOSPADM

## 2022-08-04 RX ORDER — HYDROXYZINE HYDROCHLORIDE 25 MG/1
25 TABLET, FILM COATED ORAL NIGHTLY PRN
Status: DISCONTINUED | OUTPATIENT
Start: 2022-08-04 | End: 2022-08-05 | Stop reason: HOSPADM

## 2022-08-04 RX ORDER — INSULIN LISPRO 100 [IU]/ML
0-4 INJECTION, SOLUTION INTRAVENOUS; SUBCUTANEOUS
Status: DISCONTINUED | OUTPATIENT
Start: 2022-08-05 | End: 2022-08-05 | Stop reason: HOSPADM

## 2022-08-04 RX ORDER — ONDANSETRON 4 MG/1
4 TABLET, ORALLY DISINTEGRATING ORAL EVERY 8 HOURS PRN
Status: DISCONTINUED | OUTPATIENT
Start: 2022-08-04 | End: 2022-08-05 | Stop reason: HOSPADM

## 2022-08-04 RX ORDER — AMLODIPINE BESYLATE 5 MG/1
5 TABLET ORAL DAILY
Status: DISCONTINUED | OUTPATIENT
Start: 2022-08-05 | End: 2022-08-05 | Stop reason: HOSPADM

## 2022-08-04 RX ORDER — ASPIRIN 300 MG/1
300 SUPPOSITORY RECTAL DAILY
Status: DISCONTINUED | OUTPATIENT
Start: 2022-08-05 | End: 2022-08-05 | Stop reason: HOSPADM

## 2022-08-04 RX ORDER — ONDANSETRON 2 MG/ML
4 INJECTION INTRAMUSCULAR; INTRAVENOUS EVERY 6 HOURS PRN
Status: DISCONTINUED | OUTPATIENT
Start: 2022-08-04 | End: 2022-08-05 | Stop reason: HOSPADM

## 2022-08-04 RX ORDER — OMEGA-3S/DHA/EPA/FISH OIL/D3 300MG-1000
1 CAPSULE ORAL 2 TIMES DAILY
Status: DISCONTINUED | OUTPATIENT
Start: 2022-08-04 | End: 2022-08-05 | Stop reason: CLARIF

## 2022-08-04 RX ORDER — INSULIN LISPRO 100 [IU]/ML
0-4 INJECTION, SOLUTION INTRAVENOUS; SUBCUTANEOUS NIGHTLY
Status: DISCONTINUED | OUTPATIENT
Start: 2022-08-04 | End: 2022-08-05 | Stop reason: HOSPADM

## 2022-08-04 RX ORDER — CLOPIDOGREL BISULFATE 75 MG/1
75 TABLET ORAL DAILY
Status: DISCONTINUED | OUTPATIENT
Start: 2022-08-05 | End: 2022-08-05 | Stop reason: HOSPADM

## 2022-08-04 RX ORDER — LOSARTAN POTASSIUM 100 MG/1
100 TABLET ORAL DAILY
Status: DISCONTINUED | OUTPATIENT
Start: 2022-08-05 | End: 2022-08-05 | Stop reason: HOSPADM

## 2022-08-04 ASSESSMENT — PAIN DESCRIPTION - ORIENTATION: ORIENTATION: ANTERIOR

## 2022-08-04 ASSESSMENT — PAIN DESCRIPTION - PAIN TYPE: TYPE: ACUTE PAIN

## 2022-08-04 ASSESSMENT — PAIN DESCRIPTION - FREQUENCY: FREQUENCY: CONTINUOUS

## 2022-08-04 ASSESSMENT — PAIN SCALES - GENERAL: PAINLEVEL_OUTOF10: 3

## 2022-08-04 ASSESSMENT — PAIN DESCRIPTION - LOCATION: LOCATION: HEAD

## 2022-08-04 ASSESSMENT — PAIN - FUNCTIONAL ASSESSMENT: PAIN_FUNCTIONAL_ASSESSMENT: ACTIVITIES ARE NOT PREVENTED

## 2022-08-04 ASSESSMENT — PAIN DESCRIPTION - DESCRIPTORS: DESCRIPTORS: ACHING

## 2022-08-05 ENCOUNTER — APPOINTMENT (OUTPATIENT)
Dept: MRI IMAGING | Age: 82
End: 2022-08-05
Attending: STUDENT IN AN ORGANIZED HEALTH CARE EDUCATION/TRAINING PROGRAM
Payer: MEDICARE

## 2022-08-05 VITALS
RESPIRATION RATE: 15 BRPM | BODY MASS INDEX: 32.03 KG/M2 | SYSTOLIC BLOOD PRESSURE: 144 MMHG | WEIGHT: 163.14 LBS | HEIGHT: 60 IN | HEART RATE: 63 BPM | DIASTOLIC BLOOD PRESSURE: 53 MMHG | OXYGEN SATURATION: 95 % | TEMPERATURE: 97.6 F

## 2022-08-05 LAB
ANION GAP SERPL CALCULATED.3IONS-SCNC: 14 MMOL/L (ref 4–16)
BUN BLDV-MCNC: 26 MG/DL (ref 6–23)
CALCIUM SERPL-MCNC: 9.7 MG/DL (ref 8.3–10.6)
CHLORIDE BLD-SCNC: 101 MMOL/L (ref 99–110)
CO2: 26 MMOL/L (ref 21–32)
CREAT SERPL-MCNC: 0.9 MG/DL (ref 0.6–1.1)
ERYTHROCYTE SEDIMENTATION RATE: 10 MM/HR (ref 0–30)
ESTIMATED AVERAGE GLUCOSE: 131.2 MG/DL
GFR AFRICAN AMERICAN: >60 ML/MIN/1.73M2
GFR NON-AFRICAN AMERICAN: 60 ML/MIN/1.73M2
GLUCOSE BLD-MCNC: 106 MG/DL (ref 70–99)
GLUCOSE BLD-MCNC: 116 MG/DL (ref 70–99)
GLUCOSE BLD-MCNC: 119 MG/DL (ref 70–99)
GLUCOSE BLD-MCNC: 82 MG/DL (ref 70–99)
HBA1C MFR BLD: 6.2 %
HCT VFR BLD CALC: 36.3 % (ref 37–47)
HEMOGLOBIN: 12.3 GM/DL (ref 12.5–16)
MCH RBC QN AUTO: 30.8 PG (ref 27–31)
MCHC RBC AUTO-ENTMCNC: 33.9 % (ref 32–36)
MCV RBC AUTO: 91 FL (ref 78–100)
PDW BLD-RTO: 11.9 % (ref 11.7–14.9)
PLATELET # BLD: 205 K/CU MM (ref 140–440)
PMV BLD AUTO: 9.3 FL (ref 7.5–11.1)
POTASSIUM SERPL-SCNC: 4.2 MMOL/L (ref 3.5–5.1)
RBC # BLD: 3.99 M/CU MM (ref 4.2–5.4)
SODIUM BLD-SCNC: 141 MMOL/L (ref 135–145)
WBC # BLD: 6 K/CU MM (ref 4–10.5)

## 2022-08-05 PROCEDURE — 96374 THER/PROPH/DIAG INJ IV PUSH: CPT

## 2022-08-05 PROCEDURE — 6370000000 HC RX 637 (ALT 250 FOR IP): Performed by: INTERNAL MEDICINE

## 2022-08-05 PROCEDURE — 82962 GLUCOSE BLOOD TEST: CPT

## 2022-08-05 PROCEDURE — 92610 EVALUATE SWALLOWING FUNCTION: CPT

## 2022-08-05 PROCEDURE — 85027 COMPLETE CBC AUTOMATED: CPT

## 2022-08-05 PROCEDURE — 96372 THER/PROPH/DIAG INJ SC/IM: CPT

## 2022-08-05 PROCEDURE — 6360000002 HC RX W HCPCS: Performed by: CLINICAL NURSE SPECIALIST

## 2022-08-05 PROCEDURE — 80048 BASIC METABOLIC PNL TOTAL CA: CPT

## 2022-08-05 PROCEDURE — 6360000002 HC RX W HCPCS: Performed by: INTERNAL MEDICINE

## 2022-08-05 PROCEDURE — 99223 1ST HOSP IP/OBS HIGH 75: CPT | Performed by: PSYCHIATRY & NEUROLOGY

## 2022-08-05 PROCEDURE — 70551 MRI BRAIN STEM W/O DYE: CPT

## 2022-08-05 PROCEDURE — G0378 HOSPITAL OBSERVATION PER HR: HCPCS

## 2022-08-05 PROCEDURE — 85652 RBC SED RATE AUTOMATED: CPT

## 2022-08-05 PROCEDURE — 36415 COLL VENOUS BLD VENIPUNCTURE: CPT

## 2022-08-05 RX ORDER — ACETAMINOPHEN 325 MG/1
650 TABLET ORAL EVERY 4 HOURS PRN
Status: DISCONTINUED | OUTPATIENT
Start: 2022-08-05 | End: 2022-08-05 | Stop reason: HOSPADM

## 2022-08-05 RX ORDER — DEXAMETHASONE SODIUM PHOSPHATE 4 MG/ML
4 INJECTION, SOLUTION INTRA-ARTICULAR; INTRALESIONAL; INTRAMUSCULAR; INTRAVENOUS; SOFT TISSUE EVERY 6 HOURS
Status: DISCONTINUED | OUTPATIENT
Start: 2022-08-05 | End: 2022-08-05 | Stop reason: HOSPADM

## 2022-08-05 RX ORDER — OMEGA-3-ACID ETHYL ESTERS 1 G/1
1 CAPSULE, LIQUID FILLED ORAL 2 TIMES DAILY
Status: DISCONTINUED | OUTPATIENT
Start: 2022-08-05 | End: 2022-08-05 | Stop reason: HOSPADM

## 2022-08-05 RX ORDER — ACETAMINOPHEN 325 MG/1
650 TABLET ORAL EVERY 4 HOURS PRN
Qty: 120 TABLET | Refills: 3 | Status: SHIPPED | OUTPATIENT
Start: 2022-08-05

## 2022-08-05 RX ADMIN — ACETAMINOPHEN 650 MG: 325 TABLET ORAL at 09:13

## 2022-08-05 RX ADMIN — HYDRALAZINE HYDROCHLORIDE 25 MG: 25 TABLET, FILM COATED ORAL at 09:00

## 2022-08-05 RX ADMIN — METOPROLOL TARTRATE 50 MG: 50 TABLET, FILM COATED ORAL at 09:00

## 2022-08-05 RX ADMIN — METOPROLOL TARTRATE 50 MG: 50 TABLET, FILM COATED ORAL at 00:18

## 2022-08-05 RX ADMIN — OMEGA-3-ACID ETHYL ESTERS 1 G: 1 CAPSULE, LIQUID FILLED ORAL at 08:59

## 2022-08-05 RX ADMIN — GABAPENTIN 100 MG: 100 CAPSULE ORAL at 09:00

## 2022-08-05 RX ADMIN — AMLODIPINE BESYLATE 5 MG: 5 TABLET ORAL at 08:59

## 2022-08-05 RX ADMIN — ENOXAPARIN SODIUM 30 MG: 100 INJECTION SUBCUTANEOUS at 09:00

## 2022-08-05 RX ADMIN — DEXAMETHASONE SODIUM PHOSPHATE 4 MG: 4 INJECTION, SOLUTION INTRAMUSCULAR; INTRAVENOUS at 12:21

## 2022-08-05 RX ADMIN — FOLIC ACID 1 MG: 1 TABLET ORAL at 08:59

## 2022-08-05 RX ADMIN — HYDROCHLOROTHIAZIDE 25 MG: 25 TABLET ORAL at 09:00

## 2022-08-05 RX ADMIN — LOSARTAN POTASSIUM 100 MG: 100 TABLET, FILM COATED ORAL at 09:00

## 2022-08-05 RX ADMIN — GABAPENTIN 100 MG: 100 CAPSULE ORAL at 00:18

## 2022-08-05 RX ADMIN — ASPIRIN 81 MG: 81 TABLET, COATED ORAL at 08:59

## 2022-08-05 RX ADMIN — CLOPIDOGREL BISULFATE 75 MG: 75 TABLET ORAL at 09:14

## 2022-08-05 ASSESSMENT — PAIN DESCRIPTION - LOCATION: LOCATION: HEAD

## 2022-08-05 ASSESSMENT — PAIN DESCRIPTION - PAIN TYPE: TYPE: ACUTE PAIN

## 2022-08-05 ASSESSMENT — PAIN DESCRIPTION - FREQUENCY: FREQUENCY: INTERMITTENT

## 2022-08-05 ASSESSMENT — PAIN - FUNCTIONAL ASSESSMENT: PAIN_FUNCTIONAL_ASSESSMENT: ACTIVITIES ARE NOT PREVENTED

## 2022-08-05 ASSESSMENT — PAIN SCALES - GENERAL: PAINLEVEL_OUTOF10: 3

## 2022-08-05 ASSESSMENT — PAIN DESCRIPTION - DESCRIPTORS: DESCRIPTORS: ACHING

## 2022-08-05 NOTE — H&P
History and Physical      Name:  Cheikh Rodriguez /Age/Sex: 1940  (79 y.o. female)   MRN & CSN:  [id number] & [id number] Encounter Date/Time: 2022 10:53 PM EDT   Location:  [facility] PCP: Andrew Alejandra MD       Hospital Day: 1    Assessment and Plan:     #. Headache: Evaluate for CVA  -Patient also reported numbness on the left upper lip  -CT head-no acute process  -MRI head ordered  -NIHSS/PT/OT/SLP evaluation ordered  -Neurology consultation  -Patient is on aspirin, Plavix, simvastatin.  -Patient listed lovastatin, gemfibrozil, pravastatin as allergies. Could not say if she is allergic to atorvastatin-hence not ordered. #.  Hypertension  -Patient is on multiple medications-amlodipine 5 mg daily, hydralazine 25 mg twice daily, losartan 100 mg daily, metoprolol tartrate 50 mg twice daily, HCTZ 25 mg daily. #.  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  -KmB2t-8.1-2022.  -low intensity sliding scale with hypoglycemia protocol ordered. Diabetic neuropathy-patient is on gabapentin. #. #.  Coronary artery disease  -S/p PCI and stenting in the past    #. Dyslipidemia-patient is on Lovaza, simvastatin. #.  History of diverticulitis    #. Obesity with BMI 32.08. Theodore Pretty Disposition:   Current Living situation: home    Diet Diet NPO   DVT Prophylaxis [x] Lovenox, []  Heparin, [] SCDs, [] Ambulation,  [] Eliquis, [] Xarelto   Code Status Full Code   Surrogate Decision Maker/ POA      History from:   EMR, patient. History of Present Illness:     Chief Complaint: Headache  Cheikh Rodriguez is a 79 y.o. female with coronary artery disease hypertension, diabetes mellitus type 2, dyslipidemia, who initially presented to [facility] ED with complaints of headache. Patient reported that she had some lab work done in the morning and when she returned home she started having headache.   Patient reported having intermittent, headache bilaterally, more on the right side, shooting from right behind her high to the back of her head. Patient reported that she was feeling foggy, denied any visual disturbance, intermittently patient was having numbness on the left upper lip. Denied any tingling, numbness or weakness in her bilateral upper or lower extremities. Patient denied any speech abnormality, swallowing difficulty. Patient reported never having similar symptoms in the past.  Patient reported mild improvement in her headache with Tylenol given [facility]. Patient had CT head done in [place]-prominent bilateral periventricular white matter hypodensities are nonspecific could be related to demyelination, microvascular disease. Patient is [facility] for MRI and further evaluation. Review of Systems: Need 10 Elements   10 point review of systems conducted and pertinent positives and negatives as per HPI. Objective:   No intake or output data in the 24 hours ending 08/04/22 2253   Vitals:   Vitals:    08/04/22 2242   BP: (!) 174/51   Pulse: 72   Resp: 16   Temp: 98.1 °F (36.7 °C)   TempSrc: Oral   SpO2: 95%   Weight: 163 lb 2.3 oz (74 kg)   Height: 5' (1.524 m)       Medications Prior to Admission   Reviewed medications with patient    Prior to Admission medications    Medication Sig Start Date End Date Taking?  Authorizing Provider   losartan (COZAAR) 100 MG tablet TAKE 1 TABLET BY MOUTH BY MOUTH EVERY DAY 6/27/22   Dickson Garduno MD   simvastatin (ZOCOR) 20 MG tablet TAKE 1 TABLET BY MOUTH EVERY NIGHT 6/27/22   Dickson Garduno MD   ketoconazole (NIZORAL) 2 % cream Apply topically daily to toenail area 5/11/22   Dickson Garduno MD   hydrOXYzine (ATARAX) 10 MG tablet hydroxyzine HCl 10 mg tablet   TAKE 1 TO 2 TABLETS BY MOUTH EVERY NIGHT 1 HOUR BEFORE BEDTIME FOR ITCHING    Historical Provider, MD   hydrALAZINE (APRESOLINE) 25 MG tablet Take 1 tablet by mouth 2 times daily 2/7/22   Dickson Garduno MD   clopidogrel (PLAVIX) 75 MG tablet Take 1 tablet by mouth daily 2/7/22   Norberto Magallanes MD   amLODIPine [facility]) 5 MG tablet Take 1 tablet by mouth daily 2/7/22   Norberto Magallanes MD   metoprolol tartrate (LOPRESSOR) 50 MG tablet Take 1 tablet by mouth 2 times daily 2/7/22   Norberto Magallanes MD   hydroCHLOROthiazide (HYDRODIURIL) 25 MG tablet Take 1 tablet by mouth daily 2/7/22   Norberto Magallanes MD   gabapentin (NEURONTIN) 100 MG capsule Take 1 capsule by mouth 2 times daily for 90 days. 2/7/22 5/11/22  Norberto Magallanes MD   nitroGLYCERIN (NITROSTAT) 0.4 MG SL tablet DISSOLVE 1 TABLET UNDER THE TONGUE EVERY 5 MINUTES AS NEEDED FOR CHEST PAIN. MAX OF 3 DOSES IN 15 MINUTES. 6/8/21   Norberto Magallanes MD   metFORMIN (GLUCOPHAGE) 500 MG tablet Take 0.5 tablets by mouth daily 3/10/21   Norberto Magallanes MD   Biotin 300 MCG TABS Take 1 tablet by mouth daily 3/10/21   Norberto Magallanes MD   folic acid (FOLVITE) 830 MCG tablet Take 800 mcg by mouth daily    Historical Provider, MD   diclofenac sodium 1 % GEL Apply 2 g topically 4 times daily 12/3/19   Norberto Magallanes MD   Acetaminophen (TYLENOL ARTHRITIS PAIN PO) Take 2 tablets by mouth 2 times daily    Historical Provider, MD   fluticasone (FLONASE) 50 MCG/ACT nasal spray 2 sprays by Nasal route daily into each nostril every day 5/18/15   Norberto Magallanes MD   aspirin 81 MG tablet Take 81 mg by mouth 2 times daily     Historical Provider, MD   multivitamin [facility]) per tablet Take 1 tablet by mouth daily. Historical Provider, MD   Calcium Carbonate-Vitamin D (CALCIUM + D) 600-200 MG-UNIT TABS Take 1 tablet by mouth nightly     Historical Provider, MD   Omega-3 Fatty Acids (FISH OIL BURP-LESS) 1000 MG CAPS Take 1 tablet by mouth 2 times daily. Historical Provider, MD       Physical Exam: Need 8 Elements   Physical Exam     GEN  -Awake, alert, NAD.   EYES   -PERRL. HENT  -MM are moist.   RESP  -LS CTA equal bilat, no wheezes, rales or rhonchi. Symmetric chest movement. No respiratory distress noted. C/V  -S1/S2 auscultated. RRR without appreciable M/R/G. No JVD or carotid bruits. Peripheral pulses equal bilaterally and palpable. No peripheral edema. No reproducible chest wall tenderness. GI  -Abdomen is soft, non-distended, no significant tenderness. No masses or guarding. + BS in all quadrants. Rectal exam deferred.   -No CVA tenderness. Castellano catheter is not present. MS  -B/L extremities - No gross joint deformities. No swelling, intact sensation symmetrical.   SKIN  -Normal coloration, warm, dry. NEURO  -  NEUROLOGIC EXAM:     Mental Status: A&O to self, location, month and year, NAD, speech clear, language fluent, repetition and naming intact, follows commands appropriately     Cranial Nerve Exam:   CN II-XII:  PERRL, no nystagmus, no gaze paresis, sensation V1-V3 intact b/l, muscles of facial expression symmetric; hearing intact to conversational tone, shoulder elevation symmetric  Motor Exam:  Strength 5/5 bilateral upper and lower extremities   sensation: Intact light touch UE's/LE's b/l  Coordination/Cerebellum: It is a past Finger-to-Nose: no dysmetria b/l  Gait and stance: deferred for safety     PSYC  -Awake, alert, oriented x 3. Appropriate affect. Past Medical History:   Reviewed patient's past medical, surgical, social, family history and allergies. PMHx   Past Medical History:   Diagnosis Date    AK (actinic keratosis)     [place] following    CAD (coronary artery disease)     5/2005 S/P PTCA and stents X2 - Dr Fariha Carney [facility])     skin    Carotid stenosis     Carotid stenosis, left     monitored by Dr Jennifer Sevilla- 50% stenosis noted on CTA 10/2016    Coronary artery disease     Dr Jennifer Sevilla    Cough secondary to angiotensin converting enzyme inhibitor (ACE-I)     inactive    COVID-19 virus infection     tested pos in Nov 2020- mild cough. now resolved.     CTS (carpal tunnel syndrome)     inactive-S/P right CTS 06/14/2022 08:50 AM    RBCUA 2 06/14/2022 08:50 AM    RBCUA <1 11/03/2017 11:02 AM    MUCUS RARE 11/03/2017 11:02 AM    TRICHOMONAS NONE SEEN 11/03/2017 11:02 AM    BACTERIA None Seen 06/14/2022 08:50 AM    CLARITYU Clear 06/14/2022 08:50 AM    SPECGRAV 1.012 06/14/2022 08:50 AM    LEUKOCYTESUR SMALL 06/14/2022 08:50 AM    UROBILINOGEN 0.2 06/14/2022 08:50 AM    BILIRUBINUR Negative 06/14/2022 08:50 AM    BILIRUBINUR neg 05/31/2022 11:36 AM    BLOODU Negative 06/14/2022 08:50 AM    GLUCOSEU Negative 06/14/2022 08:50 AM    KETUA Negative 06/14/2022 08:50 AM     Urine Cultures:   Lab Results   Component Value Date/Time    LABURIN >100,000 CFU/ml 05/31/2022 11:50 AM     Blood Cultures: No results found for: BC  No results found for: BLOODCULT2  Organism:   Lab Results   Component Value Date/Time    ORG Escherichia coli 05/31/2022 11:50 AM       Imaging/Diagnostics Last 24 Hours   No results found. Personally reviewed Lab Studies, Imaging.     Electronically signed by Josh Medina MD on 8/4/2022 at 10:53 PM

## 2022-08-05 NOTE — PLAN OF CARE
Problem: Discharge Planning  Goal: Discharge to home or other facility with appropriate resources  8/5/2022 0909 by Ruben Brewster RN  Outcome: Progressing     Problem: Pain  Goal: Verbalizes/displays adequate comfort level or baseline comfort level  8/5/2022 0909 by Ruben Brewster RN  Outcome: Progressing     Problem: Chronic Conditions and Co-morbidities  Goal: Patient's chronic conditions and co-morbidity symptoms are monitored and maintained or improved  Outcome: Progressing     Problem: ABCDS Injury Assessment  Goal: Absence of physical injury  Outcome: Progressing

## 2022-08-05 NOTE — DISCHARGE SUMMARY
cardiac diet   Activity: activity as tolerated  Disposition: Discharged to:   [x]Home, [][facility], []SNF, []Acute Rehab, []Hospice   Condition on discharge: Stable    Discharge Medications:        Medication List        START taking these medications      acetaminophen 325 MG tablet  Commonly known as: TYLENOL  Take 2 tablets by mouth every 4 hours as needed for Pain  Replaces: TYLENOL ARTHRITIS PAIN PO            CONTINUE taking these medications      amLODIPine 5 MG tablet  Commonly known as: NORVASC  Take 1 tablet by mouth daily     aspirin 81 MG tablet     Biotin 300 MCG Tabs  Take 1 tablet by mouth daily     Calcium + D 600-200 MG-UNIT Tabs  Generic drug: calcium carbonate-vitamin D     clopidogrel 75 MG tablet  Commonly known as: PLAVIX  Take 1 tablet by mouth daily     diclofenac sodium 1 % Gel  Commonly known as: VOLTAREN  Apply 2 g topically 4 times daily     Fish Oil Burp-Less 1000 MG Caps     fluticasone 50 MCG/ACT nasal spray  Commonly known as: Flonase  2 sprays by Nasal route daily into each nostril every day     folic acid 458 MCG tablet  Commonly known as: FOLVITE     gabapentin 100 MG capsule  Commonly known as: Neurontin  Take 1 capsule by mouth 2 times daily for 90 days.      hydrALAZINE 25 MG tablet  Commonly known as: APRESOLINE  Take 1 tablet by mouth 2 times daily     hydroCHLOROthiazide 25 MG tablet  Commonly known as: HYDRODIURIL  Take 1 tablet by mouth daily     hydrOXYzine HCl 10 MG tablet  Commonly known as: ATARAX     ketoconazole 2 % cream  Commonly known as: NIZORAL  Apply topically daily to toenail area     losartan 100 MG tablet  Commonly known as: COZAAR  TAKE 1 TABLET BY MOUTH BY MOUTH EVERY DAY     metFORMIN 500 MG tablet  Commonly known as: GLUCOPHAGE  Take 0.5 tablets by mouth daily     metoprolol tartrate 50 MG tablet  Commonly known as: LOPRESSOR  Take 1 tablet by mouth 2 times daily     multivitamin per tablet     nitroGLYCERIN 0.4 MG SL tablet  Commonly known as: NITROSTAT  DISSOLVE 1 TABLET UNDER THE TONGUE EVERY 5 MINUTES AS NEEDED FOR CHEST PAIN. MAX OF 3 DOSES IN 15 MINUTES.     omeprazole 20 MG delayed release capsule  Commonly known as: PRILOSEC     simvastatin 20 MG tablet  Commonly known as: ZOCOR  TAKE 1 TABLET BY MOUTH EVERY NIGHT            STOP taking these medications      TYLENOL ARTHRITIS PAIN PO  Replaced by: acetaminophen 325 MG tablet               Where to Get Your Medications        These medications were sent to [address] [place] [address] 18, [address] [facility]       Phone: [phone]   acetaminophen 325 MG tablet         Objective Findings at Discharge:   BP (!) 165/62   Pulse 61   Temp 97.6 °F (36.4 °C) (Oral)   Resp 13   Ht 5' (1.524 m)   Wt 163 lb 2.3 oz (74 kg)   LMP  (LMP Unknown)   SpO2 95%   BMI 31.86 kg/m²            PHYSICAL EXAM   GEN Awake female, sitting upright in bed in no apparent distress. Appears given age. EYES Pupils are equally round. No scleral erythema, discharge, or conjunctivitis. HENT Mucous membranes are moist. Oral pharynx without exudates, no evidence of thrush. NECK Supple, no apparent thyromegaly or masses. RESP Clear to auscultation, no wheezes, rales or rhonchi. Symmetric chest movement while on room air. CARDIO/VASC S1/S2 auscultated. Regular rate without appreciable murmurs, rubs, or gallops. No JVD or carotid bruits. Peripheral pulses equal bilaterally and palpable. No peripheral edema. GI Abdomen is soft without significant tenderness, masses, or guarding. Bowel sounds are normoactive. Rectal exam deferred.  No costovertebral angle tenderness. Normal appearing external genitalia. Castellano catheter is not present. HEME/LYMPH No palpable cervical lymphadenopathy and no hepatosplenomegaly. No petechiae or ecchymoses. MSK No gross joint deformities.   SKIN Normal coloration, warm, dry.  NEURO Cranial nerves appear grossly intact, normal speech, no lateralizing weakness. PSYCH Awake, alert, oriented x 4. Affect appropriate. BMP/CBC  Recent Labs     08/04/22  0753 08/05/22  0100    141   K 4.4 4.2   CL 98* 101   CO2 26 26   BUN 21* 26*   CREATININE 0.8 0.9   WBC 4.8 6.0   HCT 36.6 36.3*    205       IMAGING:  MRI brain without contrast    Result Date: 8/5/2022  EXAMINATION: MRI OF THE BRAIN WITHOUT CONTRAST  8/5/2022 12:06 pm TECHNIQUE: Multiplanar multisequence MRI of the brain was performed without the administration of intravenous contrast. COMPARISON: 10/12/2016. HISTORY: ORDERING SYSTEM PROVIDED HISTORY: headache TECHNOLOGIST PROVIDED HISTORY: Reason for exam:->headache What is the sedation requirement?->None Reason for Exam: ha Initial evaluation. FINDINGS: INTRACRANIAL STRUCTURES/VENTRICLES: There is no acute infarct. No mass effect or midline shift. No evidence of an acute intracranial hemorrhage. Areas of T2 FLAIR hyperintensity are seen in the periventricular and subcortical white matter, which are nonspecific, but may represent chronic microvascular ischemic change. There is minimal global parenchymal volume loss. Otherwise, the ventricles and sulci are normal in size and configuration. The sellar/suprasellar regions appear unremarkable. The normal signal voids within the major intracranial vessels appear maintained. ORBITS: The visualized portion of the orbits demonstrate no acute abnormality. SINUSES: The visualized paranasal sinuses and mastoid air cells demonstrate no acute abnormality. BONES/SOFT TISSUES: The bone marrow signal intensity appears normal. The soft tissues demonstrate no acute abnormality. 1. No acute intracranial abnormality. No acute infarct. 2. Minimal global parenchymal volume loss with extensive chronic microvascular ischemic changes.         Electronically signed by Nelida Garcia MD on 8/5/2022 at 1:19 PM

## 2022-08-05 NOTE — CONSULTS
Neurology Service Consult Note  [facility]  Patient Name: Chritso Greer  : 1940        Subjective:   Reason for consult: Headache  79 y.o. female with history of CAD, HTN, DM II, HLD initially presenting to [facility] ED with complaints of headache. Patient described intermittent bilateral headache. She was feeling \"foggy\" and reported numbness of the left upper lip. CT head completed at [facility] noted prominent bilateral periventricular white matter hypodensities that could be related to demyelination, microvascular disease. She was transferred to [facility] for MRI and further evaluation. Today patient is seen and assessed sitting up at bedside. She is alert and oriented x 4. She states that she had a sharp pain in the right forehead that came and went quickly. She then developed numbness of the left upper lip and a headache felt in her entire head. She states she felt \"foggy\" but did not have any confusion, balance issues or speech changes. She denies photophobia, phonophobia but did have associated nausea. Pain was initially a 8/10 but has improved to a 4-5 out of 10 today. Pain is decreased but unchanged in location and description. She does report pain behind her eyes. She does not have a history of chronic headache or migraine. Numbness to lip has resolved. Past Medical History:   Diagnosis Date    AK (actinic keratosis)     Gaurav Sorrow Grooms following    CAD (coronary artery disease)     2005 S/P PTCA and stents X2 - Dr Davidson Hu [facility])     skin    Carotid stenosis     Carotid stenosis, left     monitored by Dr Tim Nicole- 50% stenosis noted on CTA 10/2016    Coronary artery disease     Dr Tim Nicole    Cough secondary to angiotensin converting enzyme inhibitor (ACE-I)     inactive    COVID-19 virus infection     tested pos in 2020- mild cough. now resolved.     CTS (carpal tunnel syndrome)     inactive-S/P right CTS surg 2001- Dr eGrry Pedersen    DDD (degenerative disc disease), cervical     DDD (degenerative disc disease), cervical     DDD (degenerative disc disease), lumbar     Degenerative disc disease, cervical     Diabetes mellitus with neuropathy ([address]     Dr Alvarado/sheyla, - ON NEURONTIN    Diffuse cystic mastopathy     GERD without esophagitis     H/O 24 hour EKG monitoring 09/2007 9/27/07 SR, max , min HR 47, supraventricular ectopy is in the fashio of a-fib, very short episodes, infrequent vent and SVT ectopy noted    H/O cardiac catheterization 05/2005 5/16/05 Circ stent 80% prior 0% post, EF 60%    H/O cardiovascular stress test 3/29/10,   9/08,    3/29/10 post stress myocardial perfusion show norm pattern of perfusion in all regions, post left vent is norm, rest EF 90%, LV systolic  is norm, vent func preserved,     H/O cardiovascular stress test 09/11/2014    EF70% Normal study. H/O cardiovascular stress test 02/12/2016    lexiscan-normal,70%    H/O cardiovascular stress test 12/29/2020    ef 60% normal lexiscan    H/O Doppler ultrasound 10/2011, 8/09    carotid 10/4/11 moderat stenosis left internal carotid atery est 50-69%, progression in stenotic changes left internal carotid artery, right WNL    H/O echocardiogram 10/2011, 4/09    47/35/92HOVETIQW AR , LV systolic function WNL, EF 55%    H/O echocardiogram 09/11/2014    EF 60%. Mild aortic insufficiency. H/O echocardiogram 02/12/2016    EF 55% mild lvh, stage 1 diast dysf, mild PI    H/O echocardiogram 12/29/2020    EF 55-60% mild TR AR and pulmonic regurg    H/O mammogram     1/16- recheck 1/17    H/O tilt table evaluation 07/2009 7/20/09 WNL    Hyperlipidemia     Hypertension     Memory loss     MMSE 23/30-- 11/15/13    Neuropathy     Osteoarthritis, knee     Peripheral neuropathy     burning discomfort in feet.     Postsurgical menopause     Rheumatoid arthritis(714.0)     S/P colonoscopic polypectomy 11/20/2020    Dr Gabriela Santiago, recheck 3 yrs- SESSILE SERATED POLYP    SCC (squamous cell carcinoma), leg     Dr Octavio Sugn removed fr legs 10/16    Thyroid nodule     on u/s 10/2016, recheck May 2017- stable--- RECHECK MAY 2018 RECOMMENDED    TMJ (temporomandibular joint syndrome)     Unspecified cerebral artery occlusion with cerebral infarction     2016-Right hemiparesis, aphasia, dysphagia, gait disturbance,    :   Past Surgical History:   Procedure Laterality Date    BREAST BIOPSY  1993    bilateral    CARPAL TUNNEL RELEASE      Right wrist - 4/2011-Dr Parmar    CATARACT EXTRACTION W/  INTRAOCULAR LENS IMPLANT Left 02/06/2017    Dr Ava Carty    umbilical    [address]    Right thumb- giant cell tumor    KNEE ARTHROSCOPY  02/21/2012    Left knee- Dr Smith Dilling ARTHROSCOPY Left 09/23/2014    Dr Arley Butterfield    ?     PTCA  05/2005    PTCA with stent X2 - Dr Rose Marie Hastings  10/16/2013    both legs    TONSILLECTOMY  1972    TOTAL ABDOMINAL HYSTERECTOMY  1963    TOTAL KNEE ARTHROPLASTY Left 07/28/2015    Dr Susie Pittman Left 11/14/2017    Dr Sagrario Goddard at [address] [place] [address] - Dr Lesa Arredondo  2011     Medications:  Scheduled Meds:   omega-3 acid ethyl esters  1 g Oral BID    enoxaparin  30 mg SubCUTAneous Daily    aspirin  81 mg Oral Daily    Or    aspirin  300 mg Rectal Daily    amLODIPine  5 mg Oral Daily    folic acid  1 mg Oral Daily    gabapentin  100 mg Oral BID    hydrALAZINE  25 mg Oral BID    hydroCHLOROthiazide  25 mg Oral Daily    losartan  100 mg Oral Daily    metoprolol tartrate  50 mg Oral BID    insulin lispro  0-4 Units SubCUTAneous TID WC    insulin lispro  0-4 Units SubCUTAneous Nightly    [START ON 8/6/2022] pantoprazole  20 mg Oral Once per day on Sun Tue Thu Sat    clopidogrel  75 mg Oral Daily     Continuous Infusions:   dextrose       PRN Meds:.ondansetron **OR** ondansetron, polyethylene glycol, hydrOXYzine HCl, glucose, dextrose bolus **OR** dextrose bolus, glucagon (rDNA), dextrose    Allergies   Allergen Reactions    Actonel [Risedronate Sodium]      Abdominal pain    Ciprofloxacin      nausea    Darvocet [Propoxyphene N-Acetaminophen]      Lightheaded    Lopid [Gemfibrozil] Other (See Comments)     Leg cramping    Mevacor [Lovastatin] Other (See Comments)     Leg cramping    Neosporin [Neomycin-Polymyx-Gramicid] Other (See Comments)     Pt states the her skin gets welts/streaks    Nsaids      HAS EPISODIC PROTEINURIA ALONG W DM    Pravachol [Pravastatin Sodium] Other (See Comments)     Leg cramping     Social History     Socioeconomic History    Marital status:      Spouse name: Not on file    Number of children: Not on file    Years of education: Not on file    Highest education level: Not on file   Occupational History    Occupation: retired     Comment: Retired[employer]   Tobacco Use    Smoking status: Former     Packs/day: 0.25     Years: 14.00     Pack years: 3.50     Types: Cigarettes     Start date:      Quit date: 1967     Years since quittin.6    Smokeless tobacco: Never    Tobacco comments:     2/10/16   Vaping Use    Vaping Use: Never used   Substance and Sexual Activity    Alcohol use: No     Comment: caffeine 1 cup of coffee a day    Drug use: No    Sexual activity: Not Currently     Partners: Male   Other Topics Concern    Not on file   Social History Narrative    Not on file     Social Determinants of Health     Financial Resource Strain: Unknown    Difficulty of Paying Living Expenses: Patient refused   Food Insecurity: Unknown    Worried About Running Out of Food in the Last Year: Patient refused    Ran Out of Food in the Last Year: Patient refused   Transportation Needs: Not on file   Physical Activity: Not on file   Stress: Not on file   Social Connections: Not on file   Intimate Partner Violence: Not on file   Housing Stability: Not on file      Family History   Problem Relation Age of Onset    Coronary Art Dis Mother          of MI    Diabetes Mother     Heart Attack Mother     Hypertension Mother     Cancer Father     Thyroid Disease Daughter         hypothyroid    Diabetes Brother          ROS (10 systems)  In addition to that documented in the HPI above, the additional ROS was obtained:  Constitutional: Denies fevers or chills  Eyes: Denies vision changes  ENMT: Denies sore throat  CV: Denies chest pain  Resp: Denies SOB  GI: Nausea associated to headache  : Denies painful urination  MSK: Denies recent trauma  Skin: Denies new rashes  Neuro: numbness to left upper lip initially, has now resolved  Endocrine: Denies unexpected weight loss  Heme: Denies bleeding disorders    Physical Exam:       [unfilled]   Wt Readings from Last 3 Encounters:   08/04/22 163 lb 2.3 oz (74 kg)   08/05/22 164 lb 3.9 oz (74.5 kg)   05/11/22 170 lb 4 oz (77.2 kg)     Temp Readings from Last 3 Encounters:   08/05/22 98 °F (36.7 °C) (Axillary)   10/19/16 97.9 °F (36.6 °C) (Oral)   10/14/16 98.2 °F (36.8 °C) (Oral)     BP Readings from Last 3 Encounters:   08/05/22 122/63   05/31/22 (!) 146/72   05/11/22 122/68     Pulse Readings from Last 3 Encounters:   08/05/22 62   05/31/22 86   05/11/22 62        Gen: A&O x 4, NAD, cooperative  HEENT: NC/AT, EOMI, PERRL, mmm, no carotid bruits, neck supple, no meningeal signs  Heart: NSR   Lungs: Respirations even and unlabored  Ext: no edema, no calf tenderness b/l  Psych: normal mood and affect  Skin: no rashes or lesions    NEUROLOGIC EXAM:    Mental Status: A&O to self, location, month and year, NAD, speech clear, language fluent, repetition and naming intact, follows commands appropriately    Cranial Nerve Exam:   CN II-XII: PERRL, VFF, no nystagmus, no gaze paresis, sensation V1-V3 intact b/l, muscles of facial expression symmetric; hearing intact to conversational tone, palate elevates symmetrically, shoulder elevation symmetric and tongue protrudes midline with movement side to side.     Motor Exam:       Strength 5/5 UE's/LE's b/l  Tone and bulk normal   No pronator drift    Deep Tendon Reflexes: 2/4 biceps, triceps, brachioradialis, patellar, and achilles b/l; flexor plantar responses b/l    Sensation: Intact light touch/pinprick/vibration UE's/LE's b/l    Coordination/Cerebellum:       Tremors--none      Rapidly alternating movements: no dysdiadochokinesia b/l                Heel-to-Shin: no dysmetria b/l      Finger-to-Nose: no dysmetria b/l    Gait and stance:      Gait: deferred      LABS:     Recent Labs     08/04/22  0753 08/05/22  0100   WBC 4.8 6.0    141   K 4.4 4.2   CL 98* 101   CO2 26 26   BUN 21* 26*   CREATININE 0.8 0.9   GLUCOSE 126* 106*       IMAGING:      CT head w/o contrast:  Impression    1. Prominent bilateral periventricular white matter hypodensities are nonspecific could be related to demyelination, microvascular disease, among others. Consider further evaluation with MRI of the brain before and after contrast administration. 2.  No evidence for acute intracranial findings. No evidence for hemorrhage,mass effect, or acute large territory infarct. MRI brain wo contrast   pending    All imaging was personally reviewed    ASSESSMENT/PLAN:   79year old female presenting to outside facility with complaint of headache with associated left upper lip numbness, mental fogginess and nausea. Patient is alert and oriented x 4 today. Speech is clear, language is fluent, memory is intact. She states headache  has improved but is still present. Sensory changes to lip have resolved. Exam is non-focal.   Headache secondary to migraine v HTN urgency with sensory changes to lip now resolved. Can not exclude the possibility of TIA given these symptoms although would not expect this as cause of headache. CT head as above   MRI brain pending  Check ESR  Dexamethasone 4 mg q6 for 5 doses while in hospital. Does not need to receive all doses prior to discharge if headache is improved.    Continue Aspirin and Plavix for secondary stroke prevention. Patient not able to tolerate Statin medications  If MRI of brain unremarkable, can be discharged from our standpoint. Patient was discussed with attending neurologist Dr. Alba Badillo. > 80 minutes of time spent included chart review, obtaining history, patient examination, developing plan of care, and documentation. Thank you for allowing us to participate in the care of your patient. If there are any questions regarding evaluation please feel free to contact us. JAYME Christianson - CNS, 8/5/2022     Attending Note:  I have rounded on this patient with BECKI Richardson. I have reviewed the chart and we have discussed this case in detail. The patient was seen and examined by myself. Pertinent labs and imaging have been personally reviewed. Our findings and impressions were discussed with the patient. I concur with the Nurse Practioner's assessment and plan. Will sign off, she is discharged appropriately.      Electronically signed by Julito Hall DO on 8/5/2022 at 1:25 PM

## 2022-08-05 NOTE — PROGRESS NOTES
Speech Language Pathology  Facility/Department: [address] [place] [address]   CLINICAL BEDSIDE SWALLOW EVALUATION    NAME: Colletta Bevels  : 1940  MRN: [id number]    ADMISSION DATE: 2022  ADMITTING DIAGNOSIS: has Peripheral neuropathy ([facility] [address] CAD (coronary artery disease); Essential hypertension; Rheumatoid arthritis ([address] Hyperlipidemia; Osteoarthritis, knee; Carotid stenosis, left; IFG (impaired fasting glucose); SCC (squamous cell carcinoma), leg; Memory loss; Diabetes mellitus with neuropathy ([facility] [address] Cerebrovascular accident (CVA) due to thrombosis of cerebral artery ([facility] [address] Coronary artery disease involving native coronary artery of native heart without angina pectoris; Dysarthria due to recent cerebral infarction; Oropharyngeal dysphagia; Gait disturbance; Thyroid nodule; Type 2 diabetes mellitus with diabetic neuropathy, without long-term current use of insulin ([facility] [address] Diabetic nephropathy associated with type 2 diabetes mellitus ([facility] [address] DDD (degenerative disc disease), cervical; DDD (degenerative disc disease), lumbar; S/P colonoscopic polypectomy; COVID-19 virus infection; and Headache on their problem list.      Impressions: Colletta Bevels was seen for a bedside swallowing evaluation after being admitted to [facility] with a possible CVA. Pt was alert, cooperative and oriented throughout assessment. Relevant medical hx includes GERD, CAD, COVID-19, hypertension, GERD. Pt denies any hx of dysphagia PTA. Pt was positioned upright in bed and presented with a clear vocal quality and strong volitional cough. Oral mechanism examination WFL. Oropharyngeal swallow is intact for trials of regular solids and thin liquids. Swallow initiation appears timely with adequate laryngeal elevation and 0 overt s/s aspiration are observed across all textures. Receptive/expressive language, motor speech and cognition screened informally throughout evaluation and judged to be [facility].         Recommend regular diet/thin liquids with aspiration precautions. No further acute SLP needs were identified at this time. ONSET DATE: this admission     Date of Eval: 8/5/2022  Evaluating Therapist: CATHY Leyva    Current Diet level:  Current Diet : Regular      Primary Complaint       Pain:  Pain Assessment  Pain Assessment: 0-10  Pain Level: 3  Patient's Stated Pain Goal: 0 - No pain  Pain Location: Head  Pain Orientation: Anterior  Pain Descriptors: Aching  Functional Pain Assessment: Activities are not prevented  Pain Type: Acute pain  Pain Frequency: Intermittent    Reason for Referral  Solomon Ayala was referred for a bedside swallow evaluation to assess the efficiency of her swallow function, identify signs and symptoms of aspiration and make recommendations regarding safe dietary consistencies, effective compensatory strategies, and safe eating environment. Impression  Dysphagia Diagnosis: Swallow function appears WFL;No clinical indicators of dysphagia  Dysphagia Outcome Severity Scale: Level 6: Within functional limits/Modified independence     Treatment Plan  Requires SLP Intervention: No  Duration of Treatment: n/a  D/C Recommendations: To be determined       Recommended Diet and Intervention                   Compensatory Swallowing Strategies  Compensatory Swallowing Strategies : Upright as possible for all oral intake    Treatment/Goals  Short-term Goals  Timeframe for Short-term Goals: n/a    General  Chart Reviewed: Yes  Behavior/Cognition: Alert; Cooperative;Pleasant mood  O2 Device: None (Room air)  Communication Observation: Functional  Follows Directions: Complex  Dentition: Adequate  Patient Positioning: Upright in bed  Baseline Vocal Quality: Normal  Volitional Cough: Strong  Prior Dysphagia History: Pt denies  Consistencies Administered: Regular; Thin - straw; Thin - cup; Thin - teaspoon;Minced and Moist           Vision/Hearing  Vision  Vision: Within Functional Limits  Hearing  Hearing: Within functional limits    Oral Motor Deficits  Oral/Motor  Gag: No Impairment    Oral Phase Dysfunction  Oral Phase  Oral Phase: WFL     Indicators of Pharyngeal Phase Dysfunction        Prognosis       Education  Patient Education: recommendations/POC  Patient Education Response: Verbalizes understanding             Therapy Time      [id number]  20 mins        Ly Stokes 87, CCC-SLP, 8/5/2022

## 2022-08-05 NOTE — RESULT ENCOUNTER NOTE
Chol, sugars, labs appear in stable range, DM is controlled- her TRIGLYCERIDES HAVE STAYED BORDERLINE HIGH, SL INCR FROM 251--329 SO DO NEED TO CONT WORK W LOW FAT DIET. notify pt please.

## 2022-08-05 NOTE — PROGRESS NOTES
Patient admitted from [facility] ED  A&Ox4, c/o STEVENS  VSS, Daughter @ bedside w/Patient  Dr. Brock Bryan @ bedside completing assessment.   Will con't to monitor

## 2022-08-05 NOTE — PLAN OF CARE
Problem: Discharge Planning  Goal: Discharge to home or other facility with appropriate resources  8/5/2022 1437 by David Galvez RN  Outcome: Completed     Problem: Pain  Goal: Verbalizes/displays adequate comfort level or baseline comfort level  8/5/2022 1437 by David Galvez RN  Outcome: Completed     Problem: Chronic Conditions and Co-morbidities  Goal: Patient's chronic conditions and co-morbidity symptoms are monitored and maintained or improved  8/5/2022 1437 by David Galvez RN  Outcome: Completed     Problem: ABCDS Injury Assessment  Goal: Absence of physical injury  8/5/2022 1437 by David Galvez RN  Outcome: Completed

## 2022-08-08 ENCOUNTER — CARE COORDINATION (OUTPATIENT)
Dept: CASE MANAGEMENT | Age: 82
End: 2022-08-08

## 2022-08-08 ENCOUNTER — TELEPHONE (OUTPATIENT)
Dept: INTERNAL MEDICINE CLINIC | Age: 82
End: 2022-08-08

## 2022-08-08 DIAGNOSIS — G44.89 OTHER HEADACHE SYNDROME: Primary | ICD-10-CM

## 2022-08-08 PROCEDURE — 1111F DSCHRG MED/CURRENT MED MERGE: CPT | Performed by: INTERNAL MEDICINE

## 2022-08-08 NOTE — CARE COORDINATION
provided:  Scheduled appointment with PCP-8/11/22  Obtained and reviewed discharge summary and/or continuity of care documents  Education of patient/family/caregiver/guardian to support self-management-reviewed s/s of stroke /what to watch out for.s/s of stroke  Assessment and support for treatment adherence and medication management-DC meds reviewed. 1111F completed    Care Transitions 24 Hour Call    Schedule Follow Up Appointment with PCP: Completed  Do you have a copy of your discharge instructions?: Yes  Do you have all of your prescriptions and are they filled?: Yes  Have you been contacted by a [address]?: No  Have you scheduled your follow up appointment?: Yes (Comment: 8/11/22 PCP)  How are you going to get to your appointment?: Car - family or friend to transport  Patient DME: Straight cane, [place], Wheelchair  Patient Home Equipment: Other (Comment: 10/15 glucometer)  Do you have support at home?: Alone  Do you feel like you have everything you need to keep you well at home?: Yes  Are you an active caregiver in your home?: No  Care Transitions Interventions    Physical Therapy: Declined       Transportation Support: Declined      DME Assistance: Declined     Senior Services: Declined    Diabetes Education: Completed       Occupational Therapy: Declined     Disease Association: Completed               Follow Up  Future Appointments   Date Time Provider Annmarie Spivey   8/11/2022  9:30 AM Radha Clark MD [facility]   11/17/2022 10:00 AM Delia Ching MD [facility]     Transitions of Care Initial Call    Challenges to be reviewed by the provider   Additional needs identified to be addressed with provider: No  none             Method of communication with provider : none    Advance Care Planning:   Does patient have an Advance Directive: not on file; education provided. Care Transition Nurse contacted the patient by telephone to perform post hospital discharge assessment.  Verified

## 2022-08-08 NOTE — TELEPHONE ENCOUNTER
[address] Transitions Initial Follow Up Call    Outreach made within 2 business days of discharge: Yes    Patient: Padilla Guevara Patient : 1940   MRN: [id number]  Reason for Admission: There are no discharge diagnoses documented for the most recent discharge. Discharge Date: 22       Spoke with: Patient    [facility]    TCM Interactive Patient Contact:  Was patient able to fill all prescriptions: Yes  Was patient instructed to bring all medications to the follow-up visit: Yes  Is patient taking all medications as directed in the discharge summary?  Yes  Does patient understand their discharge instructions: Yes  Does patient have questions or concerns that need addressed prior to 7-14 day follow up office visit: no    Scheduled appointment with PCP within 7-14 days    Follow Up  Future Appointments   Date Time Provider Annmarie Spivey   2022  9:30 AM Guzman Elaine MD [address] [facility] [place] VERA [facility]   2022 10:00 AM Richi Dudley MD [facility]       Rajwinder Wing MA

## 2022-08-11 ENCOUNTER — OFFICE VISIT (OUTPATIENT)
Dept: INTERNAL MEDICINE CLINIC | Age: 82
End: 2022-08-11
Payer: MEDICARE

## 2022-08-11 VITALS
DIASTOLIC BLOOD PRESSURE: 70 MMHG | OXYGEN SATURATION: 97 % | HEART RATE: 59 BPM | RESPIRATION RATE: 16 BRPM | BODY MASS INDEX: 32.42 KG/M2 | SYSTOLIC BLOOD PRESSURE: 144 MMHG | WEIGHT: 166 LBS

## 2022-08-11 DIAGNOSIS — G44.89 OTHER HEADACHE SYNDROME: Primary | ICD-10-CM

## 2022-08-11 DIAGNOSIS — Z09 HOSPITAL DISCHARGE FOLLOW-UP: ICD-10-CM

## 2022-08-11 DIAGNOSIS — E11.40 TYPE 2 DIABETES MELLITUS WITH DIABETIC NEUROPATHY, WITHOUT LONG-TERM CURRENT USE OF INSULIN (HCC): ICD-10-CM

## 2022-08-11 PROCEDURE — 1111F DSCHRG MED/CURRENT MED MERGE: CPT | Performed by: INTERNAL MEDICINE

## 2022-08-11 PROCEDURE — 99496 TRANSJ CARE MGMT HIGH F2F 7D: CPT | Performed by: INTERNAL MEDICINE

## 2022-08-11 RX ORDER — GABAPENTIN 300 MG/1
300 CAPSULE ORAL 2 TIMES DAILY
Qty: 180 CAPSULE | Refills: 1 | Status: SHIPPED | OUTPATIENT
Start: 2022-08-11 | End: 2022-11-09

## 2022-08-11 NOTE — PROGRESS NOTES
Post-Discharge Transitional Care  Follow Up      Cindy Chicas   YOB: 1940    Date of Office Visit:  8/11/2022  Date of Hospital Admission: 8/4/22  Date of Hospital Discharge: 8/5/22  Risk of hospital readmission (high >=14%. Medium >=10%) :No data recorded    Care management risk score Rising risk (score 2-5) and Complex Care (Scores >=6): No Risk Score On File     Non face to face  following discharge, date last encounter closed (first attempt may have been earlier): 08/08/2022    Call initiated 2 business days of discharge: Yes    ASSESSMENT/PLAN:   Other headache syndrome- EXTENSIVE NEURO EVAL BENIGN, DID NOT HAVE ANY LONG TERM IMPROVEMENT W DECADRON AND ALSO MRI/CT IMAGING NEG FOR CVA, BLEED, MASS. WE'LL TRY INCR NEURONTIN FROM 100MG bid TO 200MG BID FOR A WEEK OR TWO, THEN UP TO 300MG BID AS TOLERATED. -     gabapentin (NEURONTIN) 300 MG capsule; Take 1 capsule by mouth in the morning and 1 capsule before bedtime. Do all this for 90 days. , Disp-180 capsule, R-1Normal  Type 2 diabetes mellitus with diabetic neuropathy, without long-term current use of insulin ([address]- RECENT A1C STABLE AND CONT REGIMEN, REFILL METFORMIN  -     metFORMIN (GLUCOPHAGE) 500 MG tablet; Take 0.5 tablets by mouth in the morning., Disp-45 tablet, R-1Normal  Hospital discharge follow-up  -     [place] DISCHARGE MEDS RECONCILED W/ CURRENT OUTPATIENT MED LIST      Medical Decision Making: high complexity  Return in about 3 months (around 11/11/2022) for routine DM. On this date 8/11/2022 I have spent 25 minutes reviewing previous notes, test results and face to face with the patient discussing the diagnosis and importance of compliance with the treatment plan as well as documenting on the day of the visit. Subjective:   HPI:  Follow up of Hospital problems/diagnosis(es): HEADACHE UNSPECIFIED. Inpatient course: Discharge summary reviewed- see chart.   Cindy Chicas is a 79 y.o.  female  who presents with Headache H and P per HPI     Ayanna Chester is a 79 y.o. female with coronary artery disease hypertension, diabetes mellitus type 2, dyslipidemia, who initially presented to [facility] ED with complaints of headache. Patient reported that she had some lab work done in the morning and when she returned home she started having headache. Patient reported having intermittent, headache bilaterally, more on the right side, shooting from right behind her high to the back of her head. Patient reported that she was feeling foggy, denied any visual disturbance, intermittently patient was having numbness on the left upper lip. Denied any tingling, numbness or weakness in her bilateral upper or lower extremities. Patient denied any speech abnormality, swallowing difficulty. Patient reported never having similar symptoms in the past.  Patient reported mild improvement in her headache with Tylenol given in [facility] ED. Patient had CT head done in [place]-prominent bilateral periventricular white matter hypodensities are nonspecific could be related to demyelination, microvascular disease. Patient is transferred to [facility] for MRI and further evaluation. \"     MRI of the brain with no acute process. Neurology consulted. ESR was normal.  Patient was started on dexamethasone. Headache improved. She was discharged stable        The patient expressed appropriate understanding of and agreement with the discharge recommendations, medications, and plan. Consults this admission:  IP CONSULT TO NEUROLOGY     Discharge Instruction:   Follow up appointments:  Primary care physician:  within 2 weeks     Diet:  cardiac diet   Activity: activity as tolerated  Disposition: Discharged to:   [x]Home, [][facility], []SNF, []Acute Rehab, []Hospice  Condition on discharge: Stable       Interval history/Current status: presented to [facility] then transferred to [facility].   MRI brain was benign NAD, neuro consulted, ESR nl but then started dexamethasone w some relief of HA. Now off steroid but headache persists. Sx daily. W/u neg for CVA.-   WE'LL TRY NOW INCR NEURONTIN. HTN, bp running ok ~120-140. DM-   Lab Results   Component Value Date    LABA1C 6.2 08/04/2022    LABA1C 6.1 05/04/2022    LABA1C 6.3 02/01/2022     Lab Results   Component Value Date    GLUF 128 (H) 10/14/2016    LABMICR YES 06/14/2022    LDLCALC see below 08/04/2022    CREATININE 0.9 08/05/2022         Patient Active Problem List   Diagnosis    Peripheral neuropathy ([address]    CAD (coronary artery disease)    Essential hypertension    Rheumatoid arthritis ([address]    Hyperlipidemia    Osteoarthritis, knee    Carotid stenosis, left    IFG (impaired fasting glucose)    SCC (squamous cell carcinoma), leg    Memory loss    Diabetes mellitus with neuropathy ([address]    Cerebrovascular accident (CVA) due to thrombosis of cerebral artery ([address]    Coronary artery disease involving native coronary artery of native heart without angina pectoris    Dysarthria due to recent cerebral infarction    Oropharyngeal dysphagia    Gait disturbance    Thyroid nodule    Type 2 diabetes mellitus with diabetic neuropathy, without long-term current use of insulin ([address]    Diabetic nephropathy associated with type 2 diabetes mellitus (HCC)    DDD (degenerative disc disease), cervical    DDD (degenerative disc disease), lumbar    S/P colonoscopic polypectomy    COVID-19 virus infection    Headache       Medications listed as ordered at the time of discharge from hospital     Medication List            Accurate as of August 11, 2022 10:11 AM. If you have any questions, ask your nurse or doctor.                 CONTINUE taking these medications      acetaminophen 325 MG tablet  Commonly known as: TYLENOL  Take 2 tablets by mouth every 4 hours as needed for Pain     amLODIPine 5 MG tablet  Commonly known as: NORVASC  Take 1 tablet by mouth daily     aspirin 81 MG tablet     Biotin 300 MCG Tabs  Take 1 tablet by mouth daily     calcium carbonate-vitamin D 600-200 MG-UNIT Tabs     clopidogrel 75 MG tablet  Commonly known as: PLAVIX  Take 1 tablet by mouth daily     diclofenac sodium 1 % Gel  Commonly known as: VOLTAREN  Apply 2 g topically 4 times daily     Fish Oil Burp-Less 1000 MG Caps     fluticasone 50 MCG/ACT nasal spray  Commonly known as: Flonase  2 sprays by Nasal route daily into each nostril every day     folic acid 562 MCG tablet  Commonly known as: FOLVITE     gabapentin 100 MG capsule  Commonly known as: Neurontin  Take 1 capsule by mouth 2 times daily for 90 days. hydrALAZINE 25 MG tablet  Commonly known as: APRESOLINE  Take 1 tablet by mouth 2 times daily     hydroCHLOROthiazide 25 MG tablet  Commonly known as: HYDRODIURIL  Take 1 tablet by mouth daily     hydrOXYzine HCl 10 MG tablet  Commonly known as: ATARAX     ketoconazole 2 % cream  Commonly known as: NIZORAL  Apply topically daily to toenail area     losartan 100 MG tablet  Commonly known as: COZAAR  TAKE 1 TABLET BY MOUTH BY MOUTH EVERY DAY     metFORMIN 500 MG tablet  Commonly known as: GLUCOPHAGE  Take 0.5 tablets by mouth daily     metoprolol tartrate 50 MG tablet  Commonly known as: LOPRESSOR  Take 1 tablet by mouth 2 times daily     multivitamin per tablet     nitroGLYCERIN 0.4 MG SL tablet  Commonly known as: NITROSTAT  DISSOLVE 1 TABLET UNDER THE TONGUE EVERY 5 MINUTES AS NEEDED FOR CHEST PAIN.  MAX OF 3 DOSES IN 15 MINUTES.     omeprazole 20 MG delayed release capsule  Commonly known as: PRILOSEC     simvastatin 20 MG tablet  Commonly known as: ZOCOR  TAKE 1 TABLET BY MOUTH EVERY NIGHT                Medications marked \"taking\" at this time  Outpatient Medications Marked as Taking for the 8/11/22 encounter (Office Visit) with Cindra Romberg, MD   Medication Sig Dispense Refill    acetaminophen (TYLENOL) 325 MG tablet Take 2 tablets by mouth every 4 hours as needed for Pain 120 tablet 3    omeprazole (PRILOSEC) 20 MG delayed release capsule Take 20 mg by mouth four times a week Sun, dolorese, thvishal, sat      losartan (COZAAR) 100 MG tablet TAKE 1 TABLET BY MOUTH BY MOUTH EVERY DAY 90 tablet 1    simvastatin (ZOCOR) 20 MG tablet TAKE 1 TABLET BY MOUTH EVERY NIGHT 90 tablet 1    ketoconazole (NIZORAL) 2 % cream Apply topically daily to toenail area 30 g 1    hydrOXYzine (ATARAX) 10 MG tablet hydroxyzine HCl 10 mg tablet   TAKE 1 TO 2 TABLETS BY MOUTH EVERY NIGHT 1 HOUR BEFORE BEDTIME FOR ITCHING      hydrALAZINE (APRESOLINE) 25 MG tablet Take 1 tablet by mouth 2 times daily 180 tablet 1    clopidogrel (PLAVIX) 75 MG tablet Take 1 tablet by mouth daily 90 tablet 1    amLODIPine (NORVASC) 5 MG tablet Take 1 tablet by mouth daily 90 tablet 1    metoprolol tartrate (LOPRESSOR) 50 MG tablet Take 1 tablet by mouth 2 times daily 180 tablet 1    hydroCHLOROthiazide (HYDRODIURIL) 25 MG tablet Take 1 tablet by mouth daily 90 tablet 1    nitroGLYCERIN (NITROSTAT) 0.4 MG SL tablet DISSOLVE 1 TABLET UNDER THE TONGUE EVERY 5 MINUTES AS NEEDED FOR CHEST PAIN. MAX OF 3 DOSES IN 15 MINUTES. 25 tablet 0    metFORMIN (GLUCOPHAGE) 500 MG tablet Take 0.5 tablets by mouth daily 90 tablet 1    Biotin 300 MCG TABS Take 1 tablet by mouth daily 30 tablet 3    folic acid (FOLVITE) 563 MCG tablet Take 800 mcg by mouth daily      diclofenac sodium 1 % GEL Apply 2 g topically 4 times daily 100 g 2    fluticasone (FLONASE) 50 MCG/ACT nasal spray 2 sprays by Nasal route daily into each nostril every day 1 Bottle 3    aspirin 81 MG tablet Take 81 mg by mouth 2 times daily       multivitamin (THERAGRAN) per tablet Take 1 tablet by mouth daily. calcium carbonate-vitamin D 600-200 MG-UNIT TABS Take 1 tablet by mouth nightly       Omega-3 Fatty Acids (FISH OIL BURP-LESS) 1000 MG CAPS Take 1 tablet by mouth 2 times daily. Medications patient taking as of now reconciled against medications ordered at time of hospital discharge:  Yes    A comprehensive review of systems was negative except for what was noted in the HPI. Objective:    BP (!) 150/78   Pulse 59   Resp 16   Wt 166 lb (75.3 kg)   LMP  (LMP Unknown)   SpO2 97%   BMI 32.42 kg/m²   General Appearance: alert and oriented to person, place and time, well developed and well- nourished, in no acute distress  Skin: warm and dry, no rash or erythema  Head: normocephalic and atraumatic  Eyes: pupils equal, round, and reactive to light, extraocular eye movements intact, conjunctivae normal  Neck: supple and non-tender without mass, no thyromegaly or thyroid nodules, no cervical lymphadenopathy  Pulmonary/Chest: clear to auscultation bilaterally- no wheezes, rales or rhonchi, normal air movement, no respiratory distress  Cardiovascular: normal rate, regular rhythm, normal S1 and S2, no murmurs, rubs, clicks, or gallops, distal pulses intact, no carotid bruits  Abdomen: soft, non-tender, non-distended, normal bowel sounds, no masses or organomegaly  Extremities: no cyanosis, clubbing or edema  Musculoskeletal: normal range of motion, no joint swelling, deformity or tenderness  Neurologic:   no cranial nerve deficit, gait, coordination and speech normal      An electronic signature was used to authenticate this note.   --Cuong Winkler MD

## 2022-08-16 ENCOUNTER — CARE COORDINATION (OUTPATIENT)
Dept: CASE MANAGEMENT | Age: 82
End: 2022-08-16

## 2022-08-16 NOTE — CARE COORDINATION
[address] Transitions Follow Up Call    2022    Patient: Tiffanie Woodard  Patient : 1940   MRN: <[id number]>  Reason for Admission: 2022 - 2022 [address] [place] [address]. Discharge Date: 22 RARS: No data recorded       PCP  appt Attended. Next appt  11:15. Care Transitions Follow Up Call    Needs to be reviewed by the provider   Additional needs identified to be addressed with provider: No  none             Method of communication with provider : none      Care Transition Nurse contacted the patient by telephone to follow up after admission. Verified name and  with patient as identifiers. Addressed changes since last contact:  Renetta Lassiter reports she has not had a headache since  or 8/10. No headache aura, dizziness, sleepiness, N/V, or fogginess. Reports no weakness concerns. Reports good swallow and appetite is fine. No elimination concerns. Has/taking her meds as directed. Reports she is taking her ASA and Plavix. Had gabapentin changed to 300 mg BID. Reports today  /84, HR 64-71 and . Denies any med refill needs or HHC needs. Reviewed s/s Acute CVA to call 911, v/u.  Discussed follow-up appointments. If no appointment was previously scheduled, appointment scheduling offered: Yes. Is follow up appointment scheduled within 7 days of discharge? Attended PCP appt 22. Advance Care Planning:   Does patient have an Advance Directive: Guzman Drew primary decision maker [phone]     CTN reviewed discharge instructions, medical action plan and red flags with patient and discussed any barriers to care and/or understanding of plan of care after discharge. Discussed appropriate site of care based on symptoms and resources available to patient including: When to call 911. The patient agrees to contact the PCP office for questions related to their healthcare.      Patients top risk factors for readmission:  HA  Interventions to address risk factors:  Reviewed s/s Acute CVA to call 911. Reviewed to report trending  or higher to physician/return to ED. Non-BS follow up appointment(s):     CTN provided contact information for future needs. Plan for follow-up call in 10-14 days based on severity of symptoms and risk factors. Plan for next call:  CT FU, Check HA and home vitals. Care Transitions Subsequent and Final Call    Subsequent and Final Calls  Do you have any ongoing symptoms?: No  Have your medications changed?: Yes  Patient Reports: Change in Gabapentin to 300 mg BID. Do you have any questions related to your medications?: No  Do you currently have any active services?: No  Do you have any needs or concerns that I can assist you with?: No  Identified Barriers: Lack of Education  Care Transitions Interventions    Physical Therapy: Declined       Transportation Support: Declined      DME Assistance: Declined     Senior Services: Declined    Diabetes Education: Completed       Occupational Therapy: Declined     Disease Association: Completed      Other Interventions:              Follow Up  Future Appointments   Date Time Provider Annmarie Spivey   11/11/2022 11:15 AM Gabriela Greene MD [address] [facility] Keron VERA  LEVI   11/17/2022 10:00 AM Kacie Ariza MD [facility] LEVI Walter RN

## 2022-08-24 ENCOUNTER — CARE COORDINATION (OUTPATIENT)
Dept: CASE MANAGEMENT | Age: 82
End: 2022-08-24

## 2022-08-24 NOTE — CARE COORDINATION
Care Transitions Outreach Attempt    Subsequent Call    22    Patient: Christo Greer Patient : 1940 MRN: [id number]    1st attempt to reach for Care Transition follow up call unsuccessful. HIPAA compliant message left requesting call back.       Last Discharge [facility]       Date Complaint Diagnosis Description Type Department Provider    22   Admission (Discharged) Jacob Bunch MD          Discharge Facility: [facility]    Noted following upcoming appointments from discharge chart review:   [facility] follow up appointment(s):   Future Appointments   Date Time Provider Annmarie Spivey   2022 11:15 AM Paulino Figueroa MD [address] [facility] Keron Roberts E S IM MMA   2022 10:00 AM Juarez Lei MD Critical [facility] hospital [facility]     Non-[facility] follow up appointment(s): PALMIRA Jasmine RN [phone]

## 2022-08-26 ENCOUNTER — CARE COORDINATION (OUTPATIENT)
Dept: CASE MANAGEMENT | Age: 82
End: 2022-08-26

## 2022-08-26 ENCOUNTER — TELEPHONE (OUTPATIENT)
Dept: INTERNAL MEDICINE CLINIC | Age: 82
End: 2022-08-26

## 2022-08-26 DIAGNOSIS — B37.31 CANDIDA VAGINITIS: Primary | ICD-10-CM

## 2022-08-26 RX ORDER — FLUCONAZOLE 100 MG/1
100 TABLET ORAL DAILY
Qty: 3 TABLET | Refills: 0 | Status: SHIPPED | OUTPATIENT
Start: 2022-08-26 | End: 2022-08-29

## 2022-08-26 NOTE — TELEPHONE ENCOUNTER
Patient is requesting something be called in for a yeast infection. She said she is experiencing a lot of itching and dryness in her vaginal area. This has been going on for 3 days. Please advise.

## 2022-08-26 NOTE — CARE COORDINATION
[address] Transitions Follow Up Call    2022    Patient: James Uribe  Patient : 1940   MRN: <[id number]>  Reason for Admission: Headaches, r/o CVA  Discharge Date: 22 RARS: No data recorded    Issac Palomino states she is doing just fine. Denies headaches, pain, dizziness, confusion, vision changes, fatigue, weakness. States she feels back to normal. Appetite good. No elimination concerns. She is taking he medications as prescribed. Discussed upcoming appointments for PCP, cardiology and ophthalmology in November. States she will be shopping today for her daughter's 59th birthday. No questions or concerns. Spoke with: Issac Clarke/Patient    Care Transitions Subsequent and Final Call    Subsequent and Final Calls  Care Transitions Interventions  Other Interventions: Follow Up  Future Appointments   Date Time Provider Annmarie Spivey   2022 11:15 AM Joyce Giang MD [facility]   2022 10:00 AM Alba Downs MD [facility]   Care Transitions Follow Up Call    Needs to be reviewed by the provider   Additional needs identified to be addressed with provider: No  none             Method of communication with provider : none      Care Transition Nurse contacted the patient by telephone to follow up after admission on 22. Verified name and  with patient as identifiers. Addressed changes since last contact: none  Discussed follow-up appointments. If no appointment was previously scheduled, appointment scheduling offered: Yes. Is follow up appointment scheduled within 7 days of discharge? Yes. Advance Care Planning:   Does patient have an Advance Directive: not on file. CTN reviewed discharge instructions, medical action plan and red flags with patient and discussed any barriers to care and/or understanding of plan of care after discharge.  Discussed appropriate site of care based on symptoms and resources available to patient including: PCP  Specialist  When to call 911. The patient agrees to contact the PCP office for questions related to their healthcare. Patients top risk factors for readmission: medical condition-Headaches  Interventions to address risk factors: Take medications as prescribed. Keep scheduled appointments and reports new or worsening symptoms to appropriate physician      Non-[facility] follow up appointment(s): N/A    CTN provided contact information for future needs. Plan for follow-up call in 5-7 days based on severity of symptoms and risk factors.   Plan for next call: symptom management-Headaches,r/o CVA   New or worsening symptoms         Ivanna Martinez RN

## 2022-09-02 ENCOUNTER — CARE COORDINATION (OUTPATIENT)
Dept: CASE MANAGEMENT | Age: 82
End: 2022-09-02

## 2022-09-06 NOTE — CARE COORDINATION
Care Transitions Outreach Attempt    2022     Subsequent Call    2nd unsuccessful attempt to reach for Care Transition follow up call. HIPAA compliant message left requesting call back. Episode closed per protocol, no further outreach N  Unable to reach letter sent out.      Patient: Guille Blackwood Patient : 1940 MRN: [id number]    Last Discharge [facility]       Date Complaint Diagnosis Description Type Department Provider    22   Admission (Discharged) Todd Menjivar MD          Noted following upcoming appointments from discharge chart review:   [facility] follow up appointment(s):   Future Appointments   Date Time Provider Annmarie Spivey   2022 11:15 AM Norberto Magallanes MD [facility] E S IM MMA   2022 10:00 AM Andrea Darling MD [facility]     Non-[facility] follow up appointment(s): PALMIRA Zambrano RN [phone]

## 2022-09-21 ENCOUNTER — CARE COORDINATION (OUTPATIENT)
Dept: CARE COORDINATION | Age: 82
End: 2022-09-21

## 2022-09-21 SDOH — ECONOMIC STABILITY: FOOD INSECURITY: WITHIN THE PAST 12 MONTHS, YOU WORRIED THAT YOUR FOOD WOULD RUN OUT BEFORE YOU GOT MONEY TO BUY MORE.: SOMETIMES TRUE

## 2022-09-21 SDOH — ECONOMIC STABILITY: HOUSING INSECURITY: IN THE LAST 12 MONTHS, HOW MANY PLACES HAVE YOU LIVED?: 1

## 2022-09-21 SDOH — ECONOMIC STABILITY: TRANSPORTATION INSECURITY
IN THE PAST 12 MONTHS, HAS LACK OF TRANSPORTATION KEPT YOU FROM MEETINGS, WORK, OR FROM GETTING THINGS NEEDED FOR DAILY LIVING?: NO

## 2022-09-21 SDOH — ECONOMIC STABILITY: INCOME INSECURITY: IN THE LAST 12 MONTHS, WAS THERE A TIME WHEN YOU WERE NOT ABLE TO PAY THE MORTGAGE OR RENT ON TIME?: NO

## 2022-09-21 SDOH — HEALTH STABILITY: MENTAL HEALTH
STRESS IS WHEN SOMEONE FEELS TENSE, NERVOUS, ANXIOUS, OR CAN'T SLEEP AT NIGHT BECAUSE THEIR MIND IS TROUBLED. HOW STRESSED ARE YOU?: ONLY A LITTLE

## 2022-09-21 SDOH — ECONOMIC STABILITY: HOUSING INSECURITY
IN THE LAST 12 MONTHS, WAS THERE A TIME WHEN YOU DID NOT HAVE A STEADY PLACE TO SLEEP OR SLEPT IN A SHELTER (INCLUDING NOW)?: NO

## 2022-09-21 SDOH — SOCIAL STABILITY: SOCIAL NETWORK: ARE YOU MARRIED, WIDOWED, DIVORCED, SEPARATED, NEVER MARRIED, OR LIVING WITH A PARTNER?: DIVORCED

## 2022-09-21 SDOH — ECONOMIC STABILITY: TRANSPORTATION INSECURITY
IN THE PAST 12 MONTHS, HAS THE LACK OF TRANSPORTATION KEPT YOU FROM MEDICAL APPOINTMENTS OR FROM GETTING MEDICATIONS?: NO

## 2022-09-21 SDOH — ECONOMIC STABILITY: INCOME INSECURITY: HOW HARD IS IT FOR YOU TO PAY FOR THE VERY BASICS LIKE FOOD, HOUSING, MEDICAL CARE, AND HEATING?: NOT VERY HARD

## 2022-09-21 SDOH — ECONOMIC STABILITY: FOOD INSECURITY: WITHIN THE PAST 12 MONTHS, THE FOOD YOU BOUGHT JUST DIDN'T LAST AND YOU DIDN'T HAVE MONEY TO GET MORE.: SOMETIMES TRUE

## 2022-09-21 NOTE — CARE COORDINATION
Ambulatory Care Coordination Note  9/21/2022    ACC: James Saunders RN    Summary Note:   Spoke with patient for ACM follow up:  [facility]OP[facility] eligible for enrollment. Oriented to the role of ACM. Patient consents to ongoing follow up. Patient reports that she is feeling pretty well. Denies HA, dizziness. Symptoms are at baseline. Medications:  Medication reconciliation completed. Patient reports that she uses a medi-minder to manage her own medications. Reports that her insurance recently changed and she is struggling with the cost of her co-pays. Instructed on the Med Assist program and patient consents to referral.    DM:  Most recent A1C 8/4/22 : 6.2  POCT Glucose 116. Instructed on the importance of compliance with medications as directed. Encouraged routine BS monitoring. Reviewed DM zone tool briefly; copy sent via My Chart for reference. Discussed support/ resource needs:  Patient is able to drive. Patient reports that she is struggling with the cost of groceries and sometimes is worried she will run out. Patient 's grandson lives with her and does provide support as needed; however; he was recently in an accident and is in the process of recovering. Further assessment deferred per patient request as she is on the way to the hospital to visit her grandson. ACM contact information provided should needs arise. Plan:  ACM enrollment to be completed next outreach. SDOH, PCAM    Referral made to [place]/ Med Assist.  Confirmed plan for follow up. Referral made to [place] with request that information be mailed to patient.      Ambulatory Care Coordination Assessment    Care Coordination Protocol  Referral from Primary Care Provider: No  Week 1 - Initial Assessment     Do you have all of your prescriptions and are they filled?: Yes  Are you able to afford your medications?: With Assistance  Medication Assistance Program: [facility] Meds/Vouchers  How often do you have trouble taking your medications the way you have been told to take them?: I always take them as prescribed. Do you have Home O2 Therapy?: No      Ability to seek help/take action for Emergent Urgent situations i.e. fire, crime, inclement weather or health crisis. : Independent  Ability to ambulate to restroom: Independent  Ability handle personal hygeine needs (bathing/dressing/grooming): Independent  Ability to manage Medications: Independent  Ability to prepare Food Preparation: Independent  Ability to maintain home (clean home, laundry): Needs Assistance  Ability to drive and/or has transportation: Needs Assistance  Ability to do shopping: Needs Assistance  Ability to manage finances: Needs Assistance  Is patient able to live independently?: Yes     Current Housing: Private Residence        Per the Fall Risk Screening, did the patient have 2 or more falls or 1 fall with injury in the past year?: No     Frequent urination at night?: Yes  Do you use rails/bars?: No  Do you have a non-slip tub mat?: Yes     Are you experiencing loss of meaning?: No  Are you experiencing loss of hope and peace?: No     Thinking about your patient's physical health needs, are there any symptoms or problems (risk indicators) you are unsure about that require further investigation?: No identified areas of uncertainly or problems already being investigated   Are the patients physical health problems impacting on their mental well-being?: No identified areas of concern   Are there any problems with your patients lifestyle behaviors (alcohol, drugs, diet, exercise) that are impacting on physical or mental well-being?: No identified areas of concern   Do you have any other concerns about your patients mental well-being?  How would you rate their severity and impact on the patient?: No identified areas of concern   How would you rate their home environment in terms of safety and stability (including domestic violence, insecure housing, neighbor harassment)?: Consistently safe, supportive, stable, no identified problems   How do daily activities impact on the patient's well-being? (include current or anticipated unemployment, work, caregiving, access to transportation or other): No identified problems or perceived positive benefits   How would you rate their social network (family, work, friends)?: Adequate participation with social networks   How would you rate their financial resources (including ability to afford all required medical care)?: Financially secure, some resource challenges   How wells does the patient now understand their health and well-being (symptoms, signs or risk factors) and what they need to do to manage their health?: Reasonable to good understanding and already engages in managing health or is willing to undertake better management   How well do you think your patient can engage in healthcare discussions? (Barriers include language, deafness, aphasia, alcohol or drug problems, learning difficulties, concentration): Clear and open communication, no identified barriers   Do other services need to be involved to help this patient?: Other care/services not required at this time   Are current services involved with this patient well-coordinated? (Include coordination with other services you are now recommendation): All required care/services in place and well-coordinated   Suggested Interventions and Community Resources   Medication Assistance Program: In Process   Medi Set or Pill Pack: Declined   Social Work: In Process   Zone Management Tools: In Process         Set up/Review an Education Plan, Set up/Review Goals                Prior to Admission medications    Medication Sig Start Date End Date Taking? Authorizing Provider   metFORMIN (GLUCOPHAGE) 500 MG tablet Take 0.5 tablets by mouth in the morning.  8/11/22 11/9/22 Yes Jasper Hickman MD   gabapentin (NEURONTIN) 300 MG capsule Take 1 capsule by mouth in the morning and 1 capsule before bedtime. Do all this for 90 days. 8/11/22 11/9/22 Yes Riya Skinner MD   acetaminophen (TYLENOL) 325 MG tablet Take 2 tablets by mouth every 4 hours as needed for Pain 8/5/22  Yes Melissa Curran MD   omeprazole (PRILOSEC) 20 MG delayed release capsule Take 20 mg by mouth four times a week Sun, dolorese, thur, sat   Yes Historical Provider, MD   losartan (COZAAR) 100 MG tablet TAKE 1 TABLET BY MOUTH BY MOUTH EVERY DAY 6/27/22  Yes Ryia Skinner MD   simvastatin (ZOCOR) 20 MG tablet TAKE 1 TABLET BY MOUTH EVERY NIGHT 6/27/22  Yes Riya Skinner MD   ketoconazole (NIZORAL) 2 % cream Apply topically daily to toenail area 5/11/22  Yes Riya Skinner MD   hydrOXYzine (ATARAX) 10 MG tablet hydroxyzine HCl 10 mg tablet   TAKE 1 TO 2 TABLETS BY MOUTH EVERY NIGHT 1 HOUR BEFORE BEDTIME FOR ITCHING   Yes Historical Provider, MD   hydrALAZINE (APRESOLINE) 25 MG tablet Take 1 tablet by mouth 2 times daily 2/7/22  Yes Riya Skinner MD   clopidogrel (PLAVIX) 75 MG tablet Take 1 tablet by mouth daily 2/7/22  Yes Riya Skinner MD   amLODIPine (NORVASC) 5 MG tablet Take 1 tablet by mouth daily 2/7/22  Yes Riya Skinner MD   metoprolol tartrate (LOPRESSOR) 50 MG tablet Take 1 tablet by mouth 2 times daily 2/7/22  Yes Riya Skinner MD   hydroCHLOROthiazide (HYDRODIURIL) 25 MG tablet Take 1 tablet by mouth daily 2/7/22  Yes Riya Skinner MD   nitroGLYCERIN (NITROSTAT) 0.4 MG SL tablet DISSOLVE 1 TABLET UNDER THE TONGUE EVERY 5 MINUTES AS NEEDED FOR CHEST PAIN.  MAX OF 3 DOSES IN 15 MINUTES. 6/8/21  Yes Riya Skinner MD   Biotin 300 MCG TABS Take 1 tablet by mouth daily 3/10/21  Yes Riya Skinner MD   diclofenac sodium 1 % GEL Apply 2 g topically 4 times daily 12/3/19  Yes Riya Skinner MD   fluticasone [facility]) 50 MCG/ACT nasal spray 2 sprays by Nasal route daily into each nostril every day 5/18/15  Yes Riya Skinner MD   aspirin 81 MG tablet Take 81 mg by mouth 2 times daily    Yes Historical Provider, MD   multivitamin [facility]) per tablet Take 1 tablet by mouth daily. Yes Historical Provider, MD   calcium carbonate-vitamin D 600-200 MG-UNIT TABS Take 1 tablet by mouth nightly    Yes Historical Provider, MD   Omega-3 Fatty Acids (FISH OIL BURP-LESS) 1000 MG CAPS Take 1 tablet by mouth 2 times daily. Yes Historical Provider, MD   folic acid (FOLVITE) 032 MCG tablet Take 800 mcg by mouth daily    Historical Provider, MD       Future Appointments   Date Time Provider Annmarie Spivey   11/11/2022 11:15 AM Tank Shetty MD [address] [facility] [address]  MMA   11/17/2022 10:00 AM Jesenia Simmons MD [facility]     ,   Diabetes Assessment    Medic Alert ID: No  Meal Planning: Avoidance of concentrated sweets, Carb counting   How often do you test your blood sugar?: Daily   Do you have barriers with adherence to non-pharmacologic self-management interventions?  (Nutrition/Exercise/Self-Monitoring): No   Have you ever had to go to the ED for symptoms of low blood sugar?: No            , and   General Assessment    Do you have any symptoms that are causing concern?: No

## 2022-09-22 ENCOUNTER — CARE COORDINATION (OUTPATIENT)
Dept: CARE COORDINATION | Age: 82
End: 2022-09-22

## 2022-09-22 NOTE — CARE COORDINATION
HC reached out to patient in regards to referral from [facility] for possible need for food resources. HC explains role and reason for call and patient is agreeable to assistance. HC advised patient that ACM explained patients current family situation and wont take up too much of her time. Patient is agreeable. Deferred patient assessment to future follow up call. Patient confirms address information for resources to be mailed out to her. Patient is agreeable to a follow up call in 2 weeks. Patient is encouraged to reach out to [facility] with any needs or concerns in the meantime. Provided emotional support. Patient expresses appreciation. Xavier Helton will mail out resources to confirmed patient address. HC will reach out to patient again in 2 weeks to follow up and go over needs and concerns at that time.

## 2022-09-23 DIAGNOSIS — I10 ESSENTIAL HYPERTENSION: ICD-10-CM

## 2022-09-23 DIAGNOSIS — I25.10 CORONARY ARTERY DISEASE INVOLVING NATIVE CORONARY ARTERY OF NATIVE HEART WITHOUT ANGINA PECTORIS: ICD-10-CM

## 2022-09-23 DIAGNOSIS — E11.40 TYPE 2 DIABETES MELLITUS WITH DIABETIC NEUROPATHY, WITHOUT LONG-TERM CURRENT USE OF INSULIN (HCC): ICD-10-CM

## 2022-09-23 RX ORDER — HYDROCHLOROTHIAZIDE 25 MG/1
25 TABLET ORAL DAILY
Qty: 90 TABLET | Refills: 1 | Status: SHIPPED | OUTPATIENT
Start: 2022-09-23

## 2022-09-23 RX ORDER — METOPROLOL TARTRATE 50 MG/1
TABLET, FILM COATED ORAL
Qty: 180 TABLET | Refills: 1 | Status: SHIPPED | OUTPATIENT
Start: 2022-09-23

## 2022-09-23 RX ORDER — GABAPENTIN 100 MG/1
CAPSULE ORAL
Qty: 180 CAPSULE | Refills: 1 | OUTPATIENT
Start: 2022-09-23

## 2022-09-27 ENCOUNTER — CARE COORDINATION (OUTPATIENT)
Dept: CARE COORDINATION | Age: 82
End: 2022-09-27

## 2022-09-27 SDOH — ECONOMIC STABILITY: INCOME INSECURITY: HOW HARD IS IT FOR YOU TO PAY FOR THE VERY BASICS LIKE FOOD, HOUSING, MEDICAL CARE, AND HEATING?: NOT VERY HARD

## 2022-09-27 SDOH — SOCIAL STABILITY: SOCIAL NETWORK: HOW OFTEN DO YOU GET TOGETHER WITH FRIENDS OR RELATIVES?: ONCE A WEEK

## 2022-09-27 SDOH — SOCIAL STABILITY: SOCIAL NETWORK: HOW OFTEN DO YOU ATTENT MEETINGS OF THE CLUB OR ORGANIZATION YOU BELONG TO?: NEVER

## 2022-09-27 SDOH — SOCIAL STABILITY: SOCIAL NETWORK: ARE YOU MARRIED, WIDOWED, DIVORCED, SEPARATED, NEVER MARRIED, OR LIVING WITH A PARTNER?: DIVORCED

## 2022-09-27 SDOH — HEALTH STABILITY: PHYSICAL HEALTH: ON AVERAGE, HOW MANY DAYS PER WEEK DO YOU ENGAGE IN MODERATE TO STRENUOUS EXERCISE (LIKE A BRISK WALK)?: 0 DAYS

## 2022-09-27 SDOH — HEALTH STABILITY: MENTAL HEALTH: HOW OFTEN DO YOU HAVE A DRINK CONTAINING ALCOHOL?: NEVER

## 2022-09-27 SDOH — SOCIAL STABILITY: SOCIAL NETWORK: IN A TYPICAL WEEK, HOW MANY TIMES DO YOU TALK ON THE PHONE WITH FAMILY, FRIENDS, OR NEIGHBORS?: ONCE A WEEK

## 2022-09-27 SDOH — SOCIAL STABILITY: SOCIAL NETWORK
DO YOU BELONG TO ANY CLUBS OR ORGANIZATIONS SUCH AS CHURCH GROUPS UNIONS, FRATERNAL OR ATHLETIC GROUPS, OR SCHOOL GROUPS?: NO

## 2022-09-27 SDOH — ECONOMIC STABILITY: FOOD INSECURITY: WITHIN THE PAST 12 MONTHS, YOU WORRIED THAT YOUR FOOD WOULD RUN OUT BEFORE YOU GOT MONEY TO BUY MORE.: NEVER TRUE

## 2022-09-27 SDOH — HEALTH STABILITY: MENTAL HEALTH: HOW MANY STANDARD DRINKS CONTAINING ALCOHOL DO YOU HAVE ON A TYPICAL DAY?: PATIENT DOES NOT DRINK

## 2022-09-27 SDOH — ECONOMIC STABILITY: HOUSING INSECURITY: IN THE LAST 12 MONTHS, HOW MANY PLACES HAVE YOU LIVED?: 1

## 2022-09-27 SDOH — SOCIAL STABILITY: SOCIAL NETWORK: HOW OFTEN DO YOU ATTEND CHURCH OR RELIGIOUS SERVICES?: NEVER

## 2022-09-27 SDOH — ECONOMIC STABILITY: FOOD INSECURITY: WITHIN THE PAST 12 MONTHS, THE FOOD YOU BOUGHT JUST DIDN'T LAST AND YOU DIDN'T HAVE MONEY TO GET MORE.: NEVER TRUE

## 2022-09-27 SDOH — ECONOMIC STABILITY: INCOME INSECURITY: IN THE LAST 12 MONTHS, WAS THERE A TIME WHEN YOU WERE NOT ABLE TO PAY THE MORTGAGE OR RENT ON TIME?: NO

## 2022-09-27 SDOH — HEALTH STABILITY: PHYSICAL HEALTH: ON AVERAGE, HOW MANY MINUTES DO YOU ENGAGE IN EXERCISE AT THIS LEVEL?: 0 MIN

## 2022-09-27 NOTE — CARE COORDINATION
Attempted to reach patient for ACM follow up. No answer to phone. Message left with ACM contact information and request for call back. Complete enrollment:  SDOH, PCAM    Return call received from patient. Ambulatory Care Coordination Note  9/27/2022    ACC: Darius Paredes, RN    Summary Note:   Spoke with patient to complete ACM enrollment. Patient reports that she is feeling well. DM:  BS running WNL for patient. Instructed on daily monitoring; log for Provider review. Reviewed DM zone management tool and s/s to report to MD.  Copy sent via My Chart for reference. Medications:  Patient confirms that she received application for Med Assist support. Verbalized her plan to review this application and fill out with her daughter when time allows. Discussed care gaps: Instructed on the recommendation of routine Provider follow up. Discussed same day/ tele-health appts should needs arise. Confirmed next patient appt. In November. Patient denies any questions or further needs at this time. ACM contact information provided should questions arise. Plan for next outreach:  DM zone management    Ambulatory Care Coordination Assessment    Care Coordination Protocol  Referral from Primary Care Provider: No  Week 1 - Initial Assessment     Do you have all of your prescriptions and are they filled?: Yes  Are you able to afford your medications?: With Assistance  Medication Assistance Program: [facility] Meds/Vouchers  How often do you have trouble taking your medications the way you have been told to take them?: I always take them as prescribed. Do you have Home O2 Therapy?: No      Ability to seek help/take action for Emergent Urgent situations i.e. fire, crime, inclement weather or health crisis. : Independent  Ability to ambulate to restroom: Independent  Ability handle personal hygeine needs (bathing/dressing/grooming): Independent  Ability to manage Medications:  Independent  Ability to prepare Food Preparation: Independent  Ability to maintain home (clean home, laundry): Needs Assistance  Ability to drive and/or has transportation: Needs Assistance  Ability to do shopping: Needs Assistance  Ability to manage finances: Needs Assistance  Is patient able to live independently?: Yes     Current Housing: Private Residence        Per the Fall Risk Screening, did the patient have 2 or more falls or 1 fall with injury in the past year?: No     Frequent urination at night?: Yes  Do you use rails/bars?: No  Do you have a non-slip tub mat?: Yes     Are you experiencing loss of meaning?: No  Are you experiencing loss of hope and peace?: No     Thinking about your patient's physical health needs, are there any symptoms or problems (risk indicators) you are unsure about that require further investigation?: No identified areas of uncertainly or problems already being investigated   Are the patients physical health problems impacting on their mental well-being?: No identified areas of concern   Are there any problems with your patients lifestyle behaviors (alcohol, drugs, diet, exercise) that are impacting on physical or mental well-being?: No identified areas of concern   Do you have any other concerns about your patients mental well-being?  How would you rate their severity and impact on the patient?: No identified areas of concern   How would you rate their home environment in terms of safety and stability (including domestic violence, insecure housing, neighbor harassment)?: Consistently safe, supportive, stable, no identified problems   How do daily activities impact on the patient's well-being? (include current or anticipated unemployment, work, caregiving, access to transportation or other): No identified problems or perceived positive benefits   How would you rate their social network (family, work, friends)?: Adequate participation with social networks   How would you rate their financial resources (including ability to afford all required medical care)?: Financially secure, some resource challenges   How wells does the patient now understand their health and well-being (symptoms, signs or risk factors) and what they need to do to manage their health?: Reasonable to good understanding and already engages in managing health or is willing to undertake better management   How well do you think your patient can engage in healthcare discussions? (Barriers include language, deafness, aphasia, alcohol or drug problems, learning difficulties, concentration): Clear and open communication, no identified barriers   Do other services need to be involved to help this patient?: Other care/services not required at this time   Are current services involved with this patient well-coordinated? (Include coordination with other services you are now recommendation): All required care/services in place and well-coordinated   Suggested Interventions and Community Resources   Medication Assistance Program: In Process   Medi Set or Pill Pack: Declined   Social Work: In Process   Zone Management Tools: In Process         Set up/Review an Education Plan, Set up/Review Goals                Prior to Admission medications    Medication Sig Start Date End Date Taking? Authorizing Provider   metoprolol tartrate (LOPRESSOR) 50 MG tablet TAKE 1 TABLET BY MOUTH TWICE DAILY 9/23/22   Chioma Hanna MD   hydroCHLOROthiazide (HYDRODIURIL) 25 MG tablet TAKE 1 TABLET BY MOUTH DAILY 9/23/22   Chioma Hanna MD   metFORMIN (GLUCOPHAGE) 500 MG tablet Take 0.5 tablets by mouth in the morning. 8/11/22 11/9/22  Chioma Hanna MD   gabapentin (NEURONTIN) 300 MG capsule Take 1 capsule by mouth in the morning and 1 capsule before bedtime. Do all this for 90 days.  8/11/22 11/9/22  Chioma Hanna MD   acetaminophen (TYLENOL) 325 MG tablet Take 2 tablets by mouth every 4 hours as needed for Pain 8/5/22   Caleb Butler MD   omeprazole (PRILOSEC) 20 MG delayed release capsule Take 20 mg by mouth four times a week Sun, tue, thur, sat    Historical Provider, MD   losartan (COZAAR) 100 MG tablet TAKE 1 TABLET BY MOUTH BY MOUTH EVERY DAY 6/27/22   Diana Nash MD   simvastatin (ZOCOR) 20 MG tablet TAKE 1 TABLET BY MOUTH EVERY NIGHT 6/27/22   Diana Nash MD   ketoconazole (NIZORAL) 2 % cream Apply topically daily to toenail area 5/11/22   Diana Nash MD   hydrOXYzine (ATARAX) 10 MG tablet hydroxyzine HCl 10 mg tablet   TAKE 1 TO 2 TABLETS BY MOUTH EVERY NIGHT 1 HOUR BEFORE BEDTIME FOR ITCHING    Historical Provider, MD   hydrALAZINE (APRESOLINE) 25 MG tablet Take 1 tablet by mouth 2 times daily 2/7/22   Diana Nash MD   clopidogrel (PLAVIX) 75 MG tablet Take 1 tablet by mouth daily 2/7/22   Diana Nash MD   amLODIPine (NORVASC) 5 MG tablet Take 1 tablet by mouth daily 2/7/22   Diana Nash MD   nitroGLYCERIN (NITROSTAT) 0.4 MG SL tablet DISSOLVE 1 TABLET UNDER THE TONGUE EVERY 5 MINUTES AS NEEDED FOR CHEST PAIN. MAX OF 3 DOSES IN 15 MINUTES. 6/8/21   Diana Nash MD   Biotin 300 MCG TABS Take 1 tablet by mouth daily 3/10/21   Diana Nash MD   folic acid (FOLVITE) 800 MCG tablet Take 800 mcg by mouth daily    Historical Provider, MD   diclofenac sodium 1 % GEL Apply 2 g topically 4 times daily 12/3/19   Diana Nash MD   fluticasone [facility]) 50 MCG/ACT nasal spray 2 sprays by Nasal route daily into each nostril every day 5/18/15   Diana Nash MD   aspirin 81 MG tablet Take 81 mg by mouth 2 times daily     Historical Provider, MD   multivitamin [facility]) per tablet Take 1 tablet by mouth daily. Historical Provider, MD   calcium carbonate-vitamin D 600-200 MG-UNIT TABS Take 1 tablet by mouth nightly     Historical Provider, MD   Omega-3 Fatty Acids (FISH OIL BURP-LESS) 1000 MG CAPS Take 1 tablet by mouth 2 times daily.       Historical Provider, MD       Future Appointments Date Time Provider Annmarie Spivey   11/11/2022 11:15 AM Hai Nunes MD [address] [facility] [address] [facility]   11/17/2022 10:00 AM Elena Minor MD [facility]     ,   Diabetes Assessment    Medic Alert ID: No  Meal Planning: Avoidance of concentrated sweets, Carb counting   How often do you test your blood sugar?: Daily   Do you have barriers with adherence to non-pharmacologic self-management interventions?  (Nutrition/Exercise/Self-Monitoring): No   Have you ever had to go to the ED for symptoms of low blood sugar?: No       No patient-reported symptoms        , and   General Assessment    Do you have any symptoms that are causing concern?: No

## 2022-09-28 ENCOUNTER — CARE COORDINATION (OUTPATIENT)
Dept: CARE COORDINATION | Age: 82
End: 2022-09-28

## 2022-10-04 ENCOUNTER — CARE COORDINATION (OUTPATIENT)
Dept: CARE COORDINATION | Age: 82
End: 2022-10-04

## 2022-10-04 NOTE — CARE COORDINATION
Ambulatory Care Coordination Note  10/4/2022    ACC: Lorne Scheuermann, RN      Spoke with patient for ACM follow up. Patient reports that she is feeling well. DM:  Patient reports most recent BS was 110. Patient denies s/s hypo/ hyperglycemia. Instructed on low concentrated sugar/ low carb diet. Instructed on patient on routine BS monitoring; log for Provider review. Reviewed DM zone management tool and s/s to report to MD.     Discussed care gaps. Instructed on the recommendation for routine provider follow up. Confirmed plan for follow up appt. 11/11/22. Discussed support needs. Patient reports that she is well supported by family. Denies support needs at this time. Patient denies any questions, equipment or resource needs. Plan for next outreach: Address care gaps, address support needs      Offered patient enrollment in the Remote Patient Monitoring (RPM) program for in-home monitoring: NA. Lab Results       None            Care Coordination Interventions    Referral from Primary Care Provider: No  Suggested Interventions and Community Resources  Medication Assistance Program: In Process  Medi Set or Pill Pack: Declined  Social Work: In Process  Zone Management Tools: In Process          Goals Addressed                   This Visit's Progress     Community Resource Goal   On track     I will complete referral to community agency Medication Assistance for assistance. Barriers: overwhelmed by complexity of regimen  Plan for overcoming my barriers:  Patient will complete Med Assist application as directed. Confidence: 8/10  Anticipated Goal Completion Date:  12/21/22 9/21/22:  Med Assist referral made. Conditions and Symptoms   On track     I will schedule office visits, as directed by my provider. I will keep my appointment or reschedule if I have to cancel. I will notify my provider of any barriers to my plan of care.   I will follow my Zone Management tool to seek urgent or emergent care. I will notify my provider of any symptoms that indicate a worsening of my condition. Barriers: overwhelmed by complexity of regimen and lack of education  Plan for overcoming my barriers: ACM informed pt to call using the Zone Tool for Diabetes. Pt is also going to stop eating all lunch meat and adding no salt to her meals for one week and check her B/P and see if B/Ps decrease. Confidence: 7/10  Anticipated Goal Completion Date: 12/21/22                Prior to Admission medications    Medication Sig Start Date End Date Taking? Authorizing Provider   metoprolol tartrate (LOPRESSOR) 50 MG tablet TAKE 1 TABLET BY MOUTH TWICE DAILY 9/23/22   Bhavesh Arce MD   hydroCHLOROthiazide (HYDRODIURIL) 25 MG tablet TAKE 1 TABLET BY MOUTH DAILY 9/23/22   Bhavesh Arce MD   metFORMIN (GLUCOPHAGE) 500 MG tablet Take 0.5 tablets by mouth in the morning. 8/11/22 11/9/22  Bhavesh Arce MD   gabapentin (NEURONTIN) 300 MG capsule Take 1 capsule by mouth in the morning and 1 capsule before bedtime. Do all this for 90 days.  8/11/22 11/9/22  Bhavesh Arce MD   acetaminophen (TYLENOL) 325 MG tablet Take 2 tablets by mouth every 4 hours as needed for Pain 8/5/22   Beatrice Tripp MD   omeprazole (PRILOSEC) 20 MG delayed release capsule Take 20 mg by mouth four times a week itzle Gr thur, sat    Historical Provider, MD   losartan (COZAAR) 100 MG tablet TAKE 1 TABLET BY MOUTH BY MOUTH EVERY DAY 6/27/22   Bhavesh Arce MD   simvastatin (ZOCOR) 20 MG tablet TAKE 1 TABLET BY MOUTH EVERY NIGHT 6/27/22   Bhavesh Arce MD   ketoconazole (NIZORAL) 2 % cream Apply topically daily to toenail area 5/11/22   Bhavesh Arce MD   hydrOXYzine (ATARAX) 10 MG tablet hydroxyzine HCl 10 mg tablet   TAKE 1 TO 2 TABLETS BY MOUTH EVERY NIGHT 1 HOUR BEFORE BEDTIME FOR ITCHING    Historical Provider, MD   hydrALAZINE (APRESOLINE) 25 MG tablet Take 1 tablet by mouth 2 times daily 2/7/22 Bhavesh Arce MD   clopidogrel (PLAVIX) 75 MG tablet Take 1 tablet by mouth daily 2/7/22   Bhavesh Arce MD   amLODIPine (NORVASC) 5 MG tablet Take 1 tablet by mouth daily 2/7/22   Bhavesh Arce MD   nitroGLYCERIN (NITROSTAT) 0.4 MG SL tablet DISSOLVE 1 TABLET UNDER THE TONGUE EVERY 5 MINUTES AS NEEDED FOR CHEST PAIN. MAX OF 3 DOSES IN 15 MINUTES. 6/8/21   Bhavesh Arce MD   Biotin 300 MCG TABS Take 1 tablet by mouth daily 3/10/21   Bhavesh Arce MD   folic acid (FOLVITE) 756 MCG tablet Take 800 mcg by mouth daily    Historical Provider, MD   diclofenac sodium 1 % GEL Apply 2 g topically 4 times daily 12/3/19   Bhavesh Arce MD   fluticasone [facility]) 50 MCG/ACT nasal spray 2 sprays by Nasal route daily into each nostril every day 5/18/15   Bhavesh Arce MD   aspirin 81 MG tablet Take 81 mg by mouth 2 times daily     Historical Provider, MD   multivitamin [facility]) per tablet Take 1 tablet by mouth daily. Historical Provider, MD   calcium carbonate-vitamin D 600-200 MG-UNIT TABS Take 1 tablet by mouth nightly     Historical Provider, MD   Omega-3 Fatty Acids (FISH OIL BURP-LESS) 1000 MG CAPS Take 1 tablet by mouth 2 times daily. Historical Provider, MD       Future Appointments   Date Time Provider Annmarie Spivey   11/11/2022 11:15 AM Bhavesh Arce MD [facility]   11/17/2022 10:00 AM Jacques Corbin MD [facility]   ,   Diabetes Assessment    Medic Alert ID: No  Meal Planning: Avoidance of concentrated sweets, Carb counting   How often do you test your blood sugar?: Daily   Do you have barriers with adherence to non-pharmacologic self-management interventions?  (Nutrition/Exercise/Self-Monitoring): No   Have you ever had to go to the ED for symptoms of low blood sugar?: No            , and   General Assessment

## 2022-10-10 ENCOUNTER — CARE COORDINATION (OUTPATIENT)
Dept: CARE COORDINATION | Age: 82
End: 2022-10-10

## 2022-10-10 NOTE — CARE COORDINATION
HC attempted to contact patient to follow up in regards to social needs. Patient is unavailable at this time. Left HIPAA compliant message with [facility]. contact information, reason for call and request for call back. Will expect a return call. If no call back is received, HC will attempt to reach out again in a few days.

## 2022-10-14 ENCOUNTER — CARE COORDINATION (OUTPATIENT)
Dept: CARE COORDINATION | Age: 82
End: 2022-10-14

## 2022-10-14 NOTE — CARE COORDINATION
HC contacted patient to follow up in regards to social needs and resources mailed out to her. Patient confirms receiving resources in the mail and states that she will be utilizing them in the future. Patient reports that she is doing well and in the process of packing to move in with her daughter. Patient expresses appreciation and denies any other needs or concerns at this time. HC will follow up with patient again in 2 weeks.

## 2022-10-18 ENCOUNTER — CARE COORDINATION (OUTPATIENT)
Dept: CARE COORDINATION | Age: 82
End: 2022-10-18

## 2022-10-18 NOTE — CARE COORDINATION
Ambulatory Care Coordination Note  10/18/2022    ACC: Lorne Scheuermann, RN      Spoke with patient for ACM follow up. DM:  BS running WNL for patient. Denies s/s hypo/hyperglycemia. Instructed on DM zone tool and s/s to report to MD.    Patient reports that she is packing and purging items to move to her daughter's home. Instructed patient to pace activity to avoid overexertion. Patient reports that she is taking her time and does not have a set date by which she expects to be moved. Discussed care gaps. Instructed on the recommendation for routine Provider follow up. Confirmed previously scheduled appts with PCP/ Cardiology. Offered patient enrollment in the Remote Patient Monitoring (RPM) program for in-home monitoring: NA.    Patient denies any questions or concerns at this time. ACM contact information provided should questions arise. Plan for next outreach: Address care gaps, ACP support    Lab Results       None            Care Coordination Interventions    Referral from Primary Care Provider: No  Suggested Interventions and Community Resources  Medication Assistance Program: In Process  Medi Set or Pill Pack: Declined  Social Work: In Process  Zone Management Tools: In Process          Goals Addressed                   This Visit's Progress     Community Resource Goal   No change     I will complete referral to community agency Medication Assistance for assistance. Barriers: overwhelmed by complexity of regimen  Plan for overcoming my barriers:  Patient will complete Med Assist application as directed. Confidence: 8/10  Anticipated Goal Completion Date:  12/21/22 9/21/22:  Med Assist referral made. Conditions and Symptoms   No change     I will schedule office visits, as directed by my provider. I will keep my appointment or reschedule if I have to cancel. I will notify my provider of any barriers to my plan of care.   I will follow my Zone Management tool to seek urgent or emergent care. I will notify my provider of any symptoms that indicate a worsening of my condition. Barriers: overwhelmed by complexity of regimen and lack of education  Plan for overcoming my barriers: ACM informed pt to call using the Zone Tool for Diabetes. Pt is also going to stop eating all lunch meat and adding no salt to her meals for one week and check her B/P and see if B/Ps decrease. Confidence: 7/10  Anticipated Goal Completion Date: 12/21/22                Prior to Admission medications    Medication Sig Start Date End Date Taking? Authorizing Provider   metoprolol tartrate (LOPRESSOR) 50 MG tablet TAKE 1 TABLET BY MOUTH TWICE DAILY 9/23/22   Tavon Jacobo MD   hydroCHLOROthiazide (HYDRODIURIL) 25 MG tablet TAKE 1 TABLET BY MOUTH DAILY 9/23/22   Tavon Jacobo MD   metFORMIN (GLUCOPHAGE) 500 MG tablet Take 0.5 tablets by mouth in the morning. 8/11/22 11/9/22  Tavon Jacobo MD   gabapentin (NEURONTIN) 300 MG capsule Take 1 capsule by mouth in the morning and 1 capsule before bedtime. Do all this for 90 days.  8/11/22 11/9/22  Tavon Jacobo MD   acetaminophen (TYLENOL) 325 MG tablet Take 2 tablets by mouth every 4 hours as needed for Pain 8/5/22   Jose Richmond MD   omeprazole (PRILOSEC) 20 MG delayed release capsule Take 20 mg by mouth four times a week itzel Gr thur, sat    Historical Provider, MD   losartan (COZAAR) 100 MG tablet TAKE 1 TABLET BY MOUTH BY MOUTH EVERY DAY 6/27/22   Tavon Jacobo MD   simvastatin (ZOCOR) 20 MG tablet TAKE 1 TABLET BY MOUTH EVERY NIGHT 6/27/22   Tavon Jacobo MD   ketoconazole (NIZORAL) 2 % cream Apply topically daily to toenail area 5/11/22   Tavon Jacobo MD   hydrOXYzine (ATARAX) 10 MG tablet hydroxyzine HCl 10 mg tablet   TAKE 1 TO 2 TABLETS BY MOUTH EVERY NIGHT 1 HOUR BEFORE BEDTIME FOR ITCHING    Historical Provider, MD   hydrALAZINE (APRESOLINE) 25 MG tablet Take 1 tablet by mouth 2 times daily 2/7/22 Bhavesh Arce MD   clopidogrel (PLAVIX) 75 MG tablet Take 1 tablet by mouth daily 2/7/22   Bhavesh Arce MD   amLODIPine (NORVASC) 5 MG tablet Take 1 tablet by mouth daily 2/7/22   Bhavesh Arce MD   nitroGLYCERIN (NITROSTAT) 0.4 MG SL tablet DISSOLVE 1 TABLET UNDER THE TONGUE EVERY 5 MINUTES AS NEEDED FOR CHEST PAIN. MAX OF 3 DOSES IN 15 MINUTES. 6/8/21   Bhavesh Arce MD   Biotin 300 MCG TABS Take 1 tablet by mouth daily 3/10/21   Bhavesh Arce MD   folic acid (FOLVITE) 191 MCG tablet Take 800 mcg by mouth daily    Historical Provider, MD   diclofenac sodium 1 % GEL Apply 2 g topically 4 times daily 12/3/19   Bhavesh Arce MD   fluticasone [facility]) 50 MCG/ACT nasal spray 2 sprays by Nasal route daily into each nostril every day 5/18/15   Bhavesh Arce MD   aspirin 81 MG tablet Take 81 mg by mouth 2 times daily     Historical Provider, MD   multivitamin [facility]) per tablet Take 1 tablet by mouth daily. Historical Provider, MD   calcium carbonate-vitamin D 600-200 MG-UNIT TABS Take 1 tablet by mouth nightly     Historical Provider, MD   Omega-3 Fatty Acids (FISH OIL BURP-LESS) 1000 MG CAPS Take 1 tablet by mouth 2 times daily. Historical Provider, MD       Future Appointments   Date Time Provider Annmarie Spivey   11/9/2022 10:40 AM JAYME Hernandez - CNP [facility]   11/11/2022 11:15 AM [facility] Arce [facility]   ,   Diabetes Assessment    Medic Alert ID: No  Meal Planning: Avoidance of concentrated sweets, Carb counting   How often do you test your blood sugar?: Daily   Do you have barriers with adherence to non-pharmacologic self-management interventions?  (Nutrition/Exercise/Self-Monitoring): No   Have you ever had to go to the ED for symptoms of low blood sugar?: No            , and   General Assessment    Do you have any symptoms that are causing concern?: No

## 2022-10-28 ENCOUNTER — CARE COORDINATION (OUTPATIENT)
Dept: CARE COORDINATION | Age: 82
End: 2022-10-28

## 2022-10-28 NOTE — CARE COORDINATION
HC contacted patient to follow up in regards to any possible social needs. Patient states that she is doing very well and she is still in the process of moving in with her daughter. Patient  expresses appreciation for assistance and follow up calls. Patient denies any further needs or concerns at this time. Patient confirms having HC contact information. Patient is encouraged to reach out should any future needs or concerns arise. HC will sign off of patients care team at this time.

## 2022-11-03 ENCOUNTER — CARE COORDINATION (OUTPATIENT)
Dept: CARE COORDINATION | Age: 82
End: 2022-11-03

## 2022-11-03 NOTE — CARE COORDINATION
Attempted to reach patient for continued Care Coordination follow up and education. Patient was unavailable at the time of my call, and a generic voicemail message was left asking patient to return my call at [phone].

## 2022-11-09 ENCOUNTER — CARE COORDINATION (OUTPATIENT)
Dept: CARE COORDINATION | Age: 82
End: 2022-11-09

## 2022-11-09 ENCOUNTER — OFFICE VISIT (OUTPATIENT)
Dept: CARDIOLOGY CLINIC | Age: 82
End: 2022-11-09
Payer: MEDICARE

## 2022-11-09 VITALS
HEIGHT: 60 IN | DIASTOLIC BLOOD PRESSURE: 60 MMHG | SYSTOLIC BLOOD PRESSURE: 136 MMHG | BODY MASS INDEX: 32.83 KG/M2 | HEART RATE: 60 BPM | WEIGHT: 167.2 LBS

## 2022-11-09 DIAGNOSIS — I25.10 CORONARY ARTERY DISEASE INVOLVING NATIVE CORONARY ARTERY OF NATIVE HEART WITHOUT ANGINA PECTORIS: Primary | ICD-10-CM

## 2022-11-09 DIAGNOSIS — I10 ESSENTIAL HYPERTENSION: ICD-10-CM

## 2022-11-09 DIAGNOSIS — E78.2 MIXED HYPERLIPIDEMIA: ICD-10-CM

## 2022-11-09 DIAGNOSIS — I65.22 STENOSIS OF LEFT CAROTID ARTERY: ICD-10-CM

## 2022-11-09 PROCEDURE — 3078F DIAST BP <80 MM HG: CPT | Performed by: NURSE PRACTITIONER

## 2022-11-09 PROCEDURE — G8417 CALC BMI ABV UP PARAM F/U: HCPCS | Performed by: NURSE PRACTITIONER

## 2022-11-09 PROCEDURE — G8484 FLU IMMUNIZE NO ADMIN: HCPCS | Performed by: NURSE PRACTITIONER

## 2022-11-09 PROCEDURE — 3074F SYST BP LT 130 MM HG: CPT | Performed by: NURSE PRACTITIONER

## 2022-11-09 PROCEDURE — G8399 PT W/DXA RESULTS DOCUMENT: HCPCS | Performed by: NURSE PRACTITIONER

## 2022-11-09 PROCEDURE — 99214 OFFICE O/P EST MOD 30 MIN: CPT | Performed by: NURSE PRACTITIONER

## 2022-11-09 PROCEDURE — 1036F TOBACCO NON-USER: CPT | Performed by: NURSE PRACTITIONER

## 2022-11-09 PROCEDURE — G8427 DOCREV CUR MEDS BY ELIG CLIN: HCPCS | Performed by: NURSE PRACTITIONER

## 2022-11-09 PROCEDURE — 1123F ACP DISCUSS/DSCN MKR DOCD: CPT | Performed by: NURSE PRACTITIONER

## 2022-11-09 PROCEDURE — 1090F PRES/ABSN URINE INCON ASSESS: CPT | Performed by: NURSE PRACTITIONER

## 2022-11-09 ASSESSMENT — ENCOUNTER SYMPTOMS
SHORTNESS OF BREATH: 0
ORTHOPNEA: 0

## 2022-11-09 NOTE — CARE COORDINATION
Ambulatory Care Coordination Note  11/9/2022    ACC: Kathryn Robb, RN      Spoke with patient for ACM follow up. Patient reports that she is feeling about the same. Denies SOB, CP, palpitations, ligh headedness or dizziness. Reports that she continues to have CP on and off; occasional dizzy spells. Instructed on compliance with medications as directed. Reviewed CP s/s t report to MD/91Luis A. Patient confirms Cardiology follow up today. Reports that she is considering having another heart cath. Patient denies any questions and reports that her Provider did an excellent job of explaining treatment options. Instructed on safety / fall prevention measures. Encouraged patient to avoid bending at the waist or sudden changes in position. Encouraged patient to lie flat if dizziness occurs. DM:  Patient reports BS is running WNL. Encouraged routine BS monitoring; log for Provider review. Instructed on the importance of compliance with medications, diet as directed. Reviewed DM zone tool and s/s to report to MD.     Offered patient enrollment in the Remote Patient Monitoring (RPM) program for in-home monitoring: NA.    Patient denies any questions or concerns at this time. ACM contact information provided should needs arise. Plan for next outreach:  Confirm patient plan for Cardiac treatment, address support needs    Lab Results       None            Care Coordination Interventions    Referral from Primary Care Provider: No  Suggested Interventions and Community Resources  Medication Assistance Program: In Process  Medi Set or Pill Pack: Declined  Social Work: In Process  Zone Management Tools: In Process          Goals Addressed    None         Prior to Admission medications    Medication Sig Start Date End Date Taking?  Authorizing Provider   metoprolol tartrate (LOPRESSOR) 50 MG tablet TAKE 1 TABLET BY MOUTH TWICE DAILY 9/23/22   Sarah Crabtree MD   hydroCHLOROthiazide (HYDRODIURIL) 25 MG tablet TAKE 1 TABLET BY MOUTH DAILY 9/23/22   Patricia Quick MD   metFORMIN (GLUCOPHAGE) 500 MG tablet Take 0.5 tablets by mouth in the morning. 8/11/22 11/9/22  Patricia Quick MD   gabapentin (NEURONTIN) 300 MG capsule Take 1 capsule by mouth in the morning and 1 capsule before bedtime. Do all this for 90 days. 8/11/22 11/9/22  Patricia Quick MD   acetaminophen (TYLENOL) 325 MG tablet Take 2 tablets by mouth every 4 hours as needed for Pain 8/5/22   Royer Emerson MD   omeprazole (PRILOSEC) 20 MG delayed release capsule Take 20 mg by mouth four times a week itzel Gr thur, sat    Historical Provider, MD   losartan (COZAAR) 100 MG tablet TAKE 1 TABLET BY MOUTH BY MOUTH EVERY DAY 6/27/22   Patricia Quick MD   simvastatin (ZOCOR) 20 MG tablet TAKE 1 TABLET BY MOUTH EVERY NIGHT 6/27/22   Patricia Quick MD   ketoconazole (NIZORAL) 2 % cream Apply topically daily to toenail area 5/11/22   Patricia Quick MD   hydrOXYzine (ATARAX) 10 MG tablet hydroxyzine HCl 10 mg tablet   TAKE 1 TO 2 TABLETS BY MOUTH EVERY NIGHT 1 HOUR BEFORE BEDTIME FOR ITCHING    Historical Provider, MD   hydrALAZINE (APRESOLINE) 25 MG tablet Take 1 tablet by mouth 2 times daily 2/7/22   Patricia Quick MD   clopidogrel (PLAVIX) 75 MG tablet Take 1 tablet by mouth daily 2/7/22   Patricia Quick MD   amLODIPine (NORVASC) 5 MG tablet Take 1 tablet by mouth daily 2/7/22   Patricia Quick MD   nitroGLYCERIN (NITROSTAT) 0.4 MG SL tablet DISSOLVE 1 TABLET UNDER THE TONGUE EVERY 5 MINUTES AS NEEDED FOR CHEST PAIN.  MAX OF 3 DOSES IN 15 MINUTES. 6/8/21   Patricia Quick MD   Biotin 300 MCG TABS Take 1 tablet by mouth daily 3/10/21   Patricia Quick MD   folic acid (FOLVITE) 417 MCG tablet Take 800 mcg by mouth daily    Historical Provider, MD   diclofenac sodium 1 % GEL Apply 2 g topically 4 times daily 12/3/19   Patricia Quick MD   fluticasone (FLONASE) 50 MCG/ACT nasal spray 2 sprays by Nasal route daily into each nostril every day 5/18/15   Prerna Jean-Baptiste MD   aspirin 81 MG tablet Take 81 mg by mouth 2 times daily     Historical Provider, MD   multivitamin [facility]) per tablet Take 1 tablet by mouth daily. Historical Provider, MD   calcium carbonate-vitamin D 600-200 MG-UNIT TABS Take 1 tablet by mouth nightly     Historical Provider, MD   Omega-3 Fatty Acids (FISH OIL BURP-LESS) 1000 MG CAPS Take 1 tablet by mouth 2 times daily. Historical Provider, MD       Future Appointments   Date Time Provider Annmarie Spivey   11/11/2022 11:15 AM Prerna Jean-Baptiste MD [address] [facility] [place] [facility]   12/7/2022  9:00 AM SCHEDULE, [facility]   5/22/2023  8:50 AM Shen Hogan MD [facility]   ,   Diabetes Assessment    Medic Alert ID: No  Meal Planning: Avoidance of concentrated sweets, Carb counting   How often do you test your blood sugar?: Daily   Do you have barriers with adherence to non-pharmacologic self-management interventions?  (Nutrition/Exercise/Self-Monitoring): No   Have you ever had to go to the ED for symptoms of low blood sugar?: No            , and   General Assessment    Do you have any symptoms that are causing concern?: No

## 2022-11-09 NOTE — PROGRESS NOTES
11/9/2022  Primary cardiologist: Dr. Julian Enamorado:   Bonnie Patel  is an established 79 y.o.  female here for a 9 month follow up on coronary artery disease, carotid artery disease, hypertension      SUBJECTIVE/OBJECTIVE:  Bonnie Patel is a 79 y.o. female with a history of coronary artery disease, carotid artery disease hypertension, hyperlipidemia, non-insulin-dependent diabetes . Guerita has remote history of PCI Reomte h/o PCI  (2005)     HPI :   Bonnie Patel reports she had chest pain off and on. They occur at different times- cna occur at rest, wake her from sleep and with activity - cna last seconds to hours - feels as though she is dizzy with episodes-reports the chest pain has radiated to neck and jaw. She notes shortness of breath with exertion    Review of Systems   Constitutional: Positive for malaise/fatigue. Negative for diaphoresis. Cardiovascular:  Positive for chest pain, dyspnea on exertion and palpitations. Negative for claudication, irregular heartbeat, leg swelling, near-syncope, orthopnea and paroxysmal nocturnal dyspnea. Respiratory:  Negative for shortness of breath. Neurological:  Negative for dizziness and light-headedness. Vitals:    11/09/22 1043   BP: 136/60   Site: Left Upper Arm   Position: Sitting   Cuff Size: Large Adult   Pulse: 60   Weight: 167 lb 3.2 oz (75.8 kg)   Height: 5' (1.524 m)     No flowsheet data found. Wt Readings from Last 3 Encounters:   11/09/22 167 lb 3.2 oz (75.8 kg)   08/11/22 166 lb (75.3 kg)   08/04/22 163 lb 2.3 oz (74 kg)     Body mass index is 32.65 kg/m². Physical Exam  Vitals reviewed. Constitutional:       Appearance: Normal appearance. HENT:      Head: Normocephalic and atraumatic. Eyes:      Extraocular Movements: Extraocular movements intact. Pupils: Pupils are equal, round, and reactive to light. Neck:      Vascular: No carotid bruit. Cardiovascular:      Rate and Rhythm: Normal rate and regular rhythm. Pulses: Normal pulses. Pulmonary:      Effort: Pulmonary effort is normal.      Breath sounds: Normal breath sounds. No rales. Chest:      Chest wall: No tenderness. Abdominal:      General: There is no distension. Palpations: Abdomen is soft. Tenderness: There is no abdominal tenderness. Musculoskeletal:      Cervical back: No tenderness. Right lower leg: No edema. Left lower leg: No edema. Skin:     General: Skin is warm and dry. Capillary Refill: Capillary refill takes less than 2 seconds. Neurological:      General: No focal deficit present. Mental Status: She is alert and oriented to person, place, and time. Psychiatric:         Mood and Affect: Mood normal.         Behavior: Behavior normal.              Current Outpatient Medications   Medication Sig Dispense Refill    metoprolol tartrate (LOPRESSOR) 50 MG tablet TAKE 1 TABLET BY MOUTH TWICE DAILY 180 tablet 1    hydroCHLOROthiazide (HYDRODIURIL) 25 MG tablet TAKE 1 TABLET BY MOUTH DAILY 90 tablet 1    metFORMIN (GLUCOPHAGE) 500 MG tablet Take 0.5 tablets by mouth in the morning. 45 tablet 1    gabapentin (NEURONTIN) 300 MG capsule Take 1 capsule by mouth in the morning and 1 capsule before bedtime. Do all this for 90 days.  180 capsule 1    acetaminophen (TYLENOL) 325 MG tablet Take 2 tablets by mouth every 4 hours as needed for Pain 120 tablet 3    omeprazole (PRILOSEC) 20 MG delayed release capsule Take 20 mg by mouth four times a week Sun, tue, thur, sat      losartan (COZAAR) 100 MG tablet TAKE 1 TABLET BY MOUTH BY MOUTH EVERY DAY 90 tablet 1    simvastatin (ZOCOR) 20 MG tablet TAKE 1 TABLET BY MOUTH EVERY NIGHT 90 tablet 1    ketoconazole (NIZORAL) 2 % cream Apply topically daily to toenail area 30 g 1    hydrOXYzine (ATARAX) 10 MG tablet hydroxyzine HCl 10 mg tablet   TAKE 1 TO 2 TABLETS BY MOUTH EVERY NIGHT 1 HOUR BEFORE BEDTIME FOR ITCHING      hydrALAZINE (APRESOLINE) 25 MG tablet Take 1 tablet by mouth 2 times daily 180 tablet 1 clopidogrel (PLAVIX) 75 MG tablet Take 1 tablet by mouth daily 90 tablet 1    amLODIPine (NORVASC) 5 MG tablet Take 1 tablet by mouth daily 90 tablet 1    nitroGLYCERIN (NITROSTAT) 0.4 MG SL tablet DISSOLVE 1 TABLET UNDER THE TONGUE EVERY 5 MINUTES AS NEEDED FOR CHEST PAIN. MAX OF 3 DOSES IN 15 MINUTES. 25 tablet 0    Biotin 300 MCG TABS Take 1 tablet by mouth daily 30 tablet 3    folic acid (FOLVITE) 377 MCG tablet Take 800 mcg by mouth daily      diclofenac sodium 1 % GEL Apply 2 g topically 4 times daily 100 g 2    fluticasone (FLONASE) 50 MCG/ACT nasal spray 2 sprays by Nasal route daily into each nostril every day 1 Bottle 3    aspirin 81 MG tablet Take 81 mg by mouth 2 times daily       multivitamin (THERAGRAN) per tablet Take 1 tablet by mouth daily. calcium carbonate-vitamin D 600-200 MG-UNIT TABS Take 1 tablet by mouth nightly       Omega-3 Fatty Acids (FISH OIL BURP-LESS) 1000 MG CAPS Take 1 tablet by mouth 2 times daily. No current facility-administered medications for this visit. All pertinent data reviewed and discussed with patient       ASSESSMENT/PLAN:    Coronary artery disease  Having symptoms of angina - cp with radiation to neck and jaw with associated shonrtess of breath: Offered to start antianginals however she declines any additional medications at this time. I also offered her a left heart catheterization his previous stress test are normal however she continues to have ongoing anginal symptoms. She would like to think about it and will phone office back and give update if she decides for catheterization. Continue with aspirin, and Plavix      Carotid artery disease  Known history of left carotid artery stenosis recommend recheck carotid ultrasound for ongoing surveillance.   Continue aspirin and simvastatin    Hypertension  Blood pressure is controlled with amlodipine which will be continued    Hyperlipidemia  On simvastatin-no changes  HDL 32, LDL 84, triglycerides 329  Low carbohydrate diet              Tests ordered: Carotid ultrasound  Follow-up 6 months or sooner if needed    Signed:  JAYME Pond CNP, 11/9/2022, 10:55 AM    An electronic signature was used to authenticate this note. Please note this report has been partially produced using speech recognition software and may contain errors related to that system including errors in grammar, punctuation, and spelling, as well as words and phrases that may be inappropriate. If there are any questions or concerns please feel free to contact the dictating provider for clarification.

## 2022-11-11 ENCOUNTER — HOSPITAL ENCOUNTER (OUTPATIENT)
Dept: GENERAL RADIOLOGY | Age: 82
Discharge: HOME | End: 2022-11-11
Payer: MEDICARE

## 2022-11-11 ENCOUNTER — HOSPITAL ENCOUNTER (OUTPATIENT)
Age: 82
Discharge: HOME | End: 2022-11-11
Payer: MEDICARE

## 2022-11-11 ENCOUNTER — OFFICE VISIT (OUTPATIENT)
Dept: INTERNAL MEDICINE CLINIC | Age: 82
End: 2022-11-11
Payer: MEDICARE

## 2022-11-11 VITALS
DIASTOLIC BLOOD PRESSURE: 72 MMHG | BODY MASS INDEX: 32.22 KG/M2 | HEART RATE: 60 BPM | RESPIRATION RATE: 16 BRPM | WEIGHT: 165 LBS | SYSTOLIC BLOOD PRESSURE: 146 MMHG | OXYGEN SATURATION: 96 %

## 2022-11-11 DIAGNOSIS — M25.511 ACUTE PAIN OF RIGHT SHOULDER: ICD-10-CM

## 2022-11-11 DIAGNOSIS — E11.40 TYPE 2 DIABETES MELLITUS WITH DIABETIC NEUROPATHY, WITHOUT LONG-TERM CURRENT USE OF INSULIN (HCC): ICD-10-CM

## 2022-11-11 DIAGNOSIS — I25.10 CORONARY ARTERY DISEASE INVOLVING NATIVE CORONARY ARTERY OF NATIVE HEART WITHOUT ANGINA PECTORIS: Primary | ICD-10-CM

## 2022-11-11 PROCEDURE — G8484 FLU IMMUNIZE NO ADMIN: HCPCS | Performed by: INTERNAL MEDICINE

## 2022-11-11 PROCEDURE — 1090F PRES/ABSN URINE INCON ASSESS: CPT | Performed by: INTERNAL MEDICINE

## 2022-11-11 PROCEDURE — 1123F ACP DISCUSS/DSCN MKR DOCD: CPT | Performed by: INTERNAL MEDICINE

## 2022-11-11 PROCEDURE — 3074F SYST BP LT 130 MM HG: CPT | Performed by: INTERNAL MEDICINE

## 2022-11-11 PROCEDURE — G8399 PT W/DXA RESULTS DOCUMENT: HCPCS | Performed by: INTERNAL MEDICINE

## 2022-11-11 PROCEDURE — G8427 DOCREV CUR MEDS BY ELIG CLIN: HCPCS | Performed by: INTERNAL MEDICINE

## 2022-11-11 PROCEDURE — G8417 CALC BMI ABV UP PARAM F/U: HCPCS | Performed by: INTERNAL MEDICINE

## 2022-11-11 PROCEDURE — 3044F HG A1C LEVEL LT 7.0%: CPT | Performed by: INTERNAL MEDICINE

## 2022-11-11 PROCEDURE — 99214 OFFICE O/P EST MOD 30 MIN: CPT | Performed by: INTERNAL MEDICINE

## 2022-11-11 PROCEDURE — 3078F DIAST BP <80 MM HG: CPT | Performed by: INTERNAL MEDICINE

## 2022-11-11 PROCEDURE — 1036F TOBACCO NON-USER: CPT | Performed by: INTERNAL MEDICINE

## 2022-11-11 PROCEDURE — 73030 X-RAY EXAM OF SHOULDER: CPT

## 2022-11-11 RX ORDER — PREDNISONE 1 MG/1
TABLET ORAL
Qty: 21 TABLET | Refills: 0 | Status: ON HOLD | OUTPATIENT
Start: 2022-11-11 | End: 2022-11-30 | Stop reason: HOSPADM

## 2022-11-11 NOTE — PATIENT INSTRUCTIONS
IF R SHOULDER DOES NOT IMPROVE W EXERCISES AND STEROID TAPER THEN YOU NEED TO CALL US BACK FOR REFERRAL TO ORTHOPEDICS

## 2022-11-11 NOTE — PROGRESS NOTES
June Revel  1940 11/11/22    SUBJECTIVE:  CAD- w hx of stents, despite benign stress testing has continued to have exertional CP and SOB w discomfort radiating to jaw, sometimes occurring at night. Has had some relief w NTG prn but causes headache    DM- sugar stable and losing wt. Lab Results   Component Value Date    LABA1C 6.2 08/04/2022    LABA1C 6.1 05/04/2022    LABA1C 6.3 02/01/2022     Lab Results   Component Value Date    GLUF 128 (H) 10/14/2016    LABMICR YES 06/14/2022    LDLCALC see below 08/04/2022    CREATININE 0.9 08/05/2022     She has had some muscular R shoulder pain noted the past week. Is R handed. No strain or trauma, hurts w use and activity but better w rest.      OBJECTIVE:    BP (!) 146/72   Pulse 60   Resp 16   Wt 165 lb (74.8 kg)   LMP  (LMP Unknown)   SpO2 96%   BMI 32.22 kg/m²     Physical Exam  Constitutional:       Appearance: Normal appearance. HENT:      Head: Normocephalic and atraumatic. Eyes:      Extraocular Movements: Extraocular movements intact. Conjunctiva/sclera: Conjunctivae normal.      Pupils: Pupils are equal, round, and reactive to light. Cardiovascular:      Rate and Rhythm: Normal rate and regular rhythm. Heart sounds: Normal heart sounds. No murmur heard. Pulmonary:      Effort: Pulmonary effort is normal. No respiratory distress. Breath sounds: Normal breath sounds. Abdominal:      General: Abdomen is flat. Bowel sounds are normal. There is no distension. Palpations: Abdomen is soft. There is no mass. Tenderness: There is no abdominal tenderness. Hernia: No hernia is present. Musculoskeletal:         General: Tenderness (POSTEIOR SHOULDER ON R AND POS EMPTY CAN, NEERS AND APLEY SCRATCH) present. Cervical back: Neck supple. Right lower leg: No edema. Left lower leg: No edema. Neurological:      Mental Status: She is alert.    Psychiatric:         Mood and Affect: Mood normal. ASSESSMENT:    1. Coronary artery disease involving native coronary artery of native heart without angina pectoris    2. Type 2 diabetes mellitus with diabetic neuropathy, without long-term current use of insulin ([address]    3. Acute pain of right shoulder        PLAN:    Bonnie Patel was seen today for diabetes and shoulder pain. Diagnoses and all orders for this visit:    Coronary artery disease involving native coronary artery of native heart without angina pectoris- GIVEN ONGOING SX STRONGLY SUGGESTIVE OF PROGRESSIVE ANGINA, DO REC FOR [facility] AND WE'LL REFER FOR F/U DR Asa Levin. Alex Chicas MD, Cardiology, [facility]  -     Comprehensive Metabolic Panel; Future  -     CBC with Auto Differential; Future  -     Lipid Panel; Future  Type 2 diabetes mellitus with diabetic neuropathy, without long-term current use of insulin ([address]  - DM relatively well controlled and will continue current regimen. Screening reviewed. See orders. -     Hemoglobin A1C; Future  -     Comprehensive Metabolic Panel; Future  -     CBC with Auto Differential; Future  -     Lipid Panel; Future    Acute pain of right shoulder- RC TENDINITIS SUSPECTED, REC REST, CHECK XRAY, TRIAL PRED TAPER AND RC EXERCISES BUT IF PERSISTENT THEN WE'LL REFER ORTHO   -     XR SHOULDER RIGHT (MIN 2 VIEWS); Future  -     predniSONE (DELTASONE) 5 MG tablet; Take 6 tablets by mouth on day 1, 5 on day 2, 4 on day 3, 3 on day 4, 2 on day 5, 1 on day 6.

## 2022-11-14 DIAGNOSIS — I25.10 CORONARY ARTERY DISEASE INVOLVING NATIVE CORONARY ARTERY OF NATIVE HEART WITHOUT ANGINA PECTORIS: ICD-10-CM

## 2022-11-14 DIAGNOSIS — E11.40 TYPE 2 DIABETES MELLITUS WITH DIABETIC NEUROPATHY, WITHOUT LONG-TERM CURRENT USE OF INSULIN (HCC): ICD-10-CM

## 2022-11-14 LAB
A/G RATIO: 1.7 (ref 1.1–2.2)
ALBUMIN SERPL-MCNC: 5 G/DL (ref 3.4–5)
ALP BLD-CCNC: 69 U/L (ref 40–129)
ALT SERPL-CCNC: 15 U/L (ref 10–40)
ANION GAP SERPL CALCULATED.3IONS-SCNC: 16 MMOL/L (ref 3–16)
AST SERPL-CCNC: 17 U/L (ref 15–37)
BASOPHILS ABSOLUTE: 0 K/UL (ref 0–0.2)
BASOPHILS RELATIVE PERCENT: 0.3 %
BILIRUB SERPL-MCNC: 0.7 MG/DL (ref 0–1)
BUN BLDV-MCNC: 19 MG/DL (ref 7–20)
CALCIUM SERPL-MCNC: 10.3 MG/DL (ref 8.3–10.6)
CHLORIDE BLD-SCNC: 96 MMOL/L (ref 99–110)
CHOLESTEROL, TOTAL: 163 MG/DL (ref 0–199)
CO2: 26 MMOL/L (ref 21–32)
CREAT SERPL-MCNC: 1 MG/DL (ref 0.6–1.2)
EOSINOPHILS ABSOLUTE: 0.2 K/UL (ref 0–0.6)
EOSINOPHILS RELATIVE PERCENT: 2 %
GFR SERPL CREATININE-BSD FRML MDRD: 56 ML/MIN/{1.73_M2}
GLUCOSE BLD-MCNC: 98 MG/DL (ref 70–99)
HCT VFR BLD CALC: 39.1 % (ref 36–48)
HDLC SERPL-MCNC: 38 MG/DL (ref 40–60)
HEMOGLOBIN: 13.1 G/DL (ref 12–16)
LDL CHOLESTEROL CALCULATED: 72 MG/DL
LYMPHOCYTES ABSOLUTE: 4.5 K/UL (ref 1–5.1)
LYMPHOCYTES RELATIVE PERCENT: 46 %
MCH RBC QN AUTO: 31 PG (ref 26–34)
MCHC RBC AUTO-ENTMCNC: 33.5 G/DL (ref 31–36)
MCV RBC AUTO: 92.6 FL (ref 80–100)
MONOCYTES ABSOLUTE: 0.6 K/UL (ref 0–1.3)
MONOCYTES RELATIVE PERCENT: 6.3 %
NEUTROPHILS ABSOLUTE: 4.5 K/UL (ref 1.7–7.7)
NEUTROPHILS RELATIVE PERCENT: 45.4 %
PDW BLD-RTO: 13.1 % (ref 12.4–15.4)
PLATELET # BLD: 250 K/UL (ref 135–450)
PMV BLD AUTO: 7.7 FL (ref 5–10.5)
POTASSIUM SERPL-SCNC: 4.6 MMOL/L (ref 3.5–5.1)
RBC # BLD: 4.22 M/UL (ref 4–5.2)
SODIUM BLD-SCNC: 138 MMOL/L (ref 136–145)
TOTAL PROTEIN: 7.9 G/DL (ref 6.4–8.2)
TRIGL SERPL-MCNC: 267 MG/DL (ref 0–150)
VLDLC SERPL CALC-MCNC: 53 MG/DL
WBC # BLD: 9.8 K/UL (ref 4–11)

## 2022-11-14 PROCEDURE — 36415 COLL VENOUS BLD VENIPUNCTURE: CPT | Performed by: INTERNAL MEDICINE

## 2022-11-14 NOTE — RESULT ENCOUNTER NOTE
Please call pt, her shoulder xray is benign, neg for arthritis or fractures.   Although is the R side and not L, this could also be heart related since worse w exertion so do suggest we await the heart cath results first. If cath is ok, we'll consider orthopedic eval next

## 2022-11-15 LAB
ESTIMATED AVERAGE GLUCOSE: 128.4 MG/DL
HBA1C MFR BLD: 6.1 %

## 2022-11-15 NOTE — RESULT ENCOUNTER NOTE
Chol, sugars, labs appear in stable range, DM is controlled- HER TRIGLYCERIDE CHOL IS MUCH BETTER OVERALL DROPPING FROM 329-267.   notify pt please.

## 2022-11-16 ENCOUNTER — TELEPHONE (OUTPATIENT)
Dept: CARDIOLOGY CLINIC | Age: 82
End: 2022-11-16

## 2022-11-16 NOTE — TELEPHONE ENCOUNTER
Spoke to patient about procedure. [facility] scheduled 12/7/22 @ 10:00 with arrival time of 8:00. Procedure paperwork to be done 12/2/22 @ 2:30. Patient was in car and will call back to verify.

## 2022-11-16 NOTE — TELEPHONE ENCOUNTER
Patient called, spoke to [address] [place] [address] and Dr Dawood Pinzon, is ready to sched cath. Please call patient.

## 2022-11-22 ENCOUNTER — HOSPITAL ENCOUNTER (OUTPATIENT)
Dept: GENERAL RADIOLOGY | Age: 82
Discharge: HOME | End: 2022-11-22
Payer: MEDICARE

## 2022-11-22 ENCOUNTER — CARE COORDINATION (OUTPATIENT)
Dept: CARE COORDINATION | Age: 82
End: 2022-11-22

## 2022-11-22 ENCOUNTER — HOSPITAL ENCOUNTER (OUTPATIENT)
Age: 82
Discharge: HOME | End: 2022-11-22
Payer: MEDICARE

## 2022-11-22 ENCOUNTER — NURSE ONLY (OUTPATIENT)
Dept: CARDIOLOGY CLINIC | Age: 82
End: 2022-11-22
Payer: MEDICARE

## 2022-11-22 DIAGNOSIS — Z01.810 PRE-OPERATIVE CARDIOVASCULAR EXAMINATION: ICD-10-CM

## 2022-11-22 LAB
ABO/RH: NORMAL
ANTIBODY SCREEN: NEGATIVE
COMMENT: NORMAL

## 2022-11-22 PROCEDURE — 86901 BLOOD TYPING SEROLOGIC RH(D): CPT

## 2022-11-22 PROCEDURE — 99211 OFF/OP EST MAY X REQ PHY/QHP: CPT | Performed by: INTERNAL MEDICINE

## 2022-11-22 PROCEDURE — 86900 BLOOD TYPING SEROLOGIC ABO: CPT

## 2022-11-22 PROCEDURE — 36415 COLL VENOUS BLD VENIPUNCTURE: CPT

## 2022-11-22 PROCEDURE — 86850 RBC ANTIBODY SCREEN: CPT

## 2022-11-22 PROCEDURE — 71046 X-RAY EXAM CHEST 2 VIEWS: CPT

## 2022-11-22 NOTE — PROGRESS NOTES
Patient was here in office & educated on [facility] for Dx: CP & SOB. Procedure is scheduled for 11/30/22 @ 10, w/arrival @ 8 AM, @ [facility]. Pre-admission orders were given to patient for labs & CXR, which are due 11/22/22 @ [facility]. Procedure and risks were explained to patient. Consent forms were signed. Instructions were given to patient to remain NPO after midnight the night before procedure. Patient may take morning meds the morning of procedure with small amount of water. Patient is asked to call hospital @ [phone], 1 to 2 days before procedure to pre-register. Patient was notified that procedure could be delayed due to an emergency. Patient voiced understanding.  Copies of consent, pre-testing orders, & instructions scanned into media

## 2022-11-29 ENCOUNTER — CARE COORDINATION (OUTPATIENT)
Dept: CARE COORDINATION | Age: 82
End: 2022-11-29

## 2022-11-30 ENCOUNTER — HOSPITAL ENCOUNTER (OUTPATIENT)
Dept: CARDIAC CATH/INVASIVE PROCEDURES | Age: 82
Discharge: HOME | End: 2022-11-30
Attending: INTERNAL MEDICINE | Admitting: INTERNAL MEDICINE
Payer: MEDICARE

## 2022-11-30 VITALS
HEIGHT: 60 IN | OXYGEN SATURATION: 97 % | DIASTOLIC BLOOD PRESSURE: 49 MMHG | WEIGHT: 165 LBS | BODY MASS INDEX: 32.39 KG/M2 | RESPIRATION RATE: 18 BRPM | TEMPERATURE: 96 F | SYSTOLIC BLOOD PRESSURE: 122 MMHG | HEART RATE: 69 BPM

## 2022-11-30 LAB
BASE EXCESS MIXED: 1 (ref 0–2.3)
CARBON MONOXIDE, BLOOD: 0.6 % (ref 0–5)
CO2 CONTENT: 27.3 MMOL/L (ref 19–24)
COMMENT: ABNORMAL
ESTIMATED AVERAGE GLUCOSE: 128 MG/DL
HBA1C MFR BLD: 6.1 % (ref 4.2–6.3)
HCO3 ARTERIAL: 26 MMOL/L (ref 18–23)
METHEMOGLOBIN ARTERIAL: 1.2 %
O2 SATURATION: 94.7 % (ref 96–97)
PCO2 ARTERIAL: 42 MMHG (ref 32–45)
PH BLOOD: 7.4 (ref 7.34–7.45)
PO2 ARTERIAL: 89 MMHG (ref 75–100)

## 2022-11-30 PROCEDURE — 93458 L HRT ARTERY/VENTRICLE ANGIO: CPT

## 2022-11-30 PROCEDURE — 2580000003 HC RX 258: Performed by: INTERNAL MEDICINE

## 2022-11-30 PROCEDURE — 2709999900 HC NON-CHARGEABLE SUPPLY

## 2022-11-30 PROCEDURE — 93458 L HRT ARTERY/VENTRICLE ANGIO: CPT | Performed by: INTERNAL MEDICINE

## 2022-11-30 PROCEDURE — C1894 INTRO/SHEATH, NON-LASER: HCPCS

## 2022-11-30 PROCEDURE — 6360000004 HC RX CONTRAST MEDICATION

## 2022-11-30 PROCEDURE — 83036 HEMOGLOBIN GLYCOSYLATED A1C: CPT

## 2022-11-30 PROCEDURE — C1769 GUIDE WIRE: HCPCS

## 2022-11-30 PROCEDURE — 2500000003 HC RX 250 WO HCPCS

## 2022-11-30 PROCEDURE — C1887 CATHETER, GUIDING: HCPCS

## 2022-11-30 PROCEDURE — 82803 BLOOD GASES ANY COMBINATION: CPT

## 2022-11-30 PROCEDURE — 6370000000 HC RX 637 (ALT 250 FOR IP): Performed by: INTERNAL MEDICINE

## 2022-11-30 PROCEDURE — 6360000002 HC RX W HCPCS

## 2022-11-30 RX ORDER — ACETAMINOPHEN 325 MG/1
650 TABLET ORAL EVERY 4 HOURS PRN
Status: DISCONTINUED | OUTPATIENT
Start: 2022-11-30 | End: 2022-11-30 | Stop reason: HOSPADM

## 2022-11-30 RX ORDER — DIAZEPAM 5 MG/1
5 TABLET ORAL ONCE
Status: COMPLETED | OUTPATIENT
Start: 2022-11-30 | End: 2022-11-30

## 2022-11-30 RX ORDER — SODIUM CHLORIDE 0.9 % (FLUSH) 0.9 %
5-40 SYRINGE (ML) INJECTION PRN
Status: DISCONTINUED | OUTPATIENT
Start: 2022-11-30 | End: 2022-11-30 | Stop reason: HOSPADM

## 2022-11-30 RX ORDER — ATORVASTATIN CALCIUM 40 MG/1
20 TABLET, FILM COATED ORAL DAILY
Qty: 90 TABLET | Refills: 1 | Status: SHIPPED | OUTPATIENT
Start: 2022-11-30

## 2022-11-30 RX ORDER — DIPHENHYDRAMINE HCL 25 MG
25 TABLET ORAL ONCE
Status: COMPLETED | OUTPATIENT
Start: 2022-11-30 | End: 2022-11-30

## 2022-11-30 RX ORDER — SODIUM CHLORIDE 9 MG/ML
INJECTION, SOLUTION INTRAVENOUS ONCE
Status: COMPLETED | OUTPATIENT
Start: 2022-11-30 | End: 2022-11-30

## 2022-11-30 RX ORDER — SODIUM CHLORIDE 0.9 % (FLUSH) 0.9 %
5-40 SYRINGE (ML) INJECTION EVERY 12 HOURS SCHEDULED
Status: DISCONTINUED | OUTPATIENT
Start: 2022-11-30 | End: 2022-11-30 | Stop reason: HOSPADM

## 2022-11-30 RX ORDER — SODIUM CHLORIDE 9 MG/ML
INJECTION, SOLUTION INTRAVENOUS PRN
Status: DISCONTINUED | OUTPATIENT
Start: 2022-11-30 | End: 2022-11-30 | Stop reason: HOSPADM

## 2022-11-30 RX ADMIN — DIAZEPAM 5 MG: 5 TABLET ORAL at 08:14

## 2022-11-30 RX ADMIN — DIPHENHYDRAMINE HYDROCHLORIDE 25 MG: 25 TABLET ORAL at 08:14

## 2022-11-30 RX ADMIN — SODIUM CHLORIDE: 9 INJECTION, SOLUTION INTRAVENOUS at 08:14

## 2022-11-30 NOTE — PROGRESS NOTES
Outpatient Pharmacy Progress Note for Meds-to-Beds    Total number of Prescriptions Filled: 1  The following medications were dispensed to the patient during the discharge process:  Atorvastatin     Additional Documentation:  Patient's family member picked-up the medication(s) in the OP Pharmacy      Thank you for letting us serve your patients.   [address] [place]    [address], [address] [facility]    Phone: [phone]    Fax: [phone]

## 2022-11-30 NOTE — H&P
CC:   Korey Trevizo  is an established 79 y.o.  female here for a 9 month follow up on coronary artery disease, carotid artery disease, hypertension   here for [facility]     SUBJECTIVE/OBJECTIVE:  Korey Trevizo is a 79 y.o. female with a history of coronary artery disease, carotid artery disease hypertension, hyperlipidemia, non-insulin-dependent diabetes . Guerita has remote history of PCI Reomte h/o PCI  (2005)      HPI :   Korey Trevizo reports she had chest pain off and on. They occur at different times- cna occur at rest, wake her from sleep and with activity - cna last seconds to hours - feels as though she is dizzy with episodes-reports the chest pain has radiated to neck and jaw. She notes shortness of breath with exertion     Review of Systems   Constitutional: Positive for malaise/fatigue. Negative for diaphoresis. Cardiovascular:  Positive for chest pain, dyspnea on exertion and palpitations. Negative for claudication, irregular heartbeat, leg swelling, near-syncope, orthopnea and paroxysmal nocturnal dyspnea. Respiratory:  Negative for shortness of breath. Neurological:  Negative for dizziness and light-headedness. Vitals       Vitals:     11/09/22 1043   BP: 136/60   Site: Left Upper Arm   Position: Sitting   Cuff Size: Large Adult   Pulse: 60   Weight: 167 lb 3.2 oz (75.8 kg)   Height: 5' (1.524 m)         No flowsheet data found. Wt Readings from Last 3 Encounters:   11/09/22 167 lb 3.2 oz (75.8 kg)   08/11/22 166 lb (75.3 kg)   08/04/22 163 lb 2.3 oz (74 kg)      Body mass index is 32.65 kg/m². Physical Exam  Vitals reviewed. Constitutional:       Appearance: Normal appearance. HENT:      Head: Normocephalic and atraumatic. Eyes:      Extraocular Movements: Extraocular movements intact. Pupils: Pupils are equal, round, and reactive to light. Neck:      Vascular: No carotid bruit. Cardiovascular:      Rate and Rhythm: Normal rate and regular rhythm. Pulses: Normal pulses.    Pulmonary: Effort: Pulmonary effort is normal.      Breath sounds: Normal breath sounds. No rales. Chest:      Chest wall: No tenderness. Abdominal:      General: There is no distension. Palpations: Abdomen is soft. Tenderness: There is no abdominal tenderness. Musculoskeletal:      Cervical back: No tenderness. Right lower leg: No edema. Left lower leg: No edema. Skin:     General: Skin is warm and dry. Capillary Refill: Capillary refill takes less than 2 seconds. Neurological:      General: No focal deficit present. Mental Status: She is alert and oriented to person, place, and time. Psychiatric:         Mood and Affect: Mood normal.         Behavior: Behavior normal.                  Current Facility-Administered Medications          Current Outpatient Medications   Medication Sig Dispense Refill    metoprolol tartrate (LOPRESSOR) 50 MG tablet TAKE 1 TABLET BY MOUTH TWICE DAILY 180 tablet 1    hydroCHLOROthiazide (HYDRODIURIL) 25 MG tablet TAKE 1 TABLET BY MOUTH DAILY 90 tablet 1    metFORMIN (GLUCOPHAGE) 500 MG tablet Take 0.5 tablets by mouth in the morning. 45 tablet 1    gabapentin (NEURONTIN) 300 MG capsule Take 1 capsule by mouth in the morning and 1 capsule before bedtime. Do all this for 90 days.  180 capsule 1    acetaminophen (TYLENOL) 325 MG tablet Take 2 tablets by mouth every 4 hours as needed for Pain 120 tablet 3    omeprazole (PRILOSEC) 20 MG delayed release capsule Take 20 mg by mouth four times a week Sun, tue, thur, sat        losartan (COZAAR) 100 MG tablet TAKE 1 TABLET BY MOUTH BY MOUTH EVERY DAY 90 tablet 1    simvastatin (ZOCOR) 20 MG tablet TAKE 1 TABLET BY MOUTH EVERY NIGHT 90 tablet 1    ketoconazole (NIZORAL) 2 % cream Apply topically daily to toenail area 30 g 1    hydrOXYzine (ATARAX) 10 MG tablet hydroxyzine HCl 10 mg tablet   TAKE 1 TO 2 TABLETS BY MOUTH EVERY NIGHT 1 HOUR BEFORE BEDTIME FOR ITCHING        hydrALAZINE (APRESOLINE) 25 MG tablet Take 1 tablet by mouth 2 times daily 180 tablet 1    clopidogrel (PLAVIX) 75 MG tablet Take 1 tablet by mouth daily 90 tablet 1    amLODIPine (NORVASC) 5 MG tablet Take 1 tablet by mouth daily 90 tablet 1    nitroGLYCERIN (NITROSTAT) 0.4 MG SL tablet DISSOLVE 1 TABLET UNDER THE TONGUE EVERY 5 MINUTES AS NEEDED FOR CHEST PAIN. MAX OF 3 DOSES IN 15 MINUTES. 25 tablet 0    Biotin 300 MCG TABS Take 1 tablet by mouth daily 30 tablet 3    folic acid (FOLVITE) 825 MCG tablet Take 800 mcg by mouth daily        diclofenac sodium 1 % GEL Apply 2 g topically 4 times daily 100 g 2    fluticasone (FLONASE) 50 MCG/ACT nasal spray 2 sprays by Nasal route daily into each nostril every day 1 Bottle 3    aspirin 81 MG tablet Take 81 mg by mouth 2 times daily         multivitamin (THERAGRAN) per tablet Take 1 tablet by mouth daily. calcium carbonate-vitamin D 600-200 MG-UNIT TABS Take 1 tablet by mouth nightly         Omega-3 Fatty Acids (FISH OIL BURP-LESS) 1000 MG CAPS Take 1 tablet by mouth 2 times daily. No current facility-administered medications for this visit. All pertinent data reviewed and discussed with patient        ASSESSMENT/PLAN:     Coronary artery disease  H/o cx stent  [facility]       Carotid artery disease  Known history of left carotid artery stenosis recommend recheck carotid ultrasound for ongoing surveillance.   Continue aspirin and simvastatin     Hypertension  Blood pressure is controlled with amlodipine which will be continued     Hyperlipidemia  On simvastatin-no changes  HDL 32, LDL 84, triglycerides 329  Low carbohydrate diet

## 2022-11-30 NOTE — DISCHARGE INSTRUCTIONS
Home instruction for Radial site Heart Catheterization:    Do not lift with affected arm for 3 days. Avoid lifting >10lbs. No flexing or bending affected wrist.    Remove dressing the next day, keep area clean and dry and covered with a new band aid daily until healed. Do not submerge site in water for 3 days. Slight tenderness is normal, but notify doctor if tingling of fingers/hand happens. If bleeding or hematoma (blood collecting under the skin) occurs at site, apply pressure to slow the bleeding and notify doctor immediately. No driving for 3 days. For your procedure you may have been given a sedative to help you relax. This drug will make you sleepy. It is usually given in a vein (by IV). Don't do anything for 24 hours that requires attention to detail. It takes time for the medicine effects to completely wear off. Do not sign any legally binding contracts or documents over the next 24 hours. For your safety, you should not drive or operate any machinery that could be dangerous until the medicine wears off and you can think clearly and react easily. PLEASE call Dr. Ba Reason office with any complaints of chest pain.

## 2022-11-30 NOTE — PROGRESS NOTES
Patient went cardiac catheterization  The left main is normal of the LAD has multiple it is up a stenosis proximal it is calcified is about 80% at the bifurcation in some views shows that patient has a 70% stenosis lower mid LAD has 90% stenosis  Circumflex after stent is about 90% stenosis the rca has 50% long tubular stenosis and the PL branch has about 70% stenosis  EF is normal    Multiplicity of the lesions we will discussed the case with cardiothoracic surgeon for further assessment in the meantime present medical therapy will be continued I will add statin to her therapy advised that if she has chest pain to come to the emergency room

## 2022-11-30 NOTE — PROGRESS NOTES
CHART REVIEWED  PT EXAMINED  PROCEDURE DW PT  RISKS BENEFITS AND ALERTNATIVES EXPLAINED IN DETAIL  CONSENT OBTANED

## 2022-11-30 NOTE — PROGRESS NOTES
Patient returned to [facility] with TR band to right wrist. No bleeding nor hematoma noted. Vitals stable. Site check and [facility] Call light within reach. Patient pain free at this time.

## 2022-11-30 NOTE — PROGRESS NOTES
Discharge instructions reviewed with patient. Voices understanding. Ambulated without difficulty. Patient encouraged to call 9-1-1 or Dr. Ever Nash office if she has any chest pain. IV removed.    No bleeding nor hematoma noted to right wrist.

## 2022-12-02 ENCOUNTER — TELEPHONE (OUTPATIENT)
Dept: CARDIOLOGY CLINIC | Age: 82
End: 2022-12-02

## 2022-12-02 NOTE — TELEPHONE ENCOUNTER
Dr. Bard Houser about wanting to do a PCI on this patient for Wed 12/7/22 at 10am    I called left message for patient to call back.

## 2022-12-07 ENCOUNTER — HOSPITAL ENCOUNTER (INPATIENT)
Dept: CARDIAC CATH/INVASIVE PROCEDURES | Age: 82
LOS: 1 days | Discharge: HOME | End: 2022-12-08
Attending: INTERNAL MEDICINE | Admitting: INTERNAL MEDICINE
Payer: MEDICARE

## 2022-12-07 DIAGNOSIS — E11.40 TYPE 2 DIABETES MELLITUS WITH DIABETIC NEUROPATHY, WITHOUT LONG-TERM CURRENT USE OF INSULIN (HCC): ICD-10-CM

## 2022-12-07 LAB
ANION GAP SERPL CALCULATED.3IONS-SCNC: 13 MMOL/L (ref 4–16)
APTT: 33.5 SECONDS (ref 25.1–37.1)
BUN BLDV-MCNC: 19 MG/DL (ref 6–23)
CALCIUM SERPL-MCNC: 9.4 MG/DL (ref 8.3–10.6)
CHLORIDE BLD-SCNC: 100 MMOL/L (ref 99–110)
CO2: 25 MMOL/L (ref 21–32)
CREAT SERPL-MCNC: 1 MG/DL (ref 0.6–1.1)
GFR SERPL CREATININE-BSD FRML MDRD: 56 ML/MIN/1.73M2
GLUCOSE BLD-MCNC: 136 MG/DL (ref 70–99)
GLUCOSE BLD-MCNC: 174 MG/DL (ref 70–99)
GLUCOSE BLD-MCNC: 176 MG/DL (ref 70–99)
HCT VFR BLD CALC: 34.6 % (ref 37–47)
HEMOGLOBIN: 11.4 GM/DL (ref 12.5–16)
INR BLD: 0.92 INDEX
MCH RBC QN AUTO: 30.4 PG (ref 27–31)
MCHC RBC AUTO-ENTMCNC: 32.9 % (ref 32–36)
MCV RBC AUTO: 92.3 FL (ref 78–100)
PDW BLD-RTO: 12.5 % (ref 11.7–14.9)
PLATELET # BLD: 245 K/CU MM (ref 140–440)
PMV BLD AUTO: 9.3 FL (ref 7.5–11.1)
POTASSIUM SERPL-SCNC: 4.4 MMOL/L (ref 3.5–5.1)
PROTHROMBIN TIME: 11.9 SECONDS (ref 11.7–14.5)
RBC # BLD: 3.75 M/CU MM (ref 4.2–5.4)
SODIUM BLD-SCNC: 138 MMOL/L (ref 135–145)
WBC # BLD: 6.1 K/CU MM (ref 4–10.5)

## 2022-12-07 PROCEDURE — 2500000003 HC RX 250 WO HCPCS

## 2022-12-07 PROCEDURE — 2580000003 HC RX 258: Performed by: INTERNAL MEDICINE

## 2022-12-07 PROCEDURE — 85027 COMPLETE CBC AUTOMATED: CPT

## 2022-12-07 PROCEDURE — 027236Z DILATION OF CORONARY ARTERY, THREE ARTERIES WITH THREE DRUG-ELUTING INTRALUMINAL DEVICES, PERCUTANEOUS APPROACH: ICD-10-PCS | Performed by: INTERNAL MEDICINE

## 2022-12-07 PROCEDURE — 2709999900 HC NON-CHARGEABLE SUPPLY

## 2022-12-07 PROCEDURE — C9600 PERC DRUG-EL COR STENT SING: HCPCS

## 2022-12-07 PROCEDURE — 82962 GLUCOSE BLOOD TEST: CPT

## 2022-12-07 PROCEDURE — 92929 PR PRQ TRLUML CORONARY STENT W/ANGIO ADDL ART/BRNCH: CPT | Performed by: INTERNAL MEDICINE

## 2022-12-07 PROCEDURE — 80048 BASIC METABOLIC PNL TOTAL CA: CPT

## 2022-12-07 PROCEDURE — C1894 INTRO/SHEATH, NON-LASER: HCPCS

## 2022-12-07 PROCEDURE — 6360000002 HC RX W HCPCS

## 2022-12-07 PROCEDURE — 2140000000 HC CCU INTERMEDIATE R&B

## 2022-12-07 PROCEDURE — 6360000004 HC RX CONTRAST MEDICATION

## 2022-12-07 PROCEDURE — B2151ZZ FLUOROSCOPY OF LEFT HEART USING LOW OSMOLAR CONTRAST: ICD-10-PCS | Performed by: INTERNAL MEDICINE

## 2022-12-07 PROCEDURE — 85730 THROMBOPLASTIN TIME PARTIAL: CPT

## 2022-12-07 PROCEDURE — C1874 STENT, COATED/COV W/DEL SYS: HCPCS

## 2022-12-07 PROCEDURE — 6370000000 HC RX 637 (ALT 250 FOR IP): Performed by: INTERNAL MEDICINE

## 2022-12-07 PROCEDURE — C1769 GUIDE WIRE: HCPCS

## 2022-12-07 PROCEDURE — 92928 PRQ TCAT PLMT NTRAC ST 1 LES: CPT | Performed by: INTERNAL MEDICINE

## 2022-12-07 PROCEDURE — C1760 CLOSURE DEV, VASC: HCPCS

## 2022-12-07 PROCEDURE — 6370000000 HC RX 637 (ALT 250 FOR IP)

## 2022-12-07 PROCEDURE — 94761 N-INVAS EAR/PLS OXIMETRY MLT: CPT

## 2022-12-07 PROCEDURE — 93005 ELECTROCARDIOGRAM TRACING: CPT | Performed by: INTERNAL MEDICINE

## 2022-12-07 PROCEDURE — 85610 PROTHROMBIN TIME: CPT

## 2022-12-07 PROCEDURE — C1725 CATH, TRANSLUMIN NON-LASER: HCPCS

## 2022-12-07 RX ORDER — AMLODIPINE BESYLATE 5 MG/1
5 TABLET ORAL DAILY
Status: DISCONTINUED | OUTPATIENT
Start: 2022-12-07 | End: 2022-12-08 | Stop reason: HOSPADM

## 2022-12-07 RX ORDER — METOPROLOL TARTRATE 50 MG/1
50 TABLET, FILM COATED ORAL 2 TIMES DAILY
Status: DISCONTINUED | OUTPATIENT
Start: 2022-12-07 | End: 2022-12-08 | Stop reason: HOSPADM

## 2022-12-07 RX ORDER — CLOPIDOGREL BISULFATE 75 MG/1
75 TABLET ORAL DAILY
Status: DISCONTINUED | OUTPATIENT
Start: 2022-12-07 | End: 2022-12-07 | Stop reason: DRUGHIGH

## 2022-12-07 RX ORDER — INSULIN LISPRO 100 [IU]/ML
0-4 INJECTION, SOLUTION INTRAVENOUS; SUBCUTANEOUS NIGHTLY
Status: DISCONTINUED | OUTPATIENT
Start: 2022-12-07 | End: 2022-12-08 | Stop reason: HOSPADM

## 2022-12-07 RX ORDER — LANOLIN ALCOHOL/MO/W.PET/CERES
1 CREAM (GRAM) TOPICAL DAILY
Status: DISCONTINUED | OUTPATIENT
Start: 2022-12-07 | End: 2022-12-07 | Stop reason: RX

## 2022-12-07 RX ORDER — HYDRALAZINE HYDROCHLORIDE 25 MG/1
25 TABLET, FILM COATED ORAL 2 TIMES DAILY
Status: DISCONTINUED | OUTPATIENT
Start: 2022-12-07 | End: 2022-12-08 | Stop reason: HOSPADM

## 2022-12-07 RX ORDER — ACETAMINOPHEN 325 MG/1
650 TABLET ORAL EVERY 4 HOURS PRN
Status: DISCONTINUED | OUTPATIENT
Start: 2022-12-07 | End: 2022-12-08 | Stop reason: HOSPADM

## 2022-12-07 RX ORDER — SODIUM CHLORIDE 9 MG/ML
INJECTION, SOLUTION INTRAVENOUS CONTINUOUS
Status: DISCONTINUED | OUTPATIENT
Start: 2022-12-07 | End: 2022-12-08 | Stop reason: HOSPADM

## 2022-12-07 RX ORDER — M-VIT,TX,IRON,MINS/CALC/FOLIC 27MG-0.4MG
1 TABLET ORAL DAILY
Status: DISCONTINUED | OUTPATIENT
Start: 2022-12-07 | End: 2022-12-08 | Stop reason: HOSPADM

## 2022-12-07 RX ORDER — SODIUM CHLORIDE 0.9 % (FLUSH) 0.9 %
5-40 SYRINGE (ML) INJECTION PRN
Status: DISCONTINUED | OUTPATIENT
Start: 2022-12-07 | End: 2022-12-08 | Stop reason: HOSPADM

## 2022-12-07 RX ORDER — ATORVASTATIN CALCIUM 10 MG/1
20 TABLET, FILM COATED ORAL DAILY
Status: DISCONTINUED | OUTPATIENT
Start: 2022-12-07 | End: 2022-12-07 | Stop reason: DRUGHIGH

## 2022-12-07 RX ORDER — ASPIRIN 81 MG/1
81 TABLET, CHEWABLE ORAL DAILY
Status: DISCONTINUED | OUTPATIENT
Start: 2022-12-08 | End: 2022-12-08 | Stop reason: HOSPADM

## 2022-12-07 RX ORDER — ATORVASTATIN CALCIUM 40 MG/1
40 TABLET, FILM COATED ORAL DAILY
Status: DISCONTINUED | OUTPATIENT
Start: 2022-12-07 | End: 2022-12-08 | Stop reason: HOSPADM

## 2022-12-07 RX ORDER — DIAZEPAM 5 MG/1
5 TABLET ORAL ONCE
Status: COMPLETED | OUTPATIENT
Start: 2022-12-07 | End: 2022-12-07

## 2022-12-07 RX ORDER — CLOPIDOGREL BISULFATE 75 MG/1
75 TABLET ORAL DAILY
Status: DISCONTINUED | OUTPATIENT
Start: 2022-12-08 | End: 2022-12-08 | Stop reason: HOSPADM

## 2022-12-07 RX ORDER — OMEGA-3S/DHA/EPA/FISH OIL/D3 300MG-1000
1 CAPSULE ORAL 2 TIMES DAILY
Status: DISCONTINUED | OUTPATIENT
Start: 2022-12-07 | End: 2022-12-07 | Stop reason: RX

## 2022-12-07 RX ORDER — OYSTER SHELL CALCIUM WITH VITAMIN D 500; 200 MG/1; [IU]/1
1 TABLET, FILM COATED ORAL NIGHTLY
Status: DISCONTINUED | OUTPATIENT
Start: 2022-12-07 | End: 2022-12-07 | Stop reason: CLARIF

## 2022-12-07 RX ORDER — DIPHENHYDRAMINE HCL 25 MG
25 TABLET ORAL ONCE
Status: COMPLETED | OUTPATIENT
Start: 2022-12-07 | End: 2022-12-07

## 2022-12-07 RX ORDER — DEXTROSE MONOHYDRATE 100 MG/ML
INJECTION, SOLUTION INTRAVENOUS CONTINUOUS PRN
Status: DISCONTINUED | OUTPATIENT
Start: 2022-12-07 | End: 2022-12-08 | Stop reason: HOSPADM

## 2022-12-07 RX ORDER — LOSARTAN POTASSIUM 25 MG/1
50 TABLET ORAL DAILY
Status: DISCONTINUED | OUTPATIENT
Start: 2022-12-08 | End: 2022-12-08 | Stop reason: HOSPADM

## 2022-12-07 RX ORDER — PANTOPRAZOLE SODIUM 40 MG/1
40 TABLET, DELAYED RELEASE ORAL
Status: DISCONTINUED | OUTPATIENT
Start: 2022-12-08 | End: 2022-12-07 | Stop reason: DRUGHIGH

## 2022-12-07 RX ORDER — GUAIFENESIN/DEXTROMETHORPHAN 100-10MG/5
5 SYRUP ORAL EVERY 4 HOURS PRN
Status: DISCONTINUED | OUTPATIENT
Start: 2022-12-07 | End: 2022-12-08 | Stop reason: HOSPADM

## 2022-12-07 RX ORDER — FLUTICASONE PROPIONATE 50 MCG
2 SPRAY, SUSPENSION (ML) NASAL DAILY
Status: DISCONTINUED | OUTPATIENT
Start: 2022-12-07 | End: 2022-12-08 | Stop reason: HOSPADM

## 2022-12-07 RX ORDER — SODIUM CHLORIDE 0.9 % (FLUSH) 0.9 %
5-40 SYRINGE (ML) INJECTION EVERY 12 HOURS SCHEDULED
Status: DISCONTINUED | OUTPATIENT
Start: 2022-12-07 | End: 2022-12-08 | Stop reason: HOSPADM

## 2022-12-07 RX ORDER — MULTIVITAMIN WITH IRON
1 TABLET ORAL DAILY
Status: DISCONTINUED | OUTPATIENT
Start: 2022-12-07 | End: 2022-12-07 | Stop reason: CLARIF

## 2022-12-07 RX ORDER — INSULIN LISPRO 100 [IU]/ML
0-8 INJECTION, SOLUTION INTRAVENOUS; SUBCUTANEOUS
Status: DISCONTINUED | OUTPATIENT
Start: 2022-12-07 | End: 2022-12-08 | Stop reason: HOSPADM

## 2022-12-07 RX ORDER — FOLIC ACID 1 MG/1
1 TABLET ORAL DAILY
Status: DISCONTINUED | OUTPATIENT
Start: 2022-12-07 | End: 2022-12-08 | Stop reason: HOSPADM

## 2022-12-07 RX ORDER — NITROGLYCERIN 0.4 MG/1
0.4 TABLET SUBLINGUAL EVERY 5 MIN PRN
Status: DISCONTINUED | OUTPATIENT
Start: 2022-12-07 | End: 2022-12-08 | Stop reason: HOSPADM

## 2022-12-07 RX ORDER — SODIUM CHLORIDE 9 MG/ML
INJECTION, SOLUTION INTRAVENOUS PRN
Status: DISCONTINUED | OUTPATIENT
Start: 2022-12-07 | End: 2022-12-08 | Stop reason: HOSPADM

## 2022-12-07 RX ORDER — PANTOPRAZOLE SODIUM 40 MG/1
40 TABLET, DELAYED RELEASE ORAL
Status: DISCONTINUED | OUTPATIENT
Start: 2022-12-08 | End: 2022-12-08 | Stop reason: HOSPADM

## 2022-12-07 RX ORDER — HYDROCHLOROTHIAZIDE 25 MG/1
25 TABLET ORAL DAILY
Status: DISCONTINUED | OUTPATIENT
Start: 2022-12-07 | End: 2022-12-08 | Stop reason: HOSPADM

## 2022-12-07 RX ADMIN — GUAIFENESIN AND DEXTROMETHORPHAN 5 ML: 100; 10 SYRUP ORAL at 21:11

## 2022-12-07 RX ADMIN — DIPHENHYDRAMINE HYDROCHLORIDE 25 MG: 25 TABLET ORAL at 08:15

## 2022-12-07 RX ADMIN — METOPROLOL TARTRATE 50 MG: 50 TABLET, FILM COATED ORAL at 21:10

## 2022-12-07 RX ADMIN — SODIUM CHLORIDE, PRESERVATIVE FREE 10 ML: 5 INJECTION INTRAVENOUS at 21:10

## 2022-12-07 RX ADMIN — HYDRALAZINE HYDROCHLORIDE 25 MG: 25 TABLET, FILM COATED ORAL at 21:11

## 2022-12-07 RX ADMIN — GUAIFENESIN AND DEXTROMETHORPHAN 5 ML: 100; 10 SYRUP ORAL at 17:13

## 2022-12-07 RX ADMIN — SODIUM CHLORIDE: 9 INJECTION, SOLUTION INTRAVENOUS at 15:41

## 2022-12-07 RX ADMIN — ACETAMINOPHEN 650 MG: 325 TABLET ORAL at 10:42

## 2022-12-07 RX ADMIN — HYDROCHLOROTHIAZIDE 25 MG: 25 TABLET ORAL at 15:40

## 2022-12-07 RX ADMIN — Medication 1 TABLET: at 21:10

## 2022-12-07 RX ADMIN — FOLIC ACID 1 MG: 1 TABLET ORAL at 15:40

## 2022-12-07 RX ADMIN — Medication 1 TABLET: at 17:13

## 2022-12-07 RX ADMIN — DIAZEPAM 5 MG: 5 TABLET ORAL at 08:15

## 2022-12-07 ASSESSMENT — PAIN SCALES - GENERAL
PAINLEVEL_OUTOF10: 0
PAINLEVEL_OUTOF10: 3
PAINLEVEL_OUTOF10: 5

## 2022-12-07 ASSESSMENT — PAIN DESCRIPTION - LOCATION: LOCATION: CHEST;BACK;HEAD

## 2022-12-07 NOTE — PROGRESS NOTES
Patient returned from [facility]  3 s/p [facility] with PCI. Vitals stable. Call light within reach. No bleeding nor hematoma noted to right groin - with tegaderm, gauze, and angioseal. Stent card and booklet given to patient's family.

## 2022-12-07 NOTE — H&P
Richi Galdamez  is an established 79 y.o.  female here for a 9 month follow up on coronary artery disease, carotid artery disease, hypertension   here for PCI of LAD and circumflex     SUBJECTIVE/OBJECTIVE:  Richi Galdamez is a 79 y.o. female with a history of coronary artery disease, carotid artery disease hypertension, hyperlipidemia, non-insulin-dependent diabetes . Guerita has remote history of PCI Reomte h/o PCI  (2005)      HPI :   Richi Galdamez reports she had chest pain off and on. They occur at different times- cna occur at rest, wake her from sleep and with activity - cna last seconds to hours - feels as though she is dizzy with episodes-reports the chest pain has radiated to neck and jaw. She notes shortness of breath with exertion     Review of Systems   Constitutional: Positive for malaise/fatigue. Negative for diaphoresis. Cardiovascular:  Positive for chest pain, dyspnea on exertion and palpitations. Negative for claudication, irregular heartbeat, leg swelling, near-syncope, orthopnea and paroxysmal nocturnal dyspnea. Respiratory:  Negative for shortness of breath. Neurological:  Negative for dizziness and light-headedness. Vitals         Vitals:     11/09/22 1043   BP: 136/60   Site: Left Upper Arm   Position: Sitting   Cuff Size: Large Adult   Pulse: 60   Weight: 167 lb 3.2 oz (75.8 kg)   Height: 5' (1.524 m)         No flowsheet data found. Wt Readings from Last 3 Encounters:   11/09/22 167 lb 3.2 oz (75.8 kg)   08/11/22 166 lb (75.3 kg)   08/04/22 163 lb 2.3 oz (74 kg)      Body mass index is 32.65 kg/m². Physical Exam  Vitals reviewed. Constitutional:       Appearance: Normal appearance. HENT:      Head: Normocephalic and atraumatic. Eyes:      Extraocular Movements: Extraocular movements intact. Pupils: Pupils are equal, round, and reactive to light. Neck:      Vascular: No carotid bruit. Cardiovascular:      Rate and Rhythm: Normal rate and regular rhythm.       Pulses: Normal

## 2022-12-07 NOTE — PROGRESS NOTES
[facility]   Herbal and Nutritional Product Restrictions      The following herbal, alternative, and/or nutritional/dietary supplement product(s) has been discontinued per [employer] approved policy:      - Fish oil 1,000 mg capsule BID  - Biotin 300 mcg tablet daily    Please reorder upon discharge if appropriate.     Thank you,  Mercedes Palomino [id number], [facility]  12/7/2022 2:53 PM

## 2022-12-07 NOTE — OP NOTE
Operative Note      Patient: Anne Vee  YOB: 1940  MRN: [id number]        Date of procedure is a 12/7/2022    Procedure  PTCA and stenting of the circumflex  PTCA and stenting of the proximal LAD  PTCA and stenting of the distal LAD    EBL  10 cc    Indication is  Abnormal stress with angina pectoris    Findings  Left main is a large-caliber vessel has a mild ostial stenosis  The circumflex had a 99% stenosis distal to the stent which was reduced to 0% with a angioplasty and stenting with lesion reduction from 99% to 0% with a 2.75 x 18 stent  The LAD calcified vessel in the proximal segment there was an 80% stenosis which was angioplastied and stented with a 2.25 x 18 stent  In the distal LAD there is another lesion which was angioplastied from 99% to 0% and a 2 oh by 25 stent was placed with excellent results    Assessment and plan  Successful angioplasty of the LAD and circumflex as mentioned above  Patient will be on DAPT for 1 year  Will be monitored overnight

## 2022-12-07 NOTE — PROGRESS NOTES
Patient's right groin with no bleeding nor hematoma noted. Patient assisted to ambulate with this nurse.

## 2022-12-08 ENCOUNTER — APPOINTMENT (OUTPATIENT)
Dept: GENERAL RADIOLOGY | Age: 82
End: 2022-12-08
Attending: INTERNAL MEDICINE
Payer: MEDICARE

## 2022-12-08 ENCOUNTER — CARE COORDINATION (OUTPATIENT)
Dept: CARE COORDINATION | Age: 82
End: 2022-12-08

## 2022-12-08 VITALS
HEART RATE: 70 BPM | SYSTOLIC BLOOD PRESSURE: 137 MMHG | RESPIRATION RATE: 15 BRPM | OXYGEN SATURATION: 91 % | TEMPERATURE: 98.2 F | WEIGHT: 168.43 LBS | HEIGHT: 60 IN | BODY MASS INDEX: 33.07 KG/M2 | DIASTOLIC BLOOD PRESSURE: 61 MMHG

## 2022-12-08 LAB
ANION GAP SERPL CALCULATED.3IONS-SCNC: 12 MMOL/L (ref 4–16)
BUN BLDV-MCNC: 17 MG/DL (ref 6–23)
CALCIUM SERPL-MCNC: 8.8 MG/DL (ref 8.3–10.6)
CHLORIDE BLD-SCNC: 99 MMOL/L (ref 99–110)
CO2: 26 MMOL/L (ref 21–32)
CREAT SERPL-MCNC: 0.8 MG/DL (ref 0.6–1.1)
EKG ATRIAL RATE: 70 BPM
EKG DIAGNOSIS: NORMAL
EKG P AXIS: 48 DEGREES
EKG P-R INTERVAL: 162 MS
EKG Q-T INTERVAL: 394 MS
EKG QRS DURATION: 108 MS
EKG QTC CALCULATION (BAZETT): 425 MS
EKG R AXIS: -28 DEGREES
EKG T AXIS: 30 DEGREES
EKG VENTRICULAR RATE: 70 BPM
GFR SERPL CREATININE-BSD FRML MDRD: >60 ML/MIN/1.73M2
GLUCOSE BLD-MCNC: 117 MG/DL (ref 70–99)
GLUCOSE BLD-MCNC: 127 MG/DL (ref 70–99)
GLUCOSE BLD-MCNC: 130 MG/DL (ref 70–99)
HCT VFR BLD CALC: 30.7 % (ref 37–47)
HEMOGLOBIN: 10.2 GM/DL (ref 12.5–16)
MCH RBC QN AUTO: 31 PG (ref 27–31)
MCHC RBC AUTO-ENTMCNC: 33.2 % (ref 32–36)
MCV RBC AUTO: 93.3 FL (ref 78–100)
PDW BLD-RTO: 12.5 % (ref 11.7–14.9)
PLATELET # BLD: 202 K/CU MM (ref 140–440)
PMV BLD AUTO: 9.4 FL (ref 7.5–11.1)
POTASSIUM SERPL-SCNC: 4.2 MMOL/L (ref 3.5–5.1)
RBC # BLD: 3.29 M/CU MM (ref 4.2–5.4)
SODIUM BLD-SCNC: 137 MMOL/L (ref 135–145)
WBC # BLD: 7.6 K/CU MM (ref 4–10.5)

## 2022-12-08 PROCEDURE — 36415 COLL VENOUS BLD VENIPUNCTURE: CPT

## 2022-12-08 PROCEDURE — 99238 HOSP IP/OBS DSCHRG MGMT 30/<: CPT | Performed by: INTERNAL MEDICINE

## 2022-12-08 PROCEDURE — 2580000003 HC RX 258: Performed by: INTERNAL MEDICINE

## 2022-12-08 PROCEDURE — 6370000000 HC RX 637 (ALT 250 FOR IP): Performed by: INTERNAL MEDICINE

## 2022-12-08 PROCEDURE — 80048 BASIC METABOLIC PNL TOTAL CA: CPT

## 2022-12-08 PROCEDURE — 71046 X-RAY EXAM CHEST 2 VIEWS: CPT

## 2022-12-08 PROCEDURE — 82962 GLUCOSE BLOOD TEST: CPT

## 2022-12-08 PROCEDURE — 85027 COMPLETE CBC AUTOMATED: CPT

## 2022-12-08 PROCEDURE — 93010 ELECTROCARDIOGRAM REPORT: CPT | Performed by: INTERNAL MEDICINE

## 2022-12-08 PROCEDURE — 94761 N-INVAS EAR/PLS OXIMETRY MLT: CPT

## 2022-12-08 RX ORDER — ATORVASTATIN CALCIUM 40 MG/1
40 TABLET, FILM COATED ORAL DAILY
Qty: 30 TABLET | Refills: 3 | Status: SHIPPED | OUTPATIENT
Start: 2022-12-08

## 2022-12-08 RX ORDER — GUAIFENESIN/DEXTROMETHORPHAN 100-10MG/5
5 SYRUP ORAL EVERY 4 HOURS PRN
Qty: 120 ML | Refills: 0 | Status: SHIPPED | OUTPATIENT
Start: 2022-12-08 | End: 2022-12-18

## 2022-12-08 RX ORDER — LOSARTAN POTASSIUM 100 MG/1
50 TABLET ORAL DAILY
Qty: 90 TABLET | Refills: 1 | Status: SHIPPED
Start: 2022-12-08

## 2022-12-08 RX ADMIN — Medication 1 TABLET: at 10:08

## 2022-12-08 RX ADMIN — ATORVASTATIN CALCIUM 40 MG: 40 TABLET, FILM COATED ORAL at 10:08

## 2022-12-08 RX ADMIN — AMLODIPINE BESYLATE 5 MG: 5 TABLET ORAL at 10:08

## 2022-12-08 RX ADMIN — HYDROCHLOROTHIAZIDE 25 MG: 25 TABLET ORAL at 10:08

## 2022-12-08 RX ADMIN — GUAIFENESIN AND DEXTROMETHORPHAN 5 ML: 100; 10 SYRUP ORAL at 04:40

## 2022-12-08 RX ADMIN — GUAIFENESIN AND DEXTROMETHORPHAN 5 ML: 100; 10 SYRUP ORAL at 11:00

## 2022-12-08 RX ADMIN — LOSARTAN POTASSIUM 50 MG: 25 TABLET, FILM COATED ORAL at 10:08

## 2022-12-08 RX ADMIN — FOLIC ACID 1 MG: 1 TABLET ORAL at 10:08

## 2022-12-08 RX ADMIN — ASPIRIN 81 MG: 81 TABLET, CHEWABLE ORAL at 10:08

## 2022-12-08 RX ADMIN — SODIUM CHLORIDE: 9 INJECTION, SOLUTION INTRAVENOUS at 02:14

## 2022-12-08 RX ADMIN — PANTOPRAZOLE SODIUM 40 MG: 40 TABLET, DELAYED RELEASE ORAL at 10:08

## 2022-12-08 RX ADMIN — HYDRALAZINE HYDROCHLORIDE 25 MG: 25 TABLET, FILM COATED ORAL at 10:08

## 2022-12-08 RX ADMIN — METOPROLOL TARTRATE 50 MG: 50 TABLET, FILM COATED ORAL at 10:08

## 2022-12-08 RX ADMIN — CLOPIDOGREL BISULFATE 75 MG: 75 TABLET ORAL at 10:08

## 2022-12-08 ASSESSMENT — PAIN SCALES - GENERAL: PAINLEVEL_OUTOF10: 0

## 2022-12-08 NOTE — DISCHARGE SUMMARY
DISCHARGE SUMMARY      Patient ID:  Bethel Christie  [id number] y.o. 1940    Admit date: 12/7/2022    Discharge date:      Admitting Physician: Elias Ozuna MD     Discharge Physician: Elias Ozuna MD     Admission Diagnoses: ASCVD (arteriosclerotic cardiovascular disease) [I25.10]    Discharge Diagnoses:   Patient Active Problem List   Diagnosis    Peripheral neuropathy ([address]    ASCVD (arteriosclerotic cardiovascular disease)    Essential hypertension    Rheumatoid arthritis ([address]    Hyperlipidemia    Osteoarthritis, knee    Carotid stenosis, left    IFG (impaired fasting glucose)    SCC (squamous cell carcinoma), leg    Memory loss    Diabetes mellitus with neuropathy ([address]    Cerebrovascular accident (CVA) due to thrombosis of cerebral artery ([address]    Coronary artery disease involving native coronary artery of native heart without angina pectoris    Dysarthria due to recent cerebral infarction    Oropharyngeal dysphagia    Gait disturbance    Thyroid nodule    Type 2 diabetes mellitus with diabetic neuropathy, without long-term current use of insulin ([address]    Diabetic nephropathy associated with type 2 diabetes mellitus (HCC)    DDD (degenerative disc disease), cervical    DDD (degenerative disc disease), lumbar    S/P colonoscopic polypectomy    COVID-19 virus infection    Headache        Discharged Condition: good    Hospital Course: Bethel Christie who  is a 79 y. 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), COVID-19 virus infection, CTS (carpal tunnel syndrome), DDD (degenerative disc disease), cervical, DDD (degenerative disc disease), cervical, DDD (degenerative disc disease), lumbar, Degenerative disc disease, cervical, Diabetes mellitus with neuropathy ([facility] Diffuse cystic mastopathy, GERD without esophagitis, H/O cardiac catheterization, H/O cardiovascular stress test, H/O Doppler ultrasound, H/O echocardiogram, H/O mammogram, H/O tilt table evaluation, Hyperlipidemia, Hypertension, Memory loss, Neuropathy, Osteoarthritis, knee, Peripheral neuropathy, Postsurgical menopause, Rheumatoid arthritis(714.0), S/P colonoscopic polypectomy, SCC (squamous cell carcinoma), leg, Thyroid nodule, TMJ (temporomandibular joint syndrome), and Unspecified cerebral artery occlusion with cerebral infarction. Past surgical history:   has a past surgical history that includes Carpal tunnel release; cyst removal (1963); Hysterectomy, total abdominal (1963); Tonsillectomy (1972); Neck surgery (1973); Breast biopsy (1993); Finger surgery (1998); Percutaneous Transluminal Coronary Angio (05/2005); Knee arthroscopy (02/21/2012); Vein Surgery; Wrist surgery (2011); Skin cancer excision (10/16/2013); Knee arthroscopy (Left, 09/23/2014); Total knee arthroplasty (Left, 07/28/2015); Cataract removal with implant (Left, 02/06/2017); and Total knee arthroplasty (Left, 11/14/2017). Social History:   reports that she quit smoking about 55 years ago. Her smoking use included cigarettes. She started smoking about 69 years ago. She has a 3.50 pack-year smoking history. She has never used smokeless tobacco. She reports that she does not drink alcohol and does not use drugs. Family history:  family history includes Cancer in her father; Coronary Art Dis in her mother; Diabetes in her brother and mother; Heart Attack in her mother; Hypertension in her mother; Thyroid Disease in her daughter.     Consults: none    Significant Diagnostic Studies:   Echocardiography: not done  Stress test: Abnormal, suggestive of severe CAD  Labs:   Lab Results   Component Value Date    CREATININE 1.0 12/07/2022    BUN 19 12/07/2022     12/07/2022    K 4.4 12/07/2022     12/07/2022    CO2 25 12/07/2022      Lab Results   Component Value Date    WBC 6.1 12/07/2022    HGB 11.4 (L) 12/07/2022    HCT 34.6 (L) 12/07/2022    MCV 92.3 12/07/2022     12/07/2022      Lab Results   Component Value Date    INR 0.92 12/07/2022    PROTIME 11.9 12/07/2022      No results for input(s): TROPONINI in the last 72 hours. No results found for: BNP      Disposition: home    Patient Instructions:      Medication List        START taking these medications      guaiFENesin-dextromethorphan 100-10 MG/5ML syrup  Commonly known as: ROBITUSSIN DM  Take 5 mLs by mouth every 4 hours as needed for Cough     hydrOXYzine HCl 10 MG tablet  Commonly known as: ATARAX     nitroGLYCERIN 0.4 MG SL tablet  Commonly known as: NITROSTAT  DISSOLVE 1 TABLET UNDER THE TONGUE EVERY 5 MINUTES AS NEEDED FOR CHEST PAIN. MAX OF 3 DOSES IN 15 MINUTES. CHANGE how you take these medications      * atorvastatin 40 MG tablet  Commonly known as: LIPITOR  Take 0.5 tablets by mouth daily  What changed: Another medication with the same name was added. Make sure you understand how and when to take each. * atorvastatin 40 MG tablet  Commonly known as: LIPITOR  Take 1 tablet by mouth daily  What changed: You were already taking a medication with the same name, and this prescription was added. Make sure you understand how and when to take each. Replaces: simvastatin 20 MG tablet     losartan 100 MG tablet  Commonly known as: COZAAR  Take 0.5 tablets by mouth daily  What changed: See the new instructions. * This list has 2 medication(s) that are the same as other medications prescribed for you. Read the directions carefully, and ask your doctor or other care provider to review them with you.                 CONTINUE taking these medications      acetaminophen 325 MG tablet  Commonly known as: TYLENOL  Take 2 tablets by mouth every 4 hours as needed for Pain     amLODIPine 5 MG tablet  Commonly known as: NORVASC  Take 1 tablet by mouth daily     aspirin 81 MG tablet     Biotin 300 MCG Tabs  Take 1 tablet by mouth daily     calcium carbonate-vitamin D 600-200 MG-UNIT Tabs     clopidogrel 75 MG tablet  Commonly known as: PLAVIX  Take 1 tablet by mouth daily     diclofenac sodium 1 % Gel  Commonly known as: VOLTAREN  Apply 2 g topically 4 times daily     Fish Oil Burp-Less 1000 MG Caps     fluticasone 50 MCG/ACT nasal spray  Commonly known as: Flonase  2 sprays by Nasal route daily into each nostril every day     folic acid 818 MCG tablet  Commonly known as: FOLVITE     gabapentin 300 MG capsule  Commonly known as: Neurontin  Take 1 capsule by mouth in the morning and 1 capsule before bedtime. Do all this for 90 days. hydrALAZINE 25 MG tablet  Commonly known as: APRESOLINE  Take 1 tablet by mouth 2 times daily     hydroCHLOROthiazide 25 MG tablet  Commonly known as: HYDRODIURIL  TAKE 1 TABLET BY MOUTH DAILY     ketoconazole 2 % cream  Commonly known as: NIZORAL  Apply topically daily to toenail area     metFORMIN 500 MG tablet  Commonly known as: GLUCOPHAGE  Take 0.5 tablets by mouth in the morning.      metoprolol tartrate 50 MG tablet  Commonly known as: LOPRESSOR  TAKE 1 TABLET BY MOUTH TWICE DAILY     multivitamin per tablet     omeprazole 20 MG delayed release capsule  Commonly known as: PRILOSEC            STOP taking these medications      simvastatin 20 MG tablet  Commonly known as: ZOCOR  Replaced by: atorvastatin 40 MG tablet               Where to Get Your Medications        These medications were sent to [address] [place] [address], [facility]       Phone: [phone]   atorvastatin 40 MG tablet  guaiFENesin-dextromethorphan 100-10 MG/5ML syrup       Information about where to get these medications is not yet available    Ask your nurse or doctor about these medications  losartan 100 MG tablet         Follow-up with dr matias in 2 weeks.     Signed:  Erika Chavez MD, 12/8/2022, 6:12 AM

## 2022-12-08 NOTE — PROGRESS NOTES
Seen by cardiac rehab status post PTCA. Patient  awake, alert and sitting up in bed. I introduced myself as the cardiac rehab nurse as well as introducing her to the [address]   I explained to her that this program is a customized out patient program of exercise and education. I explained to the patient that Cardiac Rehab is designed to help improve your hearts future. Cardiac rehab is a medically supervised program designed to improve your cardiovascular health through educating about nutrition, exercise, and stress release. Stressed to her the importance of compliance with antiplatelet therapy. Discussed risk factor identification and modification. Teaching done on cardiac diet. Explained the benefits of regular exercise program and stressed the need for a long term commitment to heart healthy practices in controlling this chronic disease. RN in the room preparing patient for discharge at this time.

## 2022-12-08 NOTE — PROGRESS NOTES
Patient is stable post PCI labs are unremarkable can be discharged to be followed up as an outpatient

## 2022-12-08 NOTE — PROGRESS NOTES
Outpatient Pharmacy Progress Note for Meds-to-Beds    Total number of Prescriptions Filled: 0    Additional Documentation:  Patient has atorvastatin 40mg tablets at home and insurance states it is refill too soon (will not pay). Patient informed to use her tablets at home. The OTC cough and cold prescriptions was not covered by insurance. Patient stated she has some at home and deferred picking this up. Thank you for letting us serve your patients.   [facility]    [address], [address] [facility]    Phone: [phone]    Fax: [phone]

## 2022-12-08 NOTE — PROGRESS NOTES
Patient has a cough which was present on admission as well we will obtain a chest x-ray has no fever and the white count was normal on admission

## 2022-12-08 NOTE — CARE COORDINATION
Ambulatory Care Coordination Note  12/8/2022    ACC: Henry Marrero, RN      Spoke with patient briefly for ACM follow up. Patient reports that she is feeling really well s/p angioplasty. Instructed patient on the importance of compliance with medication; Provider follow up as directed. Patient confirms that she just got back from initial visit with Cardiac Rehab and is considering ongoing follow up. Instructed on the benefits of Cardiac Rehab for education ; ongoing disease management. Offered patient enrollment in the Remote Patient Monitoring (RPM) program for in-home monitoring: NA.    Further assessment deferred per patient request as she reports that she just arrived at her daughter's home. ACM contact information provided should questions arise. Plan for next outreach: Address support needs, symptom management    Lab Results       None            Care Coordination Interventions    Referral from Primary Care Provider: No  Suggested Interventions and Community Resources  Medication Assistance Program: In Process  Medi Set or Pill Pack: Declined  Social Work: In Process  Zone Management Tools: In Process          Goals Addressed                   This Visit's Progress     Community Resource Goal   No change     I will complete referral to community agency Medication Assistance for assistance. Barriers: overwhelmed by complexity of regimen  Plan for overcoming my barriers:  Patient will complete Med Assist application as directed. Confidence: 8/10  Anticipated Goal Completion Date:  12/21/22 9/21/22:  Med Assist referral made. Conditions and Symptoms   No change     I will schedule office visits, as directed by my provider. I will keep my appointment or reschedule if I have to cancel. I will notify my provider of any barriers to my plan of care. I will follow my Zone Management tool to seek urgent or emergent care.   I will notify my provider of any symptoms that indicate a worsening of my condition. Barriers: overwhelmed by complexity of regimen and lack of education  Plan for overcoming my barriers: ACM informed pt to call using the Zone Tool for Diabetes. Pt is also going to stop eating all lunch meat and adding no salt to her meals for one week and check her B/P and see if B/Ps decrease. Confidence: 7/10  Anticipated Goal Completion Date: 12/21/22                Prior to Admission medications    Medication Sig Start Date End Date Taking? Authorizing Provider   atorvastatin (LIPITOR) 40 MG tablet Take 1 tablet by mouth daily 12/8/22   Mallika Fowler MD   losartan (COZAAR) 100 MG tablet Take 0.5 tablets by mouth daily 12/8/22   Mallika Fowler MD   guaiFENesin-dextromethorphan (ROBITUSSIN DM) 100-10 MG/5ML syrup Take 5 mLs by mouth every 4 hours as needed for Cough 12/8/22 12/18/22  Mallika Fowler MD   atorvastatin (LIPITOR) 40 MG tablet Take 0.5 tablets by mouth daily 11/30/22   Mallika Fowler MD   metoprolol tartrate (LOPRESSOR) 50 MG tablet TAKE 1 TABLET BY MOUTH TWICE DAILY 9/23/22   Fabiola Cr MD   hydroCHLOROthiazide (HYDRODIURIL) 25 MG tablet TAKE 1 TABLET BY MOUTH DAILY 9/23/22   Fabiola Cr MD   metFORMIN (GLUCOPHAGE) 500 MG tablet Take 0.5 tablets by mouth in the morning. 8/11/22 11/9/22  Fabiola Cr MD   gabapentin (NEURONTIN) 300 MG capsule Take 1 capsule by mouth in the morning and 1 capsule before bedtime. Do all this for 90 days.  8/11/22 11/9/22  Fabiola Cr MD   acetaminophen (TYLENOL) 325 MG tablet Take 2 tablets by mouth every 4 hours as needed for Pain 8/5/22   Neo Valdez MD   omeprazole (PRILOSEC) 20 MG delayed release capsule Take 20 mg by mouth four times a week itzel Gr thur, sat    Historical MD [place]   ketoconazole (NIZORAL) 2 % cream Apply topically daily to toenail area 5/11/22   Fabiola Cr MD   hydrOXYzine (ATARAX) 10 MG tablet hydroxyzine HCl 10 mg tablet   TAKE 1 TO 2 TABLETS BY MOUTH EVERY NIGHT 1 HOUR BEFORE BEDTIME FOR ITCHING    Historical Provider, MD   hydrALAZINE (APRESOLINE) 25 MG tablet Take 1 tablet by mouth 2 times daily 2/7/22   Xavier Lin MD   clopidogrel (PLAVIX) 75 MG tablet Take 1 tablet by mouth daily 2/7/22   Xavier Lin MD   amLODIPine (NORVASC) 5 MG tablet Take 1 tablet by mouth daily 2/7/22   Xavier Lin MD   nitroGLYCERIN (NITROSTAT) 0.4 MG SL tablet DISSOLVE 1 TABLET UNDER THE TONGUE EVERY 5 MINUTES AS NEEDED FOR CHEST PAIN. MAX OF 3 DOSES IN 15 MINUTES. 6/8/21   Xavier Lin MD   Biotin 300 MCG TABS Take 1 tablet by mouth daily 3/10/21   Xavier Lin MD   folic acid (FOLVITE) 704 MCG tablet Take 800 mcg by mouth daily    Historical Provider, MD   diclofenac sodium 1 % GEL Apply 2 g topically 4 times daily 12/3/19   Xavier Lin MD   fluticasone Shantell Broach) 50 MCG/ACT nasal spray 2 sprays by Nasal route daily into each nostril every day 5/18/15   Xavier Lin MD   aspirin 81 MG tablet Take 81 mg by mouth 2 times daily     Historical Provider, MD   multivitamin [facility]) per tablet Take 1 tablet by mouth daily. Historical Provider, MD   calcium carbonate-vitamin D 600-200 MG-UNIT TABS Take 1 tablet by mouth nightly     Historical Provider, MD   Omega-3 Fatty Acids (FISH OIL BURP-LESS) 1000 MG CAPS Take 1 tablet by mouth 2 times daily.       Historical Provider, MD       Future Appointments   Date Time Provider Annmarie Spivey   12/13/2022  1:30 PM Vijaya Pelaez MD [facility]   12/20/2022  1:00 PM Lisa Pinedajulio cesar [facility] [address] [facility]   2/13/2023 11:00 AM Xavier Lin MD [address] [facility] [address]  [facility]   5/22/2023  8:50 AM Vijaya Pelaez MD [facility]    and   General Assessment    Do you have any symptoms that are causing concern?: No

## 2022-12-09 ENCOUNTER — TELEPHONE (OUTPATIENT)
Dept: INTERNAL MEDICINE CLINIC | Age: 82
End: 2022-12-09

## 2022-12-09 ENCOUNTER — CARE COORDINATION (OUTPATIENT)
Dept: CASE MANAGEMENT | Age: 82
End: 2022-12-09

## 2022-12-09 NOTE — TELEPHONE ENCOUNTER
Care Transitions Initial Follow Up Call    Outreach made within 2 business days of discharge: Yes    Patient: Albaro Hammond Patient : 1940   MRN: [id number]  Reason for Admission: There are no discharge diagnoses documented for the most recent discharge. Discharge Date: 22       Spoke with: PATIENT    Discharge department/facility: [facility]    TCM Interactive Patient Contact:  Was patient able to fill all prescriptions: Yes  Was patient instructed to bring all medications to the follow-up visit: Yes  Is patient taking all medications as directed in the discharge summary?  Yes  Does patient understand their discharge instructions: Yes  Does patient have questions or concerns that need addressed prior to 7-14 day follow up office visit: no    Scheduled appointment with PCP within 7-14 days    Follow Up  Future Appointments   Date Time Provider Annmarie Spivey   2022  1:30 PM Jessica Briones MD [facility]   2022  1:30 PM Balaji Garnett MD [facility] IM [facility]   2022  1:00 PM [facility]   2023 11:00 AM Balaji Garnett MD [facility] IM [facility]   2023  8:50 AM Jessica Briones MD [facility]       Erika Cohn MA

## 2022-12-09 NOTE — PROGRESS NOTES
IV line removed, discharged instruction given to the patient and discharge paper, all questions answered. All patient belongings taken by the patient. Wheeled down during discharged.

## 2022-12-09 NOTE — CARE COORDINATION
CTN episode opened in error. CTN episode closed as this pt is followed by Domonique Linares.      Tabby Rocha RN [phone]

## 2022-12-13 ENCOUNTER — OFFICE VISIT (OUTPATIENT)
Dept: CARDIOLOGY CLINIC | Age: 82
End: 2022-12-13
Payer: MEDICARE

## 2022-12-13 VITALS
DIASTOLIC BLOOD PRESSURE: 62 MMHG | SYSTOLIC BLOOD PRESSURE: 122 MMHG | WEIGHT: 168.8 LBS | BODY MASS INDEX: 33.14 KG/M2 | HEIGHT: 60 IN

## 2022-12-13 DIAGNOSIS — R07.2 PRECORDIAL CHEST PAIN: ICD-10-CM

## 2022-12-13 DIAGNOSIS — I10 ESSENTIAL HYPERTENSION: Primary | ICD-10-CM

## 2022-12-13 PROCEDURE — 1090F PRES/ABSN URINE INCON ASSESS: CPT | Performed by: INTERNAL MEDICINE

## 2022-12-13 PROCEDURE — G8427 DOCREV CUR MEDS BY ELIG CLIN: HCPCS | Performed by: INTERNAL MEDICINE

## 2022-12-13 PROCEDURE — G8484 FLU IMMUNIZE NO ADMIN: HCPCS | Performed by: INTERNAL MEDICINE

## 2022-12-13 PROCEDURE — 1111F DSCHRG MED/CURRENT MED MERGE: CPT | Performed by: INTERNAL MEDICINE

## 2022-12-13 PROCEDURE — 99214 OFFICE O/P EST MOD 30 MIN: CPT | Performed by: INTERNAL MEDICINE

## 2022-12-13 PROCEDURE — G8417 CALC BMI ABV UP PARAM F/U: HCPCS | Performed by: INTERNAL MEDICINE

## 2022-12-13 PROCEDURE — G8399 PT W/DXA RESULTS DOCUMENT: HCPCS | Performed by: INTERNAL MEDICINE

## 2022-12-13 PROCEDURE — 1036F TOBACCO NON-USER: CPT | Performed by: INTERNAL MEDICINE

## 2022-12-13 PROCEDURE — 1123F ACP DISCUSS/DSCN MKR DOCD: CPT | Performed by: INTERNAL MEDICINE

## 2022-12-13 PROCEDURE — 3078F DIAST BP <80 MM HG: CPT | Performed by: INTERNAL MEDICINE

## 2022-12-13 PROCEDURE — 93000 ELECTROCARDIOGRAM COMPLETE: CPT | Performed by: INTERNAL MEDICINE

## 2022-12-13 PROCEDURE — 3074F SYST BP LT 130 MM HG: CPT | Performed by: INTERNAL MEDICINE

## 2022-12-13 NOTE — PROGRESS NOTES
CARDIOLOGY NOTE      12/13/2022    RE: Flakito Sandhu  (1940)                               TO:  Dr. Carolann Chavez MD            CHIEF COMPLAINT   Tom Kamara is a 79 y.o. female who was seen today for management of coronary disease                                  HERE POST PCI OF LAD AND CX  HPI:                   Pt has h/o coronary artery disease, hypertension, hyperlipidemia, non-insulin-dependent diabetes seen today for follow-up.   PCI  Flakito Sandhu has the following history recorded in care path:  Patient Active Problem List    Diagnosis Date Noted    Cerebrovascular accident (CVA) due to thrombosis of cerebral artery ([facility] [address] 10/12/2016    Headache 08/04/2022    Essential hypertension     Thyroid nodule     Dysarthria due to recent cerebral infarction 10/15/2016    Oropharyngeal dysphagia 10/15/2016    Gait disturbance 10/15/2016    Coronary artery disease involving native coronary artery of native heart without angina pectoris     Diabetes mellitus with neuropathy (HCC)     SCC (squamous cell carcinoma), leg     Memory loss     IFG (impaired fasting glucose)     Carotid stenosis, left     Hyperlipidemia     Osteoarthritis, knee     ASCVD (arteriosclerotic cardiovascular disease)     Rheumatoid arthritis (HCC)     Peripheral neuropathy ([address]     COVID-19 virus infection     S/P colonoscopic polypectomy 11/20/2020    DDD (degenerative disc disease), cervical     DDD (degenerative disc disease), lumbar     Diabetic nephropathy associated with type 2 diabetes mellitus ([facility] [address] 02/27/2019    Type 2 diabetes mellitus with diabetic neuropathy, without long-term current use of insulin (HCC) 11/03/2017     Current Outpatient Medications   Medication Sig Dispense Refill    atorvastatin (LIPITOR) 40 MG tablet Take 1 tablet by mouth daily 30 tablet 3    losartan (COZAAR) 100 MG tablet Take 0.5 tablets by mouth daily 90 tablet 1    guaiFENesin-dextromethorphan (ROBITUSSIN DM) 100-10 MG/5ML syrup Take 5 mLs by mouth every 4 hours as needed for Cough 120 mL 0    atorvastatin (LIPITOR) 40 MG tablet Take 0.5 tablets by mouth daily 90 tablet 1    metoprolol tartrate (LOPRESSOR) 50 MG tablet TAKE 1 TABLET BY MOUTH TWICE DAILY 180 tablet 1    hydroCHLOROthiazide (HYDRODIURIL) 25 MG tablet TAKE 1 TABLET BY MOUTH DAILY 90 tablet 1    metFORMIN (GLUCOPHAGE) 500 MG tablet Take 0.5 tablets by mouth in the morning. 45 tablet 1    gabapentin (NEURONTIN) 300 MG capsule Take 1 capsule by mouth in the morning and 1 capsule before bedtime. Do all this for 90 days. 180 capsule 1    acetaminophen (TYLENOL) 325 MG tablet Take 2 tablets by mouth every 4 hours as needed for Pain 120 tablet 3    omeprazole (PRILOSEC) 20 MG delayed release capsule Take 20 mg by mouth four times a week Sun, tue, thur, sat      ketoconazole (NIZORAL) 2 % cream Apply topically daily to toenail area 30 g 1    hydrOXYzine (ATARAX) 10 MG tablet hydroxyzine HCl 10 mg tablet   TAKE 1 TO 2 TABLETS BY MOUTH EVERY NIGHT 1 HOUR BEFORE BEDTIME FOR ITCHING      hydrALAZINE (APRESOLINE) 25 MG tablet Take 1 tablet by mouth 2 times daily 180 tablet 1    clopidogrel (PLAVIX) 75 MG tablet Take 1 tablet by mouth daily 90 tablet 1    amLODIPine (NORVASC) 5 MG tablet Take 1 tablet by mouth daily 90 tablet 1    nitroGLYCERIN (NITROSTAT) 0.4 MG SL tablet DISSOLVE 1 TABLET UNDER THE TONGUE EVERY 5 MINUTES AS NEEDED FOR CHEST PAIN. MAX OF 3 DOSES IN 15 MINUTES. 25 tablet 0    Biotin 300 MCG TABS Take 1 tablet by mouth daily 30 tablet 3    folic acid (FOLVITE) 681 MCG tablet Take 800 mcg by mouth daily      diclofenac sodium 1 % GEL Apply 2 g topically 4 times daily 100 g 2    fluticasone (FLONASE) 50 MCG/ACT nasal spray 2 sprays by Nasal route daily into each nostril every day 1 Bottle 3    aspirin 81 MG tablet Take 81 mg by mouth 2 times daily       multivitamin (THERAGRAN) per tablet Take 1 tablet by mouth daily.         calcium carbonate-vitamin D 600-200 MG-UNIT TABS Take 1 tablet by mouth nightly       Omega-3 Fatty Acids (FISH OIL BURP-LESS) 1000 MG CAPS Take 1 tablet by mouth 2 times daily. No current facility-administered medications for this visit. Allergies: Actonel [risedronate sodium], Ciprofloxacin, Darvocet [propoxyphene n-acetaminophen], Lopid [gemfibrozil], Mevacor [lovastatin], Neosporin [neomycin-polymyx-gramicid], Nsaids, and Pravachol [pravastatin sodium]  Past Medical History:   Diagnosis Date    AK (actinic keratosis)     Candace Hernandez following    CAD (coronary artery disease)     5/2005 [facility]     skin    Carotid stenosis     Carotid stenosis, left     monitored by Dr Quinones December- 50% stenosis noted on CTA 10/2016    Coronary artery disease     Dr Quinones December    Cough secondary to angiotensin converting enzyme inhibitor (ACE-I)     inactive    COVID-19 virus infection     tested pos in Nov 2020- mild cough. now resolved.     CTS (carpal tunnel syndrome)     inactive-S/P right CTS surg 4/2001- Dr Kelley Arthur    DDD (degenerative disc disease), cervical     DDD (degenerative disc disease), cervical     DDD (degenerative disc disease), lumbar     Degenerative disc disease, cervical     Diabetes mellitus with neuropathy ([facility] [address]     Dr Alvarado/sheyla, - ON NEURONTIN    Diffuse cystic mastopathy     GERD without esophagitis     H/O cardiac catheterization 05/2005 5/16/05 Circ stent 80% prior 0% post, EF 60%    H/O cardiovascular stress test 12/29/2020    ef 60% normal lexiscan    H/O Doppler ultrasound 10/2011, 8/09    carotid 10/4/11 moderat stenosis left internal carotid atery est 50-69%, progression in stenotic changes left internal carotid artery, right WNL    H/O echocardiogram 12/29/2020    EF 55-60% mild TR AR and pulmonic regurg    H/O mammogram     1/16- recheck 1/17    H/O tilt table evaluation 07/2009 7/20/09 WNL    Hyperlipidemia     Hypertension     Memory loss     MMSE 23/30-- 11/15/13    Neuropathy     Osteoarthritis, knee Peripheral neuropathy     burning discomfort in feet. Postsurgical menopause     Rheumatoid arthritis(714.0)     S/P colonoscopic polypectomy 2020    Dr Tino Gustafson, recheck 3 yrs- SESSILE SERATED POLYP    SCC (squamous cell carcinoma), leg     Dr Kelley Arthur removed fr legs 10/16    Thyroid nodule     on u/s 10/2016, recheck May 2017- stable--- RECHECK MAY 2018 RECOMMENDED    TMJ (temporomandibular joint syndrome)     Unspecified cerebral artery occlusion with cerebral infarction     2016-Right hemiparesis, aphasia, dysphagia, gait disturbance,     Past Surgical History:   Procedure Laterality Date    BREAST BIOPSY      bilateral    CARPAL TUNNEL RELEASE      Right wrist - 2011-Dr Parmar    CATARACT REMOVAL WITH IMPLANT Left 2017    Dr Brizuela High    umbilical    [address]    Right thumb- giant cell tumor    HYSTERECTOMY, TOTAL ABDOMINAL (CERVIX REMOVED)  1963    KNEE ARTHROSCOPY  2012    Left knee- Dr Conley Linepolly ARTHROSCOPY Left 2014    Dr Sukhi Romano    ? PTCA  2005    PTCA with stent X2 - Dr Meek Hull  10/16/2013    both legs    TONSILLECTOMY  1972    TOTAL KNEE ARTHROPLASTY Left 2015    Dr Mariano Cordial Left 2017    Dr Jak Sabillon at [address] [place] [address] - Dr Toth [address]        As reviewed   Family History   Problem Relation Age of Onset    Coronary Art Dis Mother          of MI    Diabetes Mother     Heart Attack Mother     Hypertension Mother     Cancer Father     Thyroid Disease Daughter         hypothyroid    Diabetes Brother      Social History     Tobacco Use    Smoking status: Former     Packs/day: 0.25     Years: 14.00     Pack years: 3.50     Types: Cigarettes     Start date: 5     Quit date: 1967     Years since quittin.9    Smokeless tobacco: Never    Tobacco comments:     2/10/16   Substance Use Topics    Alcohol use:  No Comment: caffeine 1 cup of coffee a day        Objective:    Vitals:    12/13/22 1330   BP: 122/62   Site: Right Upper Arm   Position: Sitting   Cuff Size: Medium Adult   Weight: 168 lb 12.8 oz (76.6 kg)   Height: 5' (1.524 m)     /62 (Site: Right Upper Arm, Position: Sitting, Cuff Size: Medium Adult)   Ht 5' (1.524 m)   Wt 168 lb 12.8 oz (76.6 kg)   LMP  (LMP Unknown)   BMI 32.97 kg/m²     No flowsheet data found. Wt Readings from Last 3 Encounters:   12/13/22 168 lb 12.8 oz (76.6 kg)   12/08/22 168 lb 6.9 oz (76.4 kg)   11/30/22 165 lb (74.8 kg)     Body mass index is 32.97 kg/m². GENERAL - Alert, oriented, pleasant, in no apparent distress. EYES: No jaundice, no conjunctival pallor. SKIN: It is warm & dry. No rashes. No Echhymosis    HEENT - No clinically significant abnormalities seen. Neck - Supple. No jugular venous distention noted. No carotid bruits. Cardiovascular - Normal S1 and S2 without obvious murmur or gallop. Extremities - No cyanosis, clubbing, or significant edema. Pulmonary - No respiratory distress. No wheezes or rales. Abdomen - No masses, tenderness, or organomegaly. Musculoskeletal - No significant edema. No joint deformities. No muscle wasting. Neurologic - Cranial nerves II through XII are grossly intact. There were no gross focal neurologic abnormalities.     Lab Review   No results found for: CKTOTAL, CKMB, CKMBINDEX, TROPONINT  BNP:  No results found for: BNP  PT/INR:    Lab Results   Component Value Date    INR 0.92 12/07/2022     Lab Results   Component Value Date    LABA1C 6.1 11/30/2022    LABA1C 6.1 11/14/2022     Lab Results   Component Value Date    WBC 7.6 12/08/2022    HCT 30.7 (L) 12/08/2022    MCV 93.3 12/08/2022     12/08/2022     Lab Results   Component Value Date    CHOL 163 11/14/2022    TRIG 267 (H) 11/14/2022    HDL 38 (L) 11/14/2022    LDLCALC 72 11/14/2022    LDLDIRECT 84 08/04/2022     Lab Results   Component Value Date    ALT 15 11/14/2022    AST 17 11/14/2022     BMP:    Lab Results   Component Value Date/Time     12/08/2022 07:19 AM    K 4.2 12/08/2022 07:19 AM    CL 99 12/08/2022 07:19 AM    CO2 26 12/08/2022 07:19 AM    BUN 17 12/08/2022 07:19 AM    CREATININE 0.8 12/08/2022 07:19 AM     CMP:   Lab Results   Component Value Date/Time     12/08/2022 07:19 AM    K 4.2 12/08/2022 07:19 AM    CL 99 12/08/2022 07:19 AM    CO2 26 12/08/2022 07:19 AM    BUN 17 12/08/2022 07:19 AM    PROT 7.9 11/14/2022 07:38 AM    PROT 7.3 02/07/2013 08:23 PM     TSH:    Lab Results   Component Value Date/Time    TSH 2.00 02/04/2016 08:59 AM           Assessment & Plan:               -     CORONARY ARTERY DISEASE:  Asymptomatic     All available  tests in chart reviewed. Management discussed . Testing ordered  stress                On Plavix compliant with medicine will be continued    ETT and rehab           pci done of the LAD and circumflex feeling better however still had 1 or 2 episodes of mild chest discomfort EKG is nonspecific  We will get her for cardiac rehab and will start a regular treadmill stress test first    -  Hypertension: Patients blood pressure is normal. Patient is advised about low sodium diet. Present medical regimen will not be changed. Amlodipine l 5 mg p.o. daily p.o. twice daily, losartan 100 aspirin daily which will become               -  LIPID MANAGEMENT:  Importance of lipid levels discussed with patient   and patient was given dietary advice. NCEP- ATP III guidelines reviewed with patient. -   Changes  in medicines made: No     On simvastatin 20 mg p.o. daily omega-3 fatty acids       LDL is noted to be 84, triglycerides at 245                    -   DIABETES MELLITUS: Available pertinent lab data reviewed   and  patient was given dietary advice . Advised to check blood glucose level on a regular basis.       -   Changes  in medicines made: No        On Metformin hemoglobin A1c 6.3               Robe Gustafson MD [facility]    Please note this report has been partially produced using speech recognition software and may contain errors related to that system including errors in grammar, punctuation, and spelling, as well as words and phrases that may be inappropriate. If there are any questions or concerns please feel free to contact the dictating provider for clarification.

## 2022-12-19 ENCOUNTER — OFFICE VISIT (OUTPATIENT)
Dept: INTERNAL MEDICINE CLINIC | Age: 82
End: 2022-12-19

## 2022-12-19 VITALS
SYSTOLIC BLOOD PRESSURE: 122 MMHG | WEIGHT: 168 LBS | BODY MASS INDEX: 32.81 KG/M2 | OXYGEN SATURATION: 97 % | RESPIRATION RATE: 18 BRPM | HEART RATE: 58 BPM | DIASTOLIC BLOOD PRESSURE: 80 MMHG

## 2022-12-19 DIAGNOSIS — Z09 HOSPITAL DISCHARGE FOLLOW-UP: ICD-10-CM

## 2022-12-19 DIAGNOSIS — I25.10 CORONARY ARTERY DISEASE INVOLVING NATIVE CORONARY ARTERY OF NATIVE HEART WITHOUT ANGINA PECTORIS: Primary | ICD-10-CM

## 2022-12-19 DIAGNOSIS — J20.9 ACUTE BRONCHITIS, UNSPECIFIED ORGANISM: ICD-10-CM

## 2022-12-19 DIAGNOSIS — E11.40 TYPE 2 DIABETES MELLITUS WITH DIABETIC NEUROPATHY, WITHOUT LONG-TERM CURRENT USE OF INSULIN (HCC): ICD-10-CM

## 2022-12-19 DIAGNOSIS — I10 ESSENTIAL HYPERTENSION: ICD-10-CM

## 2022-12-19 RX ORDER — GUAIFENESIN AND CODEINE PHOSPHATE 100; 10 MG/5ML; MG/5ML
5 SOLUTION ORAL 4 TIMES DAILY PRN
Qty: 120 ML | Refills: 0 | Status: SHIPPED | OUTPATIENT
Start: 2022-12-19 | End: 2022-12-20

## 2022-12-19 RX ORDER — DOXYCYCLINE HYCLATE 100 MG
100 TABLET ORAL 2 TIMES DAILY
Qty: 14 TABLET | Refills: 0 | Status: SHIPPED | OUTPATIENT
Start: 2022-12-19 | End: 2022-12-26

## 2022-12-19 NOTE — PROGRESS NOTES
Post-Discharge Transitional Care  Follow Up      [employer]   YOB: 1940    Date of Office Visit:  12/19/2022  Date of Hospital Admission: 12/7/22  Date of Hospital Discharge: 12/8/22  Risk of hospital readmission (high >=14%. Medium >=10%) :Readmission Risk Score: 11.1      Care management risk score Rising risk (score 2-5) and Complex Care (Scores >=6): No Risk Score On File     Non face to face  following discharge, date last encounter closed (first attempt may have been earlier): 12/09/2022    Call initiated 2 business days of discharge: Yes    ASSESSMENT/PLAN:   Coronary artery disease involving native coronary artery of native heart without angina pectoris- NOW STABILIZED S/P STENT AND FOR POST PROCEDURE STRESS TEST TO BE SCHEDULE  Essential hypertension - The current medical regimen is effective;  continue present plan and medications. Type 2 diabetes mellitus with diabetic neuropathy, without long-term current use of insulin (HCC) - DM relatively well controlled and will continue current regimen. Acute bronchitis, unspecified organism- Consistent w presentation, rec rx as below and fluids, rest.  Pt to call back one week if not improving.      -     doxycycline hyclate (VIBRA-TABS) 100 MG tablet; Take 1 tablet by mouth 2 times daily for 7 days, Disp-14 tablet, R-0Normal  -     guaiFENesin-codeine (TUSSI-ORGANIDIN NR) 100-10 MG/5ML syrup; Take 5 mLs by mouth 4 times daily as needed for Cough or Congestion for up to 7 days. , Disp-120 mL, R-0Normal  Hospital discharge follow-up  -     VT DISCHARGE MEDS RECONCILED W/ CURRENT OUTPATIENT MED LIST    Medical Decision Making: high complexity  Return if symptoms worsen or fail to improve, for keep next as scheduled. Subjective:   HPI:  Follow up of Hospital problems/diagnosis(es): cad w unstable angina    Inpatient course: Discharge summary reviewed- see chart.   12/7 w stents LAD and L Circ and now no recurring Date of procedure is a 12/7/2022    Procedure  PTCA and stenting of the circumflex  PTCA and stenting of the proximal LAD  PTCA and stenting of the distal LAD     EBL  10 cc    Indication is  Abnormal stress with angina pectoris    Findings  Left main is a large-caliber vessel has a mild ostial stenosis  The circumflex had a 99% stenosis distal to the stent which was reduced to 0% with a angioplasty and stenting with lesion reduction from 99% to 0% with a 2.75 x 18 stent  The LAD calcified vessel in the proximal segment there was an 80% stenosis which was angioplastied and stented with a 2.25 x 18 stent  In the distal LAD there is another lesion which was angioplastied from 99% to 0% and a 2 oh by 18 stent was placed with excellent results    Assessment and plan  Successful angioplasty of the LAD and circumflex as mentioned above  Patient will be on DAPT for 1 year  Will be monitored overnight   sx cp or sob. Interval history/Current status: \since PCI LAD and Circ w/o sx cp and feels well. For stress testing later. Lipids- now on lipitor 40mg daily- rec to stop simvistatin now. DM- sugars stable on regimen as below. Lab Results   Component Value Date    LABA1C 6.1 11/30/2022    LABA1C 6.1 11/14/2022    LABA1C 6.2 08/04/2022     Lab Results   Component Value Date    GLUF 128 (H) 10/14/2016    LABMICR YES 06/14/2022    LDLCALC 72 11/14/2022    CREATININE 0.8 12/08/2022     Coughing since hospitalization but no fever or chills and CXR was clear. SINUS PRESSURE NOTED W MILD L EARACHE. NO FEVER OR CHILLS NOTED.     Patient Active Problem List   Diagnosis    Peripheral neuropathy (HCC)    ASCVD (arteriosclerotic cardiovascular disease)    Essential hypertension    Rheumatoid arthritis ([address]    Hyperlipidemia    Osteoarthritis, knee    Carotid stenosis, left    IFG (impaired fasting glucose)    SCC (squamous cell carcinoma), leg    Memory loss    Diabetes mellitus with neuropathy ([address]    Cerebrovascular accident (CVA) due to thrombosis of cerebral artery ([facility] [address]    Coronary artery disease involving native coronary artery of native heart without angina pectoris    Dysarthria due to recent cerebral infarction    Oropharyngeal dysphagia    Gait disturbance    Thyroid nodule    Type 2 diabetes mellitus with diabetic neuropathy, without long-term current use of insulin ([facility] [address]    Diabetic nephropathy associated with type 2 diabetes mellitus (HCC)    DDD (degenerative disc disease), cervical    DDD (degenerative disc disease), lumbar    S/P colonoscopic polypectomy    COVID-19 virus infection    Headache       Medications listed as ordered at the time of discharge from hospital     Medication List            Accurate as of December 19, 2022  1:47 PM. If you have any questions, ask your nurse or doctor. CONTINUE taking these medications      acetaminophen 325 MG tablet  Commonly known as: TYLENOL  Take 2 tablets by mouth every 4 hours as needed for Pain     amLODIPine 5 MG tablet  Commonly known as: NORVASC  Take 1 tablet by mouth daily     aspirin 81 MG tablet     * atorvastatin 40 MG tablet  Commonly known as: LIPITOR  Take 0.5 tablets by mouth daily     * atorvastatin 40 MG tablet  Commonly known as: LIPITOR  Take 1 tablet by mouth daily     Biotin 300 MCG Tabs  Take 1 tablet by mouth daily     calcium carbonate-vitamin D 600-200 MG-UNIT Tabs     clopidogrel 75 MG tablet  Commonly known as: PLAVIX  Take 1 tablet by mouth daily     diclofenac sodium 1 % Gel  Commonly known as: VOLTAREN  Apply 2 g topically 4 times daily     Fish Oil Burp-Less 1000 MG Caps     fluticasone 50 MCG/ACT nasal spray  Commonly known as: Flonase  2 sprays by Nasal route daily into each nostril every day     folic acid 728 MCG tablet  Commonly known as: FOLVITE     gabapentin 300 MG capsule  Commonly known as: Neurontin  Take 1 capsule by mouth in the morning and 1 capsule before bedtime. Do all this for 90 days.      hydrALAZINE 25 MG tablet  Commonly known as: APRESOLINE  Take 1 tablet by mouth 2 times daily     hydroCHLOROthiazide 25 MG tablet  Commonly known as: HYDRODIURIL  TAKE 1 TABLET BY MOUTH DAILY     hydrOXYzine HCl 10 MG tablet  Commonly known as: ATARAX     ketoconazole 2 % cream  Commonly known as: NIZORAL  Apply topically daily to toenail area     losartan 100 MG tablet  Commonly known as: COZAAR  Take 0.5 tablets by mouth daily     metFORMIN 500 MG tablet  Commonly known as: GLUCOPHAGE  Take 0.5 tablets by mouth in the morning. metoprolol tartrate 50 MG tablet  Commonly known as: LOPRESSOR  TAKE 1 TABLET BY MOUTH TWICE DAILY     multivitamin per tablet     nitroGLYCERIN 0.4 MG SL tablet  Commonly known as: NITROSTAT  DISSOLVE 1 TABLET UNDER THE TONGUE EVERY 5 MINUTES AS NEEDED FOR CHEST PAIN. MAX OF 3 DOSES IN 15 MINUTES.     omeprazole 20 MG delayed release capsule  Commonly known as: PRILOSEC           * This list has 2 medication(s) that are the same as other medications prescribed for you. Read the directions carefully, and ask your doctor or other care provider to review them with you.                     Medications marked \"taking\" at this time  Outpatient Medications Marked as Taking for the 12/19/22 encounter (Office Visit) with Eliseo Solitario MD   Medication Sig Dispense Refill    atorvastatin (LIPITOR) 40 MG tablet Take 1 tablet by mouth daily 30 tablet 3    losartan (COZAAR) 100 MG tablet Take 0.5 tablets by mouth daily 90 tablet 1    atorvastatin (LIPITOR) 40 MG tablet Take 0.5 tablets by mouth daily 90 tablet 1    metoprolol tartrate (LOPRESSOR) 50 MG tablet TAKE 1 TABLET BY MOUTH TWICE DAILY 180 tablet 1    hydroCHLOROthiazide (HYDRODIURIL) 25 MG tablet TAKE 1 TABLET BY MOUTH DAILY 90 tablet 1    acetaminophen (TYLENOL) 325 MG tablet Take 2 tablets by mouth every 4 hours as needed for Pain 120 tablet 3    omeprazole (PRILOSEC) 20 MG delayed release capsule Take 20 mg by mouth four times a week Sun, tue, thur, sat ketoconazole (NIZORAL) 2 % cream Apply topically daily to toenail area 30 g 1    hydrOXYzine (ATARAX) 10 MG tablet hydroxyzine HCl 10 mg tablet   TAKE 1 TO 2 TABLETS BY MOUTH EVERY NIGHT 1 HOUR BEFORE BEDTIME FOR ITCHING      hydrALAZINE (APRESOLINE) 25 MG tablet Take 1 tablet by mouth 2 times daily 180 tablet 1    clopidogrel (PLAVIX) 75 MG tablet Take 1 tablet by mouth daily 90 tablet 1    amLODIPine (NORVASC) 5 MG tablet Take 1 tablet by mouth daily 90 tablet 1    nitroGLYCERIN (NITROSTAT) 0.4 MG SL tablet DISSOLVE 1 TABLET UNDER THE TONGUE EVERY 5 MINUTES AS NEEDED FOR CHEST PAIN. MAX OF 3 DOSES IN 15 MINUTES. 25 tablet 0    Biotin 300 MCG TABS Take 1 tablet by mouth daily 30 tablet 3    folic acid (FOLVITE) 260 MCG tablet Take 800 mcg by mouth daily      diclofenac sodium 1 % GEL Apply 2 g topically 4 times daily 100 g 2    fluticasone (FLONASE) 50 MCG/ACT nasal spray 2 sprays by Nasal route daily into each nostril every day 1 Bottle 3    aspirin 81 MG tablet Take 81 mg by mouth 2 times daily       multivitamin (THERAGRAN) per tablet Take 1 tablet by mouth daily. calcium carbonate-vitamin D 600-200 MG-UNIT TABS Take 1 tablet by mouth nightly       Omega-3 Fatty Acids (FISH OIL BURP-LESS) 1000 MG CAPS Take 1 tablet by mouth 2 times daily. Medications patient taking as of now reconciled against medications ordered at time of hospital discharge: Yes    A comprehensive review of systems was negative except for what was noted in the HPI.     Objective:    BP (!) 158/78   Pulse 58   Resp 18   Wt 168 lb (76.2 kg)   LMP  (LMP Unknown)   SpO2 97%   BMI 32.81 kg/m²   General Appearance: alert and oriented to person, place and time, well developed and well- nourished, in no acute distress  Skin: warm and dry, no rash or erythema  Head: normocephalic and atraumatic  Eyes: pupils equal, round, and reactive to light, extraocular eye movements intact, conjunctivae normal  ENT: tympanic membrane, external ear and ear canal normal bilaterally, nose without deformity, nasal mucosa and turbinates normal without polyps  Neck: supple and non-tender without mass, no thyromegaly or thyroid nodules, no cervical lymphadenopathy  Pulmonary/Chest: TRACE RHONCHI NOTED W COUGHING, BUT NO WHEEZES OR RALES  Cardiovascular: normal rate, regular rhythm, normal S1 and S2, no murmurs, rubs, clicks, or gallops, distal pulses intact, no carotid bruits  Abdomen: soft, non-tender, non-distended, normal bowel sounds, no masses or organomegaly  Extremities: no cyanosis, clubbing or edema  Musculoskeletal: normal range of motion, no joint swelling, deformity or tenderness  Neurologic:   no cranial nerve deficit, gait, coordination and speech normal      An electronic signature was used to authenticate this note.   --Xavier Lin MD

## 2022-12-20 ENCOUNTER — PROCEDURE VISIT (OUTPATIENT)
Dept: CARDIOLOGY CLINIC | Age: 82
End: 2022-12-20
Payer: MEDICARE

## 2022-12-20 ENCOUNTER — TELEPHONE (OUTPATIENT)
Dept: INTERNAL MEDICINE CLINIC | Age: 82
End: 2022-12-20

## 2022-12-20 DIAGNOSIS — J20.9 ACUTE BRONCHITIS, UNSPECIFIED ORGANISM: Primary | ICD-10-CM

## 2022-12-20 DIAGNOSIS — I65.22 STENOSIS OF LEFT CAROTID ARTERY: ICD-10-CM

## 2022-12-20 PROCEDURE — 93880 EXTRACRANIAL BILAT STUDY: CPT | Performed by: INTERNAL MEDICINE

## 2022-12-20 RX ORDER — BENZONATATE 100 MG/1
100 CAPSULE ORAL 3 TIMES DAILY PRN
Qty: 30 CAPSULE | Refills: 0 | Status: SHIPPED | OUTPATIENT
Start: 2022-12-20 | End: 2022-12-30

## 2022-12-20 NOTE — TELEPHONE ENCOUNTER
[facility] and [facility] do not carry the guaifenesin-codeine at all. [facility] does not have any is stock. Do you have an alternative suggestion? Please advise.

## 2022-12-21 ENCOUNTER — TELEPHONE (OUTPATIENT)
Dept: CARDIOLOGY CLINIC | Age: 82
End: 2022-12-21

## 2022-12-21 ENCOUNTER — CARE COORDINATION (OUTPATIENT)
Dept: CARE COORDINATION | Age: 82
End: 2022-12-21

## 2022-12-21 NOTE — TELEPHONE ENCOUNTER
Called and spoke with pt. Please inform of results- has moderate disease of the left internal carotid- medical management with asa and atorvastatin: recheck in 6 mo.     Next appt scheduled for 6/21 @ 9am.

## 2022-12-21 NOTE — TELEPHONE ENCOUNTER
Called pt and left VM to call back.       Please inform of results- has moderate disease of the left internal carotid- medical management with asa and atorvastatin: recheck in 6 mo

## 2022-12-22 ENCOUNTER — PROCEDURE VISIT (OUTPATIENT)
Dept: CARDIOLOGY CLINIC | Age: 82
End: 2022-12-22

## 2022-12-22 DIAGNOSIS — I10 ESSENTIAL HYPERTENSION: ICD-10-CM

## 2022-12-22 DIAGNOSIS — E11.40 TYPE 2 DIABETES MELLITUS WITH DIABETIC NEUROPATHY, WITHOUT LONG-TERM CURRENT USE OF INSULIN (HCC): ICD-10-CM

## 2022-12-22 DIAGNOSIS — R07.2 PRECORDIAL CHEST PAIN: ICD-10-CM

## 2022-12-22 DIAGNOSIS — I63.30 CEREBROVASCULAR ACCIDENT (CVA) DUE TO THROMBOSIS OF CEREBRAL ARTERY (HCC): ICD-10-CM

## 2022-12-22 RX ORDER — HYDRALAZINE HYDROCHLORIDE 25 MG/1
TABLET, FILM COATED ORAL
Qty: 180 TABLET | Refills: 1 | Status: SHIPPED | OUTPATIENT
Start: 2022-12-22

## 2022-12-22 RX ORDER — AMLODIPINE BESYLATE 5 MG/1
5 TABLET ORAL DAILY
Qty: 90 TABLET | Refills: 1 | Status: SHIPPED | OUTPATIENT
Start: 2022-12-22

## 2022-12-22 RX ORDER — LOSARTAN POTASSIUM 100 MG/1
TABLET ORAL
Qty: 90 TABLET | Refills: 1 | OUTPATIENT
Start: 2022-12-22

## 2022-12-22 RX ORDER — CLOPIDOGREL BISULFATE 75 MG/1
75 TABLET ORAL DAILY
Qty: 90 TABLET | Refills: 1 | Status: SHIPPED | OUTPATIENT
Start: 2022-12-22

## 2022-12-30 ENCOUNTER — CARE COORDINATION (OUTPATIENT)
Dept: CARE COORDINATION | Age: 82
End: 2022-12-30

## 2023-01-04 ENCOUNTER — HOSPITAL ENCOUNTER (OUTPATIENT)
Dept: CARDIAC REHAB | Age: 83
Setting detail: THERAPIES SERIES
Discharge: HOME | End: 2023-01-04
Payer: MEDICARE

## 2023-01-04 PROCEDURE — G0422 INTENS CARDIAC REHAB W/EXERC: HCPCS

## 2023-01-04 PROCEDURE — G0423 INTENS CARDIAC REHAB NO EXER: HCPCS

## 2023-01-06 ENCOUNTER — CARE COORDINATION (OUTPATIENT)
Dept: CARE COORDINATION | Age: 83
End: 2023-01-06

## 2023-01-06 NOTE — CARE COORDINATION
Ambulatory Care Coordination Note  1/6/2023    ACC: Darius Paredes, RN            Spoke with patient for ACM follow up. Patient reports that she is feeling much better. Energy level; appetite are much improved. Patient reports that she is attending Cardiac rehab 3 x week. Reports that things are going well. Denies any questions. Offered patient enrollment in the Remote Patient Monitoring (RPM) program for in-home monitoring: NA.    DM:  Patient reports BS has been running WNL. Encouraged compliance with diet; exercise as directed. Reviewed DM zone tool and s/s to report to MD.     Patient denies any questions , equipment or resource needs at this time. ACM contact information provided should needs arise. Plan for next outreach:  Zone review, ACP support    Lab Results       None            Care Coordination Interventions    Referral from Primary Care Provider: No  Suggested Interventions and Community Resources  Medication Assistance Program: In Process  Medi Set or Pill Pack: Declined  Social Work: In Process  Zone Management Tools: In Process          Goals Addressed    None         Prior to Admission medications    Medication Sig Start Date End Date Taking?  Authorizing Provider   hydrALAZINE (APRESOLINE) 25 MG tablet TAKE 1 TABLET BY MOUTH TWICE DAILY 12/22/22   Chayo Calvillo MD   clopidogrel (PLAVIX) 75 MG tablet TAKE 1 TABLET BY MOUTH DAILY 12/22/22   Chayo Calvillo MD   amLODIPine (NORVASC) 5 MG tablet TAKE 1 TABLET BY MOUTH DAILY 12/22/22   Chayo Calvillo MD   atorvastatin (LIPITOR) 40 MG tablet Take 1 tablet by mouth daily 12/8/22   Wilda Hernandes MD   losartan (COZAAR) 100 MG tablet Take 0.5 tablets by mouth daily 12/8/22   Wilda Hernandes MD   atorvastatin (LIPITOR) 40 MG tablet Take 0.5 tablets by mouth daily 11/30/22   Wilda Hernandes MD   metoprolol tartrate (LOPRESSOR) 50 MG tablet TAKE 1 TABLET BY MOUTH TWICE DAILY 9/23/22   Chayo Calvillo MD   hydroCHLOROthiazide (HYDRODIURIL) 25 MG tablet TAKE 1 TABLET BY MOUTH DAILY 9/23/22   Diana Nash MD   metFORMIN (GLUCOPHAGE) 500 MG tablet Take 0.5 tablets by mouth in the morning. 8/11/22 12/13/22  Diana Nash MD   gabapentin (NEURONTIN) 300 MG capsule Take 1 capsule by mouth in the morning and 1 capsule before bedtime. Do all this for 90 days. 8/11/22 12/13/22  Diana Nash MD   acetaminophen (TYLENOL) 325 MG tablet Take 2 tablets by mouth every 4 hours as needed for Pain 8/5/22   Saarh Hastings MD   omeprazole (PRILOSEC) 20 MG delayed release capsule Take 20 mg by mouth four times a week itzel Gr thur, sat    Historical Provider, MD   ketoconazole (NIZORAL) 2 % cream Apply topically daily to toenail area 5/11/22   Diana Nash MD   hydrOXYzine (ATARAX) 10 MG tablet hydroxyzine HCl 10 mg tablet   TAKE 1 TO 2 TABLETS BY MOUTH EVERY NIGHT 1 HOUR BEFORE BEDTIME FOR ITCHING    Historical Provider, MD   nitroGLYCERIN (NITROSTAT) 0.4 MG SL tablet DISSOLVE 1 TABLET UNDER THE TONGUE EVERY 5 MINUTES AS NEEDED FOR CHEST PAIN. MAX OF 3 DOSES IN 15 MINUTES. 6/8/21   Diana Nash MD   Biotin 300 MCG TABS Take 1 tablet by mouth daily 3/10/21   Diana Nash MD   folic acid (FOLVITE) 857 MCG tablet Take 800 mcg by mouth daily    Historical Provider, MD   diclofenac sodium 1 % GEL Apply 2 g topically 4 times daily 12/3/19   Diana Nash MD   fluticasone [facility]) 50 MCG/ACT nasal spray 2 sprays by Nasal route daily into each nostril every day 5/18/15   Diana Nash MD   aspirin 81 MG tablet Take 81 mg by mouth 2 times daily     Historical Provider, MD   multivitamin [facility]) per tablet Take 1 tablet by mouth daily. Historical Provider, MD   calcium carbonate-vitamin D 600-200 MG-UNIT TABS Take 1 tablet by mouth nightly     Historical Provider, MD   Omega-3 Fatty Acids (FISH OIL BURP-LESS) 1000 MG CAPS Take 1 tablet by mouth 2 times daily.       Historical Provider, MD       Future Appointments   Date Time Provider Annmarie Spivey   1/9/2023 10:00 AM [address] [facility]   1/10/2023 10:00 AM [address] [facility]   1/12/2023 10:00 AM [address] EXERCISE [address] [facility]   1/16/2023 10:00 AM [address] EXERCISE [address] [facility]   1/17/2023 10:00 AM [facility] CARDIAC REHAB EXERCISE [address] [facility]   1/19/2023 10:00 AM [facility] CARDIAC REHAB EXERCISE [address] [facility]   1/23/2023 10:00 AM [facility] CARDIAC REHAB EXERCISE [address] [facility]   1/24/2023 10:00 AM [facility] CARDIAC REHAB EXERCISE [address] [facility]   1/26/2023 10:00 AM [facility] CARDIAC REHAB EXERCISE [address] [facility]   1/30/2023 10:00 AM [facility] CARDIAC REHAB EXERCISE [address] [facility]   1/31/2023 10:00 AM [facility] CARDIAC REHAB EXERCISE [address] [facility]   2/2/2023 10:00 AM [address] EXERCISE [address] [facility]   2/6/2023 10:00 AM [address] EXERCISE [address] [facility]   2/7/2023 10:00 AM [address] [facility]   2/9/2023 10:00 AM [address] REHAB EXERCISE [address] [facility]   2/13/2023 10:00 AM [facility] CARDIAC REHAB EXERCISE [facility]   2/13/2023 12:30 PM Jarad Watts MD [address] [facility] [address] E S IM [facility]   2/14/2023 10:00 AM [facility] CARDIAC REHAB EXERCISE [address] [facility]   2/16/2023 10:00 AM [facility] CARDIAC REHAB EXERCISE [address] [facility]   2/20/2023 10:00 AM [facility] CARDIAC REHAB EXERCISE [address] [facility]   2/21/2023 10:00 AM [facility] CARDIAC REHAB EXERCISE [address] [facility]   2/23/2023 10:00 AM [facility] CARDIAC REHAB EXERCISE [address] [facility]   2/27/2023 10:00 AM [facility] CARDIAC REHAB EXERCISE [address] [facility]   2/28/2023 10:00 AM [facility] CARDIAC REHAB EXERCISE [address] [facility]   3/2/2023 10:00 AM [facility] CARDIAC REHAB EXERCISE [address] [facility]   3/6/2023 10:00 AM [facility] [address] EXERCISE [address] [facility]   3/7/2023 10:00 AM [facility] CARDIAC REHAB EXERCISE [address]   3/8/2023  1:30 PM Ryan Renae MD [address] [place] [address] [address] [facility]   3/9/2023 10:00 AM [address]ERCISE [address] [facility]   3/13/2023 10:00 AM [address]ERCISE [address] [facility]   3/14/2023 10:00 AM [address]ERCISE [address] [facility]   3/16/2023 10:00 AM [address] [facility] [address] [facility]   3/20/2023 10:00 AM [address] [facility] CARDIAC REHAB EXERCISE [address] [facility]   3/21/2023 10:00 AM [address] [facility] CARDIAC REHAB EXERCISE [address] [facility]   3/23/2023 10:00 AM [address] [facility] CARDIAC REHAB EXERCISE [address] [facility]   3/27/2023 10:00 AM [address] [facility] CARDIAC REHAB EXERCISE [address] [facility]   3/28/2023 10:00 AM [address] [facility] CARDIAC REHAB EXERCISE [address] [facility]   3/30/2023 10:00 AM [address] EXERCISE [address] [facility] CAR [address] [facility]   6/7/2023  9:00 AM SCHEDULE, [facility] VASCULAR SPR [facility]   ,   Diabetes Assessment    Medic Alert ID: No  Meal Planning: Avoidance of concentrated sweets, Carb counting   How often do you test your blood sugar?: Daily   Do you have barriers with adherence to non-pharmacologic self-management interventions?  (Nutrition/Exercise/Self-Monitoring): No   Have you ever had to go to the ED for symptoms of low blood sugar?: No            , and   General Assessment

## 2023-01-09 ENCOUNTER — HOSPITAL ENCOUNTER (OUTPATIENT)
Dept: CARDIAC REHAB | Age: 83
Setting detail: THERAPIES SERIES
Discharge: HOME | End: 2023-01-09
Payer: MEDICARE

## 2023-01-09 LAB
GLUCOSE BLD-MCNC: 121 MG/DL (ref 70–99)
GLUCOSE BLD-MCNC: 142 MG/DL (ref 70–99)

## 2023-01-09 PROCEDURE — 82962 GLUCOSE BLOOD TEST: CPT

## 2023-01-09 PROCEDURE — G0422 INTENS CARDIAC REHAB W/EXERC: HCPCS

## 2023-01-10 ENCOUNTER — HOSPITAL ENCOUNTER (OUTPATIENT)
Dept: CARDIAC REHAB | Age: 83
Setting detail: THERAPIES SERIES
Discharge: HOME | End: 2023-01-10
Payer: MEDICARE

## 2023-01-10 LAB — GLUCOSE BLD-MCNC: 111 MG/DL (ref 70–99)

## 2023-01-10 PROCEDURE — G0422 INTENS CARDIAC REHAB W/EXERC: HCPCS

## 2023-01-10 PROCEDURE — 82962 GLUCOSE BLOOD TEST: CPT

## 2023-01-12 ENCOUNTER — HOSPITAL ENCOUNTER (OUTPATIENT)
Dept: CARDIAC REHAB | Age: 83
Setting detail: THERAPIES SERIES
Discharge: HOME | End: 2023-01-12
Payer: MEDICARE

## 2023-01-12 ENCOUNTER — CARE COORDINATION (OUTPATIENT)
Dept: CARE COORDINATION | Age: 83
End: 2023-01-12

## 2023-01-12 LAB — GLUCOSE BLD-MCNC: 107 MG/DL (ref 70–99)

## 2023-01-12 PROCEDURE — G0422 INTENS CARDIAC REHAB W/EXERC: HCPCS

## 2023-01-12 PROCEDURE — 82962 GLUCOSE BLOOD TEST: CPT

## 2023-01-12 NOTE — CARE COORDINATION
Attempted to reach patient for continued Care Coordination follow up and education. Patient was unavailable at the time of my call, and a generic voicemail message was left asking patient to return my call at [phone].

## 2023-01-16 ENCOUNTER — HOSPITAL ENCOUNTER (OUTPATIENT)
Dept: CARDIAC REHAB | Age: 83
Setting detail: THERAPIES SERIES
Discharge: HOME | End: 2023-01-16
Payer: MEDICARE

## 2023-01-16 LAB — GLUCOSE BLD-MCNC: 97 MG/DL (ref 70–99)

## 2023-01-16 PROCEDURE — 82962 GLUCOSE BLOOD TEST: CPT

## 2023-01-16 PROCEDURE — G0422 INTENS CARDIAC REHAB W/EXERC: HCPCS

## 2023-01-17 ENCOUNTER — HOSPITAL ENCOUNTER (OUTPATIENT)
Dept: CARDIAC REHAB | Age: 83
Setting detail: THERAPIES SERIES
Discharge: HOME | End: 2023-01-17
Payer: MEDICARE

## 2023-01-17 LAB — GLUCOSE BLD-MCNC: 121 MG/DL (ref 70–99)

## 2023-01-17 PROCEDURE — 82962 GLUCOSE BLOOD TEST: CPT

## 2023-01-17 PROCEDURE — G0422 INTENS CARDIAC REHAB W/EXERC: HCPCS

## 2023-01-18 RX ORDER — ATORVASTATIN CALCIUM 40 MG/1
40 TABLET, FILM COATED ORAL DAILY
Qty: 90 TABLET | Refills: 3 | Status: SHIPPED | OUTPATIENT
Start: 2023-01-18

## 2023-01-19 ENCOUNTER — HOSPITAL ENCOUNTER (OUTPATIENT)
Dept: CARDIAC REHAB | Age: 83
Setting detail: THERAPIES SERIES
Discharge: HOME | End: 2023-01-19
Payer: MEDICARE

## 2023-01-19 LAB — GLUCOSE BLD-MCNC: 99 MG/DL (ref 70–99)

## 2023-01-19 PROCEDURE — 82962 GLUCOSE BLOOD TEST: CPT

## 2023-01-19 PROCEDURE — G0422 INTENS CARDIAC REHAB W/EXERC: HCPCS

## 2023-01-20 ENCOUNTER — CARE COORDINATION (OUTPATIENT)
Dept: CARE COORDINATION | Age: 83
End: 2023-01-20

## 2023-01-20 NOTE — CARE COORDINATION
Spoke with patient briefly for ACM follow up. Patient reports that she is feeling well and \" things are going really good\". Patient is currently at Cardiac Rehab and requests to defer assessment at this time. ACM contact information provided. Encouraged patient to reach out if needs arise.

## 2023-01-23 ENCOUNTER — HOSPITAL ENCOUNTER (OUTPATIENT)
Dept: CARDIAC REHAB | Age: 83
Setting detail: THERAPIES SERIES
Discharge: HOME | End: 2023-01-23
Payer: MEDICARE

## 2023-01-23 PROCEDURE — G0422 INTENS CARDIAC REHAB W/EXERC: HCPCS

## 2023-01-24 ENCOUNTER — HOSPITAL ENCOUNTER (OUTPATIENT)
Dept: CARDIAC REHAB | Age: 83
Setting detail: THERAPIES SERIES
Discharge: HOME | End: 2023-01-24
Payer: MEDICARE

## 2023-01-24 PROCEDURE — G0422 INTENS CARDIAC REHAB W/EXERC: HCPCS

## 2023-01-26 ENCOUNTER — APPOINTMENT (OUTPATIENT)
Dept: CARDIAC REHAB | Age: 83
End: 2023-01-26
Payer: MEDICARE

## 2023-01-30 ENCOUNTER — APPOINTMENT (OUTPATIENT)
Dept: CARDIAC REHAB | Age: 83
End: 2023-01-30
Payer: MEDICARE

## 2023-01-31 ENCOUNTER — APPOINTMENT (OUTPATIENT)
Dept: CARDIAC REHAB | Age: 83
End: 2023-01-31
Payer: MEDICARE

## 2023-02-03 ENCOUNTER — CARE COORDINATION (OUTPATIENT)
Dept: CARE COORDINATION | Age: 83
End: 2023-02-03

## 2023-02-03 NOTE — CARE COORDINATION
Ambulatory Care Coordination Note  2/3/2023    ACC: Carla Duane, RN        Spoke with patient for ACM follow up; plan for graduation. Patient reports that she has completed cardiac rehab and is doing so much better. Reports improved energy and endurance levels. Patient reports that she is taking her medications as directed and her symptoms are well managed at this time. Offered patient enrollment in the Remote Patient Monitoring (RPM) program for in-home monitoring: NA.    Dicussed care gaps. Instructed on the recommendation for routine Provider follow up. Instructed on same day sick/ tele-health options. Reviewed upcoming appointment dates and times. GYN 2/7, PCP 2/13 and Derm 2/14/23. ACP:  Patient confirms that she has completed ACP documents with her daughter designated as HCDM. Patient denies any questions or concerns at this time. Agreeable to plan for ACM graduation. ACM contact information provided should questions arise. No further outreach planned. Lab Results       None            Care Coordination Interventions    Referral from Primary Care Provider: No  Suggested Interventions and Community Resources  Disease Specific Clinic: Completed (Comment: Cardiac Rehab)  Medication Assistance Program: In Process  Medi Set or Pill Pack: Declined  Pharmacist: Declined  Registered Dietician: Declined  Social Work: In Process  Other Services:  (Comment: Cardiac Rehab)  Transportation Support: Declined  Zone Management Tools: In Process          Goals Addressed                   This Visit's Progress     Community Resource Goal   On track     I will complete referral to community agency Medication Assistance for assistance. Barriers: overwhelmed by complexity of regimen  Plan for overcoming my barriers:  Patient will complete Med Assist application as directed. Confidence: 8/10  Anticipated Goal Completion Date:  12/21/22 9/21/22:  Med Assist referral made. Conditions and Symptoms   On track     I will schedule office visits, as directed by my provider. I will keep my appointment or reschedule if I have to cancel. I will notify my provider of any barriers to my plan of care. I will follow my Zone Management tool to seek urgent or emergent care. I will notify my provider of any symptoms that indicate a worsening of my condition. Barriers: overwhelmed by complexity of regimen and lack of education  Plan for overcoming my barriers: ACM informed pt to call using the Zone Tool for Diabetes. Pt is also going to stop eating all lunch meat and adding no salt to her meals for one week and check her B/P and see if B/Ps decrease. Confidence: 7/10  Anticipated Goal Completion Date: 12/21/22                Prior to Admission medications    Medication Sig Start Date End Date Taking? Authorizing Provider   atorvastatin (LIPITOR) 40 MG tablet Take 1 tablet by mouth daily 1/18/23   Varsha Mata MD   hydrALAZINE (APRESOLINE) 25 MG tablet TAKE 1 TABLET BY MOUTH TWICE DAILY 12/22/22   Eliseo Solitario MD   clopidogrel (PLAVIX) 75 MG tablet TAKE 1 TABLET BY MOUTH DAILY 12/22/22   Eliseo Solitario MD   amLODIPine (NORVASC) 5 MG tablet TAKE 1 TABLET BY MOUTH DAILY 12/22/22   Eliseo Solitario MD   atorvastatin (LIPITOR) 40 MG tablet Take 1 tablet by mouth daily 12/8/22   Varsha Mata MD   losartan (COZAAR) 100 MG tablet Take 0.5 tablets by mouth daily 12/8/22   Varsha Mata MD   atorvastatin (LIPITOR) 40 MG tablet Take 0.5 tablets by mouth daily 11/30/22   Varsha Mata MD   metoprolol tartrate (LOPRESSOR) 50 MG tablet TAKE 1 TABLET BY MOUTH TWICE DAILY 9/23/22   Eliseo Solitario MD   hydroCHLOROthiazide (HYDRODIURIL) 25 MG tablet TAKE 1 TABLET BY MOUTH DAILY 9/23/22   Eliseo Solitario MD   metFORMIN (GLUCOPHAGE) 500 MG tablet Take 0.5 tablets by mouth in the morning.  8/11/22 12/13/22  Eliseo Solitario MD   gabapentin (NEURONTIN) 300 MG capsule Take 1 capsule by mouth in the morning and 1 capsule before bedtime. Do all this for 90 days. 8/11/22 12/13/22  Agata Carrasquillo MD   acetaminophen (TYLENOL) 325 MG tablet Take 2 tablets by mouth every 4 hours as needed for Pain 8/5/22   Lelo Trujillo MD   omeprazole (PRILOSEC) 20 MG delayed release capsule Take 20 mg by mouth four times a week Sun, tue, thur, sat    Historical Provider, MD   ketoconazole (NIZORAL) 2 % cream Apply topically daily to toenail area 5/11/22   Agata Carrasquillo MD   hydrOXYzine (ATARAX) 10 MG tablet hydroxyzine HCl 10 mg tablet   TAKE 1 TO 2 TABLETS BY MOUTH EVERY NIGHT 1 HOUR BEFORE BEDTIME FOR ITCHING    Historical Provider, MD   nitroGLYCERIN (NITROSTAT) 0.4 MG SL tablet DISSOLVE 1 TABLET UNDER THE TONGUE EVERY 5 MINUTES AS NEEDED FOR CHEST PAIN. MAX OF 3 DOSES IN 15 MINUTES. 6/8/21   Agata Carrasquillo MD   Biotin 300 MCG TABS Take 1 tablet by mouth daily 3/10/21   Agata Carrasquillo MD   folic acid (FOLVITE) 773 MCG tablet Take 800 mcg by mouth daily    Historical Provider, MD   diclofenac sodium 1 % GEL Apply 2 g topically 4 times daily 12/3/19   Agata Carrasquillo MD   fluticasone [facility]) 50 MCG/ACT nasal spray 2 sprays by Nasal route daily into each nostril every day 5/18/15   Agata Carrasquillo MD   aspirin 81 MG tablet Take 81 mg by mouth 2 times daily     Historical Provider, MD   multivitamin [facility]) per tablet Take 1 tablet by mouth daily. Historical Provider, MD   calcium carbonate-vitamin D 600-200 MG-UNIT TABS Take 1 tablet by mouth nightly     Historical Provider, MD   Omega-3 Fatty Acids (FISH OIL BURP-LESS) 1000 MG CAPS Take 1 tablet by mouth 2 times daily.       Historical Provider, MD       Future Appointments   Date Time Provider Annmarie Spivey   2/7/2023  9:45 AM JAYME Coe - CNP [facility] OBGYN [facility]   2/13/2023 11:15 AM Agata Carrasquillo MD [facility] E S IM [facility]   3/8/2023  1:30 PM Nahid Duran MD [facility] Heart [facility]   6/7/2023  9:00 AM SCHEDULE, [facility] VASCULAR SPR [facility] MMA    and   General Assessment

## 2023-02-07 ENCOUNTER — OFFICE VISIT (OUTPATIENT)
Dept: OBGYN | Age: 83
End: 2023-02-07
Payer: MEDICARE

## 2023-02-07 VITALS
WEIGHT: 168 LBS | DIASTOLIC BLOOD PRESSURE: 70 MMHG | HEIGHT: 60 IN | SYSTOLIC BLOOD PRESSURE: 123 MMHG | BODY MASS INDEX: 32.98 KG/M2

## 2023-02-07 DIAGNOSIS — N90.4 LICHEN SCLEROSUS OF VULVA: Primary | ICD-10-CM

## 2023-02-07 PROCEDURE — 3074F SYST BP LT 130 MM HG: CPT | Performed by: NURSE PRACTITIONER

## 2023-02-07 PROCEDURE — 99214 OFFICE O/P EST MOD 30 MIN: CPT | Performed by: NURSE PRACTITIONER

## 2023-02-07 PROCEDURE — G8427 DOCREV CUR MEDS BY ELIG CLIN: HCPCS | Performed by: NURSE PRACTITIONER

## 2023-02-07 PROCEDURE — G8417 CALC BMI ABV UP PARAM F/U: HCPCS | Performed by: NURSE PRACTITIONER

## 2023-02-07 PROCEDURE — G8484 FLU IMMUNIZE NO ADMIN: HCPCS | Performed by: NURSE PRACTITIONER

## 2023-02-07 PROCEDURE — 3078F DIAST BP <80 MM HG: CPT | Performed by: NURSE PRACTITIONER

## 2023-02-07 PROCEDURE — G8399 PT W/DXA RESULTS DOCUMENT: HCPCS | Performed by: NURSE PRACTITIONER

## 2023-02-07 PROCEDURE — 1090F PRES/ABSN URINE INCON ASSESS: CPT | Performed by: NURSE PRACTITIONER

## 2023-02-07 PROCEDURE — 1123F ACP DISCUSS/DSCN MKR DOCD: CPT | Performed by: NURSE PRACTITIONER

## 2023-02-07 PROCEDURE — 1036F TOBACCO NON-USER: CPT | Performed by: NURSE PRACTITIONER

## 2023-02-07 RX ORDER — CLOBETASOL PROPIONATE 0.5 MG/G
OINTMENT TOPICAL
Qty: 60 G | Refills: 2 | Status: SHIPPED | OUTPATIENT
Start: 2023-02-07

## 2023-02-07 ASSESSMENT — PATIENT HEALTH QUESTIONNAIRE - PHQ9
SUM OF ALL RESPONSES TO PHQ QUESTIONS 1-9: 0
1. LITTLE INTEREST OR PLEASURE IN DOING THINGS: 0
SUM OF ALL RESPONSES TO PHQ9 QUESTIONS 1 & 2: 0
2. FEELING DOWN, DEPRESSED OR HOPELESS: 0
SUM OF ALL RESPONSES TO PHQ QUESTIONS 1-9: 0

## 2023-02-07 NOTE — PROGRESS NOTES
2/7/23    Harpreet Koenig  1940    Chief Complaint   Patient presents with    Vaginal Itching     Pt c/o vaginal itching and irritation on outside of vaginal and around opening for 2-3 wks no odor or discharge. Harpreet Koenig is a 79 y.o. female who presents today for evaluation of see above. Past Medical History:   Diagnosis Date    AK (actinic keratosis)     [facility] following    CAD (coronary artery disease)     5/2005 S/P PTCA and stents X2 - Dr Jose Hernández [facility])     skin    Carotid stenosis     Carotid stenosis, left     monitored by Dr Dotti Gosselin- 50% stenosis noted on CTA 10/2016    Coronary arteriosclerosis after percutaneous transluminal coronary angioplasty (PTCA) 12/07/2022    PCI procedure:DE Stent, LAD: DE Stent Plcmt Initl Vsl, LAD: DE Stent Plcmt Addtl Vsl, CIRC: DE Stent Plcmt Initl Vsl, Balloon Angioplasty, LAD: Balloon Angioplasty Initl Vsl, LAD: Balloon Angioplasty Addtl Vsl, CIRC: Balloon Angioplasty Initl Vsl. Coronary artery disease     Dr Dotti Gosselin    Cough secondary to angiotensin converting enzyme inhibitor (ACE-I)     inactive    COVID-19 virus infection     tested pos in Nov 2020- mild cough. now resolved.     CTS (carpal tunnel syndrome)     inactive-S/P right CTS surg 4/2001- Dr Arturo Councilman    DDD (degenerative disc disease), cervical     DDD (degenerative disc disease), cervical     DDD (degenerative disc disease), lumbar     Degenerative disc disease, cervical     Diabetes mellitus with neuropathy ([facility] [address]     Dr Alvarado/sheyla, - ON NEURONTIN    Diffuse cystic mastopathy     GERD without esophagitis     H/O cardiac catheterization 05/2005 5/16/05 Circ stent 80% prior 0% post, EF 60%    H/O cardiovascular stress test 12/29/2020    ef 60% normal lexiscan    H/O Doppler ultrasound 10/2011, 8/09    carotid 10/4/11 moderat stenosis left internal carotid atery est 50-69%, progression in stenotic changes left internal carotid artery, right WNL    H/O echocardiogram 12/29/2020    EF 55-60% mild TR AR and pulmonic regurg    H/O left heart catheterization 11/30/2022    LM patent. CX 90% distal to stent, LAD sml caliber vessel, 70%proximal stenosis, 90% distal stenosi, RCA 70% stenosis. Recommend PCI vs CABG    H/O mammogram     1/16- recheck 1/17    H/O tilt table evaluation 07/2009 7/20/09 WNL    Hyperlipidemia     Hypertension     Memory loss     MMSE 23/30-- 11/15/13    Neuropathy     Osteoarthritis, knee     Peripheral neuropathy     burning discomfort in feet. Postsurgical menopause     Rheumatoid arthritis(714.0)     S/P colonoscopic polypectomy 11/20/2020    Dr Cali Grijalva, recheck 3 yrs- SESSILE SERATED POLYP    SCC (squamous cell carcinoma), leg     Dr Sid Spencer removed fr legs 10/16    Thyroid nodule     on u/s 10/2016, recheck May 2017- stable--- RECHECK MAY 2018 RECOMMENDED    TMJ (temporomandibular joint syndrome)     Unspecified cerebral artery occlusion with cerebral infarction     2016-Right hemiparesis, aphasia, dysphagia, gait disturbance,       Past Surgical History:   Procedure Laterality Date    BREAST BIOPSY  1993    bilateral    CARPAL TUNNEL RELEASE      Right wrist - 4/2011-Dr Parmar    CATARACT REMOVAL WITH IMPLANT Left 02/06/2017    Dr Dionicio Mohamud  12/2022    Dr Donis Gore, LAD and LCX    CYST REMOVAL  9802    umbilical    [address]    Right thumb- giant cell tumor    HYSTERECTOMY, TOTAL ABDOMINAL (CERVIX REMOVED)  1963    KNEE ARTHROSCOPY  02/21/2012    Left knee- Dr Bedolla Plaster ARTHROSCOPY Left 09/23/2014    Dr Gt Mendez    ?     PTCA  05/2005    PTCA with stent X2 - Dr Tammi Aleman  10/16/2013    both legs    TONSILLECTOMY  1972    TOTAL KNEE ARTHROPLASTY Left 07/28/2015    Dr Ramirez Mass Left 11/14/2017    Dr Emerson Vargas at [address] [employer]  2011       Social History     Tobacco Use    Smoking status: Former Packs/day: 0.25     Years: 14.00     Pack years: 3.50     Types: Cigarettes     Start date: 5     Quit date: 1967     Years since quittin.1    Smokeless tobacco: Never    Tobacco comments:     2/10/16   Vaping Use    Vaping Use: Never used   Substance Use Topics    Alcohol use: No     Comment: caffeine 1 cup of coffee a day    Drug use: No       Family History   Problem Relation Age of Onset    Coronary Art Dis Mother          of MI    Diabetes Mother     Heart Attack Mother     Hypertension Mother     Cancer Father     Thyroid Disease Daughter         hypothyroid    Diabetes Brother        Current Outpatient Medications   Medication Sig Dispense Refill    atorvastatin (LIPITOR) 40 MG tablet Take 1 tablet by mouth daily 90 tablet 3    hydrALAZINE (APRESOLINE) 25 MG tablet TAKE 1 TABLET BY MOUTH TWICE DAILY 180 tablet 1    clopidogrel (PLAVIX) 75 MG tablet TAKE 1 TABLET BY MOUTH DAILY 90 tablet 1    amLODIPine (NORVASC) 5 MG tablet TAKE 1 TABLET BY MOUTH DAILY 90 tablet 1    atorvastatin (LIPITOR) 40 MG tablet Take 1 tablet by mouth daily 30 tablet 3    losartan (COZAAR) 100 MG tablet Take 0.5 tablets by mouth daily 90 tablet 1    atorvastatin (LIPITOR) 40 MG tablet Take 0.5 tablets by mouth daily 90 tablet 1    metoprolol tartrate (LOPRESSOR) 50 MG tablet TAKE 1 TABLET BY MOUTH TWICE DAILY 180 tablet 1    hydroCHLOROthiazide (HYDRODIURIL) 25 MG tablet TAKE 1 TABLET BY MOUTH DAILY 90 tablet 1    metFORMIN (GLUCOPHAGE) 500 MG tablet Take 0.5 tablets by mouth in the morning. 45 tablet 1    gabapentin (NEURONTIN) 300 MG capsule Take 1 capsule by mouth in the morning and 1 capsule before bedtime. Do all this for 90 days.  180 capsule 1    acetaminophen (TYLENOL) 325 MG tablet Take 2 tablets by mouth every 4 hours as needed for Pain 120 tablet 3    omeprazole (PRILOSEC) 20 MG delayed release capsule Take 20 mg by mouth four times a week Sun, dolorese, thvishal, sat      ketoconazole (NIZORAL) 2 % cream Apply topically daily to toenail area 30 g 1    hydrOXYzine (ATARAX) 10 MG tablet hydroxyzine HCl 10 mg tablet   TAKE 1 TO 2 TABLETS BY MOUTH EVERY NIGHT 1 HOUR BEFORE BEDTIME FOR ITCHING      nitroGLYCERIN (NITROSTAT) 0.4 MG SL tablet DISSOLVE 1 TABLET UNDER THE TONGUE EVERY 5 MINUTES AS NEEDED FOR CHEST PAIN. MAX OF 3 DOSES IN 15 MINUTES. 25 tablet 0    Biotin 300 MCG TABS Take 1 tablet by mouth daily 30 tablet 3    folic acid (FOLVITE) 765 MCG tablet Take 800 mcg by mouth daily      diclofenac sodium 1 % GEL Apply 2 g topically 4 times daily 100 g 2    fluticasone (FLONASE) 50 MCG/ACT nasal spray 2 sprays by Nasal route daily into each nostril every day 1 Bottle 3    aspirin 81 MG tablet Take 81 mg by mouth 2 times daily       multivitamin (THERAGRAN) per tablet Take 1 tablet by mouth daily. calcium carbonate-vitamin D 600-200 MG-UNIT TABS Take 1 tablet by mouth nightly       Omega-3 Fatty Acids (FISH OIL BURP-LESS) 1000 MG CAPS Take 1 tablet by mouth 2 times daily. No current facility-administered medications for this visit. Allergies   Allergen Reactions    Actonel [Risedronate Sodium]      Abdominal pain    Ciprofloxacin      nausea    Darvocet [Propoxyphene N-Acetaminophen]      Lightheaded    Lopid [Gemfibrozil] Other (See Comments)     Leg cramping    Mevacor [Lovastatin] Other (See Comments)     Leg cramping    Neosporin [Neomycin-Polymyx-Gramicid] Other (See Comments)     Pt states the her skin gets welts/streaks    Nsaids      HAS EPISODIC PROTEINURIA ALONG W DM    Pravachol [Pravastatin Sodium] Other (See Comments)     Leg cramping       No obstetric history on file.     Immunization History   Administered Date(s) Administered    COVID-19, PFIZER Bivalent BOOSTER, DO NOT Dilute, (age 12y+), IM, 30 mcg/0.3 mL 10/21/2022    COVID-19, PFIZER GRAY top, DO NOT Dilute, (age 15 y+), IM, 30 mcg/0.3 mL 07/08/2022    COVID-19, PFIZER PURPLE top, DILUTE for use, (age 15 y+), 30mcg/0.3mL 01/21/2021, 02/11/2021, 10/04/2021    Influenza 09/25/2012    Influenza Vaccine, unspecified formulation 10/06/2016    Influenza Virus Vaccine 10/06/2021    Influenza Whole 10/09/2015    Influenza, FLUAD, (age 72 y+), Adjuvanted, 0.5mL 10/08/2021    Influenza, FLUZONE (age 72 y+), High Dose, 0.7mL 09/09/2020, 09/23/2022    Influenza, High Dose (Fluzone 65 yrs and older) 11/15/2013, 10/08/2014, 08/30/2017, 08/27/2018, 10/08/2019    Pneumococcal Conjugate 13-valent ([id number]) 05/23/2017    Pneumococcal Conjugate 7-valent (Prevnar7) 09/12/2018    Pneumococcal Polysaccharide ([id number]) 11/20/2015    Zoster Live (Zostavax) 11/16/2012    Zoster Recombinant (Shingrix) 07/28/2020, 09/26/2020       Review of Systems    /70 (Site: Left Upper Arm, Position: Sitting, Cuff Size: Large Adult)   Ht 5' (1.524 m)   Wt 168 lb (76.2 kg)   LMP  (LMP Unknown)   BMI 32.81 kg/m²     Physical Exam  Genitourinary:         Comments: Thin/ pale atrophic vulvar tissue. Superficial fissures present. No results found for this visit on 02/07/23. ASSESSMENT AND PLAN   Diagnosis Orders   1. Lichen sclerosus of vulva          LS and associated risks reviewed, Tapering application of Temovate discussed. Pt has Dermatology appt this week. No follow-ups on file.     Luh Orozco, JAYME - CNP

## 2023-02-09 ENCOUNTER — TELEPHONE (OUTPATIENT)
Dept: OBGYN | Age: 83
End: 2023-02-09

## 2023-02-13 ENCOUNTER — OFFICE VISIT (OUTPATIENT)
Dept: INTERNAL MEDICINE CLINIC | Age: 83
End: 2023-02-13
Payer: MEDICARE

## 2023-02-13 VITALS
SYSTOLIC BLOOD PRESSURE: 130 MMHG | DIASTOLIC BLOOD PRESSURE: 70 MMHG | BODY MASS INDEX: 32.81 KG/M2 | HEART RATE: 62 BPM | WEIGHT: 168 LBS | OXYGEN SATURATION: 96 % | RESPIRATION RATE: 14 BRPM

## 2023-02-13 DIAGNOSIS — Z12.31 VISIT FOR SCREENING MAMMOGRAM: ICD-10-CM

## 2023-02-13 DIAGNOSIS — M81.0 AGE-RELATED OSTEOPOROSIS WITHOUT CURRENT PATHOLOGICAL FRACTURE: ICD-10-CM

## 2023-02-13 DIAGNOSIS — M05.79 RHEUMATOID ARTHRITIS INVOLVING MULTIPLE SITES WITH POSITIVE RHEUMATOID FACTOR (HCC): ICD-10-CM

## 2023-02-13 DIAGNOSIS — I25.10 CORONARY ARTERY DISEASE INVOLVING NATIVE CORONARY ARTERY OF NATIVE HEART WITHOUT ANGINA PECTORIS: ICD-10-CM

## 2023-02-13 DIAGNOSIS — I10 ESSENTIAL HYPERTENSION: ICD-10-CM

## 2023-02-13 DIAGNOSIS — E11.40 TYPE 2 DIABETES MELLITUS WITH DIABETIC NEUROPATHY, WITHOUT LONG-TERM CURRENT USE OF INSULIN (HCC): Primary | ICD-10-CM

## 2023-02-13 PROCEDURE — 1036F TOBACCO NON-USER: CPT | Performed by: INTERNAL MEDICINE

## 2023-02-13 PROCEDURE — 3075F SYST BP GE 130 - 139MM HG: CPT | Performed by: INTERNAL MEDICINE

## 2023-02-13 PROCEDURE — 1123F ACP DISCUSS/DSCN MKR DOCD: CPT | Performed by: INTERNAL MEDICINE

## 2023-02-13 PROCEDURE — G8427 DOCREV CUR MEDS BY ELIG CLIN: HCPCS | Performed by: INTERNAL MEDICINE

## 2023-02-13 PROCEDURE — 99214 OFFICE O/P EST MOD 30 MIN: CPT | Performed by: INTERNAL MEDICINE

## 2023-02-13 PROCEDURE — 3078F DIAST BP <80 MM HG: CPT | Performed by: INTERNAL MEDICINE

## 2023-02-13 PROCEDURE — 1090F PRES/ABSN URINE INCON ASSESS: CPT | Performed by: INTERNAL MEDICINE

## 2023-02-13 PROCEDURE — G8484 FLU IMMUNIZE NO ADMIN: HCPCS | Performed by: INTERNAL MEDICINE

## 2023-02-13 PROCEDURE — G8417 CALC BMI ABV UP PARAM F/U: HCPCS | Performed by: INTERNAL MEDICINE

## 2023-02-13 PROCEDURE — G8399 PT W/DXA RESULTS DOCUMENT: HCPCS | Performed by: INTERNAL MEDICINE

## 2023-02-13 NOTE — PROGRESS NOTES
Colletta Bevels  1940 02/13/23    SUBJECTIVE:  CAD- doing well after cardiac rehab and had 3 stents in Dec last yr. Hypertension: Stable. Denies CP, SOB, cough, visual changes, dizziness, palpitations or HA. Doing to [employer] or [employer] and got pass for handicap access. RA- stable w some chr aches espec in hands and feet. DM- sugars stable ~120s yesterday,   Lab Results   Component Value Date    LABA1C 6.1 11/30/2022    LABA1C 6.1 11/14/2022    LABA1C 6.2 08/04/2022     Lab Results   Component Value Date    GLUF 128 (H) 10/14/2016    LABMICR YES 06/14/2022    LDLCALC 72 11/14/2022    CREATININE 0.8 12/08/2022         OBJECTIVE:    /70   Pulse 62   Resp 14   Wt 168 lb (76.2 kg)   LMP  (LMP Unknown)   SpO2 96%   BMI 32.81 kg/m²     Physical Exam  Vitals reviewed. Constitutional:       Appearance: She is well-developed. HENT:      Head: Normocephalic and atraumatic. Eyes:      General: No scleral icterus. Right eye: No discharge. Left eye: No discharge. Conjunctiva/sclera: Conjunctivae normal.      Pupils: Pupils are equal, round, and reactive to light. Neck:      Thyroid: No thyromegaly. Vascular: No JVD. Trachea: No tracheal deviation. Cardiovascular:      Rate and Rhythm: Normal rate and regular rhythm. Heart sounds: Normal heart sounds. No murmur heard. No friction rub. No gallop. Pulmonary:      Effort: Pulmonary effort is normal. No respiratory distress. Breath sounds: Normal breath sounds. No wheezing or rales. Abdominal:      General: Bowel sounds are normal. There is no distension. Palpations: Abdomen is soft. There is no mass. Tenderness: There is no abdominal tenderness. There is no guarding or rebound. Hernia: No hernia is present. Musculoskeletal:      Cervical back: Neck supple. Lymphadenopathy:      Cervical: No cervical adenopathy. Skin:     General: Skin is warm and dry.       Comments: FOOT EXAM  Visual inspection:  Deformity/amputation: absent  Skin lesions/pre-ulcerative calluses: absent  Edema: right- negative, left- negative    Sensory exam:  Monofilament sensation: DIMINISHED  (minimum of 5 random plantar locations tested, avoiding callused areas - > 1 area with absence of sensation is + for neuropathy)      Pulses: normal         Neurological:      General: No focal deficit present. Mental Status: She is alert. Psychiatric:         Mood and Affect: Mood normal.       ASSESSMENT:    1. Type 2 diabetes mellitus with diabetic neuropathy, without long-term current use of insulin ([address]    2. Coronary artery disease involving native coronary artery of native heart without angina pectoris    3. Rheumatoid arthritis involving multiple sites with positive rheumatoid factor ([address]    4. Essential hypertension    5. Age-related osteoporosis without current pathological fracture    6. Visit for screening mammogram        PLAN:    Mandi Robles was seen today for diabetes. Diagnoses and all orders for this visit:    Type 2 diabetes mellitus with diabetic neuropathy, without long-term current use of insulin ([address] - DM relatively well controlled and will continue current regimen. Screening reviewed. See orders.    -     Lipid Panel; Future  -     Microalbumin / Creatinine Urine Ratio; Future  -     Hemoglobin A1C; Future  -     HM DIABETES FOOT EXAM  -     CBC; Future    Coronary artery disease involving native coronary artery of native heart without angina pectoris - Coronary artery disease stable and asymptomatic, will continue to monitor for any signs of instability. Will periodically monitor lipid profile and liver function, cardiac risk factors. Continue current medical regimen. Much better after stenting and also finishing cardiac rehab  -     Comprehensive Metabolic Panel; Future  -     Lipid Panel; Future  -     CBC;  Future    Rheumatoid arthritis involving multiple sites with positive rheumatoid factor [address] [address]- clinically stable and monitor    Essential hypertension    - Blood pressure stable and will continue current regimen. Will plan periodic monitoring of renal function, electrolytes, lipid profile. -     Comprehensive Metabolic Panel; Future  -     Lipid Panel; Future  -     CBC; Future    Age-related osteoporosis without current pathological fracture- dexa and mammogram due next mo  -     DEXA BONE DENSITY AXIAL SKELETON; Future    Visit for screening mammogram  -     HERB DIGITAL SCREEN W CAD BILATERAL PER PROTOCOL;  Future

## 2023-02-28 DIAGNOSIS — I25.10 CORONARY ARTERY DISEASE INVOLVING NATIVE CORONARY ARTERY OF NATIVE HEART WITHOUT ANGINA PECTORIS: ICD-10-CM

## 2023-02-28 DIAGNOSIS — E11.40 TYPE 2 DIABETES MELLITUS WITH DIABETIC NEUROPATHY, WITHOUT LONG-TERM CURRENT USE OF INSULIN (HCC): ICD-10-CM

## 2023-02-28 DIAGNOSIS — G44.89 OTHER HEADACHE SYNDROME: ICD-10-CM

## 2023-02-28 DIAGNOSIS — I10 ESSENTIAL HYPERTENSION: ICD-10-CM

## 2023-02-28 RX ORDER — GABAPENTIN 300 MG/1
CAPSULE ORAL
Qty: 180 CAPSULE | Refills: 1 | Status: SHIPPED | OUTPATIENT
Start: 2023-02-28 | End: 2023-05-29

## 2023-02-28 RX ORDER — METOPROLOL TARTRATE 50 MG/1
TABLET, FILM COATED ORAL
Qty: 180 TABLET | Refills: 1 | Status: SHIPPED | OUTPATIENT
Start: 2023-02-28

## 2023-02-28 RX ORDER — LOSARTAN POTASSIUM 100 MG/1
TABLET ORAL
Qty: 90 TABLET | Refills: 1 | Status: SHIPPED | OUTPATIENT
Start: 2023-02-28

## 2023-03-07 ENCOUNTER — OFFICE VISIT (OUTPATIENT)
Dept: OBGYN | Age: 83
End: 2023-03-07
Payer: MEDICARE

## 2023-03-07 VITALS
SYSTOLIC BLOOD PRESSURE: 157 MMHG | BODY MASS INDEX: 33.18 KG/M2 | DIASTOLIC BLOOD PRESSURE: 69 MMHG | HEIGHT: 60 IN | WEIGHT: 169 LBS

## 2023-03-07 DIAGNOSIS — L90.0 LICHEN SCLEROSUS: Primary | ICD-10-CM

## 2023-03-07 PROCEDURE — 99213 OFFICE O/P EST LOW 20 MIN: CPT | Performed by: NURSE PRACTITIONER

## 2023-03-07 PROCEDURE — G8399 PT W/DXA RESULTS DOCUMENT: HCPCS | Performed by: NURSE PRACTITIONER

## 2023-03-07 PROCEDURE — 1123F ACP DISCUSS/DSCN MKR DOCD: CPT | Performed by: NURSE PRACTITIONER

## 2023-03-07 PROCEDURE — G8484 FLU IMMUNIZE NO ADMIN: HCPCS | Performed by: NURSE PRACTITIONER

## 2023-03-07 PROCEDURE — 3078F DIAST BP <80 MM HG: CPT | Performed by: NURSE PRACTITIONER

## 2023-03-07 PROCEDURE — 1036F TOBACCO NON-USER: CPT | Performed by: NURSE PRACTITIONER

## 2023-03-07 PROCEDURE — 3077F SYST BP >= 140 MM HG: CPT | Performed by: NURSE PRACTITIONER

## 2023-03-07 PROCEDURE — 1090F PRES/ABSN URINE INCON ASSESS: CPT | Performed by: NURSE PRACTITIONER

## 2023-03-07 PROCEDURE — G8417 CALC BMI ABV UP PARAM F/U: HCPCS | Performed by: NURSE PRACTITIONER

## 2023-03-07 PROCEDURE — G8427 DOCREV CUR MEDS BY ELIG CLIN: HCPCS | Performed by: NURSE PRACTITIONER

## 2023-03-07 ASSESSMENT — ENCOUNTER SYMPTOMS
ABDOMINAL PAIN: 0
DIARRHEA: 0
CONSTIPATION: 0
NAUSEA: 0
GASTROINTESTINAL NEGATIVE: 1
VOMITING: 0
SHORTNESS OF BREATH: 0
RESPIRATORY NEGATIVE: 1

## 2023-03-07 NOTE — PROGRESS NOTES
3/7/23    Harpreet Koenig  1940    Chief Complaint   Patient presents with    Follow-up     Pt here to follow up for lichen sclerosus and the medication. Pt states the medication called in on 2/7/2023 cost 130$ but when she saw dermotologist she called in clobetasol 0.05% and it was only 14$ Pt states the cream is helping with pain and soreness but is not taking the white away. Pt also c/o to vulvar lesions one on top of the vagina and one on left labia pt noticed them 1 1/2 weeks ago no drainage and denies pain. Harpreet Koenig is a 79 y.o. female who presents today for evaluation of see above. Past Medical History:   Diagnosis Date    AK (actinic keratosis)     [facility] following    CAD (coronary artery disease)     5/2005 S/P PTCA and stents X2 - Dr Kern [facility])     skin    Carotid stenosis     Carotid stenosis, left     monitored by Dr Hossein Muller- 50% stenosis noted on CTA 10/2016    Coronary arteriosclerosis after percutaneous transluminal coronary angioplasty (PTCA) 12/07/2022    PCI procedure:DE Stent, LAD: DE Stent Plcmt Initl Vsl, LAD: DE Stent Plcmt Addtl Vsl, CIRC: DE Stent Plcmt Initl Vsl, Balloon Angioplasty, LAD: Balloon Angioplasty Initl Vsl, LAD: Balloon Angioplasty Addtl Vsl, CIRC: Balloon Angioplasty Initl Vsl. Coronary artery disease     Dr Hossein Muller    Cough secondary to angiotensin converting enzyme inhibitor (ACE-I)     inactive    COVID-19 virus infection     tested pos in Nov 2020- mild cough. now resolved.     CTS (carpal tunnel syndrome)     inactive-S/P right CTS surg 4/2001- Dr Ross Lofton    DDD (degenerative disc disease), cervical     DDD (degenerative disc disease), cervical     DDD (degenerative disc disease), lumbar     Degenerative disc disease, cervical     Diabetes mellitus with neuropathy ([facility] [address]     Dr Alvarado/sheyla, - ON NEURONTIN    Diffuse cystic mastopathy     GERD without esophagitis     H/O cardiac catheterization 05/2005 5/16/05 Circ stent 80% prior 0% post, EF 60%    H/O cardiovascular stress test 12/29/2020    ef 60% normal lexiscan    H/O Doppler ultrasound 10/2011, 8/09    carotid 10/4/11 moderat stenosis left internal carotid atery est 50-69%, progression in stenotic changes left internal carotid artery, right WNL    H/O echocardiogram 12/29/2020    EF 55-60% mild TR AR and pulmonic regurg    H/O left heart catheterization 11/30/2022    LM patent. CX 90% distal to stent, LAD sml caliber vessel, 70%proximal stenosis, 90% distal stenosi, RCA 70% stenosis. Recommend PCI vs CABG    H/O mammogram     1/16- recheck 1/17    H/O tilt table evaluation 07/2009 7/20/09 WNL    Hyperlipidemia     Hypertension     Memory loss     MMSE 23/30-- 11/15/13    Neuropathy     Osteoarthritis, knee     Peripheral neuropathy     burning discomfort in feet. Postsurgical menopause     Rheumatoid arthritis(714.0)     S/P colonoscopic polypectomy 11/20/2020    Dr Inda Soulier, recheck 3 yrs- SESSILE SERATED POLYP    SCC (squamous cell carcinoma), leg     Dr Reji Macias removed fr legs 10/16    Thyroid nodule     on u/s 10/2016, recheck May 2017- stable--- RECHECK MAY 2018 RECOMMENDED    TMJ (temporomandibular joint syndrome)     Unspecified cerebral artery occlusion with cerebral infarction     2016-Right hemiparesis, aphasia, dysphagia, gait disturbance,       Past Surgical History:   Procedure Laterality Date    BREAST BIOPSY  1993    bilateral    CARPAL TUNNEL RELEASE      Right wrist - 4/2011-Dr Parmar    CATARACT REMOVAL WITH IMPLANT Left 02/06/2017    Dr Natalya Pittman  12/2022    Dr Frantz Woodson, LAD and LCX    CYST REMOVAL  [address]    umbilical    [address]    Right thumb- giant cell tumor    HYSTERECTOMY, TOTAL ABDOMINAL (CERVIX REMOVED)  1963    KNEE ARTHROSCOPY  02/21/2012    Left knee- Dr Pelletier Aid ARTHROSCOPY Left 09/23/2014    Dr Frank Fraser    ?     PTCA  05/2005    PTCA with stent X2 - Dr Anders Coleman 10/16/2013    both legs    TONSILLECTOMY  1972    TOTAL KNEE ARTHROPLASTY Left 2015    Dr Herb Gomez Left 2017    Dr Sandy Pinedo at [address]         Social History     Tobacco Use    Smoking status: Former     Packs/day: 0.25     Years: 14.00     Pack years: 3.50     Types: Cigarettes     Start date:      Quit date: 1967     Years since quittin.2    Smokeless tobacco: Never    Tobacco comments:     2/10/16   Vaping Use    Vaping Use: Never used   Substance Use Topics    Alcohol use: No     Comment: caffeine 1 cup of coffee a day    Drug use: No       Family History   Problem Relation Age of Onset    Coronary Art Dis Mother          of MI    Diabetes Mother     Heart Attack Mother     Hypertension Mother     Cancer Father     Thyroid Disease Daughter         hypothyroid    Diabetes Brother        Current Outpatient Medications   Medication Sig Dispense Refill    metoprolol tartrate (LOPRESSOR) 50 MG tablet TAKE 1 TABLET BY MOUTH TWICE DAILY 180 tablet 1    metFORMIN (GLUCOPHAGE) 500 MG tablet TAKE 1/2 TABLET BY MOUTH IN THE MORNING 45 tablet 1    losartan (COZAAR) 100 MG tablet TAKE 1 TABLET BY MOUTH EVERY DAY 90 tablet 1    gabapentin (NEURONTIN) 300 MG capsule TAKE ONE CAPSULE BY MOUTH EVERY MORNING AND EVERY NIGHT AT BEDTIME 180 capsule 1    clobetasol (TEMOVATE) 0.05 % ointment Apply topically 2 times daily x 4 weeks, then nightly at bedtime x 4 weeks then twice weekly thereafter 60 g 2    atorvastatin (LIPITOR) 40 MG tablet Take 1 tablet by mouth daily 90 tablet 3    hydrALAZINE (APRESOLINE) 25 MG tablet TAKE 1 TABLET BY MOUTH TWICE DAILY 180 tablet 1    clopidogrel (PLAVIX) 75 MG tablet TAKE 1 TABLET BY MOUTH DAILY 90 tablet 1    amLODIPine (NORVASC) 5 MG tablet TAKE 1 TABLET BY MOUTH DAILY 90 tablet 1    atorvastatin (LIPITOR) 40 MG tablet Take 0.5 tablets by mouth daily 90 tablet 1 hydroCHLOROthiazide (HYDRODIURIL) 25 MG tablet TAKE 1 TABLET BY MOUTH DAILY 90 tablet 1    acetaminophen (TYLENOL) 325 MG tablet Take 2 tablets by mouth every 4 hours as needed for Pain 120 tablet 3    omeprazole (PRILOSEC) 20 MG delayed release capsule Take 20 mg by mouth four times a week Sun, tue, thur, sat      ketoconazole (NIZORAL) 2 % cream Apply topically daily to toenail area 30 g 1    hydrOXYzine (ATARAX) 10 MG tablet hydroxyzine HCl 10 mg tablet   TAKE 1 TO 2 TABLETS BY MOUTH EVERY NIGHT 1 HOUR BEFORE BEDTIME FOR ITCHING      nitroGLYCERIN (NITROSTAT) 0.4 MG SL tablet DISSOLVE 1 TABLET UNDER THE TONGUE EVERY 5 MINUTES AS NEEDED FOR CHEST PAIN. MAX OF 3 DOSES IN 15 MINUTES. 25 tablet 0    Biotin 300 MCG TABS Take 1 tablet by mouth daily 30 tablet 3    folic acid (FOLVITE) 433 MCG tablet Take 800 mcg by mouth daily      diclofenac sodium 1 % GEL Apply 2 g topically 4 times daily 100 g 2    fluticasone (FLONASE) 50 MCG/ACT nasal spray 2 sprays by Nasal route daily into each nostril every day 1 Bottle 3    aspirin 81 MG tablet Take 81 mg by mouth 2 times daily       multivitamin (THERAGRAN) per tablet Take 1 tablet by mouth daily. calcium carbonate-vitamin D 600-200 MG-UNIT TABS Take 1 tablet by mouth nightly       Omega-3 Fatty Acids (FISH OIL BURP-LESS) 1000 MG CAPS Take 1 tablet by mouth 2 times daily. No current facility-administered medications for this visit.        Allergies   Allergen Reactions    Actonel [Risedronate Sodium]      Abdominal pain    Ciprofloxacin      nausea    Darvocet [Propoxyphene N-Acetaminophen]      Lightheaded    Lopid [Gemfibrozil] Other (See Comments)     Leg cramping    Mevacor [Lovastatin] Other (See Comments)     Leg cramping    Neosporin [Neomycin-Polymyx-Gramicid] Other (See Comments)     Pt states the her skin gets welts/streaks    Nsaids      HAS EPISODIC PROTEINURIA ALONG W DM    Pravachol [Pravastatin Sodium] Other (See Comments)     Leg cramping No obstetric history on file. Immunization History   Administered Date(s) Administered    COVID-19, PFIZER Bivalent BOOSTER, DO NOT Dilute, (age 12y+), IM, 30 mcg/0.3 mL 10/21/2022    COVID-19, PFIZER GRAY top, DO NOT Dilute, (age 15 y+), IM, 30 mcg/0.3 mL 07/08/2022    COVID-19, PFIZER PURPLE top, DILUTE for use, (age 15 y+), 30mcg/0.3mL 01/21/2021, 02/11/2021, 10/04/2021    Influenza 09/25/2012    Influenza Vaccine, unspecified formulation 10/06/2016    Influenza Virus Vaccine 10/06/2021    Influenza Whole 10/09/2015    Influenza, FLUAD, (age 72 y+), Adjuvanted, 0.5mL 10/08/2021    Influenza, FLUZONE (age 72 y+), High Dose, 0.7mL 09/09/2020, 09/23/2022    Influenza, High Dose (Fluzone 65 yrs and older) 11/15/2013, 10/08/2014, 08/30/2017, 08/27/2018, 10/08/2019    Pneumococcal Conjugate 13-valent (Gxahitz00) 05/23/2017    Pneumococcal Conjugate 7-valent (Prevnar7) 09/12/2018    Pneumococcal Polysaccharide ([id number]) 11/20/2015    Zoster Live (Zostavax) 11/16/2012    Zoster Recombinant (Shingrix) 07/28/2020, 09/26/2020       Review of Systems   Constitutional: Negative. Negative for fatigue. Respiratory: Negative. Negative for shortness of breath. Gastrointestinal: Negative. Negative for abdominal pain, constipation, diarrhea, nausea and vomiting. Genitourinary: Negative. Neurological:  Negative for dizziness. Psychiatric/Behavioral: Negative. BP (!) 157/69 (Site: Left Upper Arm, Position: Sitting, Cuff Size: Large Adult)   Ht 5' (1.524 m)   Wt 169 lb (76.7 kg)   LMP  (LMP Unknown)   BMI 33.01 kg/m²     Physical Exam  Vitals and nursing note reviewed. Constitutional:       Appearance: Normal appearance. HENT:      Head: Normocephalic. Pulmonary:      Effort: Pulmonary effort is normal.   Genitourinary:     Comments: LS over vulva   Musculoskeletal:         General: Normal range of motion. Cervical back: Normal range of motion. Skin:     General: Skin is warm and dry. Neurological:      Mental Status: She is alert. Psychiatric:         Mood and Affect: Mood normal.       No results found for this visit on 03/07/23. ASSESSMENT AND PLAN   Diagnosis Orders   1. Lichen sclerosus          Appearance of LS reviewed. Vulvar irritation/itching has improved since starting Clobetasol, encouraged to decrease to QHS x 4 weeks. Return in about 4 weeks (around 4/4/2023).     Nevaeh Bean, JAYME - CNP

## 2023-03-08 ENCOUNTER — OFFICE VISIT (OUTPATIENT)
Dept: CARDIOLOGY CLINIC | Age: 83
End: 2023-03-08
Payer: MEDICARE

## 2023-03-08 VITALS
OXYGEN SATURATION: 96 % | BODY MASS INDEX: 33.26 KG/M2 | DIASTOLIC BLOOD PRESSURE: 64 MMHG | HEART RATE: 58 BPM | SYSTOLIC BLOOD PRESSURE: 132 MMHG | WEIGHT: 169.4 LBS | HEIGHT: 60 IN

## 2023-03-08 DIAGNOSIS — E78.2 MIXED HYPERLIPIDEMIA: ICD-10-CM

## 2023-03-08 DIAGNOSIS — I25.10 CORONARY ARTERY DISEASE INVOLVING NATIVE CORONARY ARTERY OF NATIVE HEART WITHOUT ANGINA PECTORIS: Primary | ICD-10-CM

## 2023-03-08 DIAGNOSIS — I65.23 BILATERAL CAROTID ARTERY STENOSIS: ICD-10-CM

## 2023-03-08 DIAGNOSIS — I10 ESSENTIAL HYPERTENSION: ICD-10-CM

## 2023-03-08 PROCEDURE — 1123F ACP DISCUSS/DSCN MKR DOCD: CPT | Performed by: NURSE PRACTITIONER

## 2023-03-08 PROCEDURE — 1036F TOBACCO NON-USER: CPT | Performed by: NURSE PRACTITIONER

## 2023-03-08 PROCEDURE — G8484 FLU IMMUNIZE NO ADMIN: HCPCS | Performed by: NURSE PRACTITIONER

## 2023-03-08 PROCEDURE — 3078F DIAST BP <80 MM HG: CPT | Performed by: NURSE PRACTITIONER

## 2023-03-08 PROCEDURE — G8417 CALC BMI ABV UP PARAM F/U: HCPCS | Performed by: NURSE PRACTITIONER

## 2023-03-08 PROCEDURE — G8427 DOCREV CUR MEDS BY ELIG CLIN: HCPCS | Performed by: NURSE PRACTITIONER

## 2023-03-08 PROCEDURE — 1090F PRES/ABSN URINE INCON ASSESS: CPT | Performed by: NURSE PRACTITIONER

## 2023-03-08 PROCEDURE — G8399 PT W/DXA RESULTS DOCUMENT: HCPCS | Performed by: NURSE PRACTITIONER

## 2023-03-08 PROCEDURE — 99214 OFFICE O/P EST MOD 30 MIN: CPT | Performed by: NURSE PRACTITIONER

## 2023-03-08 PROCEDURE — 3075F SYST BP GE 130 - 139MM HG: CPT | Performed by: NURSE PRACTITIONER

## 2023-03-08 ASSESSMENT — ENCOUNTER SYMPTOMS
SHORTNESS OF BREATH: 0
ORTHOPNEA: 0

## 2023-03-08 NOTE — PROGRESS NOTES
3/8/2023  Primary cardiologist: Dr. Lydia Jose:   Mikey Mancilla  is an established 79 y.o.  female here for a 9 month follow up on coronary artery disease      SUBJECTIVE/OBJECTIVE:  Mikey Mancilla is a 79 y.o. female with a history of coronary artery disease, carotid artery disease hypertension, hyperlipidemia, non-insulin-dependent diabetes . Guerita has remote history of PCI [place]te h/o PCI  (2005). She then underwent PCI of the circumflex and LAD in December 2022. HPI :   Mikey Mancilla reports she is feeling so much better since her PIC. Her symptoms of chest pain, jaw pain and shortness of breath have resolved. Her energy level has improved she is now able to clean with both sweeping and mopping without having to stop and have energy after. Review of Systems   Constitutional: Negative for diaphoresis and malaise/fatigue. Cardiovascular:  Negative for chest pain, claudication, dyspnea on exertion, irregular heartbeat, leg swelling, near-syncope, orthopnea, palpitations and paroxysmal nocturnal dyspnea. Respiratory:  Negative for shortness of breath. Neurological:  Negative for dizziness and light-headedness. Vitals:    03/08/23 1322   BP: 132/64   Pulse: 58   SpO2: 96%   Weight: 169 lb 6.4 oz (76.8 kg)   Height: 5' (1.524 m)     No flowsheet data found. Wt Readings from Last 3 Encounters:   03/08/23 169 lb 6.4 oz (76.8 kg)   03/07/23 169 lb (76.7 kg)   02/13/23 168 lb (76.2 kg)     Body mass index is 33.08 kg/m². Physical Exam  Vitals reviewed. Constitutional:       Appearance: Normal appearance. HENT:      Head: Normocephalic and atraumatic. Eyes:      Extraocular Movements: Extraocular movements intact. Pupils: Pupils are equal, round, and reactive to light. Neck:      Vascular: No carotid bruit. Cardiovascular:      Rate and Rhythm: Normal rate and regular rhythm. Pulses: Normal pulses. Pulmonary:      Effort: Pulmonary effort is normal.      Breath sounds: Normal breath sounds.  No rales.   Chest:      Chest wall: No tenderness. Abdominal:      General: There is no distension. Palpations: Abdomen is soft. Tenderness: There is no abdominal tenderness. Musculoskeletal:      Cervical back: No tenderness. Right lower leg: No edema. Left lower leg: No edema. Skin:     General: Skin is warm and dry. Capillary Refill: Capillary refill takes less than 2 seconds. Neurological:      General: No focal deficit present. Mental Status: She is alert and oriented to person, place, and time.    Psychiatric:         Mood and Affect: Mood normal.         Behavior: Behavior normal.              Current Outpatient Medications   Medication Sig Dispense Refill    metoprolol tartrate (LOPRESSOR) 50 MG tablet TAKE 1 TABLET BY MOUTH TWICE DAILY 180 tablet 1    metFORMIN (GLUCOPHAGE) 500 MG tablet TAKE 1/2 TABLET BY MOUTH IN THE MORNING 45 tablet 1    losartan (COZAAR) 100 MG tablet TAKE 1 TABLET BY MOUTH EVERY DAY 90 tablet 1    gabapentin (NEURONTIN) 300 MG capsule TAKE ONE CAPSULE BY MOUTH EVERY MORNING AND EVERY NIGHT AT BEDTIME 180 capsule 1    clobetasol (TEMOVATE) 0.05 % ointment Apply topically 2 times daily x 4 weeks, then nightly at bedtime x 4 weeks then twice weekly thereafter 60 g 2    atorvastatin (LIPITOR) 40 MG tablet Take 1 tablet by mouth daily 90 tablet 3    hydrALAZINE (APRESOLINE) 25 MG tablet TAKE 1 TABLET BY MOUTH TWICE DAILY 180 tablet 1    clopidogrel (PLAVIX) 75 MG tablet TAKE 1 TABLET BY MOUTH DAILY 90 tablet 1    amLODIPine (NORVASC) 5 MG tablet TAKE 1 TABLET BY MOUTH DAILY 90 tablet 1    atorvastatin (LIPITOR) 40 MG tablet Take 0.5 tablets by mouth daily 90 tablet 1    hydroCHLOROthiazide (HYDRODIURIL) 25 MG tablet TAKE 1 TABLET BY MOUTH DAILY 90 tablet 1    acetaminophen (TYLENOL) 325 MG tablet Take 2 tablets by mouth every 4 hours as needed for Pain 120 tablet 3    omeprazole (PRILOSEC) 20 MG delayed release capsule Take 20 mg by mouth four times a week Maile, tue, thur, sat      ketoconazole (NIZORAL) 2 % cream Apply topically daily to toenail area 30 g 1    hydrOXYzine (ATARAX) 10 MG tablet hydroxyzine HCl 10 mg tablet   TAKE 1 TO 2 TABLETS BY MOUTH EVERY NIGHT 1 HOUR BEFORE BEDTIME FOR ITCHING      nitroGLYCERIN (NITROSTAT) 0.4 MG SL tablet DISSOLVE 1 TABLET UNDER THE TONGUE EVERY 5 MINUTES AS NEEDED FOR CHEST PAIN. MAX OF 3 DOSES IN 15 MINUTES. 25 tablet 0    Biotin 300 MCG TABS Take 1 tablet by mouth daily 30 tablet 3    folic acid (FOLVITE) 332 MCG tablet Take 800 mcg by mouth daily      diclofenac sodium 1 % GEL Apply 2 g topically 4 times daily 100 g 2    fluticasone (FLONASE) 50 MCG/ACT nasal spray 2 sprays by Nasal route daily into each nostril every day 1 Bottle 3    aspirin 81 MG tablet Take 81 mg by mouth 2 times daily       multivitamin (THERAGRAN) per tablet Take 1 tablet by mouth daily. calcium carbonate-vitamin D 600-200 MG-UNIT TABS Take 1 tablet by mouth nightly       Omega-3 Fatty Acids (FISH OIL BURP-LESS) 1000 MG CAPS Take 1 tablet by mouth 2 times daily. No current facility-administered medications for this visit. All pertinent data reviewed and discussed with patient       ASSESSMENT/PLAN:    Coronary artery disease  She is doing well from cardiovascular standpoint. Since PCI her symptoms have resolved. Recommend to continue with ASA and Plavix as she had multivessel PCI and is not experiencing bleeding     Carotid artery disease  Known history of left carotid artery stenosis   She denies TIA or stroke like symptoms   recommend recheck carotid ultrasound for ongoing surveillance.   Continue aspirin and simvastatin    Hypertension  Blood pressure is controlled with amlodipine  No changes will be made: amlodipine will be continued    Hyperlipidemia  Lipids noted from 11/2022:  HDL 38  LDL 72  triglycerides 267  On simvastatin-no changes as lipids are near goal: she denies myalgia       Tests ordered: Carotid ultrasoundi n 3 months   Follow-up 6 months or sooner if needed    Signed:  JAYME Álvarez CNP, 3/8/2023, 1:38 PM    An electronic signature was used to authenticate this note. Please note this report has been partially produced using speech recognition software and may contain errors related to that system including errors in grammar, punctuation, and spelling, as well as words and phrases that may be inappropriate. If there are any questions or concerns please feel free to contact the dictating provider for clarification.

## 2023-03-22 DIAGNOSIS — I10 ESSENTIAL HYPERTENSION: ICD-10-CM

## 2023-03-22 RX ORDER — HYDROCHLOROTHIAZIDE 25 MG/1
25 TABLET ORAL DAILY
Qty: 90 TABLET | Refills: 1 | Status: SHIPPED | OUTPATIENT
Start: 2023-03-22

## 2023-03-29 ENCOUNTER — TELEPHONE (OUTPATIENT)
Dept: CARDIOLOGY CLINIC | Age: 83
End: 2023-03-29

## 2023-03-29 NOTE — TELEPHONE ENCOUNTER
Cardiologist: Dr. Frantz Woodson  Surgeon: Dr. Reji Macias  Surgery: Ex Bx Basel cell carcinoma right nasal ala with frozen section and flap vs. FTSG  Anesthesia: [place]  Date: 4/19/2023  FAX# [phone]  # [phone]    Last OV 3/8/2023 w/Judy      Coronary artery disease  She is doing well from cardiovascular standpoint. Since PCI her symptoms have resolved. Recommend to continue with ASA and Plavix as she had multivessel PCI and is not experiencing bleeding      Carotid artery disease  Known history of left carotid artery stenosis   She denies TIA or stroke like symptoms   recommend recheck carotid ultrasound for ongoing surveillance. Continue aspirin and simvastatin     Hypertension  Blood pressure is controlled with amlodipine  No changes will be made: amlodipine will be continued     Hyperlipidemia  Lipids noted from 11/2022:  HDL 38  LDL 72  triglycerides 267  On simvastatin-no changes as lipids are near goal: she denies myalgia            Last EKG-12/13/2022      Stress test- 12/22/2022   Overall Impression:normal submaximal stress test, ok for cardiac rehab     Echo- 12/29/2020   Left ventricular function is normal, EF is estimated at 55-60%. Mild left ventricular hypertrophy. Normal diastolic filling pattern for age. Mildly dilated left atrium. Mild tricuspid, pulmonic and aortic regurgitation. RVSP is 30 mmHg. No evidence of pericardial effusion.       Cath- 12/7/2022   PCI procedure:DE Stent, LAD: DE Stent Plcmt Initl Vsl, LAD: DE   Stent Plcmt Addtl Vsl, CIRC: DE Stent Plcmt Initl Vsl, Balloon   Angioplasty, LAD: Balloon Angioplasty Initl Vsl, LAD: Balloon   Angioplasty Addtl Vsl, CIRC: Balloon Angioplasty Initl Vsl        Plavix    Aspirin

## 2023-04-03 ENCOUNTER — HOSPITAL ENCOUNTER (OUTPATIENT)
Dept: WOMENS IMAGING | Age: 83
Discharge: HOME | End: 2023-04-03
Payer: MEDICARE

## 2023-04-03 DIAGNOSIS — M81.0 AGE-RELATED OSTEOPOROSIS WITHOUT CURRENT PATHOLOGICAL FRACTURE: ICD-10-CM

## 2023-04-03 DIAGNOSIS — Z12.31 VISIT FOR SCREENING MAMMOGRAM: ICD-10-CM

## 2023-04-03 PROCEDURE — 77063 BREAST TOMOSYNTHESIS BI: CPT

## 2023-04-03 PROCEDURE — 77080 DXA BONE DENSITY AXIAL: CPT

## 2023-04-03 NOTE — RESULT ENCOUNTER NOTE
Pls call pt, her bone density indicates mild thinning, osteopenia.   rec should be on a calcium and vit D supplement daily- bone density is stable and about the same as last time

## 2023-05-08 DIAGNOSIS — I10 ESSENTIAL HYPERTENSION: ICD-10-CM

## 2023-05-08 DIAGNOSIS — E11.40 TYPE 2 DIABETES MELLITUS WITH DIABETIC NEUROPATHY, WITHOUT LONG-TERM CURRENT USE OF INSULIN (HCC): ICD-10-CM

## 2023-05-08 DIAGNOSIS — I25.10 CORONARY ARTERY DISEASE INVOLVING NATIVE CORONARY ARTERY OF NATIVE HEART WITHOUT ANGINA PECTORIS: ICD-10-CM

## 2023-05-08 DIAGNOSIS — R30.0 DYSURIA: Primary | ICD-10-CM

## 2023-05-08 DIAGNOSIS — R30.0 DYSURIA: ICD-10-CM

## 2023-05-08 LAB
ALBUMIN SERPL-MCNC: 4.5 G/DL (ref 3.4–5)
ALBUMIN/GLOB SERPL: 1.9 {RATIO} (ref 1.1–2.2)
ALP SERPL-CCNC: 76 U/L (ref 40–129)
ALT SERPL-CCNC: 16 U/L (ref 10–40)
ANION GAP SERPL CALCULATED.3IONS-SCNC: 13 MMOL/L (ref 3–16)
AST SERPL-CCNC: 21 U/L (ref 15–37)
BACTERIA URNS QL MICRO: ABNORMAL /HPF
BILIRUB SERPL-MCNC: 0.9 MG/DL (ref 0–1)
BILIRUB UR QL STRIP.AUTO: NEGATIVE
BUN SERPL-MCNC: 22 MG/DL (ref 7–20)
CALCIUM SERPL-MCNC: 10.2 MG/DL (ref 8.3–10.6)
CHLORIDE SERPL-SCNC: 98 MMOL/L (ref 99–110)
CHOLEST SERPL-MCNC: 150 MG/DL (ref 0–199)
CLARITY UR: ABNORMAL
CO2 SERPL-SCNC: 27 MMOL/L (ref 21–32)
COLOR UR: YELLOW
CREAT SERPL-MCNC: 0.9 MG/DL (ref 0.6–1.2)
CREAT UR-MCNC: 22.5 MG/DL (ref 28–259)
DEPRECATED RDW RBC AUTO: 13 % (ref 12.4–15.4)
EPI CELLS #/AREA URNS HPF: ABNORMAL /HPF (ref 0–5)
GFR SERPLBLD CREATININE-BSD FMLA CKD-EPI: >60 ML/MIN/{1.73_M2}
GLUCOSE SERPL-MCNC: 111 MG/DL (ref 70–99)
GLUCOSE UR STRIP.AUTO-MCNC: NEGATIVE MG/DL
HCT VFR BLD AUTO: 36.4 % (ref 36–48)
HDLC SERPL-MCNC: 33 MG/DL (ref 40–60)
HGB BLD-MCNC: 12.2 G/DL (ref 12–16)
HGB UR QL STRIP.AUTO: ABNORMAL
HYALINE CASTS #/AREA URNS LPF: ABNORMAL /LPF (ref 0–2)
KETONES UR STRIP.AUTO-MCNC: NEGATIVE MG/DL
LDLC SERPL CALC-MCNC: 66 MG/DL
LEUKOCYTE ESTERASE UR QL STRIP.AUTO: ABNORMAL
MCH RBC QN AUTO: 31.4 PG (ref 26–34)
MCHC RBC AUTO-ENTMCNC: 33.4 G/DL (ref 31–36)
MCV RBC AUTO: 93.9 FL (ref 80–100)
MICROALBUMIN UR DL<=1MG/L-MCNC: 4 MG/DL
MICROALBUMIN/CREAT UR: 177.8 MG/G (ref 0–30)
NITRITE UR QL STRIP.AUTO: POSITIVE
PH UR STRIP.AUTO: 7 [PH] (ref 5–8)
PLATELET # BLD AUTO: 197 K/UL (ref 135–450)
PMV BLD AUTO: 8.4 FL (ref 5–10.5)
POTASSIUM SERPL-SCNC: 4.3 MMOL/L (ref 3.5–5.1)
PROT SERPL-MCNC: 6.9 G/DL (ref 6.4–8.2)
PROT UR STRIP.AUTO-MCNC: ABNORMAL MG/DL
RBC # BLD AUTO: 3.88 M/UL (ref 4–5.2)
RBC #/AREA URNS HPF: ABNORMAL /HPF (ref 0–4)
SODIUM SERPL-SCNC: 138 MMOL/L (ref 136–145)
SP GR UR STRIP.AUTO: 1.01 (ref 1–1.03)
TRIGL SERPL-MCNC: 256 MG/DL (ref 0–150)
UA DIPSTICK W REFLEX MICRO PNL UR: YES
URN SPEC COLLECT METH UR: ABNORMAL
UROBILINOGEN UR STRIP-ACNC: 0.2 E.U./DL
VLDLC SERPL CALC-MCNC: 51 MG/DL
WBC # BLD AUTO: 6.6 K/UL (ref 4–11)
WBC #/AREA URNS HPF: >100 /HPF (ref 0–5)

## 2023-05-08 PROCEDURE — 36415 COLL VENOUS BLD VENIPUNCTURE: CPT | Performed by: INTERNAL MEDICINE

## 2023-05-09 DIAGNOSIS — N30.00 ACUTE CYSTITIS WITHOUT HEMATURIA: Primary | ICD-10-CM

## 2023-05-09 DIAGNOSIS — N30.90 CYSTITIS: Primary | ICD-10-CM

## 2023-05-09 LAB
EST. AVERAGE GLUCOSE BLD GHB EST-MCNC: 128.4 MG/DL
HBA1C MFR BLD: 6.1 %

## 2023-05-09 RX ORDER — SULFAMETHOXAZOLE AND TRIMETHOPRIM 800; 160 MG/1; MG/1
1 TABLET ORAL 2 TIMES DAILY
Qty: 14 TABLET | Refills: 0 | Status: SHIPPED | OUTPATIENT
Start: 2023-05-09 | End: 2023-05-16

## 2023-05-11 ENCOUNTER — OFFICE VISIT (OUTPATIENT)
Dept: INTERNAL MEDICINE CLINIC | Age: 83
End: 2023-05-11
Payer: MEDICARE

## 2023-05-11 VITALS
HEART RATE: 70 BPM | OXYGEN SATURATION: 94 % | HEIGHT: 60 IN | DIASTOLIC BLOOD PRESSURE: 70 MMHG | SYSTOLIC BLOOD PRESSURE: 138 MMHG | RESPIRATION RATE: 14 BRPM | WEIGHT: 173 LBS | BODY MASS INDEX: 33.96 KG/M2

## 2023-05-11 DIAGNOSIS — E78.2 MIXED HYPERLIPIDEMIA: ICD-10-CM

## 2023-05-11 DIAGNOSIS — I10 ESSENTIAL HYPERTENSION: ICD-10-CM

## 2023-05-11 DIAGNOSIS — N30.01 ACUTE CYSTITIS WITH HEMATURIA: ICD-10-CM

## 2023-05-11 DIAGNOSIS — I25.10 CORONARY ARTERY DISEASE INVOLVING NATIVE CORONARY ARTERY OF NATIVE HEART WITHOUT ANGINA PECTORIS: ICD-10-CM

## 2023-05-11 DIAGNOSIS — E11.40 TYPE 2 DIABETES MELLITUS WITH DIABETIC NEUROPATHY, WITHOUT LONG-TERM CURRENT USE OF INSULIN (HCC): ICD-10-CM

## 2023-05-11 DIAGNOSIS — Z00.00 ROUTINE GENERAL MEDICAL EXAMINATION AT A HEALTH CARE FACILITY: Primary | ICD-10-CM

## 2023-05-11 DIAGNOSIS — Z00.00 MEDICARE ANNUAL WELLNESS VISIT, SUBSEQUENT: ICD-10-CM

## 2023-05-11 DIAGNOSIS — I63.30 CEREBROVASCULAR ACCIDENT (CVA) DUE TO THROMBOSIS OF CEREBRAL ARTERY (HCC): ICD-10-CM

## 2023-05-11 LAB
BACTERIA UR CULT: ABNORMAL
ORGANISM: ABNORMAL

## 2023-05-11 PROCEDURE — 3078F DIAST BP <80 MM HG: CPT | Performed by: INTERNAL MEDICINE

## 2023-05-11 PROCEDURE — 99213 OFFICE O/P EST LOW 20 MIN: CPT | Performed by: INTERNAL MEDICINE

## 2023-05-11 PROCEDURE — 1036F TOBACCO NON-USER: CPT | Performed by: INTERNAL MEDICINE

## 2023-05-11 PROCEDURE — 1123F ACP DISCUSS/DSCN MKR DOCD: CPT | Performed by: INTERNAL MEDICINE

## 2023-05-11 PROCEDURE — G8427 DOCREV CUR MEDS BY ELIG CLIN: HCPCS | Performed by: INTERNAL MEDICINE

## 2023-05-11 PROCEDURE — G8399 PT W/DXA RESULTS DOCUMENT: HCPCS | Performed by: INTERNAL MEDICINE

## 2023-05-11 PROCEDURE — G8417 CALC BMI ABV UP PARAM F/U: HCPCS | Performed by: INTERNAL MEDICINE

## 2023-05-11 PROCEDURE — 3044F HG A1C LEVEL LT 7.0%: CPT | Performed by: INTERNAL MEDICINE

## 2023-05-11 PROCEDURE — G0439 PPPS, SUBSEQ VISIT: HCPCS | Performed by: INTERNAL MEDICINE

## 2023-05-11 PROCEDURE — 3075F SYST BP GE 130 - 139MM HG: CPT | Performed by: INTERNAL MEDICINE

## 2023-05-11 PROCEDURE — 1090F PRES/ABSN URINE INCON ASSESS: CPT | Performed by: INTERNAL MEDICINE

## 2023-05-11 ASSESSMENT — LIFESTYLE VARIABLES
HOW MANY STANDARD DRINKS CONTAINING ALCOHOL DO YOU HAVE ON A TYPICAL DAY: PATIENT DOES NOT DRINK
HOW OFTEN DO YOU HAVE A DRINK CONTAINING ALCOHOL: NEVER

## 2023-05-11 ASSESSMENT — PATIENT HEALTH QUESTIONNAIRE - PHQ9
SUM OF ALL RESPONSES TO PHQ QUESTIONS 1-9: 0
2. FEELING DOWN, DEPRESSED OR HOPELESS: 0
SUM OF ALL RESPONSES TO PHQ9 QUESTIONS 1 & 2: 0
SUM OF ALL RESPONSES TO PHQ QUESTIONS 1-9: 0
1. LITTLE INTEREST OR PLEASURE IN DOING THINGS: 0

## 2023-05-11 NOTE — PATIENT INSTRUCTIONS
walk every day. Try for at least 30 minutes on most days of the week. You also may want to swim, bike, or do other activities.     Do not smoke. If you need help quitting, talk to your doctor about stop-smoking programs and medicines. These can increase your chances of quitting for good. Quitting smoking may be the most important step you can take to protect your heart. It is never too late to quit.     Limit alcohol to 2 drinks a day for men and 1 drink a day for women. Too much alcohol can cause health problems.     Manage other health problems such as diabetes, high blood pressure, and high cholesterol. If you think you may have a problem with alcohol or drug use, talk to your doctor. Medicines    Take your medicines exactly as prescribed. Call your doctor if you think you are having a problem with your medicine.     If your doctor recommends aspirin, take the amount directed each day. Make sure you take aspirin and not another kind of pain reliever, such as acetaminophen (Tylenol). When should you call for help? Call 911 if you have symptoms of a heart attack. These may include:    Chest pain or pressure, or a strange feeling in the chest.     Sweating.     Shortness of breath.     Pain, pressure, or a strange feeling in the back, neck, jaw, or upper belly or in one or both shoulders or arms.     Lightheadedness or sudden weakness.     A fast or irregular heartbeat. After you call 911, the  may tell you to chew 1 adult-strength or 2 to 4 low-dose aspirin. Wait for an ambulance. Do not try to drive yourself. Watch closely for changes in your health, and be sure to contact your doctor if you have any problems. Where can you learn more? Go to http://www.rose.com/ and enter F075 to learn more about \"A Healthy Heart: Care Instructions. \"  Current as of: September 7, 2022               Content Version: 13.6  © 1283-8870 Healthwise, Incorporated.    Care instructions adapted under

## 2023-05-11 NOTE — PROGRESS NOTES
Medicare Annual Wellness Visit    Santiago Shabazz is here for Diabetes (3 month check ) and Medicare AWV    Assessment & Plan   Routine general medical examination at a health care facility - remains independent, functional and active, no indications/needs for other interventions noted at this time- except as noted below and also findings noted on screening medicare questions. Coronary artery disease involving native coronary artery of native heart without angina pectoris - Coronary artery disease stable and asymptomatic, will continue to monitor for any signs of instability. Will periodically monitor lipid profile and liver function, cardiac risk factors. Continue current medical regimen. -     Comprehensive Metabolic Panel; Future  -     Lipid Panel; Future  -     CBC; Future  Type 2 diabetes mellitus with diabetic neuropathy, without long-term current use of insulin ([facility] [address] - DM relatively well controlled and will continue current regimen. Screening reviewed. See orders. -     Comprehensive Metabolic Panel; Future  -     Lipid Panel; Future  -     Microalbumin / Creatinine Urine Ratio; Future  -     Hemoglobin A1C; Future  Essential hypertension - Blood pressure stable and will continue current regimen. Will plan periodic monitoring of renal function, electrolytes, lipid profile. Cerebrovascular accident (CVA) due to thrombosis of cerebral artery ([facility] [address] - Remains stable neurologically w/o evidence of acute changes/focal deficits. Cont regimen. -     Comprehensive Metabolic Panel; Future  -     Lipid Panel; Future  -     CBC; Future  Acute cystitis with hematuria- this is a SEPARATE ISSUE FROM HER WELLNESS EXAM, W ACTIVE UTI. TREATING W BACTRIM THEN F/U UA IN TWO WEEKS. -     Urinalysis with Microscopic; Future  Mixed hyperlipidemia - Pt will continue to work on a low fat diet and also exercise, wt loss as appropriate.   Will continue periodic monitoring of fasting lipid profile, glucose, liver

## 2023-05-24 DIAGNOSIS — N30.90 CYSTITIS: ICD-10-CM

## 2023-05-24 DIAGNOSIS — N30.01 ACUTE CYSTITIS WITH HEMATURIA: ICD-10-CM

## 2023-05-24 LAB
BACTERIA URNS QL MICRO: ABNORMAL /HPF
BILIRUB UR QL STRIP.AUTO: NEGATIVE
CLARITY UR: CLEAR
COLOR UR: YELLOW
EPI CELLS #/AREA URNS AUTO: 1 /HPF (ref 0–5)
GLUCOSE UR STRIP.AUTO-MCNC: NEGATIVE MG/DL
HGB UR QL STRIP.AUTO: NEGATIVE
HYALINE CASTS #/AREA URNS AUTO: 1 /LPF (ref 0–8)
KETONES UR STRIP.AUTO-MCNC: NEGATIVE MG/DL
LEUKOCYTE ESTERASE UR QL STRIP.AUTO: ABNORMAL
NITRITE UR QL STRIP.AUTO: NEGATIVE
PH UR STRIP.AUTO: 7 [PH] (ref 5–8)
PROT UR STRIP.AUTO-MCNC: NEGATIVE MG/DL
RBC CLUMPS #/AREA URNS AUTO: 0 /HPF (ref 0–4)
SP GR UR STRIP.AUTO: 1.01 (ref 1–1.03)
UA DIPSTICK W REFLEX MICRO PNL UR: YES
URN SPEC COLLECT METH UR: ABNORMAL
UROBILINOGEN UR STRIP-ACNC: 0.2 E.U./DL
WBC #/AREA URNS AUTO: 2 /HPF (ref 0–5)

## 2023-05-26 LAB — BACTERIA UR CULT: NORMAL

## 2023-06-07 ENCOUNTER — TELEPHONE (OUTPATIENT)
Dept: CARDIOLOGY CLINIC | Age: 83
End: 2023-06-07

## 2023-06-07 ENCOUNTER — PROCEDURE VISIT (OUTPATIENT)
Dept: CARDIOLOGY CLINIC | Age: 83
End: 2023-06-07
Payer: MEDICARE

## 2023-06-07 DIAGNOSIS — I65.23 BILATERAL CAROTID ARTERY STENOSIS: ICD-10-CM

## 2023-06-07 PROCEDURE — 93880 EXTRACRANIAL BILAT STUDY: CPT | Performed by: INTERNAL MEDICINE

## 2023-06-07 NOTE — TELEPHONE ENCOUNTER
Carotid results to patient   The Right Proximal internal carotid artery exhibits 0-49% stenosis. The Left Proximal internal carotid artery exhibits 50-69% stenosis. Normal vertebral flow.    OV scheduled

## 2023-06-15 ENCOUNTER — OFFICE VISIT (OUTPATIENT)
Dept: SURGERY | Age: 83
End: 2023-06-15
Payer: MEDICARE

## 2023-06-15 VITALS
DIASTOLIC BLOOD PRESSURE: 80 MMHG | OXYGEN SATURATION: 99 % | HEIGHT: 60 IN | BODY MASS INDEX: 33.47 KG/M2 | HEART RATE: 80 BPM | SYSTOLIC BLOOD PRESSURE: 122 MMHG | WEIGHT: 170.5 LBS

## 2023-06-15 DIAGNOSIS — M79.89 LEFT LEG SWELLING: ICD-10-CM

## 2023-06-15 DIAGNOSIS — K11.8 MASS OF LEFT PAROTID GLAND: Primary | ICD-10-CM

## 2023-06-15 PROCEDURE — G8399 PT W/DXA RESULTS DOCUMENT: HCPCS | Performed by: SURGERY

## 2023-06-15 PROCEDURE — 1123F ACP DISCUSS/DSCN MKR DOCD: CPT | Performed by: SURGERY

## 2023-06-15 PROCEDURE — 99204 OFFICE O/P NEW MOD 45 MIN: CPT | Performed by: SURGERY

## 2023-06-15 PROCEDURE — 1036F TOBACCO NON-USER: CPT | Performed by: SURGERY

## 2023-06-15 PROCEDURE — 3078F DIAST BP <80 MM HG: CPT | Performed by: SURGERY

## 2023-06-15 PROCEDURE — G8417 CALC BMI ABV UP PARAM F/U: HCPCS | Performed by: SURGERY

## 2023-06-15 PROCEDURE — 1090F PRES/ABSN URINE INCON ASSESS: CPT | Performed by: SURGERY

## 2023-06-15 PROCEDURE — G8427 DOCREV CUR MEDS BY ELIG CLIN: HCPCS | Performed by: SURGERY

## 2023-06-15 PROCEDURE — 3074F SYST BP LT 130 MM HG: CPT | Performed by: SURGERY

## 2023-06-19 ENCOUNTER — HOSPITAL ENCOUNTER (OUTPATIENT)
Dept: ULTRASOUND IMAGING | Age: 83
Discharge: HOME | End: 2023-06-19
Attending: INTERNAL MEDICINE
Payer: MEDICARE

## 2023-06-19 ENCOUNTER — HOSPITAL ENCOUNTER (OUTPATIENT)
Dept: CT IMAGING | Age: 83
Discharge: HOME | End: 2023-06-19
Attending: INTERNAL MEDICINE
Payer: MEDICARE

## 2023-06-19 ENCOUNTER — HOSPITAL ENCOUNTER (OUTPATIENT)
Dept: CT IMAGING | Age: 83
Discharge: HOME | End: 2023-06-19
Attending: SURGERY
Payer: MEDICARE

## 2023-06-19 DIAGNOSIS — K11.8 MASS OF LEFT PAROTID GLAND: ICD-10-CM

## 2023-06-19 DIAGNOSIS — R07.2 PRECORDIAL CHEST PAIN: ICD-10-CM

## 2023-06-19 DIAGNOSIS — I65.23 BILATERAL CAROTID ARTERY STENOSIS: ICD-10-CM

## 2023-06-19 DIAGNOSIS — M79.89 LEFT LEG SWELLING: ICD-10-CM

## 2023-06-19 LAB
EGFR, POC: 50 ML/MIN/1.73M2
POC CREATININE: 1.1 MG/DL (ref 0.6–1.1)

## 2023-06-19 PROCEDURE — 93971 EXTREMITY STUDY: CPT

## 2023-06-19 PROCEDURE — 70491 CT SOFT TISSUE NECK W/DYE: CPT

## 2023-06-19 PROCEDURE — 6360000004 HC RX CONTRAST MEDICATION: Performed by: INTERNAL MEDICINE

## 2023-06-19 PROCEDURE — 6360000004 HC RX CONTRAST MEDICATION: Performed by: SURGERY

## 2023-06-19 PROCEDURE — 70498 CT ANGIOGRAPHY NECK: CPT

## 2023-06-19 PROCEDURE — 82565 ASSAY OF CREATININE: CPT

## 2023-06-19 RX ADMIN — IOPAMIDOL 75 ML: 755 INJECTION, SOLUTION INTRAVENOUS at 11:54

## 2023-06-19 RX ADMIN — IOPAMIDOL 75 ML: 755 INJECTION, SOLUTION INTRAVENOUS at 11:20

## 2023-06-23 ENCOUNTER — TELEPHONE (OUTPATIENT)
Dept: CARDIOLOGY CLINIC | Age: 83
End: 2023-06-23

## 2023-06-23 DIAGNOSIS — I65.22 CAROTID STENOSIS, LEFT: Primary | ICD-10-CM

## 2023-06-23 NOTE — TELEPHONE ENCOUNTER
Called pt about CTA results. Offered appt with Laynakul Lazo next week- states she will be out of town and wants to keep current appt.

## 2023-06-26 ENCOUNTER — TELEPHONE (OUTPATIENT)
Dept: CARDIOTHORACIC SURGERY | Age: 83
End: 2023-06-26

## 2023-06-30 ENCOUNTER — INITIAL CONSULT (OUTPATIENT)
Dept: CARDIOTHORACIC SURGERY | Age: 83
End: 2023-06-30
Payer: MEDICARE

## 2023-06-30 VITALS
DIASTOLIC BLOOD PRESSURE: 76 MMHG | HEIGHT: 60 IN | HEART RATE: 52 BPM | BODY MASS INDEX: 33.81 KG/M2 | WEIGHT: 172.2 LBS | SYSTOLIC BLOOD PRESSURE: 132 MMHG | OXYGEN SATURATION: 93 %

## 2023-06-30 DIAGNOSIS — I65.22 LEFT CAROTID STENOSIS: Primary | ICD-10-CM

## 2023-06-30 PROCEDURE — G8399 PT W/DXA RESULTS DOCUMENT: HCPCS | Performed by: THORACIC SURGERY (CARDIOTHORACIC VASCULAR SURGERY)

## 2023-06-30 PROCEDURE — 1090F PRES/ABSN URINE INCON ASSESS: CPT | Performed by: THORACIC SURGERY (CARDIOTHORACIC VASCULAR SURGERY)

## 2023-06-30 PROCEDURE — G8427 DOCREV CUR MEDS BY ELIG CLIN: HCPCS | Performed by: THORACIC SURGERY (CARDIOTHORACIC VASCULAR SURGERY)

## 2023-06-30 PROCEDURE — G8417 CALC BMI ABV UP PARAM F/U: HCPCS | Performed by: THORACIC SURGERY (CARDIOTHORACIC VASCULAR SURGERY)

## 2023-06-30 PROCEDURE — 3075F SYST BP GE 130 - 139MM HG: CPT | Performed by: THORACIC SURGERY (CARDIOTHORACIC VASCULAR SURGERY)

## 2023-06-30 PROCEDURE — 99204 OFFICE O/P NEW MOD 45 MIN: CPT | Performed by: THORACIC SURGERY (CARDIOTHORACIC VASCULAR SURGERY)

## 2023-06-30 PROCEDURE — 1123F ACP DISCUSS/DSCN MKR DOCD: CPT | Performed by: THORACIC SURGERY (CARDIOTHORACIC VASCULAR SURGERY)

## 2023-06-30 PROCEDURE — 3078F DIAST BP <80 MM HG: CPT | Performed by: THORACIC SURGERY (CARDIOTHORACIC VASCULAR SURGERY)

## 2023-06-30 PROCEDURE — 1036F TOBACCO NON-USER: CPT | Performed by: THORACIC SURGERY (CARDIOTHORACIC VASCULAR SURGERY)

## 2023-07-02 ASSESSMENT — ENCOUNTER SYMPTOMS
PHOTOPHOBIA: 0
EYE REDNESS: 0
STRIDOR: 0
RECTAL PAIN: 0
CHOKING: 0
APNEA: 0
COLOR CHANGE: 0
ANAL BLEEDING: 0
CONSTIPATION: 0
EYE ITCHING: 0
SORE THROAT: 0
BACK PAIN: 0

## 2023-07-03 ENCOUNTER — PROCEDURE VISIT (OUTPATIENT)
Dept: CARDIOLOGY CLINIC | Age: 83
End: 2023-07-03

## 2023-07-03 DIAGNOSIS — R07.2 PRECORDIAL CHEST PAIN: ICD-10-CM

## 2023-07-03 DIAGNOSIS — I65.23 BILATERAL CAROTID ARTERY STENOSIS: ICD-10-CM

## 2023-07-03 LAB
LV EF: 60 %
LVEF MODALITY: NORMAL

## 2023-07-03 NOTE — PROGRESS NOTES
1 IV started  No redness at site Colonoscopy   (with Miralax/Gatorade Prep)  Miralax has not been proven safe for patients with a history of congestive heart   failure, cardiomyopathy, chronic kidney disease, any kidney insufficiency, elevated creatinine or with renal failure (on dialysis). These patients should use the Golytely prep.  Patient Name   Manolo Bravo    Procedure Date   04/26/21 Arrival Time   6:30am Procedure Time   7:30am   Physician   Dr. Mackay    [facility] Same Day Surgery Department   Other Instructions  Occasionally procedure times need to be changed.   In the event your procedure time needs to be changed, you will receive a telephone call from a nurse informing you of the change as well as any other instructions.     YOU WILL NEED TO PURCHASE   - Bisacodyl/Dulcolax 5 mg tablets (4 ORAL tablets) No prescription needed. (Purchase at any pharmacy)  - Gatorade (64 ounces or two 32 ounce bottles) or Propel any flavor except those red or purple in color (Orange is ok)(Purchase at any grocery store)  - Miralax ( 8.3 ounces or 238 grams) (no prescription needed) (Purchase at any Pharmacy)   BEFORE THE PROCEDURE   - You will receive a phone call from a nurse and registration 1 to 2 days before your procedure. Information will be collected to pre-admit you, which will assist us in giving you the best care possible or you can call [phone].If you are diabetic, consult your physician for additional instruction.  - Check with your insurance carrier about coverage (phone number on the back of your insurance card).  - You will need to make arrangements to have someone drive you home. You will not be able to drive the day of your examination.  You can expect to be here approximately 2 hours; your  needs to come into the Same Day Surgery 2 hours after your arrival time so you can be discharged to your . You will not be able to return to work the day of your  procedure.  - If using iron supplements, stop taking those five days before your procedure.  If you are taking a multiple vitamin with iron you do not need to stop it.  - Three days before your procedure, begin a low-fiber diet. Avoid raw fruits, vegetables, whole wheat, nuts, fruits and vegetables with small seeds, popcorn, Metamucil, Fibercon, bran or bulking agents. Meats and cooked vegetables are fine.  - Two days before your procedure, increase your water intake (8 glasses of water is recommended.)       If you are on Coumadin, check with your provider regarding withholding any medications.      INSTRUCTIONS FOR DAY BEFORE PROCEDURE IF ARRIVING BEFORE 11:00 AM   - You will need to be on a clear liquid diet the entire day (see backside of page). No solid foods.  - In the morning, mix the entire contents of the Miralax powder with the Gatorade/Propel until completely dissolved.  You may put the solution in the fridge but it is not necessary.  - At 9 am take the 4 tablets of Biscodyl/Dulcolax.    - At 6 pm start drinking your solution of Gatorade and Miralax. You will be drinking half (32 oz ) of prep solution.  - It is best to drink an 8 oz glass every 15 minutes.  It will take about 1 to 2 hours to drink the 32 oz of bowel prep solution. Continue drinking clear liquids after you finish the prep solution.   - If you experience bloating, cramping, nausea, or vomiting, take a 15-30 minute break and then start drinking the prep solution again.   DAY OF PROCEDURE     Start drinking the second half of prep (32 oz)  at 4am.  Drink a glass every 15 minutes until you have finished the last 32 oz of solution.  You will need to finish this at least 3 hours before your scheduled procedure time.  You may have clear liquids up until 2 hours before your procedure.  You must finish all of the prep solution.       INSTRUCTIONS FOR DAY BEFORE IF ARRIVING AFTER 11:00 AM   - You will need to be on a clear liquid diet the entire day  (See Below for diet). No solid foods.  - In the morning, mix the entire contents of the Miralax powder with the Gatorade until completely dissolved.  You may put the solution in the fridge but it is not necessary.  - At 9 am take the four tablets of Bisacodyl/Dulcolox.    - At 6 pm start drinking your solution of Gatorade and Miralax..  You will only be drinking half (32 oz) of bowel prep.  - It will take you about 1 to 2 hours to drink the solution.  It is best to drink 1 glass every 15 minutes.  You may keep the other 32 oz or half in the fridge for the next morning.    - If you experience bloating, cramping, nausea, or vomiting, take a 15-30 minute break and then start drinking the prep solution again.  - Some patients will continue going to the bathroom throughout the night.   PROCEDURE DAY   - Start drinking your last half (32 oz) of bowel prep solution at 8 am.  Drink an 8 oz glass every 15 minutes until you are finished with all the prep solution.  It will take about 1 to 2 hours to drink the remaining solution.  YOU MUST FINISH ALL THE PREP SOLUTION.  - If you experience bloating, cramping, nausea, or vomiting, take a 15-30 minute break and then start drinking the prep solution again.  - You may have clear liquids up until 2 hours before your procedure.  Nothing to drink after that time.     Dress comfortably. Do not wear any perfume or cologne.   Unless told otherwise, we recommend holding your morning medications until after procedure. Please bring a list of your current medications to your appointment if you have not spoken to the pre-admit nurse. If you have any questions regarding these instructions, please call us at [phone], Monday through Friday between 7:00 am and 5 pm.     CLEAR LIQUID DIET  The clear liquid diet are foods that are free of fat and fiber. They will liquefy to clear liquid at room temperature.   No red or purple colored juices or Jell-O s, dairy products, or alcoholic  beverages.  TYPE OF FOOD USE ONLY THESE FOODS   Beverages Coffee      Tea      Decaffeinated coffee  Carbonated beverages (pop)  Water  Sport drinks (except red or purple)   Desserts Jell-O (except red or purple)  Fruit ice (except red or purple)  Popsicle (except red or purple)   Fruit and Fruit Juices Citrus juices, strained  Fruit nectars, strained  Apple juice  Fruit drinks (except red or purple)   Soups Broth  Bouillon  Consommé  Meat stock, strained  Vegetable broth, strained   Sweets Hard candy, non-red or non-purple, Sugar   Miscellaneous Flavorings, salt     Directions to [facility]    From the North:   Take the [address]nd [address] exit off of [address] (on the south side of [place]).  Turn left on [address]  Turn left at the first stoplight (at [facility]).  The hospital and clinic is the third building on the left.     From the South:  Follow [address] north to the first [place] exit.  Exit on [email or web address] following it to the right.  Turn left at the first stoplight.  The hospital and clinic is the third building on the left.    From the East:     Following [address] west, turn left at the stoplight onto [address] Follow [address] through [place].  Take a right at the light [address] [facility] (at [facility]).  The hospital and clinic is the third building on the left.    From the West:    Following [address] going east, turn south on [address].  Take the [address] exit off of [address] (on the south side of [place]).  Follow [address] through [place] to the stoplight on [address] ([facility] [place] Take a left.  The hospital and clinic is the third building on the left.    Colonoscopy  What you should know    What is a colonoscopy?  A colonoscopy lets the doctor look inside your large intestine (colon). The doctor guides a long, flexible, lighted tube through the entire colon. The exam is used to look for early signs of cancer. It is also used to  find the cause of a change in bowel habits. The doctor is able to see any inflamed tissue, polyps, ulcers, bleeding or muscle spasms.    How do I prepare for the exam?  See the guidelines for cleaning out your colon. The steps to prepare your colon begin four days before the exam.    Please arrive with someone who can drive you home after the exam. The medicines used in the exam will make you sleepy.  You will not be able to drive. You cannot take a bus or taxi by yourself.  You cannot walk home.    How do I prepare the day of the exam?    *Dress in comfortable, loose clothes.  *Leave your purse, billfold, credit cards, etc. at home.  *Bring your insurance card.  *Bring a list of your medications.  *Plan to arrive on time.  *We do our best to stay on time, but there may be a delay. Please bring something to pass the time, such as a newspaper, book or magazine.    What happens when I arrive?    *You will do some paper work.  *We will take you to the admission area. Your family may stay with you during this time.  *A nurse will check your records and ask you questions.  *You will change into a hospital gown without underwear.   *You will sign a consent form.  *We will start an IV (intravenous). We will use it to give you medicines during the exam.    What happens during the exam?    *We will take you on a cart to the exam room.   *You can ask questions of the doctor who is doing your exam.  *You will lie on your left side on a table.    *You will receive medicines through your IV to relax you and for pain.    *The doctor will insert a thin, lighted tube (scope) into your rectum. Then the doctor will slowly guide it into your colon. The scope bends, so he or she can move it around the curves of your colon.    *The scope sends an image of the inside of the colon. The image allows the doctor to examine the lining of the colon.    *We may ask you to change positions to help the doctor move the scope.    *The scope also  blows air into your colon. This enlarges the colon and helps the doctor see the lining.  *You should feel little pain during the exam.   *You may feel full and have cramps. Breathe slowly and evenly to help your body relax. Tell your doctor or nurse if you have any pain.    If we see a polyp, we will remove a piece of it (polypectomy). That tissue (biopsy) will be tested in the lab. Most polyps are benign (not cancer). We remove most polyps because they can cause bleeding or turn into cancer.     Risks of the exam are bleeding and piercing or tearing the colon. But this is rare.    What happens after the exam?    *We will return you to your room. We will watch you for one hour or until most of the pain medicine has worn off.  *You may feel bloated or have cramping for a short time because of the air injected during the exam. You will pass the rest of the air from your colon in the next couple hours.  *We will remove the IV.   *Your first meal should be light. Slowly return to normal meals, unless your doctor gives you other orders.

## 2023-07-05 ENCOUNTER — TELEPHONE (OUTPATIENT)
Dept: CARDIOTHORACIC SURGERY | Age: 83
End: 2023-07-05

## 2023-07-05 ENCOUNTER — TELEPHONE (OUTPATIENT)
Dept: CARDIOLOGY CLINIC | Age: 83
End: 2023-07-05

## 2023-07-05 ENCOUNTER — HOSPITAL ENCOUNTER (OUTPATIENT)
Age: 83
Discharge: HOME | End: 2023-07-05
Payer: MEDICARE

## 2023-07-05 LAB
ABO/RH: NORMAL
ANION GAP SERPL CALCULATED.3IONS-SCNC: 14 MMOL/L (ref 4–16)
ANTIBODY SCREEN: NEGATIVE
BUN SERPL-MCNC: 28 MG/DL (ref 6–23)
CALCIUM SERPL-MCNC: 9.3 MG/DL (ref 8.3–10.6)
CHLORIDE BLD-SCNC: 99 MMOL/L (ref 99–110)
CO2: 22 MMOL/L (ref 21–32)
COMMENT: NORMAL
CREAT SERPL-MCNC: 1.7 MG/DL (ref 0.6–1.1)
GFR SERPL CREATININE-BSD FRML MDRD: 29 ML/MIN/1.73M2
GLUCOSE SERPL-MCNC: 127 MG/DL (ref 70–99)
HCT VFR BLD CALC: 35.2 % (ref 37–47)
HEMOGLOBIN: 11.7 GM/DL (ref 12.5–16)
MCH RBC QN AUTO: 30.5 PG (ref 27–31)
MCHC RBC AUTO-ENTMCNC: 33.2 % (ref 32–36)
MCV RBC AUTO: 91.7 FL (ref 78–100)
PDW BLD-RTO: 11.7 % (ref 11.7–14.9)
PLATELET # BLD: 215 K/CU MM (ref 140–440)
PMV BLD AUTO: 9.5 FL (ref 7.5–11.1)
POTASSIUM SERPL-SCNC: 4.1 MMOL/L (ref 3.5–5.1)
RBC # BLD: 3.84 M/CU MM (ref 4.2–5.4)
SODIUM BLD-SCNC: 135 MMOL/L (ref 135–145)
WBC # BLD: 6.9 K/CU MM (ref 4–10.5)

## 2023-07-05 PROCEDURE — 80048 BASIC METABOLIC PNL TOTAL CA: CPT

## 2023-07-05 PROCEDURE — 86900 BLOOD TYPING SEROLOGIC ABO: CPT

## 2023-07-05 PROCEDURE — 36415 COLL VENOUS BLD VENIPUNCTURE: CPT

## 2023-07-05 PROCEDURE — 85027 COMPLETE CBC AUTOMATED: CPT

## 2023-07-05 PROCEDURE — 86901 BLOOD TYPING SEROLOGIC RH(D): CPT

## 2023-07-05 PROCEDURE — 86850 RBC ANTIBODY SCREEN: CPT

## 2023-07-05 NOTE — TELEPHONE ENCOUNTER
Returned pt call after speaking with Dr. Tanya Butler.  Pt is to stop plavix 5 days prior to surgery per Dr. Tanya Butler

## 2023-07-05 NOTE — TELEPHONE ENCOUNTER
Pt called to ask what day she is to stop plavix, and if she needs to stop any other medication. I informed pt that I would talk to Dr. Felipe Dominguez and that I would call her back with the information to her questions.

## 2023-07-05 NOTE — TELEPHONE ENCOUNTER
Small sized defect of mild severity which is reversible involving anterior   wall of myocardium. Abnormal Lexiscan nuclear scintigraphic study suggestive of abnormal   myocardial perfusion. Mild degree of anterior wall ischemia noted involving   a small sized area of left ventricular myocardium. Gated images demonstrate   normal left ventricular systolic function with EF of 60 %. Recommendation   Suggest office visit to discuss results or schedule cardiac catheterization    Advised patient to keep f/u on 7/19/2023 for results.

## 2023-07-11 ENCOUNTER — ANESTHESIA (OUTPATIENT)
Dept: OPERATING ROOM | Age: 83
DRG: 039 | End: 2023-07-11
Payer: MEDICARE

## 2023-07-11 ENCOUNTER — ANESTHESIA EVENT (OUTPATIENT)
Dept: OPERATING ROOM | Age: 83
DRG: 039 | End: 2023-07-11
Payer: MEDICARE

## 2023-07-11 ENCOUNTER — HOSPITAL ENCOUNTER (INPATIENT)
Age: 83
LOS: 1 days | Discharge: HOME | DRG: 039 | End: 2023-07-12
Attending: THORACIC SURGERY (CARDIOTHORACIC VASCULAR SURGERY) | Admitting: THORACIC SURGERY (CARDIOTHORACIC VASCULAR SURGERY)
Payer: MEDICARE

## 2023-07-11 DIAGNOSIS — Z01.818 PRE-OP TESTING: Primary | ICD-10-CM

## 2023-07-11 DIAGNOSIS — I65.22 CAROTID STENOSIS, LEFT: ICD-10-CM

## 2023-07-11 LAB
ANION GAP SERPL CALCULATED.3IONS-SCNC: 11 MMOL/L (ref 4–16)
ANION GAP SERPL CALCULATED.3IONS-SCNC: 12 MMOL/L (ref 4–16)
APTT: 54 SECONDS (ref 25.1–37.1)
BUN SERPL-MCNC: 25 MG/DL (ref 6–23)
BUN SERPL-MCNC: 29 MG/DL (ref 6–23)
CALCIUM SERPL-MCNC: 8.8 MG/DL (ref 8.3–10.6)
CALCIUM SERPL-MCNC: 8.9 MG/DL (ref 8.3–10.6)
CHLORIDE BLD-SCNC: 97 MMOL/L (ref 99–110)
CHLORIDE BLD-SCNC: 99 MMOL/L (ref 99–110)
CO2: 23 MMOL/L (ref 21–32)
CO2: 23 MMOL/L (ref 21–32)
CREAT SERPL-MCNC: 0.8 MG/DL (ref 0.6–1.1)
CREAT SERPL-MCNC: 1 MG/DL (ref 0.6–1.1)
GFR SERPL CREATININE-BSD FRML MDRD: 56 ML/MIN/1.73M2
GFR SERPL CREATININE-BSD FRML MDRD: >60 ML/MIN/1.73M2
GLUCOSE BLD-MCNC: 121 MG/DL (ref 70–99)
GLUCOSE SERPL-MCNC: 208 MG/DL (ref 70–99)
GLUCOSE SERPL-MCNC: 211 MG/DL (ref 70–99)
HCT VFR BLD CALC: 31.6 % (ref 37–47)
HCT VFR BLD CALC: 32.4 % (ref 37–47)
HEMOGLOBIN: 10.5 GM/DL (ref 12.5–16)
HEMOGLOBIN: 10.6 GM/DL (ref 12.5–16)
INR BLD: 1.2 INDEX
MAGNESIUM: 1.7 MG/DL (ref 1.8–2.4)
MAGNESIUM: 1.7 MG/DL (ref 1.8–2.4)
MCH RBC QN AUTO: 30 PG (ref 27–31)
MCHC RBC AUTO-ENTMCNC: 32.4 % (ref 32–36)
MCV RBC AUTO: 92.6 FL (ref 78–100)
PDW BLD-RTO: 11.8 % (ref 11.7–14.9)
PHOSPHORUS: 3.8 MG/DL (ref 2.5–4.9)
PHOSPHORUS: 5.1 MG/DL (ref 2.5–4.9)
PLATELET # BLD: 232 K/CU MM (ref 140–440)
PMV BLD AUTO: 9.7 FL (ref 7.5–11.1)
POTASSIUM SERPL-SCNC: 4 MMOL/L (ref 3.5–5.1)
POTASSIUM SERPL-SCNC: 4 MMOL/L (ref 3.5–5.1)
PROTHROMBIN TIME: 15.2 SECONDS (ref 11.7–14.5)
RBC # BLD: 3.5 M/CU MM (ref 4.2–5.4)
SODIUM BLD-SCNC: 132 MMOL/L (ref 135–145)
SODIUM BLD-SCNC: 133 MMOL/L (ref 135–145)
WBC # BLD: 13.6 K/CU MM (ref 4–10.5)

## 2023-07-11 PROCEDURE — 2500000003 HC RX 250 WO HCPCS: Performed by: THORACIC SURGERY (CARDIOTHORACIC VASCULAR SURGERY)

## 2023-07-11 PROCEDURE — 7100000001 HC PACU RECOVERY - ADDTL 15 MIN: Performed by: THORACIC SURGERY (CARDIOTHORACIC VASCULAR SURGERY)

## 2023-07-11 PROCEDURE — 03UJ0KZ SUPPLEMENT LEFT COMMON CAROTID ARTERY WITH NONAUTOLOGOUS TISSUE SUBSTITUTE, OPEN APPROACH: ICD-10-PCS | Performed by: THORACIC SURGERY (CARDIOTHORACIC VASCULAR SURGERY)

## 2023-07-11 PROCEDURE — 99291 CRITICAL CARE FIRST HOUR: CPT | Performed by: INTERNAL MEDICINE

## 2023-07-11 PROCEDURE — 2500000003 HC RX 250 WO HCPCS: Performed by: ANESTHESIOLOGY

## 2023-07-11 PROCEDURE — 85027 COMPLETE CBC AUTOMATED: CPT

## 2023-07-11 PROCEDURE — 6370000000 HC RX 637 (ALT 250 FOR IP): Performed by: THORACIC SURGERY (CARDIOTHORACIC VASCULAR SURGERY)

## 2023-07-11 PROCEDURE — 85730 THROMBOPLASTIN TIME PARTIAL: CPT

## 2023-07-11 PROCEDURE — 85014 HEMATOCRIT: CPT

## 2023-07-11 PROCEDURE — C1768 GRAFT, VASCULAR: HCPCS | Performed by: THORACIC SURGERY (CARDIOTHORACIC VASCULAR SURGERY)

## 2023-07-11 PROCEDURE — 6360000002 HC RX W HCPCS: Performed by: NURSE ANESTHETIST, CERTIFIED REGISTERED

## 2023-07-11 PROCEDURE — 80048 BASIC METABOLIC PNL TOTAL CA: CPT

## 2023-07-11 PROCEDURE — 93005 ELECTROCARDIOGRAM TRACING: CPT | Performed by: PHYSICIAN ASSISTANT

## 2023-07-11 PROCEDURE — C9399 UNCLASSIFIED DRUGS OR BIOLOG: HCPCS | Performed by: NURSE ANESTHETIST, CERTIFIED REGISTERED

## 2023-07-11 PROCEDURE — 3600000006 HC SURGERY LEVEL 6 BASE: Performed by: THORACIC SURGERY (CARDIOTHORACIC VASCULAR SURGERY)

## 2023-07-11 PROCEDURE — 2100000000 HC CCU R&B

## 2023-07-11 PROCEDURE — 03CJ0ZZ EXTIRPATION OF MATTER FROM LEFT COMMON CAROTID ARTERY, OPEN APPROACH: ICD-10-PCS | Performed by: THORACIC SURGERY (CARDIOTHORACIC VASCULAR SURGERY)

## 2023-07-11 PROCEDURE — 2580000003 HC RX 258: Performed by: NURSE ANESTHETIST, CERTIFIED REGISTERED

## 2023-07-11 PROCEDURE — 2580000003 HC RX 258: Performed by: ANESTHESIOLOGY

## 2023-07-11 PROCEDURE — 2500000003 HC RX 250 WO HCPCS: Performed by: NURSE ANESTHETIST, CERTIFIED REGISTERED

## 2023-07-11 PROCEDURE — 85018 HEMOGLOBIN: CPT

## 2023-07-11 PROCEDURE — 7100000000 HC PACU RECOVERY - FIRST 15 MIN: Performed by: THORACIC SURGERY (CARDIOTHORACIC VASCULAR SURGERY)

## 2023-07-11 PROCEDURE — 6360000002 HC RX W HCPCS: Performed by: PHYSICIAN ASSISTANT

## 2023-07-11 PROCEDURE — 2720000010 HC SURG SUPPLY STERILE: Performed by: THORACIC SURGERY (CARDIOTHORACIC VASCULAR SURGERY)

## 2023-07-11 PROCEDURE — 2709999900 HC NON-CHARGEABLE SUPPLY: Performed by: THORACIC SURGERY (CARDIOTHORACIC VASCULAR SURGERY)

## 2023-07-11 PROCEDURE — 85610 PROTHROMBIN TIME: CPT

## 2023-07-11 PROCEDURE — 6370000000 HC RX 637 (ALT 250 FOR IP): Performed by: PHYSICIAN ASSISTANT

## 2023-07-11 PROCEDURE — 82962 GLUCOSE BLOOD TEST: CPT

## 2023-07-11 PROCEDURE — 2700000000 HC OXYGEN THERAPY PER DAY

## 2023-07-11 PROCEDURE — 6360000002 HC RX W HCPCS: Performed by: THORACIC SURGERY (CARDIOTHORACIC VASCULAR SURGERY)

## 2023-07-11 PROCEDURE — 94761 N-INVAS EAR/PLS OXIMETRY MLT: CPT

## 2023-07-11 PROCEDURE — 3600000016 HC SURGERY LEVEL 6 ADDTL 15MIN: Performed by: THORACIC SURGERY (CARDIOTHORACIC VASCULAR SURGERY)

## 2023-07-11 PROCEDURE — 88300 SURGICAL PATH GROSS: CPT

## 2023-07-11 PROCEDURE — 83735 ASSAY OF MAGNESIUM: CPT

## 2023-07-11 PROCEDURE — C9113 INJ PANTOPRAZOLE SODIUM, VIA: HCPCS | Performed by: THORACIC SURGERY (CARDIOTHORACIC VASCULAR SURGERY)

## 2023-07-11 PROCEDURE — 84100 ASSAY OF PHOSPHORUS: CPT

## 2023-07-11 PROCEDURE — 6360000002 HC RX W HCPCS: Performed by: ANESTHESIOLOGY

## 2023-07-11 PROCEDURE — 3700000000 HC ANESTHESIA ATTENDED CARE: Performed by: THORACIC SURGERY (CARDIOTHORACIC VASCULAR SURGERY)

## 2023-07-11 PROCEDURE — 93005 ELECTROCARDIOGRAM TRACING: CPT | Performed by: INTERNAL MEDICINE

## 2023-07-11 PROCEDURE — 2780000010 HC IMPLANT OTHER: Performed by: THORACIC SURGERY (CARDIOTHORACIC VASCULAR SURGERY)

## 2023-07-11 PROCEDURE — 3700000001 HC ADD 15 MINUTES (ANESTHESIA): Performed by: THORACIC SURGERY (CARDIOTHORACIC VASCULAR SURGERY)

## 2023-07-11 PROCEDURE — 2580000003 HC RX 258: Performed by: THORACIC SURGERY (CARDIOTHORACIC VASCULAR SURGERY)

## 2023-07-11 DEVICE — PATCH VASC W0.8XL8CM PERIPH BOV PERICARD N PVC N DEHP CRSS: Type: IMPLANTABLE DEVICE | Site: NECK | Status: FUNCTIONAL

## 2023-07-11 RX ORDER — NITROGLYCERIN 20 MG/100ML
INJECTION INTRAVENOUS CONTINUOUS PRN
Status: DISCONTINUED | OUTPATIENT
Start: 2023-07-11 | End: 2023-07-12 | Stop reason: SDUPTHER

## 2023-07-11 RX ORDER — ATORVASTATIN CALCIUM 20 MG/1
20 TABLET, FILM COATED ORAL DAILY
Status: DISCONTINUED | OUTPATIENT
Start: 2023-07-11 | End: 2023-07-11 | Stop reason: DRUGHIGH

## 2023-07-11 RX ORDER — ONDANSETRON 2 MG/ML
4 INJECTION INTRAMUSCULAR; INTRAVENOUS
Status: DISCONTINUED | OUTPATIENT
Start: 2023-07-11 | End: 2023-07-11 | Stop reason: HOSPADM

## 2023-07-11 RX ORDER — LOSARTAN POTASSIUM 100 MG/1
100 TABLET ORAL DAILY
Status: DISCONTINUED | OUTPATIENT
Start: 2023-07-11 | End: 2023-07-12 | Stop reason: HOSPADM

## 2023-07-11 RX ORDER — METOPROLOL TARTRATE 5 MG/5ML
5 INJECTION INTRAVENOUS ONCE
Status: COMPLETED | OUTPATIENT
Start: 2023-07-11 | End: 2023-07-11

## 2023-07-11 RX ORDER — HYDRALAZINE HYDROCHLORIDE 20 MG/ML
10 INJECTION INTRAMUSCULAR; INTRAVENOUS
Status: DISCONTINUED | OUTPATIENT
Start: 2023-07-11 | End: 2023-07-11 | Stop reason: HOSPADM

## 2023-07-11 RX ORDER — HYDRALAZINE HYDROCHLORIDE 20 MG/ML
10 INJECTION INTRAMUSCULAR; INTRAVENOUS
Status: DISCONTINUED | OUTPATIENT
Start: 2023-07-11 | End: 2023-07-12 | Stop reason: HOSPADM

## 2023-07-11 RX ORDER — LABETALOL HYDROCHLORIDE 5 MG/ML
10 INJECTION, SOLUTION INTRAVENOUS
Status: DISCONTINUED | OUTPATIENT
Start: 2023-07-11 | End: 2023-07-11 | Stop reason: HOSPADM

## 2023-07-11 RX ORDER — FENTANYL CITRATE 50 UG/ML
25 INJECTION, SOLUTION INTRAMUSCULAR; INTRAVENOUS EVERY 5 MIN PRN
Status: DISCONTINUED | OUTPATIENT
Start: 2023-07-11 | End: 2023-07-11 | Stop reason: HOSPADM

## 2023-07-11 RX ORDER — POTASSIUM CHLORIDE 7.45 MG/ML
10 INJECTION INTRAVENOUS PRN
Status: DISCONTINUED | OUTPATIENT
Start: 2023-07-11 | End: 2023-07-12 | Stop reason: HOSPADM

## 2023-07-11 RX ORDER — BISACODYL 5 MG/1
5 TABLET, DELAYED RELEASE ORAL DAILY PRN
Status: DISCONTINUED | OUTPATIENT
Start: 2023-07-11 | End: 2023-07-12 | Stop reason: HOSPADM

## 2023-07-11 RX ORDER — SODIUM CHLORIDE, SODIUM LACTATE, POTASSIUM CHLORIDE, CALCIUM CHLORIDE 600; 310; 30; 20 MG/100ML; MG/100ML; MG/100ML; MG/100ML
INJECTION, SOLUTION INTRAVENOUS CONTINUOUS
Status: DISCONTINUED | OUTPATIENT
Start: 2023-07-11 | End: 2023-07-11 | Stop reason: HOSPADM

## 2023-07-11 RX ORDER — SODIUM CHLORIDE 0.9 % (FLUSH) 0.9 %
5-40 SYRINGE (ML) INJECTION PRN
Status: DISCONTINUED | OUTPATIENT
Start: 2023-07-11 | End: 2023-07-12 | Stop reason: HOSPADM

## 2023-07-11 RX ORDER — NITROGLYCERIN 20 MG/100ML
5-200 INJECTION INTRAVENOUS CONTINUOUS
Status: DISCONTINUED | OUTPATIENT
Start: 2023-07-11 | End: 2023-07-12 | Stop reason: HOSPADM

## 2023-07-11 RX ORDER — SODIUM CHLORIDE 0.9 % (FLUSH) 0.9 %
5-40 SYRINGE (ML) INJECTION EVERY 12 HOURS SCHEDULED
Status: DISCONTINUED | OUTPATIENT
Start: 2023-07-11 | End: 2023-07-11 | Stop reason: HOSPADM

## 2023-07-11 RX ORDER — SODIUM CHLORIDE AND POTASSIUM CHLORIDE 150; 900 MG/100ML; MG/100ML
INJECTION, SOLUTION INTRAVENOUS CONTINUOUS
Status: DISPENSED | OUTPATIENT
Start: 2023-07-11 | End: 2023-07-12

## 2023-07-11 RX ORDER — ACETAMINOPHEN 325 MG/1
650 TABLET ORAL EVERY 4 HOURS PRN
Status: DISCONTINUED | OUTPATIENT
Start: 2023-07-11 | End: 2023-07-11 | Stop reason: SDUPTHER

## 2023-07-11 RX ORDER — ATORVASTATIN CALCIUM 40 MG/1
40 TABLET, FILM COATED ORAL DAILY
Status: DISCONTINUED | OUTPATIENT
Start: 2023-07-11 | End: 2023-07-12 | Stop reason: HOSPADM

## 2023-07-11 RX ORDER — ACETAMINOPHEN 325 MG/1
650 TABLET ORAL EVERY 4 HOURS PRN
Status: DISCONTINUED | OUTPATIENT
Start: 2023-07-11 | End: 2023-07-12 | Stop reason: HOSPADM

## 2023-07-11 RX ORDER — GABAPENTIN 300 MG/1
300 CAPSULE ORAL 2 TIMES DAILY
Status: DISCONTINUED | OUTPATIENT
Start: 2023-07-11 | End: 2023-07-12 | Stop reason: HOSPADM

## 2023-07-11 RX ORDER — ONDANSETRON 4 MG/1
4 TABLET, ORALLY DISINTEGRATING ORAL EVERY 8 HOURS PRN
Status: DISCONTINUED | OUTPATIENT
Start: 2023-07-11 | End: 2023-07-12 | Stop reason: HOSPADM

## 2023-07-11 RX ORDER — FENTANYL CITRATE 50 UG/ML
50 INJECTION, SOLUTION INTRAMUSCULAR; INTRAVENOUS EVERY 5 MIN PRN
Status: DISCONTINUED | OUTPATIENT
Start: 2023-07-11 | End: 2023-07-11 | Stop reason: HOSPADM

## 2023-07-11 RX ORDER — HYDRALAZINE HYDROCHLORIDE 20 MG/ML
INJECTION INTRAMUSCULAR; INTRAVENOUS PRN
Status: DISCONTINUED | OUTPATIENT
Start: 2023-07-11 | End: 2023-07-12 | Stop reason: SDUPTHER

## 2023-07-11 RX ORDER — SODIUM CHLORIDE 9 MG/ML
INJECTION, SOLUTION INTRAVENOUS PRN
Status: DISCONTINUED | OUTPATIENT
Start: 2023-07-11 | End: 2023-07-12 | Stop reason: HOSPADM

## 2023-07-11 RX ORDER — POLYETHYLENE GLYCOL 3350 17 G/17G
17 POWDER, FOR SOLUTION ORAL DAILY
Status: DISCONTINUED | OUTPATIENT
Start: 2023-07-11 | End: 2023-07-12 | Stop reason: HOSPADM

## 2023-07-11 RX ORDER — ONDANSETRON 2 MG/ML
INJECTION INTRAMUSCULAR; INTRAVENOUS PRN
Status: DISCONTINUED | OUTPATIENT
Start: 2023-07-11 | End: 2023-07-12 | Stop reason: SDUPTHER

## 2023-07-11 RX ORDER — PANTOPRAZOLE SODIUM 40 MG/10ML
40 INJECTION, POWDER, LYOPHILIZED, FOR SOLUTION INTRAVENOUS DAILY
Status: DISCONTINUED | OUTPATIENT
Start: 2023-07-11 | End: 2023-07-12 | Stop reason: HOSPADM

## 2023-07-11 RX ORDER — CALCIUM CARBONATE 500 MG/1
500 TABLET, CHEWABLE ORAL 3 TIMES DAILY PRN
Status: DISCONTINUED | OUTPATIENT
Start: 2023-07-11 | End: 2023-07-12 | Stop reason: HOSPADM

## 2023-07-11 RX ORDER — SODIUM CHLORIDE 9 MG/ML
INJECTION, SOLUTION INTRAVENOUS PRN
Status: DISCONTINUED | OUTPATIENT
Start: 2023-07-11 | End: 2023-07-11 | Stop reason: HOSPADM

## 2023-07-11 RX ORDER — CLOPIDOGREL BISULFATE 75 MG/1
75 TABLET ORAL DAILY
Status: DISCONTINUED | OUTPATIENT
Start: 2023-07-11 | End: 2023-07-12 | Stop reason: HOSPADM

## 2023-07-11 RX ORDER — HYDROCHLOROTHIAZIDE 25 MG/1
25 TABLET ORAL DAILY
Status: DISCONTINUED | OUTPATIENT
Start: 2023-07-11 | End: 2023-07-12 | Stop reason: HOSPADM

## 2023-07-11 RX ORDER — LIDOCAINE HYDROCHLORIDE 10 MG/ML
INJECTION, SOLUTION EPIDURAL; INFILTRATION; INTRACAUDAL; PERINEURAL
Status: COMPLETED | OUTPATIENT
Start: 2023-07-11 | End: 2023-07-11

## 2023-07-11 RX ORDER — ASPIRIN 81 MG/1
81 TABLET, CHEWABLE ORAL DAILY
Status: DISCONTINUED | OUTPATIENT
Start: 2023-07-11 | End: 2023-07-12 | Stop reason: HOSPADM

## 2023-07-11 RX ORDER — HYDRALAZINE HYDROCHLORIDE 25 MG/1
25 TABLET, FILM COATED ORAL 2 TIMES DAILY
Status: DISCONTINUED | OUTPATIENT
Start: 2023-07-11 | End: 2023-07-12 | Stop reason: HOSPADM

## 2023-07-11 RX ORDER — ROCURONIUM BROMIDE 10 MG/ML
INJECTION, SOLUTION INTRAVENOUS PRN
Status: DISCONTINUED | OUTPATIENT
Start: 2023-07-11 | End: 2023-07-12 | Stop reason: SDUPTHER

## 2023-07-11 RX ORDER — ENOXAPARIN SODIUM 100 MG/ML
30 INJECTION SUBCUTANEOUS DAILY
Status: DISCONTINUED | OUTPATIENT
Start: 2023-07-12 | End: 2023-07-12

## 2023-07-11 RX ORDER — SODIUM CHLORIDE 0.9 % (FLUSH) 0.9 %
5-40 SYRINGE (ML) INJECTION EVERY 12 HOURS SCHEDULED
Status: DISCONTINUED | OUTPATIENT
Start: 2023-07-11 | End: 2023-07-12 | Stop reason: HOSPADM

## 2023-07-11 RX ORDER — SODIUM CHLORIDE, SODIUM LACTATE, POTASSIUM CHLORIDE, CALCIUM CHLORIDE 600; 310; 30; 20 MG/100ML; MG/100ML; MG/100ML; MG/100ML
INJECTION, SOLUTION INTRAVENOUS CONTINUOUS PRN
Status: DISCONTINUED | OUTPATIENT
Start: 2023-07-11 | End: 2023-07-12 | Stop reason: SDUPTHER

## 2023-07-11 RX ORDER — ONDANSETRON 2 MG/ML
4 INJECTION INTRAMUSCULAR; INTRAVENOUS EVERY 6 HOURS PRN
Status: DISCONTINUED | OUTPATIENT
Start: 2023-07-11 | End: 2023-07-12 | Stop reason: HOSPADM

## 2023-07-11 RX ORDER — MAGNESIUM SULFATE IN WATER 40 MG/ML
2000 INJECTION, SOLUTION INTRAVENOUS PRN
Status: DISCONTINUED | OUTPATIENT
Start: 2023-07-11 | End: 2023-07-12 | Stop reason: HOSPADM

## 2023-07-11 RX ORDER — PROPOFOL 10 MG/ML
INJECTION, EMULSION INTRAVENOUS PRN
Status: DISCONTINUED | OUTPATIENT
Start: 2023-07-11 | End: 2023-07-12 | Stop reason: SDUPTHER

## 2023-07-11 RX ORDER — FAMOTIDINE 10 MG/ML
20 INJECTION, SOLUTION INTRAVENOUS DAILY
Status: DISCONTINUED | OUTPATIENT
Start: 2023-07-11 | End: 2023-07-12 | Stop reason: HOSPADM

## 2023-07-11 RX ORDER — DEXAMETHASONE SODIUM PHOSPHATE 4 MG/ML
INJECTION, SOLUTION INTRA-ARTICULAR; INTRALESIONAL; INTRAMUSCULAR; INTRAVENOUS; SOFT TISSUE PRN
Status: DISCONTINUED | OUTPATIENT
Start: 2023-07-11 | End: 2023-07-12 | Stop reason: SDUPTHER

## 2023-07-11 RX ORDER — HEPARIN SODIUM 1000 [USP'U]/ML
INJECTION, SOLUTION INTRAVENOUS; SUBCUTANEOUS PRN
Status: DISCONTINUED | OUTPATIENT
Start: 2023-07-11 | End: 2023-07-12 | Stop reason: SDUPTHER

## 2023-07-11 RX ORDER — FAMOTIDINE 20 MG/1
20 TABLET, FILM COATED ORAL DAILY
Status: DISCONTINUED | OUTPATIENT
Start: 2023-07-11 | End: 2023-07-12 | Stop reason: HOSPADM

## 2023-07-11 RX ORDER — FENTANYL CITRATE 50 UG/ML
INJECTION, SOLUTION INTRAMUSCULAR; INTRAVENOUS PRN
Status: DISCONTINUED | OUTPATIENT
Start: 2023-07-11 | End: 2023-07-12 | Stop reason: SDUPTHER

## 2023-07-11 RX ORDER — ASPIRIN 325 MG
325 TABLET ORAL ONCE
Status: COMPLETED | OUTPATIENT
Start: 2023-07-11 | End: 2023-07-12

## 2023-07-11 RX ORDER — ONDANSETRON 2 MG/ML
4 INJECTION INTRAMUSCULAR; INTRAVENOUS
Status: COMPLETED | OUTPATIENT
Start: 2023-07-11 | End: 2023-07-11

## 2023-07-11 RX ORDER — LABETALOL HYDROCHLORIDE 5 MG/ML
10 INJECTION, SOLUTION INTRAVENOUS
Status: DISCONTINUED | OUTPATIENT
Start: 2023-07-11 | End: 2023-07-12 | Stop reason: HOSPADM

## 2023-07-11 RX ORDER — AMLODIPINE BESYLATE 5 MG/1
5 TABLET ORAL DAILY
Status: DISCONTINUED | OUTPATIENT
Start: 2023-07-11 | End: 2023-07-12 | Stop reason: HOSPADM

## 2023-07-11 RX ORDER — SODIUM CHLORIDE 0.9 % (FLUSH) 0.9 %
5-40 SYRINGE (ML) INJECTION PRN
Status: DISCONTINUED | OUTPATIENT
Start: 2023-07-11 | End: 2023-07-11 | Stop reason: HOSPADM

## 2023-07-11 RX ADMIN — ONDANSETRON 4 MG: 2 INJECTION INTRAMUSCULAR; INTRAVENOUS at 14:35

## 2023-07-11 RX ADMIN — CEFAZOLIN 2000 MG: 2 INJECTION, POWDER, FOR SOLUTION INTRAMUSCULAR; INTRAVENOUS at 12:22

## 2023-07-11 RX ADMIN — BENZOCAINE 6 MG-MENTHOL 10 MG LOZENGES 1 LOZENGE: at 21:25

## 2023-07-11 RX ADMIN — SODIUM CHLORIDE, SODIUM LACTATE, POTASSIUM CHLORIDE, CALCIUM CHLORIDE: 600; 310; 30; 20 INJECTION, SOLUTION INTRAVENOUS at 12:07

## 2023-07-11 RX ADMIN — METOPROLOL TARTRATE 5 MG: 5 INJECTION INTRAVENOUS at 15:09

## 2023-07-11 RX ADMIN — HYDRALAZINE HYDROCHLORIDE 5 MG: 20 INJECTION INTRAMUSCULAR; INTRAVENOUS at 14:07

## 2023-07-11 RX ADMIN — NITROGLYCERIN 20 MCG/MIN: 20 INJECTION INTRAVENOUS at 16:00

## 2023-07-11 RX ADMIN — FENTANYL CITRATE 100 MCG: 50 INJECTION, SOLUTION INTRAMUSCULAR; INTRAVENOUS at 12:05

## 2023-07-11 RX ADMIN — PROPOFOL 100 MG: 10 INJECTION, EMULSION INTRAVENOUS at 12:05

## 2023-07-11 RX ADMIN — METOPROLOL TARTRATE 50 MG: 25 TABLET, FILM COATED ORAL at 21:23

## 2023-07-11 RX ADMIN — ROCURONIUM BROMIDE 50 MG: 50 INJECTION, SOLUTION INTRAVENOUS at 12:05

## 2023-07-11 RX ADMIN — HYDRALAZINE HYDROCHLORIDE 6 MG: 20 INJECTION INTRAMUSCULAR; INTRAVENOUS at 13:32

## 2023-07-11 RX ADMIN — ROCURONIUM BROMIDE 20 MG: 50 INJECTION, SOLUTION INTRAVENOUS at 12:49

## 2023-07-11 RX ADMIN — FENTANYL CITRATE 50 MCG: 50 INJECTION, SOLUTION INTRAMUSCULAR; INTRAVENOUS at 14:42

## 2023-07-11 RX ADMIN — POTASSIUM CHLORIDE AND SODIUM CHLORIDE: 900; 150 INJECTION, SOLUTION INTRAVENOUS at 18:22

## 2023-07-11 RX ADMIN — LABETALOL HYDROCHLORIDE 5 MG: 5 INJECTION, SOLUTION INTRAVENOUS at 16:09

## 2023-07-11 RX ADMIN — HEPARIN SODIUM 10000 UNITS: 1000 INJECTION INTRAVENOUS; SUBCUTANEOUS at 13:00

## 2023-07-11 RX ADMIN — AMLODIPINE BESYLATE 5 MG: 5 TABLET ORAL at 17:52

## 2023-07-11 RX ADMIN — PHENYLEPHRINE HYDROCHLORIDE 15 MCG/MIN: 10 INJECTION INTRAVENOUS at 12:51

## 2023-07-11 RX ADMIN — PROPOFOL 50 MG: 10 INJECTION, EMULSION INTRAVENOUS at 12:19

## 2023-07-11 RX ADMIN — GABAPENTIN 300 MG: 300 CAPSULE ORAL at 21:24

## 2023-07-11 RX ADMIN — PANTOPRAZOLE SODIUM 40 MG: 40 INJECTION, POWDER, FOR SOLUTION INTRAVENOUS at 17:51

## 2023-07-11 RX ADMIN — NITROGLYCERIN 5 MCG/MIN: 20 INJECTION INTRAVENOUS at 13:37

## 2023-07-11 RX ADMIN — CLOPIDOGREL BISULFATE 75 MG: 75 TABLET ORAL at 17:51

## 2023-07-11 RX ADMIN — ONDANSETRON 4 MG: 2 INJECTION INTRAMUSCULAR; INTRAVENOUS at 13:40

## 2023-07-11 RX ADMIN — HYDROMORPHONE HYDROCHLORIDE 0.5 MG: 1 INJECTION, SOLUTION INTRAMUSCULAR; INTRAVENOUS; SUBCUTANEOUS at 16:22

## 2023-07-11 RX ADMIN — HYDRALAZINE HYDROCHLORIDE 25 MG: 25 TABLET, FILM COATED ORAL at 21:23

## 2023-07-11 RX ADMIN — DEXAMETHASONE SODIUM PHOSPHATE 4 MG: 4 INJECTION, SOLUTION INTRAMUSCULAR; INTRAVENOUS at 12:26

## 2023-07-11 RX ADMIN — FAMOTIDINE 20 MG: 20 TABLET, FILM COATED ORAL at 21:24

## 2023-07-11 RX ADMIN — PROPOFOL 30 MG: 10 INJECTION, EMULSION INTRAVENOUS at 12:40

## 2023-07-11 RX ADMIN — FENTANYL CITRATE 50 MCG: 50 INJECTION, SOLUTION INTRAMUSCULAR; INTRAVENOUS at 14:51

## 2023-07-11 RX ADMIN — SODIUM CHLORIDE 5 MG/HR: 9 INJECTION, SOLUTION INTRAVENOUS at 16:00

## 2023-07-11 RX ADMIN — ATORVASTATIN CALCIUM 40 MG: 40 TABLET, FILM COATED ORAL at 17:52

## 2023-07-11 RX ADMIN — HYDRALAZINE HYDROCHLORIDE 5 MG: 20 INJECTION INTRAMUSCULAR; INTRAVENOUS at 13:45

## 2023-07-11 RX ADMIN — ACETAMINOPHEN 650 MG: 325 TABLET ORAL at 21:24

## 2023-07-11 RX ADMIN — HYDROCHLOROTHIAZIDE 25 MG: 25 TABLET ORAL at 17:51

## 2023-07-11 RX ADMIN — ONDANSETRON 4 MG: 2 INJECTION INTRAMUSCULAR; INTRAVENOUS at 16:09

## 2023-07-11 RX ADMIN — SODIUM CHLORIDE, POTASSIUM CHLORIDE, SODIUM LACTATE AND CALCIUM CHLORIDE: 600; 310; 30; 20 INJECTION, SOLUTION INTRAVENOUS at 10:15

## 2023-07-11 RX ADMIN — METOPROLOL TARTRATE 5 MG: 5 INJECTION INTRAVENOUS at 15:31

## 2023-07-11 RX ADMIN — SUGAMMADEX 200 MG: 100 INJECTION, SOLUTION INTRAVENOUS at 14:03

## 2023-07-11 RX ADMIN — LOSARTAN POTASSIUM 100 MG: 100 TABLET, FILM COATED ORAL at 17:51

## 2023-07-11 RX ADMIN — PROPOFOL 20 MG: 10 INJECTION, EMULSION INTRAVENOUS at 13:08

## 2023-07-11 RX ADMIN — ASPIRIN 81 MG: 81 TABLET, CHEWABLE ORAL at 17:51

## 2023-07-11 RX ADMIN — HYDRALAZINE HYDROCHLORIDE 4 MG: 20 INJECTION INTRAMUSCULAR; INTRAVENOUS at 13:37

## 2023-07-11 ASSESSMENT — PAIN - FUNCTIONAL ASSESSMENT
PAIN_FUNCTIONAL_ASSESSMENT: PREVENTS OR INTERFERES SOME ACTIVE ACTIVITIES AND ADLS
PAIN_FUNCTIONAL_ASSESSMENT: 0-10
PAIN_FUNCTIONAL_ASSESSMENT: PREVENTS OR INTERFERES SOME ACTIVE ACTIVITIES AND ADLS

## 2023-07-11 ASSESSMENT — PAIN SCALES - GENERAL
PAINLEVEL_OUTOF10: 4
PAINLEVEL_OUTOF10: 8
PAINLEVEL_OUTOF10: 8
PAINLEVEL_OUTOF10: 7
PAINLEVEL_OUTOF10: 8
PAINLEVEL_OUTOF10: 5
PAINLEVEL_OUTOF10: 8

## 2023-07-11 ASSESSMENT — ENCOUNTER SYMPTOMS
PHOTOPHOBIA: 0
VOMITING: 0
ABDOMINAL PAIN: 0
CHEST TIGHTNESS: 0
VOICE CHANGE: 0
NAUSEA: 0
SINUS PAIN: 0
BACK PAIN: 0
SORE THROAT: 0
EYE REDNESS: 0
EYE DISCHARGE: 0
EYE ITCHING: 0
COUGH: 0
DIARRHEA: 0
TROUBLE SWALLOWING: 0
SHORTNESS OF BREATH: 0
CONSTIPATION: 0
ABDOMINAL DISTENTION: 0
RHINORRHEA: 0
EYE PAIN: 0
BLOOD IN STOOL: 0

## 2023-07-11 ASSESSMENT — PAIN DESCRIPTION - FREQUENCY
FREQUENCY: CONTINUOUS

## 2023-07-11 ASSESSMENT — PAIN DESCRIPTION - ORIENTATION
ORIENTATION: LEFT
ORIENTATION: MID
ORIENTATION: LEFT
ORIENTATION: MID

## 2023-07-11 ASSESSMENT — PAIN DESCRIPTION - DESCRIPTORS
DESCRIPTORS: SORE
DESCRIPTORS: ACHING;BURNING;DISCOMFORT;NAGGING
DESCRIPTORS: PRESSURE
DESCRIPTORS: SORE

## 2023-07-11 ASSESSMENT — PAIN DESCRIPTION - PAIN TYPE
TYPE: SURGICAL PAIN
TYPE: ACUTE PAIN;SURGICAL PAIN

## 2023-07-11 ASSESSMENT — PAIN DESCRIPTION - DIRECTION: RADIATING_TOWARDS: BACK

## 2023-07-11 ASSESSMENT — PAIN DESCRIPTION - LOCATION
LOCATION: NECK
LOCATION: NECK
LOCATION: CHEST
LOCATION: NECK
LOCATION: THROAT
LOCATION: NECK;CHEST

## 2023-07-11 ASSESSMENT — PAIN DESCRIPTION - ONSET
ONSET: ON-GOING

## 2023-07-11 NOTE — INTERVAL H&P NOTE
Update History & Physical    The patient's History and Physical was reviewed with the patient and I examined the patient. There was no change. The surgical site was confirmed by the patient and me. Plan: The risks, benefits, expected outcome, and alternative to the recommended procedure have been discussed with the patient. Patient understands and wants to proceed with the procedure.      Electronically signed by Eileen Donahue MD on 7/11/2023 at 11:53 AM

## 2023-07-11 NOTE — H&P
Nahid Gruber (79 y.o. female) today in office for her worsening left carotid stenosis. She has complaints of dizziness, intermittent blurry vision of both eyes, more on the left, and headaches which were not unilateral.  She is here with her daughter and a great grand daughter of which she has 11, and she tells me she is overall in excellent health. She had no transient motor deficits or speech deficits. . She is a very active and is able to do all her self care she drives and cares for her yard. She is worried that she might have a stroke with her worsening carotid stenosis and wishes to have this treated as soon as possible. PMHx:  Past Medical History        Past Medical History:   Diagnosis Date    AK (actinic keratosis)       Alen Hilda Grooms following    CAD (coronary artery disease)       5/2005 S/P PTCA and stents X2 - Dr Jaspal Choi [facility])       skin    Carotid stenosis      Carotid stenosis, left       monitored by Dr Georgina Valenzuela- 50% stenosis noted on CTA 10/2016    Coronary arteriosclerosis after percutaneous transluminal coronary angioplasty (PTCA) 12/07/2022     PCI procedure:DE Stent, LAD: DE Stent Plcmt Initl Vsl, LAD: DE Stent Plcmt Addtl Vsl, CIRC: DE Stent Plcmt Initl Vsl, Balloon Angioplasty, LAD: Balloon Angioplasty Initl Vsl, LAD: Balloon Angioplasty Addtl Vsl, CIRC: Balloon Angioplasty Initl Vsl. 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 now resolved.     CTS (carpal tunnel syndrome)       inactive-S/P right CTS surg 4/2001- Dr Gamaliel Thomas    DDD (degenerative disc disease), cervical      DDD (degenerative disc disease), cervical      DDD (degenerative disc disease), lumbar      Degenerative disc disease, cervical      Diabetes mellitus with neuropathy ([address])       Dr Alvarado/sheyla, - ON NEURONTIN    Diffuse cystic mastopathy      GERD without

## 2023-07-12 VITALS
TEMPERATURE: 98.4 F | SYSTOLIC BLOOD PRESSURE: 110 MMHG | HEART RATE: 94 BPM | RESPIRATION RATE: 17 BRPM | OXYGEN SATURATION: 91 % | DIASTOLIC BLOOD PRESSURE: 72 MMHG | HEIGHT: 60 IN | WEIGHT: 172.22 LBS | BODY MASS INDEX: 33.81 KG/M2

## 2023-07-12 PROBLEM — Z01.818 PRE-OP TESTING: Status: RESOLVED | Noted: 2023-07-11 | Resolved: 2023-07-12

## 2023-07-12 PROBLEM — I65.22 CAROTID ARTERY CALCIFICATION, LEFT: Status: ACTIVE | Noted: 2023-07-12

## 2023-07-12 LAB
LV EF: 58 %
LVEF MODALITY: NORMAL
TROPONIN T: <0.01 NG/ML

## 2023-07-12 PROCEDURE — 6370000000 HC RX 637 (ALT 250 FOR IP): Performed by: PHYSICIAN ASSISTANT

## 2023-07-12 PROCEDURE — 37799 UNLISTED PX VASCULAR SURGERY: CPT

## 2023-07-12 PROCEDURE — 93306 TTE W/DOPPLER COMPLETE: CPT

## 2023-07-12 PROCEDURE — 6370000000 HC RX 637 (ALT 250 FOR IP): Performed by: THORACIC SURGERY (CARDIOTHORACIC VASCULAR SURGERY)

## 2023-07-12 PROCEDURE — 94150 VITAL CAPACITY TEST: CPT

## 2023-07-12 PROCEDURE — APPSS60 APP SPLIT SHARED TIME 46-60 MINUTES: Performed by: NURSE PRACTITIONER

## 2023-07-12 PROCEDURE — 97116 GAIT TRAINING THERAPY: CPT

## 2023-07-12 PROCEDURE — 97165 OT EVAL LOW COMPLEX 30 MIN: CPT

## 2023-07-12 PROCEDURE — 97161 PT EVAL LOW COMPLEX 20 MIN: CPT

## 2023-07-12 PROCEDURE — 94761 N-INVAS EAR/PLS OXIMETRY MLT: CPT

## 2023-07-12 PROCEDURE — C9113 INJ PANTOPRAZOLE SODIUM, VIA: HCPCS | Performed by: THORACIC SURGERY (CARDIOTHORACIC VASCULAR SURGERY)

## 2023-07-12 PROCEDURE — 84484 ASSAY OF TROPONIN QUANT: CPT

## 2023-07-12 PROCEDURE — 2580000003 HC RX 258: Performed by: PHYSICIAN ASSISTANT

## 2023-07-12 PROCEDURE — 6360000002 HC RX W HCPCS: Performed by: THORACIC SURGERY (CARDIOTHORACIC VASCULAR SURGERY)

## 2023-07-12 PROCEDURE — 94664 DEMO&/EVAL PT USE INHALER: CPT

## 2023-07-12 PROCEDURE — 2700000000 HC OXYGEN THERAPY PER DAY

## 2023-07-12 PROCEDURE — 6360000002 HC RX W HCPCS: Performed by: PHYSICIAN ASSISTANT

## 2023-07-12 PROCEDURE — 99233 SBSQ HOSP IP/OBS HIGH 50: CPT | Performed by: INTERNAL MEDICINE

## 2023-07-12 PROCEDURE — 97530 THERAPEUTIC ACTIVITIES: CPT

## 2023-07-12 RX ORDER — GINSENG 100 MG
CAPSULE ORAL 2 TIMES DAILY
Status: DISCONTINUED | OUTPATIENT
Start: 2023-07-12 | End: 2023-07-12 | Stop reason: HOSPADM

## 2023-07-12 RX ADMIN — PANTOPRAZOLE SODIUM 40 MG: 40 INJECTION, POWDER, FOR SOLUTION INTRAVENOUS at 09:13

## 2023-07-12 RX ADMIN — CLOPIDOGREL BISULFATE 75 MG: 75 TABLET ORAL at 09:13

## 2023-07-12 RX ADMIN — METOPROLOL TARTRATE 50 MG: 25 TABLET, FILM COATED ORAL at 09:12

## 2023-07-12 RX ADMIN — AMLODIPINE BESYLATE 5 MG: 5 TABLET ORAL at 09:12

## 2023-07-12 RX ADMIN — HYDROCHLOROTHIAZIDE 25 MG: 25 TABLET ORAL at 09:12

## 2023-07-12 RX ADMIN — ASPIRIN 325 MG: 325 TABLET ORAL at 00:36

## 2023-07-12 RX ADMIN — ACETAMINOPHEN 650 MG: 325 TABLET ORAL at 06:32

## 2023-07-12 RX ADMIN — ATORVASTATIN CALCIUM 40 MG: 40 TABLET, FILM COATED ORAL at 09:13

## 2023-07-12 RX ADMIN — LOSARTAN POTASSIUM 100 MG: 100 TABLET, FILM COATED ORAL at 09:12

## 2023-07-12 RX ADMIN — ASPIRIN 81 MG: 81 TABLET, CHEWABLE ORAL at 09:12

## 2023-07-12 RX ADMIN — GABAPENTIN 300 MG: 300 CAPSULE ORAL at 09:12

## 2023-07-12 RX ADMIN — POLYETHYLENE GLYCOL 3350 17 G: 17 POWDER, FOR SOLUTION ORAL at 09:13

## 2023-07-12 RX ADMIN — HYDRALAZINE HYDROCHLORIDE 25 MG: 25 TABLET, FILM COATED ORAL at 09:13

## 2023-07-12 RX ADMIN — METFORMIN HYDROCHLORIDE 250 MG: 500 TABLET, FILM COATED ORAL at 09:12

## 2023-07-12 RX ADMIN — CEFAZOLIN 2000 MG: 2 INJECTION, POWDER, FOR SOLUTION INTRAMUSCULAR; INTRAVENOUS at 09:19

## 2023-07-12 RX ADMIN — SODIUM CHLORIDE, PRESERVATIVE FREE 10 ML: 5 INJECTION INTRAVENOUS at 09:14

## 2023-07-12 ASSESSMENT — PAIN SCALES - GENERAL
PAINLEVEL_OUTOF10: 5
PAINLEVEL_OUTOF10: 0
PAINLEVEL_OUTOF10: 3

## 2023-07-12 ASSESSMENT — PAIN DESCRIPTION - LOCATION: LOCATION: NECK

## 2023-07-12 ASSESSMENT — PAIN DESCRIPTION - DESCRIPTORS: DESCRIPTORS: ACHING

## 2023-07-12 ASSESSMENT — PAIN - FUNCTIONAL ASSESSMENT: PAIN_FUNCTIONAL_ASSESSMENT: ACTIVITIES ARE NOT PREVENTED

## 2023-07-12 NOTE — PLAN OF CARE
Problem: Discharge Planning  Goal: Discharge to home or other facility with appropriate resources  7/12/2023 0838 by oTn Hernandez RN  Outcome: Progressing  7/11/2023 2253 by Roxanna Feldman RN  Outcome: Progressing     Problem: Pain  Goal: Verbalizes/displays adequate comfort level or baseline comfort level  Outcome: Progressing     Problem: Safety - Adult  Goal: Free from fall injury  Outcome: Progressing     Problem: Chronic Conditions and Co-morbidities  Goal: Patient's chronic conditions and co-morbidity symptoms are monitored and maintained or improved  7/12/2023 0838 by Ton Hernandez RN  Outcome: Completed  7/11/2023 2253 by Ernesto Silver RN  Outcome: Progressing

## 2023-07-12 NOTE — CARE COORDINATION
07/12/23 0920   Service Assessment   Patient Orientation Alert and Oriented   Cognition Alert   History Provided By Patient   Primary [address] [place] Family Members   Patient's Healthcare Decision Maker is: Legal Next of [facility] [place]   PCP Verified by CM Yes   Last Visit to PCP Within last 3 months   Prior Functional Level Independent in ADLs/IADLs   Current Functional Level Assistance with the following:;Bathing; Toileting;Mobility  [facility] policy)   Can patient return to prior living arrangement Yes   Ability to make needs known: Good   Family able to assist with home care needs: Yes   Financial Resources [employer] None   Social/Functional History   Lives With Alone   Type of [address] [place] [address]  One level   Home Access Stairs to enter with rails   Entrance Stairs - Number of Steps 3   Entrance Stairs - [address] Accessibility Not accessible   [address] From Family   ADL Assistance Independent   Homemaking Assistance Independent   Homemaking Responsibilities Yes   Ambulation Assistance Independent   Transfer Assistance Independent   Occupation Retired   Discharge Planning   Type of [facility] Prior To Admission None   DME Ordered? No   Potential Assistance Purchasing Medications No   Type of Home Care Services None   Patient expects to be discharged to: House   History of falls? 0     Met with patient for discharge needs. She is awake and able to participate. She is eager for discharge today. Denies needs.  Vickie Donahue RN

## 2023-07-12 NOTE — CONSULTS
Measures: stable vitals on RA     Body Systems and functions:  ROM: WFL in BUE  Strength: 5/5 in BUE  Sensation: WFL  Tone: normotonic in BUE  Coordination: movements fluid and coordinated  Posture: normal posture  Activity Tolerance: Good     Activities of Daily Living (ADLs):  Feeding: IND   Grooming: Supervision  Toileting: Supervision  UB dressing: Supervision  LB dressing: Supervision  UB bathing: Supervision  LB bathing: Supervision      *pt ADL function inferred from gross functional assessment of mobility, balance, posture, safety awareness, activity tolerance (unless otherwise indicated)    Cognitive and Psychosocial Functioning:  Overall cognitive status: WFL   Affect: normal, pleasant     Balance:   Sitting: good  Scooting: Rizwan   Standing: good     Functional Mobility:  Rolling Laterally in Bed: SBA   Supine to Sit: SBA   Sit to Stand: SBA from EOB and to recliner     Ambulation: CGA progressing to SBA using no AD for long HH distances. No noted LOB throughout. See PT note for further details regarding ambulation. AM-PAC 6 click short form for inpatient daily activity:   How much help from another person does the patient currently need. .. Unable  Dep A Lot  Max A A Lot   Mod A A Little  Min A A Little   CGA  SBA None   Mod I  Indep  Sup   1. Putting on and taking off regular lower body clothing? [] 1    [] 2   [] 2   [] 3   [] 3   [x] 4      2. Bathing (including washing, rinsing, drying)? [] 1   [] 2   [] 2 [] 3 [] 3 [x] 4   3. Toileting, which includes using toilet, bedpan, or urinal? [] 1    [] 2   [] 2   [] 3   [] 3   [x] 4     4. Putting on and taking off regular upper body clothing? [] 1   [] 2   [] 2   [] 3   [] 3    [x] 4      5. Taking care of personal grooming such as brushing teeth? [] 1   [] 2    [] 2 [] 3    [] 3   [x] 4      6. Eating meals?    [] 1   [] 2   [] 2   [] 3   [] 3   [x] 4        Raw Score:  24      24/24 = unimpaired  23/24 = 1-20% impaired   20/24-22/24 = 21-40%
(SUBLIMAZE) injection, PRN  propofol infusion, PRN  rocuronium (ZEMURON) injection, PRN  ceFAZolin (ANCEF) 2,000 mg in sodium chloride 0.9 % 50 mL IVPB (mini-bag), Continuous PRN  dexamethasone (DECADRON) injection, PRN  phenylephrine (RADHA-SYNEPHRINE) 50 mg in sodium chloride 0.9 % 250 mL infusion, Continuous PRN  heparin (porcine) injection, PRN  hydrALAZINE (APRESOLINE) injection, PRN  nitroGLYCERIN 200 mcg/ml in dextrose 5%, Continuous PRN  ondansetron (ZOFRAN) injection, PRN  sugammadex (BRIDION) 200 MG/2ML injection, PRN  lactated ringers IV soln infusion, Continuous PRN      Current Facility-Administered Medications   Medication Dose Route Frequency Provider Last Rate Last Admin    amLODIPine (NORVASC) tablet 5 mg  5 mg Oral Daily Brennan Catalan PA-C        aspirin chewable tablet 81 mg  81 mg Oral Daily Ranjeet Reyes PA-C        atorvastatin (LIPITOR) tablet 40 mg  40 mg Oral Daily Brennan Catalan PA-C        clopidogrel (PLAVIX) tablet 75 mg  75 mg Oral Daily Brennan Catalan PA-C        hydrALAZINE (APRESOLINE) tablet 25 mg  25 mg Oral BID Brennan Catalan PA-C        hydroCHLOROthiazide (HYDRODIURIL) tablet 25 mg  25 mg Oral Daily Ranjeet Reyes PA-C        losartan (COZAAR) tablet 100 mg  100 mg Oral Daily Brennan Catalan PA-C        metoprolol tartrate (LOPRESSOR) tablet 50 mg  50 mg Oral BID Brennan Catalan PA-C        gabapentin (NEURONTIN) capsule 300 mg  300 mg Oral BID Brennan Catalan PA-C        sodium chloride flush 0.9 % injection 5-40 mL  5-40 mL IntraVENous 2 times per day Brennan Catalan PA-C        sodium chloride flush 0.9 % injection 5-40 mL  5-40 mL IntraVENous PRN Brennan Catalan PA-C        0.9 % sodium chloride infusion   IntraVENous PRN Brennan Catalan PA-C        potassium chloride 10 mEq/100 mL IVPB (Peripheral Line)  10 mEq IntraVENous PRN Brennan Catalan PA-C        magnesium sulfate 2000 mg in 50 mL IVPB premix  2,000 mg IntraVENous PRN Brennan Catalan PA-C        labetalol
TROPONINT  Lactic Acid: No results for input(s): LACTA in the last 72 hours. BNP: No results for input(s): PROBNP in the last 72 hours. UA:  Lab Results   Component Value Date/Time    NITRU Negative 05/24/2023 08:46 AM    COLORU Yellow 05/24/2023 08:46 AM    PHUR 7.0 05/24/2023 08:46 AM    WBCUA 2 05/24/2023 08:46 AM    RBCUA 0 05/24/2023 08:46 AM    RBCUA <1 11/03/2017 11:02 AM    MUCUS RARE 11/03/2017 11:02 AM    TRICHOMONAS NONE SEEN 11/03/2017 11:02 AM    BACTERIA None Seen 05/24/2023 08:46 AM    CLARITYU Clear 05/24/2023 08:46 AM    SPECGRAV 1.011 05/24/2023 08:46 AM    LEUKOCYTESUR TRACE 05/24/2023 08:46 AM    UROBILINOGEN 0.2 05/24/2023 08:46 AM    BILIRUBINUR Negative 05/24/2023 08:46 AM    BILIRUBINUR neg 05/31/2022 11:36 AM    BLOODU Negative 05/24/2023 08:46 AM    GLUCOSEU Negative 05/24/2023 08:46 AM    KETUA Negative 05/24/2023 08:46 AM     Urine Cultures:   Lab Results   Component Value Date/Time    LABURIN No growth at 18 to 36 hours 05/24/2023 08:46 AM     Blood Cultures: No results found for: BC  No results found for: BLOODCULT2  Organism:   Lab Results   Component Value Date/Time    ORG Escherichia coli 05/08/2023 07:48 AM       Imaging/Diagnostics Last 24 Hours   No results found. Electronically signed by Daryl Cabrera MD on 7/11/2023 at 5:38 PM      Comment: Please note this report has been produced using speech recognition software and may contain errors related to that system including errors in grammar, punctuation, and spelling, as well as words and phrases that may be inappropriate. If there are any questions or concerns please feel free to contact the dictating provider for clarification.

## 2023-07-12 NOTE — PLAN OF CARE
Problem: Chronic Conditions and Co-morbidities  Goal: Patient's chronic conditions and co-morbidity symptoms are monitored and maintained or improved  Outcome: Completed     Problem: Discharge Planning  Goal: Discharge to home or other facility with appropriate resources  7/12/2023 1555 by Flakito Alejandra RN  Outcome: Completed  7/12/2023 0839 by Flakito Alejandra RN  Outcome: Progressing  7/12/2023 0838 by Flakito Alejandra RN  Outcome: Progressing     Problem: Pain  Goal: Verbalizes/displays adequate comfort level or baseline comfort level  7/12/2023 1555 by Flakito Alejandra RN  Outcome: Completed  7/12/2023 0839 by Flakito Alejandra RN  Outcome: Progressing  7/12/2023 0838 by Flakito Alejandra RN  Outcome: Progressing     Problem: Safety - Adult  Goal: Free from fall injury  7/12/2023 1555 by Flakito Alejandra RN  Outcome: Completed  7/12/2023 0839 by Flakito Alejandra RN  Outcome: Progressing  7/12/2023 0838 by Flakito Alejandra RN  Outcome: Progressing

## 2023-07-12 NOTE — DISCHARGE INSTRUCTIONS
condition. SEEK CARE IMMEDIATELY IF:   You suddenly have trouble breathing. You have signs or symptoms of a heart attack. These include chest pain that spreads to your arms, jaw, or back. You may also have sweating, or feel sick to your stomach. You have signs or symptoms of a stroke. These include trouble speaking, moving parts of your body, or seeing. You may also have blurred vision, dizziness, trouble thinking clearly, or pass out. Your incision is swollen, red, or has pus coming from it. Your incision starts bleeding and blood begins to soak through your bandage. Your stitches come apart.

## 2023-07-12 NOTE — PLAN OF CARE
Problem: Discharge Planning  Goal: Discharge to home or other facility with appropriate resources  7/12/2023 0839 by Vero Hurt RN  Outcome: Progressing  7/12/2023 0838 by Vero Hurt RN  Outcome: Progressing  7/11/2023 2253 by José Miguel Garrett RN  Outcome: Progressing     Problem: Pain  Goal: Verbalizes/displays adequate comfort level or baseline comfort level  7/12/2023 0839 by Vero Hurt RN  Outcome: Progressing  7/12/2023 0838 by Vero Hurt RN  Outcome: Progressing     Problem: Safety - Adult  Goal: Free from fall injury  7/12/2023 0839 by Vero Hurt RN  Outcome: Progressing  7/12/2023 0838 by Vero Hurt RN  Outcome: Progressing

## 2023-07-12 NOTE — DISCHARGE SUMMARY
IN 15 MINUTES. STOP taking these medications      clobetasol 0.05 % ointment  Commonly known as: Temovate     clotrimazole 1 % vaginal cream  Commonly known as: LOTRIMIN     diclofenac sodium 1 % Gel  Commonly known as: VOLTAREN     fluticasone 50 MCG/ACT nasal spray  Commonly known as: Flonase     ketoconazole 2 % cream  Commonly known as: NIZORAL     omeprazole 20 MG delayed release capsule  Commonly known as: PRILOSEC            Activity: activity as tolerated and no lifting or Strenuous exercise for 8 weeks. Encourage walking for exercise. Diet: cardiac diet  Wound Care: keep incisions clean and dry. Wash with antibacterial soap. Antibiotic ointment to incision once daily  Instructed that if the patient has a left-sided headache to take an additional 25 mg metoprolol pill and to call our office. We will obtain a carotid duplex in 3 months  Follow-up with Dr. Layla Tomas 1-2 weeks after discharge. CT Surgery office will call to schedule appointment.       Signed: Reilly Pinedo PA-C 1:40 PM 07/12/23

## 2023-07-13 ENCOUNTER — TELEPHONE (OUTPATIENT)
Dept: CARDIOTHORACIC SURGERY | Age: 83
End: 2023-07-13

## 2023-07-13 ENCOUNTER — CARE COORDINATION (OUTPATIENT)
Dept: CASE MANAGEMENT | Age: 83
End: 2023-07-13

## 2023-07-13 DIAGNOSIS — I65.22 CAROTID STENOSIS, LEFT: Primary | ICD-10-CM

## 2023-07-13 LAB
EKG ATRIAL RATE: 61 BPM
EKG ATRIAL RATE: 64 BPM
EKG DIAGNOSIS: NORMAL
EKG DIAGNOSIS: NORMAL
EKG P AXIS: 44 DEGREES
EKG P AXIS: 56 DEGREES
EKG P-R INTERVAL: 170 MS
EKG P-R INTERVAL: 184 MS
EKG Q-T INTERVAL: 418 MS
EKG Q-T INTERVAL: 438 MS
EKG QRS DURATION: 116 MS
EKG QRS DURATION: 116 MS
EKG QTC CALCULATION (BAZETT): 431 MS
EKG QTC CALCULATION (BAZETT): 440 MS
EKG R AXIS: -25 DEGREES
EKG R AXIS: -25 DEGREES
EKG T AXIS: 36 DEGREES
EKG T AXIS: 76 DEGREES
EKG VENTRICULAR RATE: 61 BPM
EKG VENTRICULAR RATE: 64 BPM

## 2023-07-13 PROCEDURE — 93010 ELECTROCARDIOGRAM REPORT: CPT | Performed by: INTERNAL MEDICINE

## 2023-07-13 PROCEDURE — 1111F DSCHRG MED/CURRENT MED MERGE: CPT | Performed by: INTERNAL MEDICINE

## 2023-07-13 NOTE — PROGRESS NOTES
7/14/2023    Melinda Denney MD   [address] [place] [address] Idelia Goodness, MD Zachery Libman, APRN-CNP         Re: Fely Safe      Dear Dr. Aakash Juarez,  Dear Dr. Georgina Valenzuela,  Dear Ms. Alejandroel Daniel, APRN-CNP    I had the pleasure of seeing your patient, Nahid Gruber (47 y.o. female) in follow up after her left carotid endarterectomy on 7/11/2023. She comes for an early follow-up visit today. For some reason her visit was scheduled earlier than the usual postoperative. Of 1 to 2 weeks. She is here with her daughter and granddaughter. She has no incisional pain only minimal numbness behind her left year. She has no motor deficits and feels well. She has not been using any pain medications. There has been no drainage from her wound. Her blood pressure has been well controlled and she brings her blood pressure log today.   Her   Current Outpatient Medications:     hydroCHLOROthiazide (HYDRODIURIL) 25 MG tablet, TAKE 1 TABLET BY MOUTH DAILY, Disp: 90 tablet, Rfl: 1    metoprolol tartrate (LOPRESSOR) 50 MG tablet, TAKE 1 TABLET BY MOUTH TWICE DAILY, Disp: 180 tablet, Rfl: 1    metFORMIN (GLUCOPHAGE) 500 MG tablet, TAKE 1/2 TABLET BY MOUTH IN THE MORNING, Disp: 45 tablet, Rfl: 1    losartan (COZAAR) 100 MG tablet, TAKE 1 TABLET BY MOUTH EVERY DAY, Disp: 90 tablet, Rfl: 1    gabapentin (NEURONTIN) 300 MG capsule, TAKE ONE CAPSULE BY MOUTH EVERY MORNING AND EVERY NIGHT AT BEDTIME (Patient taking differently: Take 1 mg by mouth in the morning and at bedtime.), Disp: 180 capsule, Rfl: 1    atorvastatin (LIPITOR) 40 MG tablet, Take 1 tablet by mouth daily, Disp: 90 tablet, Rfl: 3    hydrALAZINE (APRESOLINE) 25 MG tablet, TAKE 1 TABLET BY MOUTH TWICE DAILY, Disp: 180 tablet, Rfl: 1    clopidogrel (PLAVIX) 75 MG tablet, TAKE 1 TABLET BY MOUTH DAILY, Disp: 90 tablet, Rfl: 1    amLODIPine (NORVASC) 5 MG tablet, TAKE 1 TABLET BY MOUTH DAILY, Disp: 90 tablet, Rfl: 1    acetaminophen (TYLENOL) 325 MG tablet,

## 2023-07-13 NOTE — CARE COORDINATION
[facility] Care Transitions Initial Follow Up Call    Call within 2 business days of discharge: Yes    Patient Current Location:  Home: [address] [place] [id number]    Care Transition Nurse contacted the patient by telephone to perform post hospital discharge assessment. Verified name and  with patient as identifiers. Provided introduction to self, and explanation of the Care Transition Nurse role. Patient: Lon Alan Patient : 1940   MRN: [id number]  Reason for Admission: Left Carotid Stenosis s/p LCEA 23  Discharge Date: 23 RARS: Readmission Risk Score: 14.1  Facility: [facility] 23-23    Last Discharge [address]       Date Complaint Diagnosis Description Type Department Provider    23  Pre-op testing . .. Admission (Discharged) Trish Stokes MD          Was this an external facility discharge? No     Challenges to be reviewed by the provider   Additional needs identified to be addressed with provider: Yes     Method of communication with provider: none. Patient reports doing very well s/p recent LCEA. Reports incision mitchel, free of erythema, no drainage, no heat. Denies chest pain, h/a, dizziness, neuro deficits, acute distress. Noted to be a&o, answering questions appropriately, speech clear. Reviewed sx which require immediate medical attention. Reviewed post op instructions as per AVS. Patient voices understanding and adherence. Reports /58, HR 60's. Discussed parameters. Reports appetite, fluid intake good w/ b&b wnl. Lives alone, independent adls, drives. GreatGrandDtr staying with Patient during recovery period. Denies resource needs including hhc, dme, financial/rx copay assistance. Care Transition Nurse reviewed discharge instructions, medical action plan, and red flags with patient who verbalized understanding.  The patient was given an opportunity to ask questions and does not have any further questions or

## 2023-07-13 NOTE — ANESTHESIA POSTPROCEDURE EVALUATION
Department of Anesthesiology  Postprocedure Note    Patient: Pro Simpson  MRN: [id number]  YOB: 1940  Date of evaluation: 7/13/2023      Procedure Summary     Date: 07/11/23 Room / Location: [address] [place] [address]    Anesthesia Start: 1157 Anesthesia Stop: 0680    Procedure: LEFT CAROTID ENDARTERECTOMY (Left: Neck) Diagnosis:       Carotid stenosis, left      (Carotid stenosis, left [I65.22])    Surgeons: Bushra Latif MD Responsible Provider: Gardenia Olmos MD    Anesthesia Type: general ASA Status: 4          Anesthesia Type: No value filed.     Aria Phase I: Aria Score: 10    Aria Phase II:        Anesthesia Post Evaluation    Patient location during evaluation: PACU  Patient participation: complete - patient participated  Level of consciousness: sleepy but conscious  Pain score: 3  Airway patency: patent  Nausea & Vomiting: no nausea and no vomiting  Complications: no  Cardiovascular status: hemodynamically stable  Respiratory status: acceptable  Hydration status: euvolemic

## 2023-07-14 ENCOUNTER — OFFICE VISIT (OUTPATIENT)
Dept: CARDIOTHORACIC SURGERY | Age: 83
End: 2023-07-14

## 2023-07-14 VITALS
OXYGEN SATURATION: 96 % | DIASTOLIC BLOOD PRESSURE: 68 MMHG | HEIGHT: 60 IN | SYSTOLIC BLOOD PRESSURE: 124 MMHG | HEART RATE: 60 BPM | BODY MASS INDEX: 34.32 KG/M2 | WEIGHT: 174.8 LBS

## 2023-07-14 DIAGNOSIS — Z98.890 S/P CAROTID ENDARTERECTOMY: Primary | ICD-10-CM

## 2023-07-14 PROCEDURE — 99024 POSTOP FOLLOW-UP VISIT: CPT | Performed by: THORACIC SURGERY (CARDIOTHORACIC VASCULAR SURGERY)

## 2023-07-19 ENCOUNTER — OFFICE VISIT (OUTPATIENT)
Dept: CARDIOLOGY CLINIC | Age: 83
End: 2023-07-19

## 2023-07-19 VITALS
OXYGEN SATURATION: 97 % | SYSTOLIC BLOOD PRESSURE: 138 MMHG | HEART RATE: 64 BPM | HEIGHT: 60 IN | DIASTOLIC BLOOD PRESSURE: 60 MMHG | BODY MASS INDEX: 33.96 KG/M2 | WEIGHT: 173 LBS

## 2023-07-19 DIAGNOSIS — I10 ESSENTIAL HYPERTENSION: ICD-10-CM

## 2023-07-19 DIAGNOSIS — I25.10 ASCVD (ARTERIOSCLEROTIC CARDIOVASCULAR DISEASE): Primary | ICD-10-CM

## 2023-07-19 RX ORDER — AMLODIPINE BESYLATE 5 MG/1
10 TABLET ORAL DAILY
Qty: 90 TABLET | Refills: 1 | Status: SHIPPED
Start: 2023-07-19

## 2023-07-19 NOTE — PROGRESS NOTES
CARDIOLOGY NOTE      7/19/2023    RE: Cora Wolfe  (1940)                               TO:  Dr. Michell Miranda MD            CHIEF COMPLAINT   Florence Edwards is a 79 y.o. female who was seen today for management of coronary disease                                  Here for follow-up  post  CEA    HPI:                   Pt has h/o coronary artery disease, hypertension, hyperlipidemia, non-insulin-dependent diabetes seen today for follow-up.   Patient has been having left-sided chest pain current episodes  Post carotid endarterectomy patient with chest pain and hypertension enzymes are normal echo was normal  Cora Wolfe has the following history recorded in care path:  Patient Active Problem List    Diagnosis Date Noted    Cerebrovascular accident (CVA) due to thrombosis of cerebral artery ([address]) 10/12/2016    Headache 08/04/2022    Essential hypertension     Thyroid nodule     Dysarthria due to recent cerebral infarction 10/15/2016    Oropharyngeal dysphagia 10/15/2016    Gait disturbance 10/15/2016    Coronary artery disease involving native coronary artery of native heart without angina pectoris     Diabetes mellitus with neuropathy (HCC)     SCC (squamous cell carcinoma), leg     Memory loss     IFG (impaired fasting glucose)     Carotid stenosis, left     Hyperlipidemia     Osteoarthritis, knee     ASCVD (arteriosclerotic cardiovascular disease)     Rheumatoid arthritis (HCC)     Peripheral neuropathy (HCC)     Carotid artery calcification, left 07/12/2023    COVID-19 virus infection     S/P colonoscopic polypectomy 11/20/2020    DDD (degenerative disc disease), cervical     DDD (degenerative disc disease), lumbar     Diabetic nephropathy associated with type 2 diabetes mellitus ([address]) 02/27/2019    Type 2 diabetes mellitus with diabetic neuropathy, without long-term current use of insulin ([address]) 11/03/2017     Current Outpatient Medications   Medication Sig Dispense Refill    hydroCHLOROthiazide

## 2023-07-19 NOTE — PATIENT INSTRUCTIONS
**It is YOUR responsibilty to bring medication bottles and/or updated medication list to [address] [place] [address]. This will allow us to better serve you and all your healthcare needs**    [facility] Locations - No appointment necessary. Sites open Monday to Friday. Call your preferred location for test preparation, business   hours and other information you need. [employer] accepts [employer]. [address] [place] [address].  WCari Myers. Joey, [facility]  Phone: [phone]     Please be informed that if you contact our office outside of normal business hours the physician on call cannot help with any scheduling or rescheduling issues, procedure instruction questions or any type of medication issue. We advise you for any urgent/emergency that you go to the nearest emergency room! PLEASE CALL OUR OFFICE DURING NORMAL BUSINESS HOURS    Monday - Friday   8 am to 5 pm    [place]: [address] [place]: [phone]    [place]:  [phone]    Thank you for allowing us to care for you today! We want to ensure we can follow your treatment plan and we strive to give you the best outcomes and experience possible. If you ever have a life threatening emergency and call 911 - for an ambulance (EMS)   Our providers can only care for you at:   [facility] or [facility]. Even if you have someone take you or you drive yourself we can only care for you in a [facility] facility. Our providers are not setup at the other healthcare locations! We are committed to providing you the best care possible. If you receive a survey after visiting one of our offices, please take time to share your experience concerning your physician office visit. These surveys are confidential and no health information about you is shared. We are eager to improve for you and we are counting on your feedback to help make that happen.

## 2023-07-20 ENCOUNTER — CARE COORDINATION (OUTPATIENT)
Dept: CASE MANAGEMENT | Age: 83
End: 2023-07-20

## 2023-07-20 NOTE — CARE COORDINATION
[facility] Care Transitions Follow Up Call    Patient Current Location:  Home: [address] [facility] [place] [facility] [address]    Care Transition Nurse contacted the patient by telephone to follow up after admission on  23-23. Verified name and  with patient as identifiers. Patient: Cora Wolfe  Patient : 1940   MRN: [id number]  Reason for Admission: Left Carotid Stenosis s/p LCEA 23  Discharge Date: 23 RARS: Readmission Risk Score: 14.1  Facility: [facility]    Needs to be reviewed by the provider   Additional needs identified to be addressed with provider: No         Patient reports doing very well since home. Denies acute chest pain, sob, palps, neuro/motor deficits, ac distress. Noted to be a&o, answering questions appropriately, speech clear. Reviewed sx which require immediate medical attention. Has been seen by Dr Julio Min (post-op) and Dr Shivam Peña. Plans for heart cath once fully recovered from recent [address] Reports 's before bp meds, SBP < 140 after meds. Dr Shivam Peña increased Norvasc to 5mg bid. Confirmed Patient taking Norvasc, Plavix and all routine meds as directed. Advised heart healthy diet. Patient is non-smoker. No c/o r/t b&b. No resource needs voiced. Future Appointments   Date Time Provider [address]   2023 10:00 AM Ang Barcenas MD [facility] CT SURG [facility]   2023  9:45 AM Michell Miranda MD [facility] E S IM [facility]   2023  9:30 AM Brendan Quevedo MD [facility]     Care Transition Nurse reviewed medical action plan and red flags with patient and discussed any barriers to care and/or understanding of plan of care after discharge. Discussed appropriate site of care based on symptoms and resources available to patient including: PCP  Specialist  Urgent care clinics  When to call Isma Valdez. The patient agrees to contact [facility]/ PCP office for questions related to healthcare.      Patients top risk factors for

## 2023-07-28 ENCOUNTER — OFFICE VISIT (OUTPATIENT)
Dept: CARDIOTHORACIC SURGERY | Age: 83
End: 2023-07-28

## 2023-07-28 VITALS
HEIGHT: 60 IN | BODY MASS INDEX: 34 KG/M2 | OXYGEN SATURATION: 92 % | SYSTOLIC BLOOD PRESSURE: 132 MMHG | HEART RATE: 56 BPM | DIASTOLIC BLOOD PRESSURE: 64 MMHG | WEIGHT: 173.2 LBS

## 2023-07-28 DIAGNOSIS — Z98.890 S/P CAROTID ENDARTERECTOMY: Primary | ICD-10-CM

## 2023-07-28 DIAGNOSIS — I65.23 BILATERAL CAROTID ARTERY STENOSIS: Primary | ICD-10-CM

## 2023-07-28 PROCEDURE — 99024 POSTOP FOLLOW-UP VISIT: CPT | Performed by: THORACIC SURGERY (CARDIOTHORACIC VASCULAR SURGERY)

## 2023-07-31 ENCOUNTER — CARE COORDINATION (OUTPATIENT)
Dept: CASE MANAGEMENT | Age: 83
End: 2023-07-31

## 2023-07-31 NOTE — CARE COORDINATION
[facility] Care Transitions Follow Up Call    Patient Current Location:  Home: [address], [place] [facility] [address]    Care Transition Nurse contacted the patient by telephone to follow up after admission on 23. Verified name and  with patient as identifiers. Patient: Nancy Negrete  Patient : 1940   MRN: [id number]  Reason for Admission:   Left Carotid Stenosis s/p LCEA  Discharge Date: 23 RARS: Readmission Risk Score: 14.1      Needs to be reviewed by the provider   Additional needs identified to be addressed with provider: No  none             Method of communication with provider: none. CTN call to Susie Webber today and she says she is feeling great. Denies post op pain, trouble speaking/swallowing,swelling in face/neck, fever, chills, dizziness, n/v/d, weakness, chest pain. Incision healing well, applies antibiotic oint qd as directed. Still has some numbness L ear but surgeon says this will gradually go away. Says she feels tired at times and has to take a nap. Went to [employer] yesterday w/ great granddaughter felt fine. CT surg HFU 23-> no restrictions, carotid scan in 3 months  PCP 23  Has transportation. Cardio 23 will discuss possible HC.  XJ=800/72  BLOOD SUGAR=103-127  Eating, drinking & sleeping ok. Denies problems w/ urination/bowels. Has ACP docs completed at home, this writer recommends she take to PCP office to be scanned into her chart. She expressed understanding. No other concerns voiced at this time. Addressed changes since last contact:  none  Discussed follow-up appointments. If no appointment was previously scheduled, appointment scheduling offered: Yes. Is follow up appointment scheduled within 7 days of discharge? Yes.     Follow Up  Future Appointments   Date Time Provider [address]   2023  9:45 AM Minh Jane MD [facility] E S IM [facility]   2023  9:30 AM Devaughn Franco MD [facility]   2023 10:00 AM Thor Dean

## 2023-08-04 DIAGNOSIS — E11.40 TYPE 2 DIABETES MELLITUS WITH DIABETIC NEUROPATHY, WITHOUT LONG-TERM CURRENT USE OF INSULIN (HCC): ICD-10-CM

## 2023-08-04 DIAGNOSIS — I25.10 CORONARY ARTERY DISEASE INVOLVING NATIVE CORONARY ARTERY OF NATIVE HEART WITHOUT ANGINA PECTORIS: ICD-10-CM

## 2023-08-04 DIAGNOSIS — I63.30 CEREBROVASCULAR ACCIDENT (CVA) DUE TO THROMBOSIS OF CEREBRAL ARTERY (HCC): ICD-10-CM

## 2023-08-04 DIAGNOSIS — E78.2 MIXED HYPERLIPIDEMIA: ICD-10-CM

## 2023-08-04 LAB
ALBUMIN SERPL-MCNC: 4.3 G/DL (ref 3.4–5)
ALBUMIN/GLOB SERPL: 1.7 {RATIO} (ref 1.1–2.2)
ALP SERPL-CCNC: 85 U/L (ref 40–129)
ALT SERPL-CCNC: 22 U/L (ref 10–40)
ANION GAP SERPL CALCULATED.3IONS-SCNC: 10 MMOL/L (ref 3–16)
AST SERPL-CCNC: 24 U/L (ref 15–37)
BILIRUB SERPL-MCNC: 0.7 MG/DL (ref 0–1)
BUN SERPL-MCNC: 19 MG/DL (ref 7–20)
CALCIUM SERPL-MCNC: 9.7 MG/DL (ref 8.3–10.6)
CHLORIDE SERPL-SCNC: 101 MMOL/L (ref 99–110)
CHOLEST SERPL-MCNC: 142 MG/DL (ref 0–199)
CO2 SERPL-SCNC: 28 MMOL/L (ref 21–32)
CREAT SERPL-MCNC: 0.9 MG/DL (ref 0.6–1.2)
CREAT UR-MCNC: 33.6 MG/DL (ref 28–259)
DEPRECATED RDW RBC AUTO: 12.8 % (ref 12.4–15.4)
GFR SERPLBLD CREATININE-BSD FMLA CKD-EPI: >60 ML/MIN/{1.73_M2}
GLUCOSE SERPL-MCNC: 146 MG/DL (ref 70–99)
HCT VFR BLD AUTO: 34.1 % (ref 36–48)
HDLC SERPL-MCNC: 34 MG/DL (ref 40–60)
HGB BLD-MCNC: 11.6 G/DL (ref 12–16)
LDLC SERPL CALC-MCNC: 68 MG/DL
MCH RBC QN AUTO: 31 PG (ref 26–34)
MCHC RBC AUTO-ENTMCNC: 34.1 G/DL (ref 31–36)
MCV RBC AUTO: 90.8 FL (ref 80–100)
MICROALBUMIN UR DL<=1MG/L-MCNC: 2.1 MG/DL
MICROALBUMIN/CREAT UR: 62.5 MG/G (ref 0–30)
PLATELET # BLD AUTO: 202 K/UL (ref 135–450)
PMV BLD AUTO: 8.6 FL (ref 5–10.5)
POTASSIUM SERPL-SCNC: 4.2 MMOL/L (ref 3.5–5.1)
PROT SERPL-MCNC: 6.9 G/DL (ref 6.4–8.2)
RBC # BLD AUTO: 3.75 M/UL (ref 4–5.2)
SODIUM SERPL-SCNC: 139 MMOL/L (ref 136–145)
TRIGL SERPL-MCNC: 201 MG/DL (ref 0–150)
TSH SERPL DL<=0.005 MIU/L-ACNC: 2.85 UIU/ML (ref 0.27–4.2)
VLDLC SERPL CALC-MCNC: 40 MG/DL
WBC # BLD AUTO: 5.8 K/UL (ref 4–11)

## 2023-08-04 PROCEDURE — 36415 COLL VENOUS BLD VENIPUNCTURE: CPT | Performed by: INTERNAL MEDICINE

## 2023-08-05 LAB
EST. AVERAGE GLUCOSE BLD GHB EST-MCNC: 142.7 MG/DL
HBA1C MFR BLD: 6.6 %

## 2023-08-05 NOTE — RESULT ENCOUNTER NOTE
Chol, sugars, labs appear in stable range, DM is controlled, THYROID FXN IS NL. MOST IMPORTANTLY HER URINE PROTEIN IS MUCH BETTER DROPPING FROM 177-62, HER CHOL FROM 150-142, AND PRIOR ANEMIA BETTER W HGB RISING FROM 10.5-11. 6.   notify pt please.

## 2023-08-08 ENCOUNTER — CARE COORDINATION (OUTPATIENT)
Dept: CASE MANAGEMENT | Age: 83
End: 2023-08-08

## 2023-08-08 NOTE — CARE COORDINATION
Care Transitions Outreach Attempt      Patient: Joanie Fish Patient : 1940 MRN: [id number] Reason for Admission: Left Carotid Stenosis s/p LCEA 23  Discharge Date: 23       RARS: Readmission Risk Score: 14.1  Facility: [facility]    Last Discharge [address]       Date Complaint Diagnosis Description Type Department Provider    23  Pre-op testing . .. Admission (Discharged) Milton Ring MD          Noted following upcoming appointments from discharge chart review:   [facility] follow up appointment(s):   Future Appointments   Date Time Provider [address]   8/10/2023  1:15 PM MD Jericho Vasquez [facility]   2023  9:45 AM Roberto Cardenas MD [facility] E S IM [facility]   2023  9:30 AM Megan Varma MD [facility]   2023 10:00 AM Mati Mathews MD [facility] SURG [facility]     Attempt to reach for Care Transition follow up call unsuccessful. HIPAA compliant message left requesting call back.  [address] [facility], -[phone]

## 2023-08-09 ENCOUNTER — CARE COORDINATION (OUTPATIENT)
Dept: CASE MANAGEMENT | Age: 83
End: 2023-08-09

## 2023-08-09 NOTE — CARE COORDINATION
contact PCP office for questions related to healthcare. Offered patient enrollment in the Remote Patient Monitoring (RPM) program for in-home monitoring: NA as PCP not participating in program.     Care Transitions Subsequent and Final Call    Subsequent and Final Calls  Do you have any ongoing symptoms?: Yes  Onset of Patient-reported symptoms: Other  Patient-reported symptoms: Other  Interventions for patient-reported symptoms: Other  Have your medications changed?: No  Do you have any questions related to your medications?: No  Do you currently have any active services?: No  Do you have any needs or concerns that I can assist you with?: No  Identified Barriers: Other  Care Transitions Interventions   Home Care Waiver: Declined Physical Therapy: Declined       Transportation Support: Declined      DME Assistance: Declined     Senior Services: Declined    Diabetes Education: Completed       Occupational Therapy: Declined     Disease Association: Completed      Other Interventions:             Care Transition Nurse provided contact information for future needs. No further follow-up call indicated based on severity of symptoms and risk factors, Patient agreeable to final call. CT episode closed. Denies need for ACM.      Veronica Rollins RN

## 2023-08-10 ENCOUNTER — OFFICE VISIT (OUTPATIENT)
Dept: SURGERY | Age: 83
End: 2023-08-10
Payer: MEDICARE

## 2023-08-10 VITALS
DIASTOLIC BLOOD PRESSURE: 62 MMHG | WEIGHT: 174.9 LBS | SYSTOLIC BLOOD PRESSURE: 124 MMHG | OXYGEN SATURATION: 95 % | HEART RATE: 58 BPM | BODY MASS INDEX: 34.16 KG/M2

## 2023-08-10 DIAGNOSIS — R22.42 LOCALIZED SWELLING OF LEFT LOWER LEG: Primary | ICD-10-CM

## 2023-08-10 PROCEDURE — 1111F DSCHRG MED/CURRENT MED MERGE: CPT | Performed by: SURGERY

## 2023-08-10 PROCEDURE — 1036F TOBACCO NON-USER: CPT | Performed by: SURGERY

## 2023-08-10 PROCEDURE — G8417 CALC BMI ABV UP PARAM F/U: HCPCS | Performed by: SURGERY

## 2023-08-10 PROCEDURE — 99214 OFFICE O/P EST MOD 30 MIN: CPT | Performed by: SURGERY

## 2023-08-10 PROCEDURE — 3078F DIAST BP <80 MM HG: CPT | Performed by: SURGERY

## 2023-08-10 PROCEDURE — 3074F SYST BP LT 130 MM HG: CPT | Performed by: SURGERY

## 2023-08-10 PROCEDURE — G8427 DOCREV CUR MEDS BY ELIG CLIN: HCPCS | Performed by: SURGERY

## 2023-08-10 PROCEDURE — 1090F PRES/ABSN URINE INCON ASSESS: CPT | Performed by: SURGERY

## 2023-08-10 PROCEDURE — G8399 PT W/DXA RESULTS DOCUMENT: HCPCS | Performed by: SURGERY

## 2023-08-10 PROCEDURE — 1124F ACP DISCUSS-NO DSCNMKR DOCD: CPT | Performed by: SURGERY

## 2023-08-10 ASSESSMENT — PATIENT HEALTH QUESTIONNAIRE - PHQ9
SUM OF ALL RESPONSES TO PHQ QUESTIONS 1-9: 0
SUM OF ALL RESPONSES TO PHQ9 QUESTIONS 1 & 2: 0
2. FEELING DOWN, DEPRESSED OR HOPELESS: 0
SUM OF ALL RESPONSES TO PHQ QUESTIONS 1-9: 0
1. LITTLE INTEREST OR PLEASURE IN DOING THINGS: 0

## 2023-08-11 ENCOUNTER — OFFICE VISIT (OUTPATIENT)
Dept: INTERNAL MEDICINE CLINIC | Age: 83
End: 2023-08-11
Payer: MEDICARE

## 2023-08-11 VITALS
SYSTOLIC BLOOD PRESSURE: 136 MMHG | DIASTOLIC BLOOD PRESSURE: 74 MMHG | BODY MASS INDEX: 33.79 KG/M2 | WEIGHT: 173 LBS | OXYGEN SATURATION: 96 % | RESPIRATION RATE: 14 BRPM | HEART RATE: 59 BPM

## 2023-08-11 DIAGNOSIS — I25.10 ASHD (ARTERIOSCLEROTIC HEART DISEASE): ICD-10-CM

## 2023-08-11 DIAGNOSIS — R22.42 LEG MASS, LEFT: ICD-10-CM

## 2023-08-11 DIAGNOSIS — E11.40 TYPE 2 DIABETES MELLITUS WITH DIABETIC NEUROPATHY, WITHOUT LONG-TERM CURRENT USE OF INSULIN (HCC): Primary | ICD-10-CM

## 2023-08-11 DIAGNOSIS — I65.22 LEFT CAROTID STENOSIS: ICD-10-CM

## 2023-08-11 PROCEDURE — 3044F HG A1C LEVEL LT 7.0%: CPT | Performed by: INTERNAL MEDICINE

## 2023-08-11 PROCEDURE — 99214 OFFICE O/P EST MOD 30 MIN: CPT | Performed by: INTERNAL MEDICINE

## 2023-08-11 PROCEDURE — 3078F DIAST BP <80 MM HG: CPT | Performed by: INTERNAL MEDICINE

## 2023-08-11 PROCEDURE — G8417 CALC BMI ABV UP PARAM F/U: HCPCS | Performed by: INTERNAL MEDICINE

## 2023-08-11 PROCEDURE — 3075F SYST BP GE 130 - 139MM HG: CPT | Performed by: INTERNAL MEDICINE

## 2023-08-11 PROCEDURE — G8427 DOCREV CUR MEDS BY ELIG CLIN: HCPCS | Performed by: INTERNAL MEDICINE

## 2023-08-11 PROCEDURE — 1090F PRES/ABSN URINE INCON ASSESS: CPT | Performed by: INTERNAL MEDICINE

## 2023-08-11 PROCEDURE — 1111F DSCHRG MED/CURRENT MED MERGE: CPT | Performed by: INTERNAL MEDICINE

## 2023-08-11 PROCEDURE — 1036F TOBACCO NON-USER: CPT | Performed by: INTERNAL MEDICINE

## 2023-08-11 PROCEDURE — G8399 PT W/DXA RESULTS DOCUMENT: HCPCS | Performed by: INTERNAL MEDICINE

## 2023-08-11 PROCEDURE — 1123F ACP DISCUSS/DSCN MKR DOCD: CPT | Performed by: INTERNAL MEDICINE

## 2023-08-11 NOTE — PATIENT INSTRUCTIONS
If heart cath is ok, then to preserve blood flow and also memory advise working up to 30min brisk exercise daily

## 2023-08-14 ENCOUNTER — HOSPITAL ENCOUNTER (OUTPATIENT)
Dept: CT IMAGING | Age: 83
Discharge: HOME | End: 2023-08-14
Attending: SURGERY
Payer: MEDICARE

## 2023-08-14 DIAGNOSIS — R22.42 LOCALIZED SWELLING OF LEFT LOWER LEG: ICD-10-CM

## 2023-08-14 PROCEDURE — 73700 CT LOWER EXTREMITY W/O DYE: CPT

## 2023-08-22 DIAGNOSIS — I10 ESSENTIAL HYPERTENSION: ICD-10-CM

## 2023-08-22 DIAGNOSIS — I25.10 CORONARY ARTERY DISEASE INVOLVING NATIVE CORONARY ARTERY OF NATIVE HEART WITHOUT ANGINA PECTORIS: ICD-10-CM

## 2023-08-22 DIAGNOSIS — I63.30 CEREBROVASCULAR ACCIDENT (CVA) DUE TO THROMBOSIS OF CEREBRAL ARTERY (HCC): ICD-10-CM

## 2023-08-22 DIAGNOSIS — G44.89 OTHER HEADACHE SYNDROME: ICD-10-CM

## 2023-08-22 DIAGNOSIS — E11.40 TYPE 2 DIABETES MELLITUS WITH DIABETIC NEUROPATHY, WITHOUT LONG-TERM CURRENT USE OF INSULIN (HCC): ICD-10-CM

## 2023-08-22 RX ORDER — AMLODIPINE BESYLATE 5 MG/1
TABLET ORAL
Qty: 90 TABLET | Refills: 1 | OUTPATIENT
Start: 2023-08-22

## 2023-08-22 RX ORDER — CLOPIDOGREL BISULFATE 75 MG/1
75 TABLET ORAL DAILY
Qty: 90 TABLET | Refills: 1 | Status: SHIPPED | OUTPATIENT
Start: 2023-08-22

## 2023-08-22 RX ORDER — METOPROLOL TARTRATE 50 MG/1
TABLET, FILM COATED ORAL
Qty: 180 TABLET | Refills: 1 | Status: SHIPPED | OUTPATIENT
Start: 2023-08-22

## 2023-08-22 RX ORDER — LOSARTAN POTASSIUM 100 MG/1
TABLET ORAL
Qty: 90 TABLET | Refills: 1 | Status: SHIPPED | OUTPATIENT
Start: 2023-08-22

## 2023-08-22 RX ORDER — HYDRALAZINE HYDROCHLORIDE 25 MG/1
TABLET, FILM COATED ORAL
Qty: 180 TABLET | Refills: 1 | Status: SHIPPED | OUTPATIENT
Start: 2023-08-22

## 2023-08-22 RX ORDER — GABAPENTIN 300 MG/1
CAPSULE ORAL
Qty: 180 CAPSULE | Refills: 1 | Status: SHIPPED | OUTPATIENT
Start: 2023-08-22 | End: 2023-11-20

## 2023-08-24 ENCOUNTER — OFFICE VISIT (OUTPATIENT)
Dept: SURGERY | Age: 83
End: 2023-08-24
Payer: MEDICARE

## 2023-08-24 VITALS
DIASTOLIC BLOOD PRESSURE: 84 MMHG | BODY MASS INDEX: 32.83 KG/M2 | WEIGHT: 173.9 LBS | HEIGHT: 61 IN | OXYGEN SATURATION: 99 % | SYSTOLIC BLOOD PRESSURE: 128 MMHG | HEART RATE: 80 BPM

## 2023-08-24 DIAGNOSIS — R22.42 LOCALIZED SWELLING OF LEFT LOWER LEG: Primary | ICD-10-CM

## 2023-08-24 PROCEDURE — 1036F TOBACCO NON-USER: CPT | Performed by: SURGERY

## 2023-08-24 PROCEDURE — G8399 PT W/DXA RESULTS DOCUMENT: HCPCS | Performed by: SURGERY

## 2023-08-24 PROCEDURE — 99213 OFFICE O/P EST LOW 20 MIN: CPT | Performed by: SURGERY

## 2023-08-24 PROCEDURE — 1123F ACP DISCUSS/DSCN MKR DOCD: CPT | Performed by: SURGERY

## 2023-08-24 PROCEDURE — 1090F PRES/ABSN URINE INCON ASSESS: CPT | Performed by: SURGERY

## 2023-08-24 PROCEDURE — 3074F SYST BP LT 130 MM HG: CPT | Performed by: SURGERY

## 2023-08-24 PROCEDURE — G8427 DOCREV CUR MEDS BY ELIG CLIN: HCPCS | Performed by: SURGERY

## 2023-08-24 PROCEDURE — 3079F DIAST BP 80-89 MM HG: CPT | Performed by: SURGERY

## 2023-08-24 PROCEDURE — G8417 CALC BMI ABV UP PARAM F/U: HCPCS | Performed by: SURGERY

## 2023-08-24 ASSESSMENT — ENCOUNTER SYMPTOMS
SORE THROAT: 0
EYE ITCHING: 0
APNEA: 0
COLOR CHANGE: 0
PHOTOPHOBIA: 0
RECTAL PAIN: 0
ANAL BLEEDING: 0
EYE REDNESS: 0
STRIDOR: 0
BACK PAIN: 0
CHOKING: 0
CONSTIPATION: 0

## 2023-08-24 NOTE — PROGRESS NOTES
Chief Complaint   Patient presents with    Follow-up     Ct results for left lower leg - epic results          SUBJECTIVE:  HPI: Patient is here with complaints of fatty mass like lesion on the left face and left leg. She has noticed these areas increasing in size. The left facial lesion is right on the jawline overlying the left parotid gland. There is minimal pain noted. Patient has had multiple heart stents placed. She also has had left knee replacement. CT was done and showed  IMPRESSION:  No focal abnormality identified at the area of palpable concern. Prior total knee arthroplasty with patellar resurfacing. I have reviewed the patient's(pertinent information to this visit) medical history, family history(scanned in  the [address] [place] [address] under \"patient questioner\"), social history and review of systems with the patient today in the office. Past Surgical History:   Procedure Laterality Date    BREAST BIOPSY  1993    bilateral    CAROTID ENDARTERECTOMY Left 07/11/2023    LEFT CAROTID ENDARTERECTOMY performed by Ang Barcenas MD at [address] [place] [address]      Right wrist - 4/2011-Dr Parmar    CATARACT REMOVAL WITH IMPLANT Left 02/06/2017    Dr Dagoberto Masterson  12/2022    Dr Shivam Peña, LAD and LCX    CYST REMOVAL  9065    umbilical    [facility]    Right thumb- giant cell tumor    HYSTERECTOMY, TOTAL ABDOMINAL (CERVIX REMOVED)  1963    KNEE ARTHROSCOPY  02/21/2012    Left knee- Dr Reji Arce ARTHROSCOPY Left 09/23/2014    Dr Asaf Howe    ?     OVARY REMOVAL      PTCA  05/2005    PTCA with stent X2 - Dr Brandan Santacruz  10/16/2013    both legs    SKIN CANCER EXCISION  04/2023    Dr Lane Brito Apr 19/2023,. R nosse    TONSILLECTOMY  1972    TOTAL KNEE ARTHROPLASTY Left 07/28/2015    Dr Kathryn Gonzalez Left 11/14/2017    Dr Mateo Hoffman at [facility] - Dr Keyon Oden

## 2023-09-07 ENCOUNTER — TELEPHONE (OUTPATIENT)
Dept: CARDIOLOGY CLINIC | Age: 83
End: 2023-09-07

## 2023-09-07 ENCOUNTER — OFFICE VISIT (OUTPATIENT)
Dept: CARDIOLOGY CLINIC | Age: 83
End: 2023-09-07
Payer: MEDICARE

## 2023-09-07 VITALS
HEART RATE: 61 BPM | WEIGHT: 170 LBS | DIASTOLIC BLOOD PRESSURE: 64 MMHG | BODY MASS INDEX: 33.38 KG/M2 | OXYGEN SATURATION: 97 % | HEIGHT: 60 IN | SYSTOLIC BLOOD PRESSURE: 120 MMHG

## 2023-09-07 DIAGNOSIS — Z01.810 PRE-OPERATIVE CARDIOVASCULAR EXAMINATION: Primary | ICD-10-CM

## 2023-09-07 DIAGNOSIS — I25.10 ASCVD (ARTERIOSCLEROTIC CARDIOVASCULAR DISEASE): Primary | ICD-10-CM

## 2023-09-07 PROCEDURE — 99214 OFFICE O/P EST MOD 30 MIN: CPT | Performed by: INTERNAL MEDICINE

## 2023-09-07 PROCEDURE — 1036F TOBACCO NON-USER: CPT | Performed by: INTERNAL MEDICINE

## 2023-09-07 PROCEDURE — 3078F DIAST BP <80 MM HG: CPT | Performed by: INTERNAL MEDICINE

## 2023-09-07 PROCEDURE — 1123F ACP DISCUSS/DSCN MKR DOCD: CPT | Performed by: INTERNAL MEDICINE

## 2023-09-07 PROCEDURE — G8399 PT W/DXA RESULTS DOCUMENT: HCPCS | Performed by: INTERNAL MEDICINE

## 2023-09-07 PROCEDURE — G8427 DOCREV CUR MEDS BY ELIG CLIN: HCPCS | Performed by: INTERNAL MEDICINE

## 2023-09-07 PROCEDURE — 1090F PRES/ABSN URINE INCON ASSESS: CPT | Performed by: INTERNAL MEDICINE

## 2023-09-07 PROCEDURE — G8417 CALC BMI ABV UP PARAM F/U: HCPCS | Performed by: INTERNAL MEDICINE

## 2023-09-07 PROCEDURE — 3074F SYST BP LT 130 MM HG: CPT | Performed by: INTERNAL MEDICINE

## 2023-09-07 NOTE — PATIENT INSTRUCTIONS
Please be informed that if you contact our office outside of normal business hours the physician on call cannot help with any scheduling or rescheduling issues, procedure instruction questions or any type of medication issue. We advise you for any urgent/emergency that you go to the nearest emergency room! PLEASE CALL OUR OFFICE DURING NORMAL BUSINESS HOURS    Monday - Friday   8 am to 5 pm    [place]: [address] [place]: [phone]    [place]:  [facility] Laboratory Locations - No appointment necessary. Sites open Monday to Friday. Call your preferred location for test preparation, business   hours and other information you need. [employer] accepts [employer]. [address] [place] [address]. [address] [place], [facility]  Phone: [phone]     **It is YOUR responsibilty to bring medication bottles and/or updated medication list to [address] [place] [address]. This will allow us to better serve you and all your healthcare needs**  Thank you for allowing us to care for you today! We want to ensure we can follow your treatment plan and we strive to give you the best outcomes and experience possible. If you ever have a life threatening emergency and call 911 - for an ambulance (EMS)   Our providers can only care for you at:   [facility] or [facility]. Even if you have someone take you or you drive yourself we can only care for you in a [facility] facility. Our providers are not setup at the other healthcare locations!

## 2023-09-07 NOTE — TELEPHONE ENCOUNTER
[address] [facility] WITH POSSIBLE PERCUTANEOUS CORONARY INTERVENTION      Patient Name: Eden Jay   : 1940  MRN# [id number]    Date of Procedure: 9/15/23 Time: 9 AM Arrival Time: 7 AM    The catheterization and angiogram are usually outpatient procedures, however if stenting is needed you may need to stay overnight. You will need to arrive at the hospital two hours before the procedure. You will go to registration in the main lobby. You will need to arrange for someone to drive you home. HOSPITAL:  [address] [place] [address])      X   If you have received orders for blood work and or a chest x-ray, please have         them done on assigned date at [facility],           [address] [place] [address], or [facility].     X Please do not have anything by mouth after midnight prior to or 8 hours before   the procedure. X You may take your medications with a sip of water in the morning of your               procedure or take them with you to the hospital                    X If you are taking HCTZ (Hydrochlorothiazide)   please do not take it the morning of your procedure. X If you are taking Metformin (Glucophage) or any medication containing Metformin (Glucophage) you must hold this medication the day before the procedure 23 , the day of your procedure and two days after your procedure. You may restart Metformin two days after your procedure on . 18

## 2023-09-13 ENCOUNTER — HOSPITAL ENCOUNTER (OUTPATIENT)
Dept: GENERAL RADIOLOGY | Age: 83
Discharge: HOME | End: 2023-09-13
Payer: MEDICARE

## 2023-09-13 ENCOUNTER — HOSPITAL ENCOUNTER (OUTPATIENT)
Age: 83
Discharge: HOME | End: 2023-09-13
Payer: MEDICARE

## 2023-09-13 DIAGNOSIS — Z01.810 PRE-OPERATIVE CARDIOVASCULAR EXAMINATION: ICD-10-CM

## 2023-09-13 LAB
ABO/RH: NORMAL
ANION GAP SERPL CALCULATED.3IONS-SCNC: 14 MMOL/L (ref 4–16)
ANTIBODY SCREEN: NEGATIVE
BUN SERPL-MCNC: 16 MG/DL (ref 6–23)
CALCIUM SERPL-MCNC: 9.5 MG/DL (ref 8.3–10.6)
CHLORIDE BLD-SCNC: 98 MMOL/L (ref 99–110)
CO2: 24 MMOL/L (ref 21–32)
COMMENT: NORMAL
CREAT SERPL-MCNC: 0.8 MG/DL (ref 0.6–1.1)
GFR SERPL CREATININE-BSD FRML MDRD: >60 ML/MIN/1.73M2
GLUCOSE SERPL-MCNC: 228 MG/DL (ref 70–99)
HCT VFR BLD CALC: 36.8 % (ref 37–47)
HEMOGLOBIN: 12.1 GM/DL (ref 12.5–16)
MCH RBC QN AUTO: 30.5 PG (ref 27–31)
MCHC RBC AUTO-ENTMCNC: 32.9 % (ref 32–36)
MCV RBC AUTO: 92.7 FL (ref 78–100)
PDW BLD-RTO: 12.1 % (ref 11.7–14.9)
PLATELET # BLD: 198 K/CU MM (ref 140–440)
PMV BLD AUTO: 9.9 FL (ref 7.5–11.1)
POTASSIUM SERPL-SCNC: 3.8 MMOL/L (ref 3.5–5.1)
RBC # BLD: 3.97 M/CU MM (ref 4.2–5.4)
SODIUM BLD-SCNC: 136 MMOL/L (ref 135–145)
WBC # BLD: 6.1 K/CU MM (ref 4–10.5)

## 2023-09-13 PROCEDURE — 86901 BLOOD TYPING SEROLOGIC RH(D): CPT

## 2023-09-13 PROCEDURE — 36415 COLL VENOUS BLD VENIPUNCTURE: CPT

## 2023-09-13 PROCEDURE — 71046 X-RAY EXAM CHEST 2 VIEWS: CPT

## 2023-09-13 PROCEDURE — 86900 BLOOD TYPING SEROLOGIC ABO: CPT

## 2023-09-13 PROCEDURE — 86850 RBC ANTIBODY SCREEN: CPT

## 2023-09-13 PROCEDURE — 80048 BASIC METABOLIC PNL TOTAL CA: CPT

## 2023-09-13 PROCEDURE — 85027 COMPLETE CBC AUTOMATED: CPT

## 2023-09-15 ENCOUNTER — HOSPITAL ENCOUNTER (OUTPATIENT)
Dept: CARDIAC CATH/INVASIVE PROCEDURES | Age: 83
Discharge: HOME | End: 2023-09-15
Attending: INTERNAL MEDICINE | Admitting: INTERNAL MEDICINE
Payer: MEDICARE

## 2023-09-15 VITALS
HEIGHT: 60 IN | HEART RATE: 62 BPM | TEMPERATURE: 96.5 F | DIASTOLIC BLOOD PRESSURE: 62 MMHG | RESPIRATION RATE: 18 BRPM | OXYGEN SATURATION: 94 % | BODY MASS INDEX: 33.38 KG/M2 | SYSTOLIC BLOOD PRESSURE: 154 MMHG | WEIGHT: 170 LBS

## 2023-09-15 LAB — GLUCOSE BLD-MCNC: 153 MG/DL (ref 70–99)

## 2023-09-15 PROCEDURE — C1894 INTRO/SHEATH, NON-LASER: HCPCS

## 2023-09-15 PROCEDURE — 2580000003 HC RX 258: Performed by: INTERNAL MEDICINE

## 2023-09-15 PROCEDURE — 93454 CORONARY ARTERY ANGIO S&I: CPT | Performed by: INTERNAL MEDICINE

## 2023-09-15 PROCEDURE — 6370000000 HC RX 637 (ALT 250 FOR IP): Performed by: INTERNAL MEDICINE

## 2023-09-15 PROCEDURE — 2709999900 HC NON-CHARGEABLE SUPPLY

## 2023-09-15 PROCEDURE — 82962 GLUCOSE BLOOD TEST: CPT

## 2023-09-15 PROCEDURE — 6360000002 HC RX W HCPCS

## 2023-09-15 PROCEDURE — 93454 CORONARY ARTERY ANGIO S&I: CPT

## 2023-09-15 RX ORDER — ISOSORBIDE MONONITRATE 30 MG/1
30 TABLET, EXTENDED RELEASE ORAL DAILY
Qty: 30 TABLET | Refills: 3 | Status: SHIPPED | OUTPATIENT
Start: 2023-09-15 | End: 2023-09-15

## 2023-09-15 RX ORDER — SODIUM CHLORIDE 9 MG/ML
INJECTION, SOLUTION INTRAVENOUS CONTINUOUS
Status: DISCONTINUED | OUTPATIENT
Start: 2023-09-15 | End: 2023-09-15 | Stop reason: HOSPADM

## 2023-09-15 RX ORDER — ACETAMINOPHEN 325 MG/1
650 TABLET ORAL EVERY 4 HOURS PRN
Status: DISCONTINUED | OUTPATIENT
Start: 2023-09-15 | End: 2023-09-15 | Stop reason: HOSPADM

## 2023-09-15 RX ORDER — DIPHENHYDRAMINE HCL 25 MG
25 TABLET ORAL ONCE
Status: COMPLETED | OUTPATIENT
Start: 2023-09-15 | End: 2023-09-15

## 2023-09-15 RX ORDER — DIAZEPAM 5 MG/1
5 TABLET ORAL ONCE
Status: COMPLETED | OUTPATIENT
Start: 2023-09-15 | End: 2023-09-15

## 2023-09-15 RX ORDER — SODIUM CHLORIDE 0.9 % (FLUSH) 0.9 %
5-40 SYRINGE (ML) INJECTION EVERY 12 HOURS SCHEDULED
Status: DISCONTINUED | OUTPATIENT
Start: 2023-09-15 | End: 2023-09-15 | Stop reason: HOSPADM

## 2023-09-15 RX ORDER — SODIUM CHLORIDE 0.9 % (FLUSH) 0.9 %
5-40 SYRINGE (ML) INJECTION PRN
Status: DISCONTINUED | OUTPATIENT
Start: 2023-09-15 | End: 2023-09-15 | Stop reason: HOSPADM

## 2023-09-15 RX ORDER — SODIUM CHLORIDE 9 MG/ML
INJECTION, SOLUTION INTRAVENOUS PRN
Status: DISCONTINUED | OUTPATIENT
Start: 2023-09-15 | End: 2023-09-15 | Stop reason: HOSPADM

## 2023-09-15 RX ADMIN — SODIUM CHLORIDE: 9 INJECTION, SOLUTION INTRAVENOUS at 07:14

## 2023-09-15 RX ADMIN — DIPHENHYDRAMINE HYDROCHLORIDE 25 MG: 25 TABLET ORAL at 07:14

## 2023-09-15 RX ADMIN — DIAZEPAM 5 MG: 5 TABLET ORAL at 07:14

## 2023-09-15 NOTE — PROGRESS NOTES
Outpatient Pharmacy Progress Note for Meds-to-Beds    Total number of Prescriptions Filled: 0  The following medications were NOT dispensed to the patient during the discharge process:  Isosorbide mononitrate    Additional Documentation:  Patient left hospital without medicine, called patient      Thank you for letting us serve your patients.   [address]    [address] [place] [address], [address] [place] [address]    Phone: [phone]    Fax: [phone]

## 2023-09-15 NOTE — FLOWSHEET NOTE
Pt to pt  in wheelchair per volunteer for d/c home in private vehicle with dtr.  Right radial site free of bleeding/hematoma at time of d/c

## 2023-09-15 NOTE — FLOWSHEET NOTE
Discharge instructions reviewed with patient per [place] [facility] understanding. Ambulated without difficulty.

## 2023-09-15 NOTE — H&P
Muriel Lee is a 79 y.o. female who was seen today for management of coronary disease                                  Here for  [facility]     HPI:                    Pt has h/o coronary artery disease, hypertension, hyperlipidemia, non-insulin-dependent diabetes seen today for follow-up.   Patient has been having left-sided chest pain current episodes  Post carotid endarterectomy patient with chest pain and hypertension enzymes are normal echo was normal  Maricel Galdamez has the following history recorded in care path:       Patient Active Problem List     Diagnosis Date Noted    Cerebrovascular accident (CVA) due to thrombosis of cerebral artery ([address]) 10/12/2016    Headache 08/04/2022    Essential hypertension      Thyroid nodule      Dysarthria due to recent cerebral infarction 10/15/2016    Oropharyngeal dysphagia 10/15/2016    Gait disturbance 10/15/2016    Coronary artery disease involving native coronary artery of native heart without angina pectoris      Diabetes mellitus with neuropathy (HCC)      SCC (squamous cell carcinoma), leg      Memory loss      IFG (impaired fasting glucose)      Carotid stenosis, left      Hyperlipidemia      Osteoarthritis, knee      ASCVD (arteriosclerotic cardiovascular disease)      Rheumatoid arthritis (HCC)      Peripheral neuropathy (HCC)      Carotid artery calcification, left 07/12/2023    COVID-19 virus infection      S/P colonoscopic polypectomy 11/20/2020    DDD (degenerative disc disease), cervical      DDD (degenerative disc disease), lumbar      Diabetic nephropathy associated with type 2 diabetes mellitus ([address]) 02/27/2019    Type 2 diabetes mellitus with diabetic neuropathy, without long-term current use of insulin ([address]) 11/03/2017      Current Facility-Administered Medications          Current Outpatient Medications   Medication Sig Dispense Refill    gabapentin (NEURONTIN) 300 MG capsule TAKE 1 CAPSULE BY MOUTH EVERY MORNING AND 1 CAPSULE EVERY NIGHT AT Lab Results   Component Value Date     INR 1.2 07/11/2023            Lab Results   Component Value Date     LABA1C 6.6 08/04/2023     LABA1C 6.1 05/08/2023            Lab Results   Component Value Date     WBC 5.8 08/04/2023     HCT 34.1 (L) 08/04/2023     MCV 90.8 08/04/2023      08/04/2023            Lab Results   Component Value Date     CHOL 142 08/04/2023     TRIG 201 (H) 08/04/2023     HDL 34 (L) 08/04/2023     LDLCALC 68 08/04/2023     LDLDIRECT 84 08/04/2022            Lab Results   Component Value Date     ALT 22 08/04/2023     AST 24 08/04/2023      BMP:          Lab Results   Component Value Date/Time      08/04/2023 07:45 AM     K 4.2 08/04/2023 07:45 AM      08/04/2023 07:45 AM     CO2 28 08/04/2023 07:45 AM     BUN 19 08/04/2023 07:45 AM     CREATININE 0.9 08/04/2023 07:45 AM      CMP:         Lab Results   Component Value Date/Time      08/04/2023 07:45 AM     K 4.2 08/04/2023 07:45 AM      08/04/2023 07:45 AM     CO2 28 08/04/2023 07:45 AM     BUN 19 08/04/2023 07:45 AM     PROT 6.9 08/04/2023 07:45 AM     PROT 7.3 02/07/2013 08:23 PM      TSH:          Lab Results   Component Value Date/Time     TSH 2.85 08/04/2023 07:45 AM               Assessment & Plan:                   -     CORONARY ARTERY DISEASE:  symptomatic     All available  tests in chart reviewed. Management discussed . Testing ordered  stress  cardioolite                On Plavix compliant with medicine will be continued   History of PCI multiple risk factors has been having chest pain  [facility] scheduled        -  Hypertension: Patients blood pressure is normal. Patient is advised about low sodium diet. Present medical regimen will not be changed. Amlodipine l 5 mg p.o. daily p.o. twice daily, losartan 100 aspirin daily which will become              -  LIPID MANAGEMENT:  Importance of lipid levels discussed with patient   and patient was given dietary advice.  NCEP- ATP III guidelines reviewed with

## 2023-09-15 NOTE — FLOWSHEET NOTE
[phone]) Pharmacy staff from [address] [facility] phoned informing staff that pt has new Rx ready for . After pt d/c, Dr Rosalio Li had sent Rx for Imdur 30mg QD. Phoned pt, aware of need to add Imdur to current medications. Pt requesting that Rx be sent to her Steffanie.  Rx for Imdur 30mg PO QD #30x6 phoned to [facility]

## 2023-09-18 RX ORDER — ISOSORBIDE MONONITRATE 30 MG/1
30 TABLET, EXTENDED RELEASE ORAL DAILY
Qty: 90 TABLET | Refills: 3 | Status: SHIPPED | OUTPATIENT
Start: 2023-09-18

## 2023-09-22 ENCOUNTER — OFFICE VISIT (OUTPATIENT)
Dept: CARDIOLOGY CLINIC | Age: 83
End: 2023-09-22
Payer: MEDICARE

## 2023-09-22 VITALS
WEIGHT: 174 LBS | HEART RATE: 58 BPM | SYSTOLIC BLOOD PRESSURE: 130 MMHG | DIASTOLIC BLOOD PRESSURE: 80 MMHG | OXYGEN SATURATION: 96 % | BODY MASS INDEX: 33.98 KG/M2

## 2023-09-22 DIAGNOSIS — I25.10 CORONARY ARTERY DISEASE INVOLVING NATIVE CORONARY ARTERY OF NATIVE HEART WITHOUT ANGINA PECTORIS: Primary | ICD-10-CM

## 2023-09-22 PROCEDURE — G8427 DOCREV CUR MEDS BY ELIG CLIN: HCPCS | Performed by: NURSE PRACTITIONER

## 2023-09-22 PROCEDURE — G8417 CALC BMI ABV UP PARAM F/U: HCPCS | Performed by: NURSE PRACTITIONER

## 2023-09-22 PROCEDURE — G8399 PT W/DXA RESULTS DOCUMENT: HCPCS | Performed by: NURSE PRACTITIONER

## 2023-09-22 PROCEDURE — 3078F DIAST BP <80 MM HG: CPT | Performed by: NURSE PRACTITIONER

## 2023-09-22 PROCEDURE — 1124F ACP DISCUSS-NO DSCNMKR DOCD: CPT | Performed by: NURSE PRACTITIONER

## 2023-09-22 PROCEDURE — 3075F SYST BP GE 130 - 139MM HG: CPT | Performed by: NURSE PRACTITIONER

## 2023-09-22 PROCEDURE — 99214 OFFICE O/P EST MOD 30 MIN: CPT | Performed by: NURSE PRACTITIONER

## 2023-09-22 PROCEDURE — 1090F PRES/ABSN URINE INCON ASSESS: CPT | Performed by: NURSE PRACTITIONER

## 2023-09-22 PROCEDURE — 1036F TOBACCO NON-USER: CPT | Performed by: NURSE PRACTITIONER

## 2023-09-22 ASSESSMENT — ENCOUNTER SYMPTOMS
SHORTNESS OF BREATH: 0
ORTHOPNEA: 0

## 2023-09-22 NOTE — PROGRESS NOTES
9/22/2023  Primary cardiologist: Dr. Tiana Brantley:   Joi Sargent  is an established 79 y.o.  female here for a 1 month follow up on [facility]      SUBJECTIVE/OBJECTIVE:  oJi Sargent is a 79 y.o. female with a history of coronary artery disease, carotid artery disease s/p left carotid endarterectomy, hypertension, hyperlipidemia and non-insulin-dependent diabetes . Guerita has remote history of PCI Reomte h/o PCI  (2005). She then underwent PCI of the circumflex and LAD in December 2022. HPI :   Joi Sargent reports cardiac catheterization on September 15, 2023 that showed patent stents. Radial cath site is free of hematoma or ecchymosis. She states she is feeling better. She did note she had an episode of chest discomfort that she described as a heaviness while taking a shower this morning. Episode last for couple minutes and went away on its own. She denied associated symptoms. Review of Systems   Constitutional: Negative for diaphoresis and malaise/fatigue. Cardiovascular:  Positive for chest pain. Negative for claudication, dyspnea on exertion, irregular heartbeat, leg swelling, near-syncope, orthopnea, palpitations and paroxysmal nocturnal dyspnea. Respiratory:  Negative for shortness of breath. Neurological:  Negative for dizziness and light-headedness. Vitals:    09/22/23 1336 09/22/23 1337   BP: (!) 160/70 130/80   Site: Right Upper Arm Left Upper Arm   Pulse:  58   SpO2:  96%   Weight:  174 lb (78.9 kg)       There is no height or weight on file to calculate BMI. Physical Exam  Vitals reviewed. HENT:      Mouth/Throat:      Mouth: Mucous membranes are moist.   Cardiovascular:      Rate and Rhythm: Normal rate and regular rhythm. Pulses: Normal pulses. Heart sounds: Normal heart sounds. Pulmonary:      Effort: Pulmonary effort is normal. No respiratory distress. Breath sounds: Normal breath sounds. No wheezing. Musculoskeletal:         General: No tenderness.       Cervical

## 2023-09-22 NOTE — PATIENT INSTRUCTIONS
**It is YOUR responsibilty to bring medication bottles and/or updated medication list to [address] [place] [address]. This will allow us to better serve you and all your healthcare needs**  [facility] Laboratory Locations - No appointment necessary. Sites open Monday to Friday. Call your preferred location for test preparation, business   hours and other information you need. [employer] accepts [employer]. [address] [place] [address]. [address] [place], [facility]  Phone: [phone]     Please be informed that if you contact our office outside of normal business hours the physician on call cannot help with any scheduling or rescheduling issues, procedure instruction questions or any type of medication issue. We advise you for any urgent/emergency that you go to the nearest emergency room! PLEASE CALL OUR OFFICE DURING NORMAL BUSINESS HOURS    Monday - Friday   8 am to 5 pm    [place]: [address] [place]: [phone]    [place]:  [phone]  Thank you for allowing us to care for you today! We want to ensure we can follow your treatment plan and we strive to give you the best outcomes and experience possible. If you ever have a life threatening emergency and call 911 - for an ambulance (EMS)   Our providers can only care for you at:   [facility] or [facility]. Even if you have someone take you or you drive yourself we can only care for you in a [facility] facility. Our providers are not setup at the other healthcare locations! We are committed to providing you the best care possible. If you receive a survey after visiting one of our offices, please take time to share your experience concerning your physician office visit. These surveys are confidential and no health information about you is shared. We are eager to improve for you and we are counting on your feedback to help make that happen.

## 2023-10-04 ENCOUNTER — TELEPHONE (OUTPATIENT)
Dept: CARDIOLOGY CLINIC | Age: 83
End: 2023-10-04

## 2023-10-16 ENCOUNTER — TELEPHONE (OUTPATIENT)
Dept: CARDIOLOGY CLINIC | Age: 83
End: 2023-10-16

## 2023-10-16 ENCOUNTER — OFFICE VISIT (OUTPATIENT)
Dept: CARDIOLOGY CLINIC | Age: 83
End: 2023-10-16
Payer: MEDICARE

## 2023-10-16 VITALS
OXYGEN SATURATION: 97 % | DIASTOLIC BLOOD PRESSURE: 70 MMHG | WEIGHT: 176.8 LBS | BODY MASS INDEX: 34.71 KG/M2 | HEART RATE: 65 BPM | HEIGHT: 60 IN | SYSTOLIC BLOOD PRESSURE: 176 MMHG

## 2023-10-16 DIAGNOSIS — I10 ESSENTIAL HYPERTENSION: ICD-10-CM

## 2023-10-16 DIAGNOSIS — I25.10 CORONARY ARTERY DISEASE INVOLVING NATIVE CORONARY ARTERY OF NATIVE HEART WITHOUT ANGINA PECTORIS: Primary | ICD-10-CM

## 2023-10-16 DIAGNOSIS — R07.9 CHEST PAIN, UNSPECIFIED TYPE: ICD-10-CM

## 2023-10-16 PROCEDURE — G8399 PT W/DXA RESULTS DOCUMENT: HCPCS | Performed by: NURSE PRACTITIONER

## 2023-10-16 PROCEDURE — G8417 CALC BMI ABV UP PARAM F/U: HCPCS | Performed by: NURSE PRACTITIONER

## 2023-10-16 PROCEDURE — 3077F SYST BP >= 140 MM HG: CPT | Performed by: NURSE PRACTITIONER

## 2023-10-16 PROCEDURE — 1036F TOBACCO NON-USER: CPT | Performed by: NURSE PRACTITIONER

## 2023-10-16 PROCEDURE — G8484 FLU IMMUNIZE NO ADMIN: HCPCS | Performed by: NURSE PRACTITIONER

## 2023-10-16 PROCEDURE — 1090F PRES/ABSN URINE INCON ASSESS: CPT | Performed by: NURSE PRACTITIONER

## 2023-10-16 PROCEDURE — 1124F ACP DISCUSS-NO DSCNMKR DOCD: CPT | Performed by: NURSE PRACTITIONER

## 2023-10-16 PROCEDURE — 93000 ELECTROCARDIOGRAM COMPLETE: CPT | Performed by: NURSE PRACTITIONER

## 2023-10-16 PROCEDURE — G8427 DOCREV CUR MEDS BY ELIG CLIN: HCPCS | Performed by: NURSE PRACTITIONER

## 2023-10-16 PROCEDURE — 3078F DIAST BP <80 MM HG: CPT | Performed by: NURSE PRACTITIONER

## 2023-10-16 PROCEDURE — 99214 OFFICE O/P EST MOD 30 MIN: CPT | Performed by: NURSE PRACTITIONER

## 2023-10-16 RX ORDER — HYDRALAZINE HYDROCHLORIDE 25 MG/1
25 TABLET, FILM COATED ORAL 4 TIMES DAILY
Qty: 180 TABLET | Refills: 3 | Status: SHIPPED | OUTPATIENT
Start: 2023-10-16

## 2023-10-16 RX ORDER — AMLODIPINE BESYLATE 5 MG/1
5 TABLET ORAL 2 TIMES DAILY
Qty: 90 TABLET | Refills: 1 | Status: SHIPPED | OUTPATIENT
Start: 2023-10-16

## 2023-10-16 ASSESSMENT — ENCOUNTER SYMPTOMS
ORTHOPNEA: 0
SHORTNESS OF BREATH: 0

## 2023-10-16 NOTE — PROGRESS NOTES
carbonate-vitamin D 600-200 MG-UNIT TABS Take 1 tablet by mouth nightly       Omega-3 Fatty Acids (FISH OIL BURP-LESS) 1000 MG CAPS Take 1 tablet by mouth 2 times daily. No current facility-administered medications for this visit. All pertinent data reviewed and discussed with patient  Cardiac catheterization 09/15/2023   LM NORMAL   LAD PATENT STENT, MILD UNCHANGED DISEASE AT BIFURCATION, SMALL   DIAG JAILED   CX PATENT STENTS MILD DISESAE   RCA 40% MID STENOSIS , HEAVILY CALCIFED UNCHANGED     ASSESSMENT/PLAN:  Coronary artery disease  Had LHC that showed stable CAD- patent stent  She has had chest heaviness after taking a shower. ?  Skeletal skeletal in nature as heart cath shows patent stents. Continue medical management - ASA- Atorvastatin- plavix-Imdur    Keep active: avoid red meat     Hypertension  Uncontrolled  Increase norvasc to 5 mg BID  Continue hydralazine 50 mg BID, HCTZ 25 mg Daily, Losartan 100 mg Daily, metoprolol 50 mg BID  Goal blood pressure 130/80      Tests ordered: None  Follow-up 1 months or sooner if needed    Signed:  Euel Gowers, APRN - CNP, 10/16/2023, 3:13 PM    An electronic signature was used to authenticate this note. Please note this report has been partially produced using speech recognition software and may contain errors related to that system including errors in grammar, punctuation, and spelling, as well as words and phrases that may be inappropriate. If there are any questions or concerns please feel free to contact the dictating provider for clarification.

## 2023-10-16 NOTE — PATIENT INSTRUCTIONS
Please be informed that if you contact our office outside of normal business hours the physician on call cannot help with any scheduling or rescheduling issues, procedure instruction questions or any type of medication issue. We advise you for any urgent/emergency that you go to the nearest emergency room! PLEASE CALL OUR OFFICE DURING NORMAL BUSINESS HOURS    Monday - Friday   8 am to 5 pm    [place]: [address] [place]: [phone]    [place]:  [phone]    **It is YOUR responsibilty to bring medication bottles and/or updated medication list to [facility]. This will allow us to better serve you and all your healthcare needs**    Thank you for allowing us to care for you today! We want to ensure we can follow your treatment plan and we strive to give you the best outcomes and experience possible. If you ever have a life threatening emergency and call 911 - for an ambulance (EMS)   Our providers can only care for you at:   [facility] or [facility]. Even if you have someone take you or you drive yourself we can only care for you in a [facility] facility. Our providers are not setup at the other healthcare locations! [facility] Laboratory Locations - No appointment necessary. Sites open Monday to Friday. Call your preferred location for test preparation, business   hours and other information you need. [employer] accepts [employer]. [address] [place] [address]. [facility] [address]. [place], [address] [facility]  Phone: [phone]       We are committed to providing you the best care possible. If you receive a survey after visiting one of our offices, please take time to share your experience concerning your physician office visit. These surveys are confidential and no health information about you is shared. We are eager to improve for you and we are counting on your feedback to help make that happen.

## 2023-11-03 ENCOUNTER — TELEPHONE (OUTPATIENT)
Dept: CARDIOTHORACIC SURGERY | Age: 83
End: 2023-11-03

## 2023-11-03 NOTE — TELEPHONE ENCOUNTER
Called and left message with date and time of Carotid, advised that patient needs to arrive at the [address] 11/13/23 at 11:30am for 12:00pm test, advised her to please give us call back so that we know she has received this message

## 2023-11-07 DIAGNOSIS — I65.23 BILATERAL CAROTID ARTERY STENOSIS: ICD-10-CM

## 2023-11-07 DIAGNOSIS — N30.00 ACUTE CYSTITIS WITHOUT HEMATURIA: ICD-10-CM

## 2023-11-07 DIAGNOSIS — R30.0 DYSURIA: ICD-10-CM

## 2023-11-07 DIAGNOSIS — E11.40 TYPE 2 DIABETES MELLITUS WITH DIABETIC NEUROPATHY, WITHOUT LONG-TERM CURRENT USE OF INSULIN (HCC): ICD-10-CM

## 2023-11-07 DIAGNOSIS — I25.10 ASHD (ARTERIOSCLEROTIC HEART DISEASE): ICD-10-CM

## 2023-11-07 DIAGNOSIS — R30.0 DYSURIA: Primary | ICD-10-CM

## 2023-11-07 LAB
ALBUMIN SERPL-MCNC: 4.5 G/DL (ref 3.4–5)
ALBUMIN/GLOB SERPL: 1.7 {RATIO} (ref 1.1–2.2)
ALP SERPL-CCNC: 72 U/L (ref 40–129)
ALT SERPL-CCNC: 23 U/L (ref 10–40)
ANION GAP SERPL CALCULATED.3IONS-SCNC: 15 MMOL/L (ref 3–16)
AST SERPL-CCNC: 25 U/L (ref 15–37)
BACTERIA URNS QL MICRO: ABNORMAL /HPF
BILIRUB SERPL-MCNC: 0.8 MG/DL (ref 0–1)
BILIRUB UR QL STRIP.AUTO: NEGATIVE
BUN SERPL-MCNC: 23 MG/DL (ref 7–20)
CALCIUM SERPL-MCNC: 9.7 MG/DL (ref 8.3–10.6)
CHLORIDE SERPL-SCNC: 99 MMOL/L (ref 99–110)
CHOLEST SERPL-MCNC: 128 MG/DL (ref 0–199)
CLARITY UR: CLEAR
CO2 SERPL-SCNC: 22 MMOL/L (ref 21–32)
COLOR UR: YELLOW
CREAT SERPL-MCNC: 1.1 MG/DL (ref 0.6–1.2)
CREAT UR-MCNC: 30.5 MG/DL (ref 28–259)
DEPRECATED RDW RBC AUTO: 13.4 % (ref 12.4–15.4)
EPI CELLS #/AREA URNS AUTO: 1 /HPF (ref 0–5)
GFR SERPLBLD CREATININE-BSD FMLA CKD-EPI: 49 ML/MIN/{1.73_M2}
GLUCOSE SERPL-MCNC: 120 MG/DL (ref 70–99)
GLUCOSE UR STRIP.AUTO-MCNC: NEGATIVE MG/DL
HCT VFR BLD AUTO: 34.2 % (ref 36–48)
HDLC SERPL-MCNC: 37 MG/DL (ref 40–60)
HGB BLD-MCNC: 11.8 G/DL (ref 12–16)
HGB UR QL STRIP.AUTO: NEGATIVE
HYALINE CASTS #/AREA URNS AUTO: 0 /LPF (ref 0–8)
KETONES UR STRIP.AUTO-MCNC: NEGATIVE MG/DL
LDLC SERPL CALC-MCNC: 60 MG/DL
LEUKOCYTE ESTERASE UR QL STRIP.AUTO: ABNORMAL
MCH RBC QN AUTO: 31.3 PG (ref 26–34)
MCHC RBC AUTO-ENTMCNC: 34.4 G/DL (ref 31–36)
MCV RBC AUTO: 90.8 FL (ref 80–100)
MICROALBUMIN UR DL<=1MG/L-MCNC: 2.4 MG/DL
MICROALBUMIN/CREAT UR: 78.7 MG/G (ref 0–30)
NITRITE UR QL STRIP.AUTO: NEGATIVE
PH UR STRIP.AUTO: 7 [PH] (ref 5–8)
PLATELET # BLD AUTO: 211 K/UL (ref 135–450)
PMV BLD AUTO: 8.6 FL (ref 5–10.5)
POTASSIUM SERPL-SCNC: 4.5 MMOL/L (ref 3.5–5.1)
PROT SERPL-MCNC: 7.1 G/DL (ref 6.4–8.2)
PROT UR STRIP.AUTO-MCNC: NEGATIVE MG/DL
RBC # BLD AUTO: 3.76 M/UL (ref 4–5.2)
RBC CLUMPS #/AREA URNS AUTO: 2 /HPF (ref 0–4)
SODIUM SERPL-SCNC: 136 MMOL/L (ref 136–145)
SP GR UR STRIP.AUTO: 1.01 (ref 1–1.03)
TRIGL SERPL-MCNC: 153 MG/DL (ref 0–150)
UA DIPSTICK W REFLEX MICRO PNL UR: YES
URN SPEC COLLECT METH UR: ABNORMAL
UROBILINOGEN UR STRIP-ACNC: 0.2 E.U./DL
VLDLC SERPL CALC-MCNC: 31 MG/DL
WBC # BLD AUTO: 6.2 K/UL (ref 4–11)
WBC #/AREA URNS AUTO: 22 /HPF (ref 0–5)

## 2023-11-07 PROCEDURE — 36415 COLL VENOUS BLD VENIPUNCTURE: CPT | Performed by: INTERNAL MEDICINE

## 2023-11-07 RX ORDER — SULFAMETHOXAZOLE AND TRIMETHOPRIM 800; 160 MG/1; MG/1
1 TABLET ORAL 2 TIMES DAILY
Qty: 14 TABLET | Refills: 0 | Status: SHIPPED | OUTPATIENT
Start: 2023-11-07 | End: 2023-11-14

## 2023-11-07 NOTE — RESULT ENCOUNTER NOTE
Please call pt, she does have a bladder infection.  I'll try a course of bactrim BID for one week, add ucx too pls to this specimen.  Rec for her to call back one week if not improving.    Is important to be drinking 3-4 glasses of water/day to flush out the infection and even after this resolves to drink this amt everyday to help prevent recurrence

## 2023-11-08 ENCOUNTER — TELEPHONE (OUTPATIENT)
Dept: BARIATRICS/WEIGHT MGMT | Age: 83
End: 2023-11-08

## 2023-11-08 LAB
BACTERIA UR CULT: NORMAL
EST. AVERAGE GLUCOSE BLD GHB EST-MCNC: 139.9 MG/DL
HBA1C MFR BLD: 6.5 %

## 2023-11-08 NOTE — TELEPHONE ENCOUNTER
Office called pt's daughter back regarding the face mass - tmj  - with the Dr. Wesley Hicks  recommending  to continuing  to see the tmj specialist. Or we can send a ref to [facility].  - daughter declined the ref.

## 2023-11-08 NOTE — RESULT ENCOUNTER NOTE
Chol, sugars, labs appear in stable range, DM is controlled,HER URINE PROTEIN LEVEL IS ALSO STABLE.  SUGAR BETTER, DROPPING FROM [phone] NOW.   notify pt please.

## 2023-11-13 ENCOUNTER — OFFICE VISIT (OUTPATIENT)
Dept: INTERNAL MEDICINE CLINIC | Age: 83
End: 2023-11-13
Payer: MEDICARE

## 2023-11-13 ENCOUNTER — HOSPITAL ENCOUNTER (OUTPATIENT)
Dept: ULTRASOUND IMAGING | Age: 83
Discharge: HOME | End: 2023-11-13
Payer: MEDICARE

## 2023-11-13 VITALS
SYSTOLIC BLOOD PRESSURE: 132 MMHG | DIASTOLIC BLOOD PRESSURE: 76 MMHG | HEART RATE: 68 BPM | OXYGEN SATURATION: 98 % | BODY MASS INDEX: 34.33 KG/M2 | WEIGHT: 175.8 LBS

## 2023-11-13 DIAGNOSIS — M79.605 PAIN OF LEFT LOWER EXTREMITY: Primary | ICD-10-CM

## 2023-11-13 DIAGNOSIS — N30.00 ACUTE CYSTITIS WITHOUT HEMATURIA: ICD-10-CM

## 2023-11-13 DIAGNOSIS — R68.84 JAW PAIN: ICD-10-CM

## 2023-11-13 DIAGNOSIS — I65.23 BILATERAL CAROTID ARTERY STENOSIS: ICD-10-CM

## 2023-11-13 DIAGNOSIS — M25.552 PELVIC JOINT PAIN, LEFT: ICD-10-CM

## 2023-11-13 PROCEDURE — G8399 PT W/DXA RESULTS DOCUMENT: HCPCS | Performed by: INTERNAL MEDICINE

## 2023-11-13 PROCEDURE — 1124F ACP DISCUSS-NO DSCNMKR DOCD: CPT | Performed by: INTERNAL MEDICINE

## 2023-11-13 PROCEDURE — G8484 FLU IMMUNIZE NO ADMIN: HCPCS | Performed by: INTERNAL MEDICINE

## 2023-11-13 PROCEDURE — G8417 CALC BMI ABV UP PARAM F/U: HCPCS | Performed by: INTERNAL MEDICINE

## 2023-11-13 PROCEDURE — 1090F PRES/ABSN URINE INCON ASSESS: CPT | Performed by: INTERNAL MEDICINE

## 2023-11-13 PROCEDURE — 3078F DIAST BP <80 MM HG: CPT | Performed by: INTERNAL MEDICINE

## 2023-11-13 PROCEDURE — 93880 EXTRACRANIAL BILAT STUDY: CPT

## 2023-11-13 PROCEDURE — 3075F SYST BP GE 130 - 139MM HG: CPT | Performed by: INTERNAL MEDICINE

## 2023-11-13 PROCEDURE — 99214 OFFICE O/P EST MOD 30 MIN: CPT | Performed by: INTERNAL MEDICINE

## 2023-11-13 PROCEDURE — 1036F TOBACCO NON-USER: CPT | Performed by: INTERNAL MEDICINE

## 2023-11-13 PROCEDURE — G8427 DOCREV CUR MEDS BY ELIG CLIN: HCPCS | Performed by: INTERNAL MEDICINE

## 2023-11-13 SDOH — ECONOMIC STABILITY: FOOD INSECURITY: WITHIN THE PAST 12 MONTHS, YOU WORRIED THAT YOUR FOOD WOULD RUN OUT BEFORE YOU GOT MONEY TO BUY MORE.: NEVER TRUE

## 2023-11-13 SDOH — ECONOMIC STABILITY: FOOD INSECURITY: WITHIN THE PAST 12 MONTHS, THE FOOD YOU BOUGHT JUST DIDN'T LAST AND YOU DIDN'T HAVE MONEY TO GET MORE.: NEVER TRUE

## 2023-11-13 SDOH — ECONOMIC STABILITY: INCOME INSECURITY: HOW HARD IS IT FOR YOU TO PAY FOR THE VERY BASICS LIKE FOOD, HOUSING, MEDICAL CARE, AND HEATING?: NOT HARD AT ALL

## 2023-11-15 NOTE — PROGRESS NOTES
11/17/2023    Michell Miranda MD   [address] [facility] Crissy Us MD         Re: Wolf Perera      Dear Dr. Patricia Soriano,  Dear Dr. Nga Medina    I had the pleasure of seeing your patient, Cora Wolfe (58 y.o. female) in follow up after her left carotid endarterectomy with bovine patch angioplasty on 07/11/2023. She feels tired but has no complaints. Recently her blood pressure medication has been changed. Her daughter is with her today and she shows also a blood pressure log. She has very well-controlled blood pressures and has no pressures less than 100. She also complains that she is taking hydrochlorothiazide at bedtime just to get up about 4-5 times every night. Her carotid incision is closed no problems, she has no numbness and the wound has been healing nicely.     Her   Current Outpatient Medications:     hydrALAZINE (APRESOLINE) 25 MG tablet, Take 1 tablet by mouth 4 times daily, Disp: 180 tablet, Rfl: 3    amLODIPine (NORVASC) 5 MG tablet, Take 1 tablet by mouth in the morning and at bedtime, Disp: 90 tablet, Rfl: 1    isosorbide mononitrate (IMDUR) 30 MG extended release tablet, Take 1 tablet by mouth daily, Disp: 90 tablet, Rfl: 3    gabapentin (NEURONTIN) 300 MG capsule, TAKE 1 CAPSULE BY MOUTH EVERY MORNING AND 1 CAPSULE EVERY NIGHT AT BEDTIME, Disp: 180 capsule, Rfl: 1    clopidogrel (PLAVIX) 75 MG tablet, TAKE 1 TABLET BY MOUTH DAILY, Disp: 90 tablet, Rfl: 1    metoprolol tartrate (LOPRESSOR) 50 MG tablet, TAKE 1 TABLET BY MOUTH TWICE DAILY, Disp: 180 tablet, Rfl: 1    losartan (COZAAR) 100 MG tablet, TAKE 1 TABLET BY MOUTH EVERY DAY, Disp: 90 tablet, Rfl: 1    metFORMIN (GLUCOPHAGE) 500 MG tablet, TAKE 1/2 TABLET BY MOUTH IN THE MORNING, Disp: 45 tablet, Rfl: 1    hydroCHLOROthiazide (HYDRODIURIL) 25 MG tablet, TAKE 1 TABLET BY MOUTH DAILY, Disp: 90 tablet, Rfl: 1    atorvastatin (LIPITOR) 40 MG tablet, Take 1 tablet by mouth daily, Disp: 90 tablet, Rfl: 3

## 2023-11-16 DIAGNOSIS — N30.00 ACUTE CYSTITIS WITHOUT HEMATURIA: ICD-10-CM

## 2023-11-16 DIAGNOSIS — R68.84 JAW PAIN: ICD-10-CM

## 2023-11-16 DIAGNOSIS — M25.552 PELVIC JOINT PAIN, LEFT: ICD-10-CM

## 2023-11-16 LAB
BACTERIA URNS QL MICRO: ABNORMAL /HPF
BASOPHILS # BLD: 0 K/UL (ref 0–0.2)
BASOPHILS NFR BLD: 0.4 %
BILIRUB UR QL STRIP.AUTO: NEGATIVE
CLARITY UR: CLEAR
COLOR UR: YELLOW
CRP SERPL-MCNC: <3 MG/L (ref 0–5.1)
DEPRECATED RDW RBC AUTO: 13.5 % (ref 12.4–15.4)
EOSINOPHIL # BLD: 0.2 K/UL (ref 0–0.6)
EOSINOPHIL NFR BLD: 3.7 %
EPI CELLS #/AREA URNS AUTO: 1 /HPF (ref 0–5)
GLUCOSE UR STRIP.AUTO-MCNC: NEGATIVE MG/DL
HCT VFR BLD AUTO: 30.9 % (ref 36–48)
HGB BLD-MCNC: 10.9 G/DL (ref 12–16)
HGB UR QL STRIP.AUTO: NEGATIVE
HYALINE CASTS #/AREA URNS AUTO: 1 /LPF (ref 0–8)
KETONES UR STRIP.AUTO-MCNC: NEGATIVE MG/DL
LEUKOCYTE ESTERASE UR QL STRIP.AUTO: ABNORMAL
LYMPHOCYTES # BLD: 2 K/UL (ref 1–5.1)
LYMPHOCYTES NFR BLD: 34.9 %
MCH RBC QN AUTO: 31.8 PG (ref 26–34)
MCHC RBC AUTO-ENTMCNC: 35.1 G/DL (ref 31–36)
MCV RBC AUTO: 90.5 FL (ref 80–100)
MONOCYTES # BLD: 0.6 K/UL (ref 0–1.3)
MONOCYTES NFR BLD: 9.8 %
NEUTROPHILS # BLD: 3 K/UL (ref 1.7–7.7)
NEUTROPHILS NFR BLD: 51.2 %
NITRITE UR QL STRIP.AUTO: NEGATIVE
PH UR STRIP.AUTO: 6 [PH] (ref 5–8)
PLATELET # BLD AUTO: 201 K/UL (ref 135–450)
PMV BLD AUTO: 8.5 FL (ref 5–10.5)
PROT UR STRIP.AUTO-MCNC: NEGATIVE MG/DL
RBC # BLD AUTO: 3.42 M/UL (ref 4–5.2)
RBC CLUMPS #/AREA URNS AUTO: 1 /HPF (ref 0–4)
SP GR UR STRIP.AUTO: 1.01 (ref 1–1.03)
UA DIPSTICK W REFLEX MICRO PNL UR: YES
URN SPEC COLLECT METH UR: ABNORMAL
UROBILINOGEN UR STRIP-ACNC: 0.2 E.U./DL
WBC # BLD AUTO: 5.8 K/UL (ref 4–11)
WBC #/AREA URNS AUTO: 0 /HPF (ref 0–5)

## 2023-11-16 PROCEDURE — 36415 COLL VENOUS BLD VENIPUNCTURE: CPT | Performed by: INTERNAL MEDICINE

## 2023-11-17 ENCOUNTER — OFFICE VISIT (OUTPATIENT)
Dept: CARDIOTHORACIC SURGERY | Age: 83
End: 2023-11-17

## 2023-11-17 VITALS
WEIGHT: 176.4 LBS | OXYGEN SATURATION: 97 % | BODY MASS INDEX: 34.63 KG/M2 | HEIGHT: 60 IN | SYSTOLIC BLOOD PRESSURE: 118 MMHG | DIASTOLIC BLOOD PRESSURE: 64 MMHG | HEART RATE: 56 BPM

## 2023-11-17 DIAGNOSIS — Z98.890 S/P CAROTID ENDARTERECTOMY: Primary | ICD-10-CM

## 2023-11-17 DIAGNOSIS — D64.9 ANEMIA, UNSPECIFIED TYPE: Primary | ICD-10-CM

## 2023-11-17 DIAGNOSIS — I65.22 CAROTID STENOSIS, LEFT: ICD-10-CM

## 2023-11-17 DIAGNOSIS — D64.9 ANEMIA, UNSPECIFIED TYPE: ICD-10-CM

## 2023-11-17 LAB
FERRITIN SERPL IA-MCNC: 78.3 NG/ML (ref 15–150)
IRON SATN MFR SERPL: 35 % (ref 15–50)
IRON SERPL-MCNC: 113 UG/DL (ref 37–145)
TIBC SERPL-MCNC: 326 UG/DL (ref 260–445)

## 2023-11-17 NOTE — RESULT ENCOUNTER NOTE
Please call pt, lab ok w prior bladder infection resolved, has nl inflammation level and white count but does appear sl anemic.  ?could be from surgery earlier this yr?  Add iron, ferritin and TIBC- dx anemia, we'll monitor for now.

## 2023-11-19 ASSESSMENT — ENCOUNTER SYMPTOMS
CHOKING: 0
COLOR CHANGE: 0
BACK PAIN: 0
STRIDOR: 0
APNEA: 0
SORE THROAT: 0
ANAL BLEEDING: 0
PHOTOPHOBIA: 0
CONSTIPATION: 0
EYE ITCHING: 0
RECTAL PAIN: 0
EYE REDNESS: 0

## 2023-11-20 ENCOUNTER — HOSPITAL ENCOUNTER (OUTPATIENT)
Dept: NUCLEAR MEDICINE | Age: 83
Discharge: HOME | End: 2023-11-20
Attending: INTERNAL MEDICINE
Payer: MEDICARE

## 2023-11-20 DIAGNOSIS — M79.605 PAIN OF LEFT LOWER EXTREMITY: ICD-10-CM

## 2023-11-20 DIAGNOSIS — R68.84 JAW PAIN: ICD-10-CM

## 2023-11-20 DIAGNOSIS — M25.552 PELVIC JOINT PAIN, LEFT: ICD-10-CM

## 2023-11-20 PROCEDURE — 78306 BONE IMAGING WHOLE BODY: CPT | Performed by: INTERNAL MEDICINE

## 2023-11-20 PROCEDURE — 3430000000 HC RX DIAGNOSTIC RADIOPHARMACEUTICAL: Performed by: INTERNAL MEDICINE

## 2023-11-20 PROCEDURE — A9503 TC99M MEDRONATE: HCPCS | Performed by: INTERNAL MEDICINE

## 2023-11-20 RX ORDER — TC 99M MEDRONATE 20 MG/10ML
26.1 INJECTION, POWDER, LYOPHILIZED, FOR SOLUTION INTRAVENOUS
Status: COMPLETED | OUTPATIENT
Start: 2023-11-20 | End: 2023-11-20

## 2023-11-20 RX ADMIN — TC 99M MEDRONATE 26.1 MILLICURIE: 20 INJECTION, POWDER, LYOPHILIZED, FOR SOLUTION INTRAVENOUS at 09:20

## 2023-11-20 NOTE — RESULT ENCOUNTER NOTE
Please call pt, she has no definite infection of bone but there is a follow up study which could be considered.   However since lab recently ok, rec we first get opinion from Dr Nilson Monae

## 2023-11-22 ENCOUNTER — OFFICE VISIT (OUTPATIENT)
Dept: CARDIOLOGY CLINIC | Age: 83
End: 2023-11-22
Payer: MEDICARE

## 2023-11-22 VITALS
HEIGHT: 60 IN | BODY MASS INDEX: 34.28 KG/M2 | SYSTOLIC BLOOD PRESSURE: 142 MMHG | OXYGEN SATURATION: 93 % | RESPIRATION RATE: 16 BRPM | HEART RATE: 60 BPM | WEIGHT: 174.6 LBS | DIASTOLIC BLOOD PRESSURE: 70 MMHG

## 2023-11-22 DIAGNOSIS — I63.30 CEREBROVASCULAR ACCIDENT (CVA) DUE TO THROMBOSIS OF CEREBRAL ARTERY (HCC): ICD-10-CM

## 2023-11-22 DIAGNOSIS — I10 ESSENTIAL HYPERTENSION: Primary | ICD-10-CM

## 2023-11-22 DIAGNOSIS — E11.40 TYPE 2 DIABETES MELLITUS WITH DIABETIC NEUROPATHY, WITHOUT LONG-TERM CURRENT USE OF INSULIN (HCC): ICD-10-CM

## 2023-11-22 DIAGNOSIS — I65.22 CAROTID ARTERY CALCIFICATION, LEFT: ICD-10-CM

## 2023-11-22 DIAGNOSIS — E78.2 MIXED HYPERLIPIDEMIA: ICD-10-CM

## 2023-11-22 DIAGNOSIS — I25.10 CORONARY ARTERY DISEASE INVOLVING NATIVE CORONARY ARTERY OF NATIVE HEART WITHOUT ANGINA PECTORIS: ICD-10-CM

## 2023-11-22 PROCEDURE — G8484 FLU IMMUNIZE NO ADMIN: HCPCS | Performed by: NURSE PRACTITIONER

## 2023-11-22 PROCEDURE — 1124F ACP DISCUSS-NO DSCNMKR DOCD: CPT | Performed by: NURSE PRACTITIONER

## 2023-11-22 PROCEDURE — 1090F PRES/ABSN URINE INCON ASSESS: CPT | Performed by: NURSE PRACTITIONER

## 2023-11-22 PROCEDURE — 3044F HG A1C LEVEL LT 7.0%: CPT | Performed by: NURSE PRACTITIONER

## 2023-11-22 PROCEDURE — G8427 DOCREV CUR MEDS BY ELIG CLIN: HCPCS | Performed by: NURSE PRACTITIONER

## 2023-11-22 PROCEDURE — G8399 PT W/DXA RESULTS DOCUMENT: HCPCS | Performed by: NURSE PRACTITIONER

## 2023-11-22 PROCEDURE — 93000 ELECTROCARDIOGRAM COMPLETE: CPT | Performed by: NURSE PRACTITIONER

## 2023-11-22 PROCEDURE — 99214 OFFICE O/P EST MOD 30 MIN: CPT | Performed by: NURSE PRACTITIONER

## 2023-11-22 PROCEDURE — 3078F DIAST BP <80 MM HG: CPT | Performed by: NURSE PRACTITIONER

## 2023-11-22 PROCEDURE — G8417 CALC BMI ABV UP PARAM F/U: HCPCS | Performed by: NURSE PRACTITIONER

## 2023-11-22 PROCEDURE — 1036F TOBACCO NON-USER: CPT | Performed by: NURSE PRACTITIONER

## 2023-11-22 PROCEDURE — 3074F SYST BP LT 130 MM HG: CPT | Performed by: NURSE PRACTITIONER

## 2023-11-22 ASSESSMENT — ENCOUNTER SYMPTOMS
SHORTNESS OF BREATH: 0
ORTHOPNEA: 0

## 2023-11-22 NOTE — PROGRESS NOTES
11/22/2023  Primary cardiologist: Dr. Sylvester Book:   Alicia Landeros  is an established 79 y.o.  female here for a  follow up on chest pain and shortness of breath    Follow-up (1 month follow  up /Vertigo , pt thinks its due to BP medication /SOB with execution /Swelling in feet , no other cardiac symptoms per pt  )        SUBJECTIVE/OBJECTIVE:  Alicia Landeros is a 79 y.o. female with a history of coronary artery disease, carotid artery disease s/p left carotid endarterectomy, hypertension, hyperlipidemia and non-insulin-dependent diabetes . Guerita has remote history of PCI Reomte h/o PCI  (2005). She then underwent PCI of the circumflex and LAD in December 2022. HPI :   Alicia Landeros reports cardiac catheterization on September 15, 2023 that showed patent stents. She has been having increased episodes of chest pain. They occur randomly. She states that it has been mild to severe but is not consistent in all manors quality. Duration is short. She states that position change dose not change them nor dose antacids alleviate them. She is short of breath with the pain. Her blood pressure has been very high and her pain increases as she has higher blood pressures    Review of Systems   Constitutional: Negative for diaphoresis and malaise/fatigue. Cardiovascular:  Positive for chest pain. Negative for claudication, dyspnea on exertion, irregular heartbeat, leg swelling, near-syncope, orthopnea, palpitations and paroxysmal nocturnal dyspnea. Respiratory:  Negative for shortness of breath. Neurological:  Negative for dizziness and light-headedness.        Vitals:    11/22/23 1023 11/22/23 1056 11/22/23 1057 11/22/23 1058   BP: (!) 142/70 138/62 138/60 (!) 142/70   Site: Left Upper Arm Left Upper Arm Left Upper Arm Left Upper Arm   Position: Sitting Supine Sitting Standing   Cuff Size: Medium Adult Medium Adult Medium Adult Medium Adult   Pulse: 56 58 58 60   Resp: 16 16 16 16   SpO2: 97% 93% 94% 93%   Weight: 79.2 kg (174 lb

## 2023-11-22 NOTE — PATIENT INSTRUCTIONS
[facility] Locations - No appointment necessary. Sites open Monday to Friday. Call your preferred location for test preparation, business   hours and other information you need. [employer] [facility] [employer]. [address] [place] [address]. [address] [place], [address]  Phone: [phone]     **It is YOUR responsibilty to bring medication bottles and/or updated medication list to [facility]. This will allow us to better serve you and all your healthcare needs**  Please be informed that if you contact our office outside of normal business hours the physician on call cannot help with any scheduling or rescheduling issues, procedure instruction questions or any type of medication issue. We advise you for any urgent/emergency that you go to the nearest emergency room! PLEASE CALL OUR OFFICE DURING NORMAL BUSINESS HOURS    Monday - Friday   8 am to 5 pm    [place]: [address]: [phone]    [place]:  [phone]  Thank you for allowing us to care for you today! We want to ensure we can follow your treatment plan and we strive to give you the best outcomes and experience possible. If you ever have a life threatening emergency and call 911 - for an ambulance (EMS)   Our providers can only care for you at:   [place] or [facility]. Even if you have someone take you or you drive yourself we can only care for you in a [facility] facility. Our providers are not setup at the other healthcare locations! We are committed to providing you the best care possible. If you receive a survey after visiting one of our offices, please take time to share your experience concerning your physician office visit. These surveys are confidential and no health information about you is shared. We are eager to improve for you and we are counting on your feedback to help make that happen.

## 2023-11-26 DIAGNOSIS — I10 ESSENTIAL HYPERTENSION: ICD-10-CM

## 2023-11-27 RX ORDER — HYDROCHLOROTHIAZIDE 25 MG/1
25 TABLET ORAL DAILY
Qty: 90 TABLET | Refills: 1 | Status: SHIPPED | OUTPATIENT
Start: 2023-11-27

## 2023-11-30 LAB — DIABETIC RETINOPATHY: NEGATIVE

## 2023-12-07 ENCOUNTER — OFFICE VISIT (OUTPATIENT)
Dept: INFECTIOUS DISEASES | Age: 83
End: 2023-12-07

## 2023-12-07 VITALS
HEART RATE: 47 BPM | RESPIRATION RATE: 18 BRPM | BODY MASS INDEX: 33.94 KG/M2 | DIASTOLIC BLOOD PRESSURE: 58 MMHG | SYSTOLIC BLOOD PRESSURE: 153 MMHG | WEIGHT: 173.8 LBS

## 2023-12-07 DIAGNOSIS — M25.462 SWELLING OF JOINT, KNEE, LEFT: ICD-10-CM

## 2023-12-07 DIAGNOSIS — R22.0 SWELLING OF LEFT SIDE OF FACE: Primary | ICD-10-CM

## 2023-12-07 NOTE — PROGRESS NOTES
12/9/2023         Referring Physician: Nickolas Pelayo MD  Primary Care Physician: Nickolas Pelayo MD    Impression/Plan:   Diagnosis Orders   1. Swelling of left side of face  Sedimentation Rate    ASHLEY    IgG 1, 2, 3, and 4    IGG, IGA, IGM    C-Reactive Protein    Quantiferon TB Gold    1,3 Beta-D-Glucan    Angiotensin Converting Enzyme      2. Swelling of joint, knee, left            Discussion:  Swelling of left side of face   painful swelling anterior to left tragus. Had an MRI of the face that did not show any swelling. Evaluate for fungal, AFB, autoimmune disease, sarcoidosis. Swelling of joint, knee, left  Not consistent with osteomyelitis. Follow up with orthopedic surgeon. Return in about 4 weeks (around 1/4/2024). 64 minutes was spent in the encounter; more than 50% of the face-to-face time was spent with the patient providing counseling and coordination of care. History: Danie Hernandez is a 79 y.o.  female presenting today for evaluation of left jaw pain and swelling. Left face swelling in 2/2022. It was painful, and if progressively getting. It hurt in her ear. Tender to the touch. Ice packs and heating pad. Has dry eyes and dry mouth. Affecting her balance. Denies fever, chills or night sweats. Born in [place], lived there for 18 years. Has lived in [place]. 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 Born and raised on a farm. Cows, chickens, pigs and ducks. Here in [place] she visits her daughter who has geese, wild turtles and stray cats. Reports weight gain. She had left knee replacement on 7/28/2015. She has a medical history of left periprosthetic (left knee). Left knee TKA. Has had swelling on the left knee tibia area. Her whole-body bone scan was 7/20/2023, was negative for osteomyelitis. Review of Systems   All other systems reviewed and are negative.       Allergies   Allergen

## 2023-12-09 PROBLEM — M25.462 SWELLING OF JOINT, KNEE, LEFT: Status: ACTIVE | Noted: 2023-12-09

## 2023-12-09 PROBLEM — R22.0 SWELLING OF LEFT SIDE OF FACE: Status: ACTIVE | Noted: 2023-12-09

## 2023-12-14 ENCOUNTER — HOSPITAL ENCOUNTER (OUTPATIENT)
Age: 83
Discharge: HOME | End: 2023-12-14
Payer: MEDICARE

## 2023-12-14 DIAGNOSIS — R22.0 SWELLING OF LEFT SIDE OF FACE: ICD-10-CM

## 2023-12-14 LAB
CRP SERPL HS-MCNC: 3 MG/L
ERYTHROCYTE SEDIMENTATION RATE: 23 MM/HR (ref 0–30)
IGA: 374 MG/DL (ref 69–382)
IGG,SERUM: 1164 MG/DL (ref 723–1685)
IGM,SERUM: 63 MG/DL (ref 62–277)

## 2023-12-14 PROCEDURE — 86140 C-REACTIVE PROTEIN: CPT

## 2023-12-14 PROCEDURE — 86480 TB TEST CELL IMMUN MEASURE: CPT

## 2023-12-14 PROCEDURE — 82784 ASSAY IGA/IGD/IGG/IGM EACH: CPT

## 2023-12-14 PROCEDURE — 82164 ANGIOTENSIN I ENZYME TEST: CPT

## 2023-12-14 PROCEDURE — 85652 RBC SED RATE AUTOMATED: CPT

## 2023-12-14 PROCEDURE — 86038 ANTINUCLEAR ANTIBODIES: CPT

## 2023-12-14 PROCEDURE — 87449 NOS EACH ORGANISM AG IA: CPT

## 2023-12-14 PROCEDURE — 36415 COLL VENOUS BLD VENIPUNCTURE: CPT

## 2023-12-16 LAB
1,3 BETA-D-GLUCAN INTERP: POSITIVE
1,3 BETA-D-GLUCAN: 91 PG/ML

## 2023-12-17 LAB
ACE SERPL-CCNC: 36 U/L (ref 16–85)
QUANTI TB1 MINUS NIL: 0.03 IU/ML (ref 0–0.34)
QUANTI TB2 MINUS NIL: 0.22 IU/ML (ref 0–0.34)
QUANTIFERON (R) TB GOLD (INCUBATED): NEGATIVE IU/ML
QUANTIFERON MITOGEN MINUS NIL: >10 IU/ML
QUANTIFERON NIL: 0.17 IU/ML

## 2024-01-02 ENCOUNTER — OFFICE VISIT (OUTPATIENT)
Dept: CARDIOLOGY CLINIC | Age: 84
End: 2024-01-02
Payer: MEDICARE

## 2024-01-02 VITALS
DIASTOLIC BLOOD PRESSURE: 60 MMHG | OXYGEN SATURATION: 95 % | WEIGHT: 172 LBS | SYSTOLIC BLOOD PRESSURE: 128 MMHG | HEART RATE: 50 BPM | BODY MASS INDEX: 33.77 KG/M2 | HEIGHT: 60 IN

## 2024-01-02 DIAGNOSIS — R06.02 SOBOE (SHORTNESS OF BREATH ON EXERTION): ICD-10-CM

## 2024-01-02 DIAGNOSIS — M25.462 SWELLING OF JOINT, KNEE, LEFT: ICD-10-CM

## 2024-01-02 DIAGNOSIS — R00.2 PALPITATIONS: Primary | ICD-10-CM

## 2024-01-02 PROCEDURE — G8399 PT W/DXA RESULTS DOCUMENT: HCPCS | Performed by: NURSE PRACTITIONER

## 2024-01-02 PROCEDURE — 1090F PRES/ABSN URINE INCON ASSESS: CPT | Performed by: NURSE PRACTITIONER

## 2024-01-02 PROCEDURE — 3074F SYST BP LT 130 MM HG: CPT | Performed by: NURSE PRACTITIONER

## 2024-01-02 PROCEDURE — G8484 FLU IMMUNIZE NO ADMIN: HCPCS | Performed by: NURSE PRACTITIONER

## 2024-01-02 PROCEDURE — 3078F DIAST BP <80 MM HG: CPT | Performed by: NURSE PRACTITIONER

## 2024-01-02 PROCEDURE — 1036F TOBACCO NON-USER: CPT | Performed by: NURSE PRACTITIONER

## 2024-01-02 PROCEDURE — 1124F ACP DISCUSS-NO DSCNMKR DOCD: CPT | Performed by: NURSE PRACTITIONER

## 2024-01-02 PROCEDURE — G8427 DOCREV CUR MEDS BY ELIG CLIN: HCPCS | Performed by: NURSE PRACTITIONER

## 2024-01-02 PROCEDURE — G8417 CALC BMI ABV UP PARAM F/U: HCPCS | Performed by: NURSE PRACTITIONER

## 2024-01-02 PROCEDURE — 99214 OFFICE O/P EST MOD 30 MIN: CPT | Performed by: NURSE PRACTITIONER

## 2024-01-02 ASSESSMENT — ENCOUNTER SYMPTOMS
ORTHOPNEA: 0
SHORTNESS OF BREATH: 0

## 2024-01-02 NOTE — PATIENT INSTRUCTIONS
Please be informed that if you contact our office outside of normal business hours the physician on call cannot help with any scheduling or rescheduling issues, procedure instruction questions or any type of medication issue.    We advise you for any urgent/emergency that you go to the nearest emergency room!    PLEASE CALL OUR OFFICE DURING NORMAL BUSINESS HOURS    Monday - Friday   8 am to 5 pm    [place]: [phone]    [place]: [phone]    [place]:  [phone]    **It is YOUR responsibilty to bring medication bottles and/or updated medication list to EACH APPOINTMENT. This will allow us to better serve you and all your healthcare needs**    Thank you for allowing us to care for you today!   We want to ensure we can follow your treatment plan and we strive to give you the best outcomes and experience possible.   If you ever have a life threatening emergency and call 911 - for an ambulance (EMS)   Our providers can only care for you at:   [facility] or [facility].   Even if you have someone take you or you drive yourself we can only care for you in a [facility] facility. Our providers are not setup at the other healthcare locations!

## 2024-01-04 ENCOUNTER — OFFICE VISIT (OUTPATIENT)
Dept: INFECTIOUS DISEASES | Age: 84
End: 2024-01-04
Payer: MEDICARE

## 2024-01-04 VITALS
HEART RATE: 53 BPM | DIASTOLIC BLOOD PRESSURE: 60 MMHG | WEIGHT: 172.2 LBS | SYSTOLIC BLOOD PRESSURE: 148 MMHG | BODY MASS INDEX: 33.63 KG/M2 | RESPIRATION RATE: 18 BRPM

## 2024-01-04 DIAGNOSIS — R89.9 ABNORMAL LABORATORY TEST: Primary | ICD-10-CM

## 2024-01-04 DIAGNOSIS — B49 FUNGAL INFECTION: ICD-10-CM

## 2024-01-04 DIAGNOSIS — R76.8 ANA POSITIVE: ICD-10-CM

## 2024-01-04 PROCEDURE — 3078F DIAST BP <80 MM HG: CPT | Performed by: INTERNAL MEDICINE

## 2024-01-04 PROCEDURE — 3077F SYST BP >= 140 MM HG: CPT | Performed by: INTERNAL MEDICINE

## 2024-01-04 PROCEDURE — 1090F PRES/ABSN URINE INCON ASSESS: CPT | Performed by: INTERNAL MEDICINE

## 2024-01-04 PROCEDURE — G8399 PT W/DXA RESULTS DOCUMENT: HCPCS | Performed by: INTERNAL MEDICINE

## 2024-01-04 PROCEDURE — 99214 OFFICE O/P EST MOD 30 MIN: CPT | Performed by: INTERNAL MEDICINE

## 2024-01-04 PROCEDURE — G8417 CALC BMI ABV UP PARAM F/U: HCPCS | Performed by: INTERNAL MEDICINE

## 2024-01-04 PROCEDURE — 1124F ACP DISCUSS-NO DSCNMKR DOCD: CPT | Performed by: INTERNAL MEDICINE

## 2024-01-04 PROCEDURE — G8427 DOCREV CUR MEDS BY ELIG CLIN: HCPCS | Performed by: INTERNAL MEDICINE

## 2024-01-04 PROCEDURE — 1036F TOBACCO NON-USER: CPT | Performed by: INTERNAL MEDICINE

## 2024-01-04 PROCEDURE — G8484 FLU IMMUNIZE NO ADMIN: HCPCS | Performed by: INTERNAL MEDICINE

## 2024-01-04 NOTE — ASSESSMENT & PLAN NOTE
She reports that she was diagnosed with RA more than 20 years ago.  She stopped seeing Dr. Cuadra 15-20 years ago for personal reasons.

## 2024-01-04 NOTE — PROGRESS NOTES
1/4/2024         Referring Physician: No ref. provider found  Primary Care Physician: Joaquim Gonzalez MD    Impression/Plan:   Diagnosis Orders   1. Abnormal laboratory test        2. ASHLEY positive  James Smith MD, Rheumatology, [place]      3. Fungal infection  Histoplasma Antibodies    Histoplasma Antibodies by CF    Histoplasma antigen, serum    Histoplasma Antigen, Urine    Blastomyces Demattidis Antigen EIA    Blastomyces Demattidis Antigen EIA    Blastomyces Antibodies    FUNGAL ANTIBODIES BY ID          Discussion:  ASHLEY positive  She reports that she was diagnosed with RA more than 20 years ago.  She stopped seeing Dr. Cuadra 15-20 years ago for personal reasons.     Abnormal laboratory test  Labs from 12/14/2023 were reviewed.  ESR: 23.  ASHLEY was detected.  ASHLEY pattern was homogeneous, titer 1: 640.  IgG, IgA, and IgM were within normal limits.  IgM was low normal.  CRP was 3.  Fungitell was positive.  ACE enzyme was 36 (within normal).  QuantiFERON TB test was negative.     Fungal infection  Suspected fungal infection as beta-1 3D glucan was positive.  Evaluate for histoplasma and Blastomyces.       Return in about 3 weeks (around 1/25/2024).    31 minutes was spent in the encounter; more than 50% of the face-to-face time was spent with the patient providing counseling and coordination of care.      History: Vita Clarke is a 83 y.o.  female presenting today for evaluation of left jaw pain and swelling.   Left face swelling in 2/2022. It was painful, and if progressively getting.  It hurt in her ear. Tender to the touch.Ice packs and heating pad.Has dry eyes and dry mouth. Affecting her balance.  Denies fever, chills or night sweats. Born in [place], lived there for 18 years. Has lived in [place]. 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 Born and raised on a farm. Cows, chickens, pigs and ducks. Here in

## 2024-01-04 NOTE — ASSESSMENT & PLAN NOTE
Labs from 12/14/2023 were reviewed.  ESR: 23.  ASHLEY was detected.  ASHLEY pattern was homogeneous, titer 1: 640.  IgG, IgA, and IgM were within normal limits.  IgM was low normal.  CRP was 3.  Fungitell was positive.  ACE enzyme was 36 (within normal).  QuantiFERON TB test was negative.

## 2024-01-05 ENCOUNTER — OFFICE VISIT (OUTPATIENT)
Dept: INTERNAL MEDICINE CLINIC | Age: 84
End: 2024-01-05
Payer: MEDICARE

## 2024-01-05 ENCOUNTER — HOSPITAL ENCOUNTER (OUTPATIENT)
Age: 84
Discharge: HOME | End: 2024-01-05
Payer: MEDICARE

## 2024-01-05 VITALS
HEART RATE: 68 BPM | WEIGHT: 172 LBS | HEIGHT: 60 IN | BODY MASS INDEX: 33.77 KG/M2 | OXYGEN SATURATION: 96 % | SYSTOLIC BLOOD PRESSURE: 143 MMHG | RESPIRATION RATE: 18 BRPM | DIASTOLIC BLOOD PRESSURE: 78 MMHG

## 2024-01-05 DIAGNOSIS — M05.79 RHEUMATOID ARTHRITIS INVOLVING MULTIPLE SITES WITH POSITIVE RHEUMATOID FACTOR (HCC): ICD-10-CM

## 2024-01-05 DIAGNOSIS — E11.40 TYPE 2 DIABETES MELLITUS WITH DIABETIC NEUROPATHY, WITHOUT LONG-TERM CURRENT USE OF INSULIN (HCC): Primary | ICD-10-CM

## 2024-01-05 DIAGNOSIS — R76.8 ANA POSITIVE: ICD-10-CM

## 2024-01-05 DIAGNOSIS — R00.2 PALPITATIONS: ICD-10-CM

## 2024-01-05 DIAGNOSIS — B49 FUNGAL INFECTION: ICD-10-CM

## 2024-01-05 DIAGNOSIS — I25.10 CORONARY ARTERY DISEASE INVOLVING NATIVE CORONARY ARTERY OF NATIVE HEART WITHOUT ANGINA PECTORIS: ICD-10-CM

## 2024-01-05 LAB — MAGNESIUM: 1.8 MG/DL (ref 1.8–2.4)

## 2024-01-05 PROCEDURE — G8417 CALC BMI ABV UP PARAM F/U: HCPCS | Performed by: INTERNAL MEDICINE

## 2024-01-05 PROCEDURE — 1124F ACP DISCUSS-NO DSCNMKR DOCD: CPT | Performed by: INTERNAL MEDICINE

## 2024-01-05 PROCEDURE — 3078F DIAST BP <80 MM HG: CPT | Performed by: INTERNAL MEDICINE

## 2024-01-05 PROCEDURE — 1090F PRES/ABSN URINE INCON ASSESS: CPT | Performed by: INTERNAL MEDICINE

## 2024-01-05 PROCEDURE — 86606 ASPERGILLUS ANTIBODY: CPT

## 2024-01-05 PROCEDURE — 87449 NOS EACH ORGANISM AG IA: CPT

## 2024-01-05 PROCEDURE — G8427 DOCREV CUR MEDS BY ELIG CLIN: HCPCS | Performed by: INTERNAL MEDICINE

## 2024-01-05 PROCEDURE — 99214 OFFICE O/P EST MOD 30 MIN: CPT | Performed by: INTERNAL MEDICINE

## 2024-01-05 PROCEDURE — 3077F SYST BP >= 140 MM HG: CPT | Performed by: INTERNAL MEDICINE

## 2024-01-05 PROCEDURE — 36415 COLL VENOUS BLD VENIPUNCTURE: CPT

## 2024-01-05 PROCEDURE — 83735 ASSAY OF MAGNESIUM: CPT

## 2024-01-05 PROCEDURE — 1036F TOBACCO NON-USER: CPT | Performed by: INTERNAL MEDICINE

## 2024-01-05 PROCEDURE — 86635 COCCIDIOIDES ANTIBODY: CPT

## 2024-01-05 PROCEDURE — 86612 BLASTOMYCES ANTIBODY: CPT

## 2024-01-05 PROCEDURE — 86698 HISTOPLASMA ANTIBODY: CPT

## 2024-01-05 PROCEDURE — 87385 HISTOPLASMA CAPSUL AG IA: CPT

## 2024-01-05 PROCEDURE — G8399 PT W/DXA RESULTS DOCUMENT: HCPCS | Performed by: INTERNAL MEDICINE

## 2024-01-05 PROCEDURE — G8484 FLU IMMUNIZE NO ADMIN: HCPCS | Performed by: INTERNAL MEDICINE

## 2024-01-05 ASSESSMENT — PATIENT HEALTH QUESTIONNAIRE - PHQ9
2. FEELING DOWN, DEPRESSED OR HOPELESS: 0
1. LITTLE INTEREST OR PLEASURE IN DOING THINGS: 0
SUM OF ALL RESPONSES TO PHQ QUESTIONS 1-9: 0
SUM OF ALL RESPONSES TO PHQ9 QUESTIONS 1 & 2: 0

## 2024-01-05 NOTE — ASSESSMENT & PLAN NOTE
Suspected fungal infection as beta-1 3D glucan was positive.  Evaluate for histoplasma and Blastomyces.

## 2024-01-05 NOTE — PROGRESS NOTES
Vita Clarke  1940 01/05/24    SUBJECTIVE:  CAD- sporadic chest heaviness, does  cont f/u w cardiology and last cath w patent stents Sept last yr.    Hypertension: Stable. Denies CP, SOB, cough, visual changes, dizziness, palpitations or HA.      DM- sugars stable on meds w recent a1cs below.   Lab Results   Component Value Date    LABA1C 6.5 11/07/2023    LABA1C 6.6 08/04/2023    LABA1C 6.1 05/08/2023     Lab Results   Component Value Date    GLUF 128 (H) 10/14/2016    MALBCR 78.7 (H) 11/07/2023    LDLCALC 60 11/07/2023    CREATININE 1.1 11/07/2023     Pos ASHLEY, has been referred to Rheum Dr Hoang, to be scheduled.  Also for RF        OBJECTIVE:    BP (!) 143/78   Pulse 68   Resp 18   Ht 1.524 m (5')   Wt 78 kg (172 lb)   LMP  (LMP Unknown)   SpO2 96%   BMI 33.59 kg/m²     Physical Exam  Vitals reviewed.   Constitutional:       General: She is not in acute distress.     Appearance: She is well-developed.   HENT:      Head: Normocephalic and atraumatic.      Nose: Nose normal. No congestion.      Mouth/Throat:      Mouth: Mucous membranes are moist.      Pharynx: Oropharynx is clear. No oropharyngeal exudate or posterior oropharyngeal erythema.   Eyes:      General: No scleral icterus.        Right eye: No discharge.         Left eye: No discharge.      Conjunctiva/sclera: Conjunctivae normal.      Pupils: Pupils are equal, round, and reactive to light.   Neck:      Thyroid: No thyromegaly.      Vascular: No JVD.      Trachea: No tracheal deviation.   Cardiovascular:      Rate and Rhythm: Normal rate and regular rhythm.      Heart sounds: Normal heart sounds. No murmur heard.     No friction rub. No gallop.   Pulmonary:      Effort: Pulmonary effort is normal. No respiratory distress.      Breath sounds: Normal breath sounds. No wheezing or rales.   Abdominal:      General: Bowel sounds are normal. There is no distension.      Palpations: Abdomen is soft. There is no mass.      Tenderness: There is

## 2024-01-07 LAB
H CAPSUL AG UR QL IA: NOT DETECTED
H CAPSUL AG UR-MCNC: NOT DETECTED NG/ML

## 2024-01-08 ENCOUNTER — TELEPHONE (OUTPATIENT)
Dept: CARDIOLOGY CLINIC | Age: 84
End: 2024-01-08

## 2024-01-08 NOTE — TELEPHONE ENCOUNTER
----- Message from JAYME Umanzor CNP sent at 1/5/2024  4:16 PM EST -----  Mag normal continue current plan  ----- Message -----  From: [facility] Incoming Lab Results From [facility] (Epic Adt)  Sent: 1/5/2024   3:36 PM EST  To: JAYME Umanzor CNP

## 2024-01-09 LAB
ASPERGILLUS AB SER QL ID: NOT DETECTED
B DERMAT AB SER QL ID: NOT DETECTED
COCCIDIOIDES AB SPEC QL ID: NOT DETECTED
H CAPSUL AB TITR SER ID: NOT DETECTED {TITER}
H CAPSUL MYC AB TITR SER CF: NORMAL {TITER}
H CAPSUL YST AB TITR SER CF: NORMAL {TITER}

## 2024-01-11 LAB
HISTOPLASMA ANTIGEN, SERUM: NORMAL
INTERPRETATION: NORMAL
REASON FOR REJECTION: NORMAL
REJECTED TEST: NORMAL

## 2024-01-16 ENCOUNTER — HOSPITAL ENCOUNTER (OUTPATIENT)
Age: 84
Setting detail: SPECIMEN
Discharge: HOME | End: 2024-01-16
Payer: MEDICARE

## 2024-01-16 PROCEDURE — 87449 NOS EACH ORGANISM AG IA: CPT

## 2024-01-23 LAB — B DERMAT AG SER QL IA: NORMAL NG/ML

## 2024-01-25 ENCOUNTER — OFFICE VISIT (OUTPATIENT)
Dept: INFECTIOUS DISEASES | Age: 84
End: 2024-01-25
Payer: MEDICARE

## 2024-01-25 VITALS
WEIGHT: 174.8 LBS | SYSTOLIC BLOOD PRESSURE: 181 MMHG | DIASTOLIC BLOOD PRESSURE: 69 MMHG | RESPIRATION RATE: 18 BRPM | BODY MASS INDEX: 34.14 KG/M2 | HEART RATE: 55 BPM

## 2024-01-25 DIAGNOSIS — B49 FUNGAL INFECTION: Primary | ICD-10-CM

## 2024-01-25 DIAGNOSIS — R22.0 SWELLING OF LEFT SIDE OF FACE: ICD-10-CM

## 2024-01-25 PROCEDURE — 1036F TOBACCO NON-USER: CPT | Performed by: INTERNAL MEDICINE

## 2024-01-25 PROCEDURE — 99213 OFFICE O/P EST LOW 20 MIN: CPT | Performed by: INTERNAL MEDICINE

## 2024-01-25 PROCEDURE — G8427 DOCREV CUR MEDS BY ELIG CLIN: HCPCS | Performed by: INTERNAL MEDICINE

## 2024-01-25 PROCEDURE — 3077F SYST BP >= 140 MM HG: CPT | Performed by: INTERNAL MEDICINE

## 2024-01-25 PROCEDURE — 1090F PRES/ABSN URINE INCON ASSESS: CPT | Performed by: INTERNAL MEDICINE

## 2024-01-25 PROCEDURE — 1124F ACP DISCUSS-NO DSCNMKR DOCD: CPT | Performed by: INTERNAL MEDICINE

## 2024-01-25 PROCEDURE — 3078F DIAST BP <80 MM HG: CPT | Performed by: INTERNAL MEDICINE

## 2024-01-25 PROCEDURE — G8484 FLU IMMUNIZE NO ADMIN: HCPCS | Performed by: INTERNAL MEDICINE

## 2024-01-25 PROCEDURE — G8399 PT W/DXA RESULTS DOCUMENT: HCPCS | Performed by: INTERNAL MEDICINE

## 2024-01-25 PROCEDURE — G8417 CALC BMI ABV UP PARAM F/U: HCPCS | Performed by: INTERNAL MEDICINE

## 2024-01-25 RX ORDER — PREDNISONE 20 MG/1
20 TABLET ORAL 2 TIMES DAILY
Qty: 10 TABLET | Refills: 0 | Status: SHIPPED | OUTPATIENT
Start: 2024-01-25 | End: 2024-01-30

## 2024-01-25 NOTE — PROGRESS NOTES
Carotid stenosis     Carotid stenosis, left     monitored by Dr Matson- 50% stenosis noted on CTA 10/2016    Coronary arteriosclerosis after percutaneous transluminal coronary angioplasty (PTCA) 12/07/2022    PCI procedure:DE Stent, LAD: DE Stent Plcmt Initl Vsl, LAD: DE Stent Plcmt Addtl Vsl, CIRC: DE Stent Plcmt Initl Vsl, Balloon Angioplasty, LAD: Balloon Angioplasty Initl Vsl, LAD: Balloon Angioplasty Addtl Vsl, CIRC: Balloon Angioplasty Initl Vsl.    Coronary artery disease     Dr Matson    Cough secondary to angiotensin converting enzyme inhibitor (ACE-I)     inactive    COVID-19 virus infection     tested pos in Nov 2020- mild cough.  now resolved.    CTS (carpal tunnel syndrome)     inactive-S/P right CTS surg 4/2001- Dr Parmar    DDD (degenerative disc disease), cervical     DDD (degenerative disc disease), cervical     DDD (degenerative disc disease), lumbar     Degenerative disc disease, cervical     Diabetes mellitus with neuropathy (HCC)     Dr Alvarado/sheyla, - ON NEURONTIN    Diffuse cystic mastopathy     GERD without esophagitis     H/O cardiac catheterization 05/2005 5/16/05 Circ stent 80% prior 0% post, EF 60%    H/O cardiovascular stress test 12/29/2020    ef 60% normal lexiscan    H/O Doppler ultrasound 10/2011, 8/09    carotid 10/4/11 moderat stenosis left internal carotid atery est 50-69%, progression in stenotic changes left internal carotid artery, right WNL    H/O echocardiogram 12/29/2020    EF 55-60% mild TR AR and pulmonic regurg    H/O left heart catheterization 11/30/2022    LM patent.  CX 90% distal to stent, LAD sml caliber vessel, 70%proximal stenosis, 90% distal stenosi, RCA 70% stenosis.  Recommend PCI vs CABG    H/O left heart catheterization by ventricular puncture 09/15/2023    LAD PATENT STENT, MILD UNCHANGED DISEASE AT BIFURCATION, SMALL DIAG JAILED.  CX PATENT STENTS MILD DISESAE. RCA 40% MID STENOSIS , HEAVILY CALCIFED UNCHANGED.    H/O mammogram     1/16- recheck 1/17    H/O

## 2024-02-01 ENCOUNTER — OFFICE VISIT (OUTPATIENT)
Dept: INFECTIOUS DISEASES | Age: 84
End: 2024-02-01
Payer: MEDICARE

## 2024-02-01 VITALS
DIASTOLIC BLOOD PRESSURE: 50 MMHG | SYSTOLIC BLOOD PRESSURE: 150 MMHG | RESPIRATION RATE: 18 BRPM | HEART RATE: 49 BPM | WEIGHT: 168.6 LBS | BODY MASS INDEX: 32.93 KG/M2

## 2024-02-01 DIAGNOSIS — R22.0 SWELLING OF LEFT SIDE OF FACE: Primary | ICD-10-CM

## 2024-02-01 PROCEDURE — 3077F SYST BP >= 140 MM HG: CPT | Performed by: INTERNAL MEDICINE

## 2024-02-01 PROCEDURE — 1124F ACP DISCUSS-NO DSCNMKR DOCD: CPT | Performed by: INTERNAL MEDICINE

## 2024-02-01 PROCEDURE — G8427 DOCREV CUR MEDS BY ELIG CLIN: HCPCS | Performed by: INTERNAL MEDICINE

## 2024-02-01 PROCEDURE — 3078F DIAST BP <80 MM HG: CPT | Performed by: INTERNAL MEDICINE

## 2024-02-01 PROCEDURE — 1036F TOBACCO NON-USER: CPT | Performed by: INTERNAL MEDICINE

## 2024-02-01 PROCEDURE — 99214 OFFICE O/P EST MOD 30 MIN: CPT | Performed by: INTERNAL MEDICINE

## 2024-02-01 PROCEDURE — G8417 CALC BMI ABV UP PARAM F/U: HCPCS | Performed by: INTERNAL MEDICINE

## 2024-02-01 PROCEDURE — G8484 FLU IMMUNIZE NO ADMIN: HCPCS | Performed by: INTERNAL MEDICINE

## 2024-02-01 PROCEDURE — 1090F PRES/ABSN URINE INCON ASSESS: CPT | Performed by: INTERNAL MEDICINE

## 2024-02-01 PROCEDURE — G8399 PT W/DXA RESULTS DOCUMENT: HCPCS | Performed by: INTERNAL MEDICINE

## 2024-02-01 NOTE — PROGRESS NOTES
2/1/2024         Referring Physician: No ref. provider found  Primary Care Physician: Joaquim Gonzalez MD    Impression/Plan:   Diagnosis Orders   1. Swelling of left side of face  MRI BRAIN W WO CONTRAST    Urinalysis with Microscopic    Electrophoresis Protein, Serum    PROTEIN ELECTROPHORESIS, URINE    Basic Metabolic Panel    C-Reactive Protein    CBC    Basic Metabolic Panel    Hepatic Function Panel    Sedimentation Rate          Discussion:  Swelling of left side of face  Prednisone brought no relief. Her left side of the face continues to hurt. Concerned with space-occupying lesion, ?amyloidosis. Obtain MRI, SPEP, UPEP, CBC, BMP, ESR, CRP.        Return in about 3 weeks (around 2/22/2024).    31 minutes was spent in the encounter; more than 50% of the face-to-face time was spent with the patient providing counseling and coordination of care.      History: Vita Clarke is a 83 y.o.  female presenting today for evaluation of left jaw pain and swelling.   Left face swelling in 2/2022. It was painful, and if progressively getting.  It hurt in her ear. Tender to the touch.Ice packs and heating pad.Has dry eyes and dry mouth. Affecting her balance.  Denies fever, chills or night sweats. Born in [place], lived there for 18 years. Has lived in [place]. 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 Born and raised on a farm. Cows, chickens, pigs and ducks. Here in [place] she visits her daughter who has geese, wild turtles and stray cats.  Reports weight gain.  She had left knee replacement on 7/28/2015.  She has a medical history of left periprosthetic (left knee). Left knee TKA. Has had swelling on the left knee tibia area. Her whole-body bone scan was 7/20/2023, was negative for osteomyelitis.   1/4/2024: Last visit she complained of left jaw pain and ear pain.  Labs were ordered. She was previously seen by Dr. Cuadra but quit seeing

## 2024-02-02 ENCOUNTER — HOSPITAL ENCOUNTER (OUTPATIENT)
Age: 84
Discharge: HOME | End: 2024-02-02
Payer: MEDICARE

## 2024-02-02 DIAGNOSIS — R22.0 SWELLING OF LEFT SIDE OF FACE: ICD-10-CM

## 2024-02-02 LAB
ALBUMIN SERPL-MCNC: 4.2 GM/DL (ref 3.4–5)
ALP BLD-CCNC: 106 IU/L (ref 40–129)
ALT SERPL-CCNC: 21 U/L (ref 10–40)
ANION GAP SERPL CALCULATED.3IONS-SCNC: 9 MMOL/L (ref 7–16)
AST SERPL-CCNC: 24 IU/L (ref 15–37)
BACTERIA: NEGATIVE /HPF
BILIRUB SERPL-MCNC: 0.8 MG/DL (ref 0–1)
BILIRUBIN DIRECT: 0.2 MG/DL (ref 0–0.3)
BILIRUBIN URINE: NEGATIVE MG/DL
BILIRUBIN, INDIRECT: 0.6 MG/DL (ref 0–0.7)
BLOOD, URINE: ABNORMAL
BUN SERPL-MCNC: 33 MG/DL (ref 6–23)
CALCIUM SERPL-MCNC: 8.9 MG/DL (ref 8.3–10.6)
CHLORIDE BLD-SCNC: 95 MMOL/L (ref 99–110)
CLARITY: CLEAR
CO2: 28 MMOL/L (ref 21–32)
COLOR: YELLOW
CREAT SERPL-MCNC: 1.2 MG/DL (ref 0.6–1.1)
CRP SERPL HS-MCNC: 3 MG/L
ERYTHROCYTE SEDIMENTATION RATE: 4 MM/HR (ref 0–30)
GFR SERPL CREATININE-BSD FRML MDRD: 45 ML/MIN/1.73M2
GLUCOSE SERPL-MCNC: 105 MG/DL (ref 70–99)
GLUCOSE, URINE: NEGATIVE MG/DL
HCT VFR BLD CALC: 38.7 % (ref 37–47)
HEMOGLOBIN: 12.7 GM/DL (ref 12.5–16)
HYALINE CASTS: 0 /LPF
KETONES, URINE: NEGATIVE MG/DL
LEUKOCYTE ESTERASE, URINE: ABNORMAL
MCH RBC QN AUTO: 30.3 PG (ref 27–31)
MCHC RBC AUTO-ENTMCNC: 32.8 % (ref 32–36)
MCV RBC AUTO: 92.4 FL (ref 78–100)
NITRITE URINE, QUANTITATIVE: NEGATIVE
PDW BLD-RTO: 12.6 % (ref 11.7–14.9)
PH, URINE: 6 (ref 5–8)
PLATELET # BLD: 229 K/CU MM (ref 140–440)
PMV BLD AUTO: 9.5 FL (ref 7.5–11.1)
POTASSIUM SERPL-SCNC: 4.3 MMOL/L (ref 3.5–5.1)
PROTEIN UA: NEGATIVE MG/DL
RBC # BLD: 4.19 M/CU MM (ref 4.2–5.4)
RBC URINE: 1 /HPF (ref 0–6)
SODIUM BLD-SCNC: 132 MMOL/L (ref 135–145)
SPECIFIC GRAVITY UA: <1.005 (ref 1–1.03)
SQUAMOUS EPITHELIAL: 1 /HPF
TOTAL PROTEIN: 6.7 GM/DL (ref 6.4–8.2)
TOTAL PROTEIN: 6.7 GM/DL (ref 6.4–8.2)
URINE TOTAL PROTEIN: 6.6 MG/DL
UROBILINOGEN, URINE: 0.2 MG/DL (ref 0.2–1)
WBC # BLD: 11.5 K/CU MM (ref 4–10.5)
WBC UA: 1 /HPF (ref 0–5)

## 2024-02-02 PROCEDURE — 85652 RBC SED RATE AUTOMATED: CPT

## 2024-02-02 PROCEDURE — 84166 PROTEIN E-PHORESIS/URINE/CSF: CPT

## 2024-02-02 PROCEDURE — 84155 ASSAY OF PROTEIN SERUM: CPT

## 2024-02-02 PROCEDURE — 86140 C-REACTIVE PROTEIN: CPT

## 2024-02-02 PROCEDURE — 81001 URINALYSIS AUTO W/SCOPE: CPT

## 2024-02-02 PROCEDURE — 84165 PROTEIN E-PHORESIS SERUM: CPT

## 2024-02-02 PROCEDURE — 82248 BILIRUBIN DIRECT: CPT

## 2024-02-02 PROCEDURE — 84156 ASSAY OF PROTEIN URINE: CPT

## 2024-02-02 PROCEDURE — 85027 COMPLETE CBC AUTOMATED: CPT

## 2024-02-02 PROCEDURE — 36415 COLL VENOUS BLD VENIPUNCTURE: CPT

## 2024-02-02 PROCEDURE — 80053 COMPREHEN METABOLIC PANEL: CPT

## 2024-02-06 ENCOUNTER — TELEPHONE (OUTPATIENT)
Dept: CARDIOLOGY CLINIC | Age: 84
End: 2024-02-06

## 2024-02-06 LAB
ALBUMIN SERPL ELPH-MCNC: 3.6 GM/DL (ref 3.2–5.6)
ALBUMIN, U: 100 %
ALPHA-1-GLOBULIN, U: 0 %
ALPHA-1-GLOBULIN: 0.2 GM/DL (ref 0.1–0.4)
ALPHA-2-GLOBULIN, U: 0 %
ALPHA-2-GLOBULIN: 0.9 GM/DL (ref 0.4–1.2)
BETA GLOBULIN, U: 0 %
BETA GLOBULIN: 0.9 GM/DL (ref 0.5–1.3)
GAMMA GLOBULIN, U: 0 %
GAMMA GLOBULIN: 1 GM/DL (ref 0.5–1.6)
SPEP INTERPRETATION: NORMAL
SPEP INTERPRETATION: NORMAL
TOTAL PROTEIN: 6.7 GM/DL (ref 6.4–8.2)
URINE TOTAL PROTEIN: 6.6 MG/DL

## 2024-02-06 NOTE — TELEPHONE ENCOUNTER
----- Message from JAYME Umanzor CNP sent at 2/5/2024 10:56 AM EST -----  Her monitor does show that she is in atrial fibrillation at times.   Please schedule her with Huyen to discuss if medication adjustment is needed  ----- Message -----  From: Agustin Collazo MD  Sent: 2/5/2024  10:27 AM EST  To: JAYME Umanzor CNP

## 2024-02-12 ENCOUNTER — OFFICE VISIT (OUTPATIENT)
Dept: CARDIOLOGY CLINIC | Age: 84
End: 2024-02-12
Payer: MEDICARE

## 2024-02-12 VITALS
HEART RATE: 56 BPM | DIASTOLIC BLOOD PRESSURE: 72 MMHG | OXYGEN SATURATION: 94 % | BODY MASS INDEX: 34.12 KG/M2 | SYSTOLIC BLOOD PRESSURE: 148 MMHG | HEIGHT: 60 IN | WEIGHT: 173.8 LBS

## 2024-02-12 DIAGNOSIS — I25.10 CORONARY ARTERY DISEASE INVOLVING NATIVE CORONARY ARTERY OF NATIVE HEART WITHOUT ANGINA PECTORIS: ICD-10-CM

## 2024-02-12 DIAGNOSIS — I10 ESSENTIAL HYPERTENSION: ICD-10-CM

## 2024-02-12 DIAGNOSIS — I25.10 ASCVD (ARTERIOSCLEROTIC CARDIOVASCULAR DISEASE): Primary | ICD-10-CM

## 2024-02-12 PROCEDURE — 99214 OFFICE O/P EST MOD 30 MIN: CPT | Performed by: INTERNAL MEDICINE

## 2024-02-12 PROCEDURE — 1090F PRES/ABSN URINE INCON ASSESS: CPT | Performed by: INTERNAL MEDICINE

## 2024-02-12 PROCEDURE — G8417 CALC BMI ABV UP PARAM F/U: HCPCS | Performed by: INTERNAL MEDICINE

## 2024-02-12 PROCEDURE — G8399 PT W/DXA RESULTS DOCUMENT: HCPCS | Performed by: INTERNAL MEDICINE

## 2024-02-12 PROCEDURE — 3078F DIAST BP <80 MM HG: CPT | Performed by: INTERNAL MEDICINE

## 2024-02-12 PROCEDURE — 3077F SYST BP >= 140 MM HG: CPT | Performed by: INTERNAL MEDICINE

## 2024-02-12 PROCEDURE — G8428 CUR MEDS NOT DOCUMENT: HCPCS | Performed by: INTERNAL MEDICINE

## 2024-02-12 PROCEDURE — 1036F TOBACCO NON-USER: CPT | Performed by: INTERNAL MEDICINE

## 2024-02-12 PROCEDURE — G8484 FLU IMMUNIZE NO ADMIN: HCPCS | Performed by: INTERNAL MEDICINE

## 2024-02-12 PROCEDURE — 1124F ACP DISCUSS-NO DSCNMKR DOCD: CPT | Performed by: INTERNAL MEDICINE

## 2024-02-12 RX ORDER — METOPROLOL TARTRATE 50 MG/1
25 TABLET, FILM COATED ORAL 2 TIMES DAILY
Qty: 180 TABLET | Refills: 1 | Status: ON HOLD | OUTPATIENT
Start: 2024-02-12

## 2024-02-12 NOTE — PATIENT INSTRUCTIONS
We are committed to providing you the best care possible.    If you receive a survey after visiting one of our offices, please take time to share your experience concerning your physician office visit.  These surveys are confidential and no health information about you is shared.    We are eager to improve for you and we are counting on your feedback to help make that happen.      **It is YOUR responsibilty to bring medication bottles and/or updated medication list to EACH APPOINTMENT. This will allow us to better serve you and all your healthcare needs**    Thank you for allowing us to care for you today!   We want to ensure we can follow your treatment plan and we strive to give you the best outcomes and experience possible.   If you ever have a life threatening emergency and call 911 - for an ambulance (EMS)   Our providers can only care for you at:   [facility] or [facility].   Even if you have someone take you or you drive yourself we can only care for you in a [facility] facility. Our providers are not setup at the other healthcare locations!     Please be informed that if you contact our office outside of normal business hours the physician on call cannot help with any scheduling or rescheduling issues, procedure instruction questions or any type of medication issue.    We advise you for any urgent/emergency that you go to the nearest emergency room!    PLEASE CALL OUR OFFICE DURING NORMAL BUSINESS HOURS    Monday - Friday   8 am to 5 pm    [place]: [phone]    [place]: [phone]    [place]:  [phone]

## 2024-02-12 NOTE — PROGRESS NOTES
CARDIOLOGY NOTE      2/12/2024    RE: Vita Clarke  (1940)                               TO:  Joaquim Ramírez MD            CHIEF COMPLAINT   Vita is a 83 y.o. female who was seen today for management of coronary disease                                  Here for follow-up  on monitor    HPI:                   Pt has h/o coronary artery disease, hypertension, hyperlipidemia, non-insulin-dependent diabetes seen today for follow-up.  Vita Clarke has the following history recorded in care path:  Patient Active Problem List    Diagnosis Date Noted    Cerebrovascular accident (CVA) due to thrombosis of cerebral artery (HCC) 10/12/2016    Headache 08/04/2022    Essential hypertension     Thyroid nodule     Dysarthria due to recent cerebral infarction 10/15/2016    Oropharyngeal dysphagia 10/15/2016    Gait disturbance 10/15/2016    Coronary artery disease involving native coronary artery of native heart without angina pectoris     Diabetes mellitus with neuropathy (HCC)     SCC (squamous cell carcinoma), leg     Memory loss     IFG (impaired fasting glucose)     Carotid stenosis, left     Hyperlipidemia     Osteoarthritis, knee     ASCVD (arteriosclerotic cardiovascular disease)     Rheumatoid arthritis (HCC)     Peripheral neuropathy (HCC)     Abnormal laboratory test 01/04/2024    ASHLEY positive 01/04/2024    Fungal infection 01/04/2024    Swelling of left side of face 12/09/2023    Swelling of joint, knee, left 12/09/2023    Carotid artery calcification, left 07/12/2023    COVID-19 virus infection     S/P colonoscopic polypectomy 11/20/2020    DDD (degenerative disc disease), cervical     DDD (degenerative disc disease), lumbar     Diabetic nephropathy associated with type 2 diabetes mellitus ([facility]) 02/27/2019    Type 2 diabetes mellitus with diabetic neuropathy, without long-term current use of insulin ([facility]) 11/03/2017     Current Outpatient Medications   Medication Sig Dispense Refill

## 2024-02-13 ENCOUNTER — OFFICE VISIT (OUTPATIENT)
Dept: RHEUMATOLOGY | Age: 84
End: 2024-02-13
Payer: MEDICARE

## 2024-02-13 VITALS
OXYGEN SATURATION: 94 % | WEIGHT: 174 LBS | HEART RATE: 65 BPM | BODY MASS INDEX: 33.98 KG/M2 | DIASTOLIC BLOOD PRESSURE: 65 MMHG | SYSTOLIC BLOOD PRESSURE: 140 MMHG

## 2024-02-13 DIAGNOSIS — Z01.89 ENCOUNTER FOR OTHER SPECIFIED SPECIAL EXAMINATIONS: ICD-10-CM

## 2024-02-13 DIAGNOSIS — R22.0 SWELLING OF LEFT SIDE OF FACE: ICD-10-CM

## 2024-02-13 DIAGNOSIS — Z87.39 HISTORY OF RHEUMATOID ARTHRITIS: Primary | ICD-10-CM

## 2024-02-13 DIAGNOSIS — D72.829 LEUKOCYTOSIS, UNSPECIFIED TYPE: ICD-10-CM

## 2024-02-13 DIAGNOSIS — M89.9 DISORDER OF BONE, UNSPECIFIED: ICD-10-CM

## 2024-02-13 DIAGNOSIS — R76.8 ANA POSITIVE: ICD-10-CM

## 2024-02-13 PROCEDURE — 99205 OFFICE O/P NEW HI 60 MIN: CPT | Performed by: STUDENT IN AN ORGANIZED HEALTH CARE EDUCATION/TRAINING PROGRAM

## 2024-02-13 PROCEDURE — 1124F ACP DISCUSS-NO DSCNMKR DOCD: CPT | Performed by: STUDENT IN AN ORGANIZED HEALTH CARE EDUCATION/TRAINING PROGRAM

## 2024-02-13 PROCEDURE — G8484 FLU IMMUNIZE NO ADMIN: HCPCS | Performed by: STUDENT IN AN ORGANIZED HEALTH CARE EDUCATION/TRAINING PROGRAM

## 2024-02-13 PROCEDURE — 1090F PRES/ABSN URINE INCON ASSESS: CPT | Performed by: STUDENT IN AN ORGANIZED HEALTH CARE EDUCATION/TRAINING PROGRAM

## 2024-02-13 PROCEDURE — 3077F SYST BP >= 140 MM HG: CPT | Performed by: STUDENT IN AN ORGANIZED HEALTH CARE EDUCATION/TRAINING PROGRAM

## 2024-02-13 PROCEDURE — G8417 CALC BMI ABV UP PARAM F/U: HCPCS | Performed by: STUDENT IN AN ORGANIZED HEALTH CARE EDUCATION/TRAINING PROGRAM

## 2024-02-13 PROCEDURE — G8399 PT W/DXA RESULTS DOCUMENT: HCPCS | Performed by: STUDENT IN AN ORGANIZED HEALTH CARE EDUCATION/TRAINING PROGRAM

## 2024-02-13 PROCEDURE — 3078F DIAST BP <80 MM HG: CPT | Performed by: STUDENT IN AN ORGANIZED HEALTH CARE EDUCATION/TRAINING PROGRAM

## 2024-02-13 PROCEDURE — 1036F TOBACCO NON-USER: CPT | Performed by: STUDENT IN AN ORGANIZED HEALTH CARE EDUCATION/TRAINING PROGRAM

## 2024-02-13 PROCEDURE — G8427 DOCREV CUR MEDS BY ELIG CLIN: HCPCS | Performed by: STUDENT IN AN ORGANIZED HEALTH CARE EDUCATION/TRAINING PROGRAM

## 2024-02-13 NOTE — PROGRESS NOTES
RHEUMATOLOGY NEW PATIENT VISIT    2024      Patient Name: Vita Clarke  : 1940  Medical Record: [id number]      CHIEF COMPLAINT    Ashley 1: 160, homogenous  Swelling of the left side of the face  Hx of RA     Pertinent Problems  S/p left TKR in 2017    HISTORY OF PRESENT ILLNESS    Vita Clarke is a 83 y.o. female who was referred by Slava Hoang. Symptom onset began with left sided facial swelling that has progressively worsening since . So far she has not been treated for this she has been evaluated by ENT that did not show remarkable findings. She reports that she was diagnosed with RA by Dr Regalado several years ago and was taking MTX x 9 years before she was lost to follow up about 15 years. She did not notice any improvement with MTX  and her symptoms did not get worse since stopping. Tylenol arthritis helps her pain.      There is bilateral feet, neck, bilateral hands, right knee pain   There is no jaw pain and there is no pain while chewing  Overall Discomfort: in bilateral feet, neck, hands and knee   There is also low back pain  Swelling: there is joint swelling at the end of the day   AM stiffness: Denies, she is always stiff  Improved with: Rest  Worse with: prolonged standing, movement      Hair loss: Denies  Oral Ulcers: Denies  Dry eyes and mouth: Dry eyes +, Dry mouth +  Rashes: Denies  Photosensitivity: Denies   Photophobia: Denies  Raynaud's: Denies  Chest Pain: + attributed to atrial fibrillation   SOB: +   Blood Clots: Denies  Seizures/strokes: hx of CVA in   Kidney Disease: CKD  Anemia/thrombocytopenia/leukopenia: Leukocytosis   Antibodies: ASHLEY +     Denies smoking, etoh, recreational drug use. There is no family hx of lupus, RA, PsA or CTD.      Current rheum meds: none     Past rheum meds:          No data to display                    REVIEW OF SYSTEMS     Constitutional:  Denies fever or chills, decreased appetite, or weight loss   Eyes:  Dry eyes+  HENT:

## 2024-02-13 NOTE — PATIENT INSTRUCTIONS
Complete ordered labs and get imaging done  Schedule and complete MRI   We will discuss results at next visit  RTC in 6 weeks

## 2024-02-14 ENCOUNTER — HOSPITAL ENCOUNTER (OUTPATIENT)
Age: 84
Discharge: HOME | End: 2024-02-14
Attending: INTERNAL MEDICINE
Payer: MEDICARE

## 2024-02-14 ENCOUNTER — HOSPITAL ENCOUNTER (OUTPATIENT)
Dept: GENERAL RADIOLOGY | Age: 84
Discharge: HOME | End: 2024-02-14
Attending: INTERNAL MEDICINE
Payer: MEDICARE

## 2024-02-14 ENCOUNTER — HOSPITAL ENCOUNTER (OUTPATIENT)
Dept: CT IMAGING | Age: 84
Discharge: HOME | End: 2024-02-14
Attending: INTERNAL MEDICINE
Payer: MEDICARE

## 2024-02-14 DIAGNOSIS — R22.0 SWELLING OF LEFT SIDE OF FACE: ICD-10-CM

## 2024-02-14 DIAGNOSIS — R76.8 ANA POSITIVE: ICD-10-CM

## 2024-02-14 DIAGNOSIS — Z87.39 HISTORY OF RHEUMATOID ARTHRITIS: ICD-10-CM

## 2024-02-14 DIAGNOSIS — M89.9 DISORDER OF BONE, UNSPECIFIED: ICD-10-CM

## 2024-02-14 DIAGNOSIS — Z01.89 ENCOUNTER FOR OTHER SPECIFIED SPECIAL EXAMINATIONS: ICD-10-CM

## 2024-02-14 DIAGNOSIS — I25.10 ASCVD (ARTERIOSCLEROTIC CARDIOVASCULAR DISEASE): ICD-10-CM

## 2024-02-14 DIAGNOSIS — I10 ESSENTIAL HYPERTENSION: ICD-10-CM

## 2024-02-14 DIAGNOSIS — I25.10 CORONARY ARTERY DISEASE INVOLVING NATIVE CORONARY ARTERY OF NATIVE HEART WITHOUT ANGINA PECTORIS: ICD-10-CM

## 2024-02-14 DIAGNOSIS — D72.829 LEUKOCYTOSIS, UNSPECIFIED TYPE: ICD-10-CM

## 2024-02-14 LAB
25(OH)D3 SERPL-MCNC: 60.77 NG/ML
HAV IGM SERPL QL IA: NON REACTIVE
HBV CORE IGM SERPL QL IA: NON REACTIVE
HBV SURFACE AG SERPL QL IA: NON REACTIVE
HCT VFR BLD CALC: 37.2 % (ref 37–47)
HCV AB SERPL QL IA: NON REACTIVE
HEMOGLOBIN: 12.5 GM/DL (ref 12.5–16)
MCH RBC QN AUTO: 30.2 PG (ref 27–31)
MCHC RBC AUTO-ENTMCNC: 33.6 % (ref 32–36)
MCV RBC AUTO: 89.9 FL (ref 78–100)
PDW BLD-RTO: 12.1 % (ref 11.7–14.9)
PLATELET # BLD: 183 K/CU MM (ref 140–440)
PMV BLD AUTO: 9.8 FL (ref 7.5–11.1)
RBC # BLD: 4.14 M/CU MM (ref 4.2–5.4)
URATE SERPL-MCNC: 5.1 MG/DL (ref 2.6–6)
WBC # BLD: 6.9 K/CU MM (ref 4–10.5)

## 2024-02-14 PROCEDURE — 36415 COLL VENOUS BLD VENIPUNCTURE: CPT

## 2024-02-14 PROCEDURE — 6360000004 HC RX CONTRAST MEDICATION: Performed by: INTERNAL MEDICINE

## 2024-02-14 PROCEDURE — 71275 CT ANGIOGRAPHY CHEST: CPT

## 2024-02-14 PROCEDURE — 2580000003 HC RX 258: Performed by: INTERNAL MEDICINE

## 2024-02-14 PROCEDURE — 86430 RHEUMATOID FACTOR TEST QUAL: CPT

## 2024-02-14 PROCEDURE — 86480 TB TEST CELL IMMUN MEASURE: CPT

## 2024-02-14 PROCEDURE — 73130 X-RAY EXAM OF HAND: CPT

## 2024-02-14 PROCEDURE — 86160 COMPLEMENT ANTIGEN: CPT

## 2024-02-14 PROCEDURE — 85027 COMPLETE CBC AUTOMATED: CPT

## 2024-02-14 PROCEDURE — 86200 CCP ANTIBODY: CPT

## 2024-02-14 PROCEDURE — 80074 ACUTE HEPATITIS PANEL: CPT

## 2024-02-14 PROCEDURE — 84550 ASSAY OF BLOOD/URIC ACID: CPT

## 2024-02-14 PROCEDURE — 82306 VITAMIN D 25 HYDROXY: CPT

## 2024-02-14 RX ORDER — SODIUM CHLORIDE 0.9 % (FLUSH) 0.9 %
10 SYRINGE (ML) INJECTION PRN
Status: COMPLETED | OUTPATIENT
Start: 2024-02-14 | End: 2024-02-14

## 2024-02-14 RX ADMIN — IOPAMIDOL 95 ML: 755 INJECTION, SOLUTION INTRAVENOUS at 08:15

## 2024-02-14 RX ADMIN — SODIUM CHLORIDE, PRESERVATIVE FREE 10 ML: 5 INJECTION INTRAVENOUS at 08:15

## 2024-02-15 ENCOUNTER — TELEPHONE (OUTPATIENT)
Dept: CARDIOLOGY CLINIC | Age: 84
End: 2024-02-15

## 2024-02-15 LAB — CYCLIC CITRULLINE AB IGG/IGA: 4 UNITS (ref 0–19)

## 2024-02-16 ENCOUNTER — APPOINTMENT (OUTPATIENT)
Dept: GENERAL RADIOLOGY | Age: 84
DRG: 313 | End: 2024-02-16
Payer: MEDICARE

## 2024-02-16 ENCOUNTER — HOSPITAL ENCOUNTER (INPATIENT)
Age: 84
LOS: 3 days | Discharge: HOME | DRG: 313 | End: 2024-02-19
Attending: EMERGENCY MEDICINE | Admitting: STUDENT IN AN ORGANIZED HEALTH CARE EDUCATION/TRAINING PROGRAM
Payer: MEDICARE

## 2024-02-16 DIAGNOSIS — I20.0 UNSTABLE ANGINA PECTORIS (HCC): Primary | ICD-10-CM

## 2024-02-16 DIAGNOSIS — R07.9 ACUTE CHEST PAIN: ICD-10-CM

## 2024-02-16 LAB
ALBUMIN SERPL-MCNC: 4.6 GM/DL (ref 3.4–5)
ALP BLD-CCNC: 102 IU/L (ref 40–129)
ALT SERPL-CCNC: 18 U/L (ref 10–40)
ANION GAP SERPL CALCULATED.3IONS-SCNC: 16 MMOL/L (ref 7–16)
AST SERPL-CCNC: 23 IU/L (ref 15–37)
BASOPHILS ABSOLUTE: 0 K/CU MM
BASOPHILS RELATIVE PERCENT: 0.5 % (ref 0–1)
BILIRUB SERPL-MCNC: 0.9 MG/DL (ref 0–1)
BUN SERPL-MCNC: 19 MG/DL (ref 6–23)
C3 SERPL-MCNC: 171 MG/DL (ref 90–180)
C4 SERPL-MCNC: 26 MG/DL (ref 10–40)
CALCIUM SERPL-MCNC: 9.9 MG/DL (ref 8.3–10.6)
CHLORIDE BLD-SCNC: 94 MMOL/L (ref 99–110)
CO2: 24 MMOL/L (ref 21–32)
CREAT SERPL-MCNC: 0.8 MG/DL (ref 0.6–1.1)
DIFFERENTIAL TYPE: ABNORMAL
EKG ATRIAL RATE: 67 BPM
EKG DIAGNOSIS: NORMAL
EKG P AXIS: 93 DEGREES
EKG P-R INTERVAL: 160 MS
EKG Q-T INTERVAL: 416 MS
EKG QRS DURATION: 108 MS
EKG QTC CALCULATION (BAZETT): 439 MS
EKG R AXIS: -29 DEGREES
EKG T AXIS: 39 DEGREES
EKG VENTRICULAR RATE: 67 BPM
EOSINOPHILS ABSOLUTE: 0.3 K/CU MM
EOSINOPHILS RELATIVE PERCENT: 5.4 % (ref 0–3)
GFR SERPL CREATININE-BSD FRML MDRD: >60 ML/MIN/1.73M2
GLUCOSE BLD-MCNC: 157 MG/DL (ref 70–99)
GLUCOSE SERPL-MCNC: 115 MG/DL (ref 70–99)
HCT VFR BLD CALC: 38.8 % (ref 37–47)
HEMOGLOBIN: 13.2 GM/DL (ref 12.5–16)
IMMATURE NEUTROPHIL %: 0.2 % (ref 0–0.43)
LYMPHOCYTES ABSOLUTE: 2.7 K/CU MM
LYMPHOCYTES RELATIVE PERCENT: 43.2 % (ref 24–44)
MAGNESIUM: 1.8 MG/DL (ref 1.8–2.4)
MCH RBC QN AUTO: 30.5 PG (ref 27–31)
MCHC RBC AUTO-ENTMCNC: 34 % (ref 32–36)
MCV RBC AUTO: 89.6 FL (ref 78–100)
MONOCYTES ABSOLUTE: 0.6 K/CU MM
MONOCYTES RELATIVE PERCENT: 8.7 % (ref 0–4)
NUCLEATED RBC %: 0 %
PDW BLD-RTO: 12.1 % (ref 11.7–14.9)
PLATELET # BLD: 200 K/CU MM (ref 140–440)
PMV BLD AUTO: 9.4 FL (ref 7.5–11.1)
POTASSIUM SERPL-SCNC: 3.9 MMOL/L (ref 3.5–5.1)
RBC # BLD: 4.33 M/CU MM (ref 4.2–5.4)
RHEUMATOID FACTOR: <10 IU/ML (ref 0–14)
SEGMENTED NEUTROPHILS ABSOLUTE COUNT: 2.7 K/CU MM
SEGMENTED NEUTROPHILS RELATIVE PERCENT: 42 % (ref 36–66)
SODIUM BLD-SCNC: 134 MMOL/L (ref 135–145)
TOTAL IMMATURE NEUTOROPHIL: 0.01 K/CU MM
TOTAL NUCLEATED RBC: 0 K/CU MM
TOTAL PROTEIN: 8.4 GM/DL (ref 6.4–8.2)
TROPONIN, HIGH SENSITIVITY: 16 NG/L (ref 0–14)
TROPONIN, HIGH SENSITIVITY: 18 NG/L (ref 0–14)
WBC # BLD: 6.3 K/CU MM (ref 4–10.5)

## 2024-02-16 PROCEDURE — 80053 COMPREHEN METABOLIC PANEL: CPT

## 2024-02-16 PROCEDURE — 93010 ELECTROCARDIOGRAM REPORT: CPT | Performed by: INTERNAL MEDICINE

## 2024-02-16 PROCEDURE — 36415 COLL VENOUS BLD VENIPUNCTURE: CPT

## 2024-02-16 PROCEDURE — 93005 ELECTROCARDIOGRAM TRACING: CPT | Performed by: STUDENT IN AN ORGANIZED HEALTH CARE EDUCATION/TRAINING PROGRAM

## 2024-02-16 PROCEDURE — 83036 HEMOGLOBIN GLYCOSYLATED A1C: CPT

## 2024-02-16 PROCEDURE — 83735 ASSAY OF MAGNESIUM: CPT

## 2024-02-16 PROCEDURE — 85025 COMPLETE CBC W/AUTO DIFF WBC: CPT

## 2024-02-16 PROCEDURE — 6370000000 HC RX 637 (ALT 250 FOR IP): Performed by: EMERGENCY MEDICINE

## 2024-02-16 PROCEDURE — 84484 ASSAY OF TROPONIN QUANT: CPT

## 2024-02-16 PROCEDURE — 71045 X-RAY EXAM CHEST 1 VIEW: CPT

## 2024-02-16 PROCEDURE — 6370000000 HC RX 637 (ALT 250 FOR IP): Performed by: STUDENT IN AN ORGANIZED HEALTH CARE EDUCATION/TRAINING PROGRAM

## 2024-02-16 PROCEDURE — 82962 GLUCOSE BLOOD TEST: CPT

## 2024-02-16 PROCEDURE — 93005 ELECTROCARDIOGRAM TRACING: CPT | Performed by: EMERGENCY MEDICINE

## 2024-02-16 PROCEDURE — 99285 EMERGENCY DEPT VISIT HI MDM: CPT

## 2024-02-16 PROCEDURE — 2580000003 HC RX 258: Performed by: STUDENT IN AN ORGANIZED HEALTH CARE EDUCATION/TRAINING PROGRAM

## 2024-02-16 PROCEDURE — 1200000000 HC SEMI PRIVATE

## 2024-02-16 RX ORDER — CLOPIDOGREL BISULFATE 75 MG/1
75 TABLET ORAL DAILY
Status: DISCONTINUED | OUTPATIENT
Start: 2024-02-16 | End: 2024-02-19 | Stop reason: HOSPADM

## 2024-02-16 RX ORDER — NITROGLYCERIN 0.4 MG/1
0.4 TABLET SUBLINGUAL EVERY 5 MIN PRN
Status: DISCONTINUED | OUTPATIENT
Start: 2024-02-16 | End: 2024-02-16 | Stop reason: SDUPTHER

## 2024-02-16 RX ORDER — POTASSIUM CHLORIDE 7.45 MG/ML
10 INJECTION INTRAVENOUS PRN
Status: DISCONTINUED | OUTPATIENT
Start: 2024-02-16 | End: 2024-02-19 | Stop reason: HOSPADM

## 2024-02-16 RX ORDER — SODIUM CHLORIDE 0.9 % (FLUSH) 0.9 %
5-40 SYRINGE (ML) INJECTION EVERY 12 HOURS SCHEDULED
Status: DISCONTINUED | OUTPATIENT
Start: 2024-02-16 | End: 2024-02-19 | Stop reason: HOSPADM

## 2024-02-16 RX ORDER — ONDANSETRON 2 MG/ML
4 INJECTION INTRAMUSCULAR; INTRAVENOUS EVERY 6 HOURS PRN
Status: DISCONTINUED | OUTPATIENT
Start: 2024-02-16 | End: 2024-02-19 | Stop reason: HOSPADM

## 2024-02-16 RX ORDER — ASPIRIN 81 MG/1
81 TABLET, CHEWABLE ORAL DAILY
Status: DISCONTINUED | OUTPATIENT
Start: 2024-02-16 | End: 2024-02-19 | Stop reason: HOSPADM

## 2024-02-16 RX ORDER — ONDANSETRON 4 MG/1
4 TABLET, ORALLY DISINTEGRATING ORAL EVERY 8 HOURS PRN
Status: DISCONTINUED | OUTPATIENT
Start: 2024-02-16 | End: 2024-02-19 | Stop reason: HOSPADM

## 2024-02-16 RX ORDER — POTASSIUM CHLORIDE 20 MEQ/1
40 TABLET, EXTENDED RELEASE ORAL PRN
Status: DISCONTINUED | OUTPATIENT
Start: 2024-02-16 | End: 2024-02-19 | Stop reason: HOSPADM

## 2024-02-16 RX ORDER — ATORVASTATIN CALCIUM 40 MG/1
40 TABLET, FILM COATED ORAL DAILY
Status: DISCONTINUED | OUTPATIENT
Start: 2024-02-16 | End: 2024-02-19 | Stop reason: HOSPADM

## 2024-02-16 RX ORDER — GLUCAGON 1 MG/ML
1 KIT INJECTION PRN
Status: DISCONTINUED | OUTPATIENT
Start: 2024-02-16 | End: 2024-02-19 | Stop reason: HOSPADM

## 2024-02-16 RX ORDER — SODIUM CHLORIDE 0.9 % (FLUSH) 0.9 %
5-40 SYRINGE (ML) INJECTION PRN
Status: DISCONTINUED | OUTPATIENT
Start: 2024-02-16 | End: 2024-02-19 | Stop reason: HOSPADM

## 2024-02-16 RX ORDER — DEXTROSE MONOHYDRATE 100 MG/ML
INJECTION, SOLUTION INTRAVENOUS CONTINUOUS PRN
Status: DISCONTINUED | OUTPATIENT
Start: 2024-02-16 | End: 2024-02-19 | Stop reason: HOSPADM

## 2024-02-16 RX ORDER — LANOLIN ALCOHOL/MO/W.PET/CERES
1 CREAM (GRAM) TOPICAL DAILY
Status: DISCONTINUED | OUTPATIENT
Start: 2024-02-16 | End: 2024-02-19 | Stop reason: HOSPADM

## 2024-02-16 RX ORDER — ENOXAPARIN SODIUM 100 MG/ML
40 INJECTION SUBCUTANEOUS DAILY
Status: DISCONTINUED | OUTPATIENT
Start: 2024-02-16 | End: 2024-02-19 | Stop reason: HOSPADM

## 2024-02-16 RX ORDER — SODIUM CHLORIDE 9 MG/ML
INJECTION, SOLUTION INTRAVENOUS PRN
Status: DISCONTINUED | OUTPATIENT
Start: 2024-02-16 | End: 2024-02-19 | Stop reason: HOSPADM

## 2024-02-16 RX ORDER — ASPIRIN 81 MG/1
324 TABLET, CHEWABLE ORAL ONCE
Status: COMPLETED | OUTPATIENT
Start: 2024-02-16 | End: 2024-02-16

## 2024-02-16 RX ORDER — INSULIN LISPRO 100 [IU]/ML
0-4 INJECTION, SOLUTION INTRAVENOUS; SUBCUTANEOUS NIGHTLY
Status: DISCONTINUED | OUTPATIENT
Start: 2024-02-16 | End: 2024-02-19 | Stop reason: HOSPADM

## 2024-02-16 RX ORDER — LOSARTAN POTASSIUM 100 MG/1
100 TABLET ORAL DAILY
Status: DISCONTINUED | OUTPATIENT
Start: 2024-02-16 | End: 2024-02-19 | Stop reason: HOSPADM

## 2024-02-16 RX ORDER — POLYETHYLENE GLYCOL 3350 17 G/17G
17 POWDER, FOR SOLUTION ORAL DAILY PRN
Status: DISCONTINUED | OUTPATIENT
Start: 2024-02-16 | End: 2024-02-19 | Stop reason: HOSPADM

## 2024-02-16 RX ORDER — INSULIN LISPRO 100 [IU]/ML
0-4 INJECTION, SOLUTION INTRAVENOUS; SUBCUTANEOUS
Status: DISCONTINUED | OUTPATIENT
Start: 2024-02-17 | End: 2024-02-19 | Stop reason: HOSPADM

## 2024-02-16 RX ORDER — GABAPENTIN 300 MG/1
300 CAPSULE ORAL 2 TIMES DAILY
Status: DISCONTINUED | OUTPATIENT
Start: 2024-02-16 | End: 2024-02-19 | Stop reason: HOSPADM

## 2024-02-16 RX ORDER — AMLODIPINE BESYLATE 5 MG/1
5 TABLET ORAL 2 TIMES DAILY
Status: DISCONTINUED | OUTPATIENT
Start: 2024-02-16 | End: 2024-02-19 | Stop reason: HOSPADM

## 2024-02-16 RX ORDER — NITROGLYCERIN 0.4 MG/1
0.4 TABLET SUBLINGUAL EVERY 5 MIN PRN
Status: DISCONTINUED | OUTPATIENT
Start: 2024-02-16 | End: 2024-02-19 | Stop reason: HOSPADM

## 2024-02-16 RX ORDER — MAGNESIUM SULFATE IN WATER 40 MG/ML
2000 INJECTION, SOLUTION INTRAVENOUS PRN
Status: DISCONTINUED | OUTPATIENT
Start: 2024-02-16 | End: 2024-02-19 | Stop reason: HOSPADM

## 2024-02-16 RX ADMIN — GABAPENTIN 300 MG: 300 CAPSULE ORAL at 20:29

## 2024-02-16 RX ADMIN — NITROGLYCERIN 0.4 MG: 0.4 TABLET, ORALLY DISINTEGRATING SUBLINGUAL at 14:01

## 2024-02-16 RX ADMIN — LOSARTAN POTASSIUM 100 MG: 100 TABLET, FILM COATED ORAL at 20:31

## 2024-02-16 RX ADMIN — SODIUM CHLORIDE, PRESERVATIVE FREE 10 ML: 5 INJECTION INTRAVENOUS at 20:29

## 2024-02-16 RX ADMIN — ATORVASTATIN CALCIUM 40 MG: 40 TABLET, FILM COATED ORAL at 20:29

## 2024-02-16 RX ADMIN — ASPIRIN 324 MG: 81 TABLET, CHEWABLE ORAL at 14:01

## 2024-02-16 RX ADMIN — ONDANSETRON 4 MG: 4 TABLET, ORALLY DISINTEGRATING ORAL at 20:29

## 2024-02-16 RX ADMIN — AMLODIPINE BESYLATE 5 MG: 5 TABLET ORAL at 20:29

## 2024-02-16 RX ADMIN — METOPROLOL TARTRATE 25 MG: 25 TABLET, FILM COATED ORAL at 20:29

## 2024-02-16 RX ADMIN — CLOPIDOGREL BISULFATE 75 MG: 75 TABLET ORAL at 20:29

## 2024-02-16 ASSESSMENT — PAIN DESCRIPTION - LOCATION
LOCATION: CHEST
LOCATION: CHEST

## 2024-02-16 ASSESSMENT — PAIN SCALES - GENERAL
PAINLEVEL_OUTOF10: 0
PAINLEVEL_OUTOF10: 2
PAINLEVEL_OUTOF10: 9

## 2024-02-16 ASSESSMENT — HEART SCORE: ECG: 0

## 2024-02-16 ASSESSMENT — PAIN - FUNCTIONAL ASSESSMENT: PAIN_FUNCTIONAL_ASSESSMENT: 0-10

## 2024-02-16 NOTE — ED TRIAGE NOTES
Pt complaining of chest pain and shortness of breath on arrival. Started at 1145 this morning. Pt took two nitro PTA, no ASA. Pt sees Dr. Montoya for cardiac care.

## 2024-02-16 NOTE — ED PROVIDER NOTES
administration in time range)   0.9 % sodium chloride infusion (has no administration in time range)   potassium chloride (KLOR-CON M) extended release tablet 40 mEq (has no administration in time range)     Or   potassium bicarb-citric acid (EFFER-K) effervescent tablet 40 mEq (has no administration in time range)     Or   potassium chloride 10 mEq/100 mL IVPB (Peripheral Line) (has no administration in time range)   magnesium sulfate 2000 mg in 50 mL IVPB premix (has no administration in time range)   ondansetron (ZOFRAN-ODT) disintegrating tablet 4 mg (4 mg Oral Given 2/16/24 2029)     Or   ondansetron (ZOFRAN) injection 4 mg ( IntraVENous See Alternative 2/16/24 2029)   polyethylene glycol (GLYCOLAX) packet 17 g (has no administration in time range)   enoxaparin (LOVENOX) injection 40 mg (has no administration in time range)   insulin lispro (HUMALOG) injection vial 0-4 Units (has no administration in time range)   insulin lispro (HUMALOG) injection vial 0-4 Units ( SubCUTAneous Not Given 2/16/24 2030)   glucose chewable tablet 16 g (has no administration in time range)   dextrose bolus 10% 125 mL (has no administration in time range)     Or   dextrose bolus 10% 250 mL (has no administration in time range)   glucagon injection 1 mg (has no administration in time range)   dextrose 10 % infusion (has no administration in time range)   aspirin chewable tablet 324 mg (324 mg Oral Given 2/16/24 1401)       EKG (if obtained): (All EKG's are interpreted by myself in the absence of a cardiologist) sinus rhythm at 67.  Incomplete right bundle branch block noted.  Nonspecific T wave flattening without ST elevation.  Similar to prior tracing.    Chronic conditions affecting care: CAD, HTN, HLD, DM, RA, CVA    Records Reviewed : Other recent CTA chest from 2/14/24 is reviewed.    Disposition Considerations (tests considered but not done, Shared Decision Making, Pt Expectation of Test or Tx.):     Patient is comfortable and

## 2024-02-16 NOTE — ED TRIAGE NOTES
Pt family states pt took 2 nitro before coming in. Pt took her baby aspirin last night as scheduled.

## 2024-02-16 NOTE — H&P
Multiplanar reformatted images are provided for review.  MIP images are provided for review. Automated exposure control, iterative reconstruction, and/or weight based adjustment of the mA/kV was utilized to reduce the radiation dose to as low as reasonably achievable. COMPARISON: None HISTORY: ORDERING SYSTEM PROVIDED HISTORY:  ASCVD (arteriosclerotic cardiovascular disease) TECHNOLOGIST PROVIDED HISTORY: Additional Contrast?  None STAT Creatinine as needed:  Yes Reason for Exam: ASCVD (arteriosclerotic cardiovascular disease), Essential hypertension, Coronary artery disease involving native coronary artery of native heart without angina pectoris. Additional signs and symptoms:  Patient states SOB and chest pains with A-fib. Relevant Medical/Surgical History:  Patient states SOB and chest pains with A-fib. FINDINGS: Pulmonary Arteries: Pulmonary arteries are adequately opacified for evaluation.  No evidence of intraluminal filling defect to suggest pulmonary embolism.  Main pulmonary artery is normal in caliber. Mediastinum: Three-vessel coronary artery disease.  No pericardial effusion. The esophagus is within normal limits.  No mediastinal adenopathy.  The thyroid is unremarkable. Lungs/pleura: Focal bleb in the left lower lobe medially.  Mild scarring at the lung apices.  No airspace consolidations.  No pleural effusion or pneumothorax.  The central airways are patent. Upper Abdomen: Limited images of the upper abdomen are unremarkable. Soft Tissues/Bones: No acute bone or soft tissue abnormality.     No evidence of pulmonary embolism or acute pulmonary abnormality. Three-vessel coronary artery disease.     XR CHEST PORTABLE    Result Date: 2/16/2024  EXAMINATION: ONE XRAY VIEW OF THE CHEST 2/16/2024 1:24 pm COMPARISON: 09/13/2023 HISTORY: ORDERING SYSTEM PROVIDED HISTORY:  TECHNOLOGIST PROVIDED HISTORY: Reason for exam:->cp Reason for Exam: chest pain FINDINGS: The lungs and pleural spaces are without acute

## 2024-02-17 LAB
ALBUMIN SERPL-MCNC: 3.8 GM/DL (ref 3.4–5)
ALP BLD-CCNC: 90 IU/L (ref 40–128)
ALT SERPL-CCNC: 15 U/L (ref 10–40)
ANION GAP SERPL CALCULATED.3IONS-SCNC: 11 MMOL/L (ref 7–16)
AST SERPL-CCNC: 19 IU/L (ref 15–37)
BILIRUB SERPL-MCNC: 0.6 MG/DL (ref 0–1)
BUN SERPL-MCNC: 24 MG/DL (ref 6–23)
CALCIUM SERPL-MCNC: 9.4 MG/DL (ref 8.3–10.6)
CHLORIDE BLD-SCNC: 98 MMOL/L (ref 99–110)
CHOLEST SERPL-MCNC: 132 MG/DL
CO2: 25 MMOL/L (ref 21–32)
CREAT SERPL-MCNC: 0.8 MG/DL (ref 0.6–1.1)
ESTIMATED AVERAGE GLUCOSE: 146 MG/DL
GFR SERPL CREATININE-BSD FRML MDRD: >60 ML/MIN/1.73M2
GLUCOSE BLD-MCNC: 114 MG/DL (ref 70–99)
GLUCOSE BLD-MCNC: 130 MG/DL (ref 70–99)
GLUCOSE BLD-MCNC: 137 MG/DL (ref 70–99)
GLUCOSE BLD-MCNC: 165 MG/DL (ref 70–99)
GLUCOSE SERPL-MCNC: 162 MG/DL (ref 70–99)
HBA1C MFR BLD: 6.7 % (ref 4.2–6.3)
HCT VFR BLD CALC: 32.7 % (ref 37–47)
HDLC SERPL-MCNC: 32 MG/DL
HEMOGLOBIN: 10.9 GM/DL (ref 12.5–16)
LDLC SERPL CALC-MCNC: 72 MG/DL
MAGNESIUM: 1.8 MG/DL (ref 1.8–2.4)
MCH RBC QN AUTO: 29.9 PG (ref 27–31)
MCHC RBC AUTO-ENTMCNC: 33.3 % (ref 32–36)
MCV RBC AUTO: 89.8 FL (ref 78–100)
PDW BLD-RTO: 12.2 % (ref 11.7–14.9)
PLATELET # BLD: 177 K/CU MM (ref 140–440)
PMV BLD AUTO: 9.4 FL (ref 7.5–11.1)
POTASSIUM SERPL-SCNC: 4.2 MMOL/L (ref 3.5–5.1)
RBC # BLD: 3.64 M/CU MM (ref 4.2–5.4)
SODIUM BLD-SCNC: 134 MMOL/L (ref 135–145)
TOTAL PROTEIN: 6 GM/DL (ref 6.4–8.2)
TRIGL SERPL-MCNC: 142 MG/DL
TROPONIN, HIGH SENSITIVITY: 15 NG/L (ref 0–14)
WBC # BLD: 5 K/CU MM (ref 4–10.5)

## 2024-02-17 PROCEDURE — 85027 COMPLETE CBC AUTOMATED: CPT

## 2024-02-17 PROCEDURE — 1200000000 HC SEMI PRIVATE

## 2024-02-17 PROCEDURE — 2580000003 HC RX 258: Performed by: STUDENT IN AN ORGANIZED HEALTH CARE EDUCATION/TRAINING PROGRAM

## 2024-02-17 PROCEDURE — 6370000000 HC RX 637 (ALT 250 FOR IP): Performed by: STUDENT IN AN ORGANIZED HEALTH CARE EDUCATION/TRAINING PROGRAM

## 2024-02-17 PROCEDURE — 80053 COMPREHEN METABOLIC PANEL: CPT

## 2024-02-17 PROCEDURE — 80061 LIPID PANEL: CPT

## 2024-02-17 PROCEDURE — 82962 GLUCOSE BLOOD TEST: CPT

## 2024-02-17 PROCEDURE — 36415 COLL VENOUS BLD VENIPUNCTURE: CPT

## 2024-02-17 PROCEDURE — 99223 1ST HOSP IP/OBS HIGH 75: CPT | Performed by: INTERNAL MEDICINE

## 2024-02-17 PROCEDURE — 6360000002 HC RX W HCPCS: Performed by: STUDENT IN AN ORGANIZED HEALTH CARE EDUCATION/TRAINING PROGRAM

## 2024-02-17 RX ADMIN — GABAPENTIN 300 MG: 300 CAPSULE ORAL at 21:24

## 2024-02-17 RX ADMIN — SODIUM CHLORIDE, PRESERVATIVE FREE 10 ML: 5 INJECTION INTRAVENOUS at 10:15

## 2024-02-17 RX ADMIN — METOPROLOL TARTRATE 25 MG: 25 TABLET, FILM COATED ORAL at 21:23

## 2024-02-17 RX ADMIN — GABAPENTIN 300 MG: 300 CAPSULE ORAL at 10:14

## 2024-02-17 RX ADMIN — AMLODIPINE BESYLATE 5 MG: 5 TABLET ORAL at 21:24

## 2024-02-17 RX ADMIN — ENOXAPARIN SODIUM 40 MG: 100 INJECTION SUBCUTANEOUS at 10:14

## 2024-02-17 RX ADMIN — LOSARTAN POTASSIUM 100 MG: 100 TABLET, FILM COATED ORAL at 10:14

## 2024-02-17 RX ADMIN — AMLODIPINE BESYLATE 5 MG: 5 TABLET ORAL at 10:14

## 2024-02-17 RX ADMIN — ASPIRIN 81 MG: 81 TABLET, CHEWABLE ORAL at 10:14

## 2024-02-17 RX ADMIN — METOPROLOL TARTRATE 25 MG: 25 TABLET, FILM COATED ORAL at 10:14

## 2024-02-17 RX ADMIN — ATORVASTATIN CALCIUM 40 MG: 40 TABLET, FILM COATED ORAL at 10:14

## 2024-02-17 RX ADMIN — CLOPIDOGREL BISULFATE 75 MG: 75 TABLET ORAL at 10:14

## 2024-02-17 ASSESSMENT — PAIN SCALES - GENERAL
PAINLEVEL_OUTOF10: 0
PAINLEVEL_OUTOF10: 2

## 2024-02-17 NOTE — PROGRESS NOTES
4 Eyes Skin Assessment     NAME:  Vita Clarke  YOB: 1940  MEDICAL RECORD NUMBER:  [id number]    The patient is being assessed for  Admission    I agree that at least one RN has performed a thorough Head to Toe Skin Assessment on the patient. ALL assessment sites listed below have been assessed.      Areas assessed by both nurses:    Head, Face, Ears, Shoulders, Back, Chest, Arms, Elbows, Hands, Sacrum. Buttock, Coccyx, Ischium, and Legs. Feet and Heels        Does the Patient have a Wound? No noted wound(s)       Kyler Prevention initiated by RN: No  Wound Care Orders initiated by RN: No    Pressure Injury (Stage 3,4, Unstageable, DTI, NWPT, and Complex wounds) if present, place Wound referral order by RN under : No    New Ostomies, if present place, Ostomy referral order under : No     Nurse 1 eSignature: Electronically signed by Cris Fermin RN on 2/17/24 at 3:41 AM EST    **SHARE this note so that the co-signing nurse can place an eSignature**    Nurse 2 eSignature: Electronically signed by CHERELLE PERRY RN on 2/17/24 at 3:47 AM EST

## 2024-02-17 NOTE — ED NOTES
ED TO INPATIENT SBAR HANDOFF    Patient Name: Vita Clarke   :  1940  83 y.o.   Preferred Name    Family/Caregiver Present yes   Restraints no   C-SSRS: Risk of Suicide: No Risk  Sitter no   Sepsis Risk Score Sepsis Risk Score: 0.83      Situation  Chief Complaint   Patient presents with    Chest Pain     Sees Dr. Montoya     Shortness of Breath     Brief Description of Patient's Condition: Pt arrived to ED via private vehicle with c/o chest pain and sob. Pt was having stabbing pain 8/10 when she arrived, relieved by nitro. Pt being admitted for a CP work up.  Mental Status: oriented, alert, coherent, logical, thought processes intact, and able to concentrate and follow conversation  Arrived from: home    Imaging:   XR CHEST PORTABLE   Final Result   No acute process.           Abnormal labs:   Abnormal Labs Reviewed   CBC WITH AUTO DIFFERENTIAL - Abnormal; Notable for the following components:       Result Value    Monocytes % 8.7 (*)     Eosinophils % 5.4 (*)     All other components within normal limits   TROPONIN - Abnormal; Notable for the following components:    Troponin, High Sensitivity 18 (*)     All other components within normal limits   COMPREHENSIVE METABOLIC PANEL - Abnormal; Notable for the following components:    Sodium 134 (*)     Chloride 94 (*)     Glucose 115 (*)     Total Protein 8.4 (*)     All other components within normal limits   TROPONIN - Abnormal; Notable for the following components:    Troponin, High Sensitivity 16 (*)     All other components within normal limits       Background  History:   Past Medical History:   Diagnosis Date    AK (actinic keratosis)     Surekha Grooms following    CAD (coronary artery disease)     2005 S/P PTCA and stents X2 - Dr Howard    Cancer (HCC)     skin    Carotid stenosis     Carotid stenosis, left     monitored by Dr Matson- 50% stenosis noted on CTA 10/2016    Coronary arteriosclerosis after percutaneous transluminal coronary angioplasty

## 2024-02-17 NOTE — CONSULTS
CARDIOLOGY CONSULT NOTE    Reason for consultation: Chest pain     Referring physician: Gary Galicia MD      Primary care physician: Joaquim Gonzalez MD        Dear Gary Galicia MD   Thanks for the consult.     History of present illness:Vita is a 83 y.o.year old who  presents with  chest pain for last few days, happening daily, intermittent for 15 to 20 mins and aggravated with activity substernal also,reproducible with palpation, radiated to shoulder, 6/10, tender to touch,associated with shortness of breath, + sweating, nausea, did get NTG in ED.  No fever, no chills, no nausea no vomiting. Blood pressure, cholesterol, blood glucose and weight are well controlled.     Chief Complaint   Patient presents with    Chest Pain     Sees Dr. Montoya     Shortness of Breath       Past medical history:    has a past medical history of AK (actinic keratosis), CAD (coronary artery disease), Cancer (HCC), Carotid stenosis, Carotid stenosis, left, Coronary arteriosclerosis after percutaneous transluminal coronary angioplasty (PTCA), Coronary artery disease, Cough secondary to angiotensin converting enzyme inhibitor (ACE-I), COVID-19 virus infection, CTS (carpal tunnel syndrome), DDD (degenerative disc disease), cervical, DDD (degenerative disc disease), cervical, DDD (degenerative disc disease), lumbar, Degenerative disc disease, cervical, Diabetes mellitus with neuropathy (HCC), Diffuse cystic mastopathy, GERD without esophagitis, H/O cardiac catheterization, H/O cardiovascular stress test, H/O Doppler ultrasound, H/O echocardiogram, H/O left heart catheterization, H/O left heart catheterization by ventricular puncture, H/O mammogram, H/O tilt table evaluation, Hyperlipidemia, Hypertension, Memory loss, Neuropathy, Osteoarthritis, knee, Peripheral neuropathy, PONV (postoperative nausea and vomiting), Postsurgical menopause, Rheumatoid arthritis(714.0), S/P colonoscopic polypectomy, SCC (squamous cell carcinoma), leg,

## 2024-02-17 NOTE — PROGRESS NOTES
V2.0  [facility] Hospitalist Progress Note      Name:  Vita Clarke /Age/Sex: 1940  (83 y.o. female)   MRN & CSN:  [id number] & [id number] Encounter Date/Time: 2024 7:27 AM EST    Location:  [address] [place] [address]-A PCP: Joaquim Gonzalez MD       Hospital Day: 2    Assessment and Plan:   Vita Clarke is a 83 y.o. female  who presents with Chest pain    #Chest Pain, ACS rule out  #CAD s/p stentx3  -Pt presented with non exertional Chest Pain, started while patient was sitting in couch, cental/Left side, radiationg to back, 8/10 , improving to 0/10 in ED, with  no associated symptoms, VS stable  in ED, labs w Trop 18-->16, EKG as below -Pt rec'd  mg PO in the ED with improvement   -HEART Score 6   -Trop 18-->16-->15  -s/p 2 NTG at home  - Pt s/p  mg PO in ED   -CXR w no acute changes   Telemetry   Repeat EKG PRN for Chest Pain   NTG PRN for Chest Pain   ASA 81 mg PO QD   Atorvastatin 80 mg PO QHS   statin , losartan , metoprolol  Trans Thoracic Echocardiogram for any wall-motion abnormalities   Cardiology consulted for any further ischemic workup  Plan for stress test on Monday    #Acute anemia  -hb: 10.9, previously 12-13  Continue to monitor     #HTN  losartan, metoprolol    #HLD  c/w statin    #DM  -HbA1c: 6.7  LDSSI w hypoglycemia protocol. Hold metformin      Diet Diet NPO   DVT Prophylaxis [x] Lovenox, []  Heparin, [] SCDs, [] Ambulation,  [] Eliquis, [] Xarelto  [] Coumadin   Code Status Full Code   Disposition From: home  Expected Disposition: home  Estimated Date of Discharge: 2 days  Patient requires continued admission due to stress test Monday   Surrogate Decision Maker/ SHELTON Clarke     Subjective:     Chief Complaint: Chest Pain (Sees Dr. Montoya ) and Shortness of Breath     Patient endorses chest pain that lasted around 30 minutes yesterday which resolved prior to arriving to the ER.  States he felt like her other MIs.  No longer having chest pain.  No other symptoms at this time.

## 2024-02-18 LAB
ANION GAP SERPL CALCULATED.3IONS-SCNC: 13 MMOL/L (ref 7–16)
BASOPHILS ABSOLUTE: 0 K/CU MM
BASOPHILS RELATIVE PERCENT: 0.4 % (ref 0–1)
BUN SERPL-MCNC: 29 MG/DL (ref 6–23)
CALCIUM SERPL-MCNC: 9 MG/DL (ref 8.3–10.6)
CHLORIDE BLD-SCNC: 98 MMOL/L (ref 99–110)
CO2: 24 MMOL/L (ref 21–32)
CREAT SERPL-MCNC: 1.1 MG/DL (ref 0.6–1.1)
DIFFERENTIAL TYPE: ABNORMAL
EKG ATRIAL RATE: 63 BPM
EKG DIAGNOSIS: NORMAL
EKG P AXIS: 24 DEGREES
EKG P-R INTERVAL: 168 MS
EKG Q-T INTERVAL: 422 MS
EKG QRS DURATION: 108 MS
EKG QTC CALCULATION (BAZETT): 431 MS
EKG R AXIS: -37 DEGREES
EKG T AXIS: 44 DEGREES
EKG VENTRICULAR RATE: 63 BPM
EOSINOPHILS ABSOLUTE: 0.4 K/CU MM
EOSINOPHILS RELATIVE PERCENT: 6.6 % (ref 0–3)
GFR SERPL CREATININE-BSD FRML MDRD: 50 ML/MIN/1.73M2
GLUCOSE BLD-MCNC: 128 MG/DL (ref 70–99)
GLUCOSE BLD-MCNC: 155 MG/DL (ref 70–99)
GLUCOSE BLD-MCNC: 155 MG/DL (ref 70–99)
GLUCOSE BLD-MCNC: 156 MG/DL (ref 70–99)
GLUCOSE SERPL-MCNC: 135 MG/DL (ref 70–99)
HCT VFR BLD CALC: 34.7 % (ref 37–47)
HEMOGLOBIN: 11.2 GM/DL (ref 12.5–16)
IMMATURE NEUTROPHIL %: 0.2 % (ref 0–0.43)
LYMPHOCYTES ABSOLUTE: 2.3 K/CU MM
LYMPHOCYTES RELATIVE PERCENT: 41.7 % (ref 24–44)
MCH RBC QN AUTO: 29.7 PG (ref 27–31)
MCHC RBC AUTO-ENTMCNC: 32.3 % (ref 32–36)
MCV RBC AUTO: 92 FL (ref 78–100)
MONOCYTES ABSOLUTE: 0.5 K/CU MM
MONOCYTES RELATIVE PERCENT: 8.5 % (ref 0–4)
NUCLEATED RBC %: 0 %
PDW BLD-RTO: 12.5 % (ref 11.7–14.9)
PLATELET # BLD: 184 K/CU MM (ref 140–440)
PMV BLD AUTO: 9.5 FL (ref 7.5–11.1)
POTASSIUM SERPL-SCNC: 4.2 MMOL/L (ref 3.5–5.1)
RBC # BLD: 3.77 M/CU MM (ref 4.2–5.4)
SEGMENTED NEUTROPHILS ABSOLUTE COUNT: 2.3 K/CU MM
SEGMENTED NEUTROPHILS RELATIVE PERCENT: 42.6 % (ref 36–66)
SODIUM BLD-SCNC: 135 MMOL/L (ref 135–145)
TOTAL IMMATURE NEUTOROPHIL: 0.01 K/CU MM
TOTAL NUCLEATED RBC: 0 K/CU MM
TSH SERPL DL<=0.005 MIU/L-ACNC: 2.44 UIU/ML (ref 0.27–4.2)
WBC # BLD: 5.4 K/CU MM (ref 4–10.5)

## 2024-02-18 PROCEDURE — 6360000002 HC RX W HCPCS: Performed by: STUDENT IN AN ORGANIZED HEALTH CARE EDUCATION/TRAINING PROGRAM

## 2024-02-18 PROCEDURE — 6370000000 HC RX 637 (ALT 250 FOR IP): Performed by: STUDENT IN AN ORGANIZED HEALTH CARE EDUCATION/TRAINING PROGRAM

## 2024-02-18 PROCEDURE — 94761 N-INVAS EAR/PLS OXIMETRY MLT: CPT

## 2024-02-18 PROCEDURE — 36415 COLL VENOUS BLD VENIPUNCTURE: CPT

## 2024-02-18 PROCEDURE — 80048 BASIC METABOLIC PNL TOTAL CA: CPT

## 2024-02-18 PROCEDURE — 1200000000 HC SEMI PRIVATE

## 2024-02-18 PROCEDURE — 85025 COMPLETE CBC W/AUTO DIFF WBC: CPT

## 2024-02-18 PROCEDURE — 93010 ELECTROCARDIOGRAM REPORT: CPT | Performed by: INTERNAL MEDICINE

## 2024-02-18 PROCEDURE — 84443 ASSAY THYROID STIM HORMONE: CPT

## 2024-02-18 PROCEDURE — 2580000003 HC RX 258: Performed by: STUDENT IN AN ORGANIZED HEALTH CARE EDUCATION/TRAINING PROGRAM

## 2024-02-18 PROCEDURE — 82962 GLUCOSE BLOOD TEST: CPT

## 2024-02-18 PROCEDURE — 99232 SBSQ HOSP IP/OBS MODERATE 35: CPT | Performed by: INTERNAL MEDICINE

## 2024-02-18 RX ADMIN — METOPROLOL TARTRATE 25 MG: 25 TABLET, FILM COATED ORAL at 09:40

## 2024-02-18 RX ADMIN — ENOXAPARIN SODIUM 40 MG: 100 INJECTION SUBCUTANEOUS at 09:40

## 2024-02-18 RX ADMIN — CLOPIDOGREL BISULFATE 75 MG: 75 TABLET ORAL at 09:40

## 2024-02-18 RX ADMIN — METOPROLOL TARTRATE 25 MG: 25 TABLET, FILM COATED ORAL at 19:56

## 2024-02-18 RX ADMIN — SODIUM CHLORIDE, PRESERVATIVE FREE 10 ML: 5 INJECTION INTRAVENOUS at 19:56

## 2024-02-18 RX ADMIN — GABAPENTIN 300 MG: 300 CAPSULE ORAL at 09:40

## 2024-02-18 RX ADMIN — ATORVASTATIN CALCIUM 40 MG: 40 TABLET, FILM COATED ORAL at 09:40

## 2024-02-18 RX ADMIN — POLYETHYLENE GLYCOL (3350) 17 G: 17 POWDER, FOR SOLUTION ORAL at 18:17

## 2024-02-18 RX ADMIN — ASPIRIN 81 MG: 81 TABLET, CHEWABLE ORAL at 09:40

## 2024-02-18 RX ADMIN — LOSARTAN POTASSIUM 100 MG: 100 TABLET, FILM COATED ORAL at 09:40

## 2024-02-18 RX ADMIN — GABAPENTIN 300 MG: 300 CAPSULE ORAL at 19:56

## 2024-02-18 RX ADMIN — SODIUM CHLORIDE, PRESERVATIVE FREE 10 ML: 5 INJECTION INTRAVENOUS at 09:41

## 2024-02-18 RX ADMIN — AMLODIPINE BESYLATE 5 MG: 5 TABLET ORAL at 19:56

## 2024-02-18 RX ADMIN — AMLODIPINE BESYLATE 5 MG: 5 TABLET ORAL at 09:40

## 2024-02-18 ASSESSMENT — PAIN SCALES - GENERAL: PAINLEVEL_OUTOF10: 0

## 2024-02-18 NOTE — PROGRESS NOTES
Cardiology Note    Admit Date:  2/16/2024    Admission diagnosis / Complaint: chest pain      Subjective:  Ms. Clarke is laying in bed. Denies cardiac complaints at this time.       Assessment and Plan:    Chest pain: No ACS, she had abnormal chest pain enzyme has been trending down, will get stress test tomorrow    CAD: Posterior LAD stent and significant risk, RCA moderate disease,Stress test on Monday, last year   stress test suggested anterior ischemia however cath did not show any LAD disease,    H/O CVA, continue Plavix continue statins aspirin    DM: Continue insulin check blood glucose avoid hypoglycemia    HTN, continue amlodipine metoprolol losartan, uptitrate as BP allows    Health maintenance: exerise and diet    Objective:   BP (!) 164/72   Pulse 63   Temp 97.8 °F (36.6 °C) (Oral)   Resp 18   Ht 1.524 m (5')   Wt 80.1 kg (176 lb 9.4 oz)   LMP  (LMP Unknown)   SpO2 97%   BMI 34.49 kg/m²   No intake or output data in the 24 hours ending 02/18/24 1029    TELEMETRY: Sinus    has a past medical history of AK (actinic keratosis), CAD (coronary artery disease), Cancer (HCC), Carotid stenosis, Carotid stenosis, left, Coronary arteriosclerosis after percutaneous transluminal coronary angioplasty (PTCA), Coronary artery disease, Cough secondary to angiotensin converting enzyme inhibitor (ACE-I), COVID-19 virus infection, CTS (carpal tunnel syndrome), DDD (degenerative disc disease), cervical, DDD (degenerative disc disease), cervical, DDD (degenerative disc disease), lumbar, Degenerative disc disease, cervical, Diabetes mellitus with neuropathy (HCC), Diffuse cystic mastopathy, GERD without esophagitis, H/O cardiac catheterization, H/O cardiovascular stress test, H/O Doppler ultrasound, H/O echocardiogram, H/O left heart catheterization, H/O left heart catheterization by ventricular puncture, H/O mammogram, H/O tilt table evaluation, Hyperlipidemia, Hypertension, Memory loss, Neuropathy, Osteoarthritis,

## 2024-02-18 NOTE — PROGRESS NOTES
V2.0  [facility] [place] [place] Hospitalist Progress Note      Name:  Vita Clarke /Age/Sex: 1940  (83 y.o. female)   MRN & CSN:  [id number] & [id number] Encounter Date/Time: 2024 7:27 AM EST    Location:  [address] [place] [address]-A PCP: Joaquim Gonzalez MD       Hospital Day: 3    Assessment and Plan:   Vita Clarke is a 83 y.o. female  who presents with Chest pain    #Chest Pain, ACS rule out  #CAD s/p stentx3  -Pt presented with non exertional Chest Pain, started while patient was sitting in couch, cental/Left side, radiationg to back, 8/10 , improving to 0/10 in ED, with  no associated symptoms, VS stable  in ED, labs w Trop 18-->16, EKG as below -Pt rec'd  mg PO in the ED with improvement   -HEART Score 6   -Trop 18-->16-->15  -s/p 2 NTG at home  - Pt s/p  mg PO in ED   -CXR w no acute changes   Telemetry   Repeat EKG PRN for Chest Pain   NTG PRN for Chest Pain   ASA 81 mg PO QD   Atorvastatin 80 mg PO QHS   statin , losartan , metoprolol  Echo ordered  Cardiology consulted for any further ischemic workup  Plan for stress test tomorrow  Npo at midnight    #Acute anemia-stable  -hb: 10.9, previously 12-13  Continue to monitor     #HTN  losartan, metoprolol    #HLD  c/w statin    #DM  -HbA1c: 6.7  LDSSI w hypoglycemia protocol. Hold metformin      Diet ADULT DIET; Regular   DVT Prophylaxis [x] Lovenox, []  Heparin, [] SCDs, [] Ambulation,  [] Eliquis, [] Xarelto  [] Coumadin   Code Status Full Code   Disposition From: home  Expected Disposition: home  Estimated Date of Discharge: 2 days  Patient requires continued admission due to stress test Monday   Surrogate Decision Maker/ SHELTON Clarke     Subjective:     Chief Complaint: Chest Pain (Sees Dr. Montoya ) and Shortness of Breath     Patient without any chest pain.  Denies shortness of breath.  Overall feeling well.    Review of Systems:    As above    Objective:   No intake or output data in the 24 hours ending 24 0740     Vitals:   Vitals:

## 2024-02-19 ENCOUNTER — APPOINTMENT (OUTPATIENT)
Dept: NON INVASIVE DIAGNOSTICS | Age: 84
DRG: 313 | End: 2024-02-19
Attending: STUDENT IN AN ORGANIZED HEALTH CARE EDUCATION/TRAINING PROGRAM
Payer: MEDICARE

## 2024-02-19 ENCOUNTER — APPOINTMENT (OUTPATIENT)
Dept: NUCLEAR MEDICINE | Age: 84
DRG: 313 | End: 2024-02-19
Attending: INTERNAL MEDICINE
Payer: MEDICARE

## 2024-02-19 ENCOUNTER — APPOINTMENT (OUTPATIENT)
Dept: NON INVASIVE DIAGNOSTICS | Age: 84
DRG: 313 | End: 2024-02-19
Attending: INTERNAL MEDICINE
Payer: MEDICARE

## 2024-02-19 VITALS
BODY MASS INDEX: 34.55 KG/M2 | HEIGHT: 60 IN | TEMPERATURE: 97.6 F | HEART RATE: 65 BPM | DIASTOLIC BLOOD PRESSURE: 45 MMHG | WEIGHT: 176 LBS | SYSTOLIC BLOOD PRESSURE: 130 MMHG | OXYGEN SATURATION: 96 % | RESPIRATION RATE: 14 BRPM

## 2024-02-19 LAB
ANION GAP SERPL CALCULATED.3IONS-SCNC: 12 MMOL/L (ref 7–16)
BASOPHILS ABSOLUTE: 0 K/CU MM
BASOPHILS RELATIVE PERCENT: 0.4 % (ref 0–1)
BUN SERPL-MCNC: 23 MG/DL (ref 6–23)
CALCIUM SERPL-MCNC: 8.4 MG/DL (ref 8.3–10.6)
CHLORIDE BLD-SCNC: 97 MMOL/L (ref 99–110)
CO2: 25 MMOL/L (ref 21–32)
CREAT SERPL-MCNC: 0.9 MG/DL (ref 0.6–1.1)
DIFFERENTIAL TYPE: ABNORMAL
ECHO AO ROOT DIAM: 2.9 CM
ECHO AO ROOT INDEX: 1.64 CM/M2
ECHO AR MAX VEL PISA: 3.1 M/S
ECHO AV AREA PEAK VELOCITY: 2.5 CM2
ECHO AV AREA VTI: 2.4 CM2
ECHO AV AREA/BSA PEAK VELOCITY: 1.4 CM2/M2
ECHO AV AREA/BSA VTI: 1.4 CM2/M2
ECHO AV MEAN GRADIENT: 6 MMHG
ECHO AV MEAN VELOCITY: 1.1 M/S
ECHO AV PEAK GRADIENT: 9 MMHG
ECHO AV PEAK VELOCITY: 1.5 M/S
ECHO AV REGURGITANT PHT: 603 MS
ECHO AV VELOCITY RATIO: 0.93
ECHO AV VTI: 38.8 CM
ECHO BSA: 1.84 M2
ECHO BSA: 1.84 M2
ECHO EST RA PRESSURE: 3 MMHG
ECHO LA AREA 4C: 22.7 CM2
ECHO LA DIAMETER INDEX: 2.26 CM/M2
ECHO LA DIAMETER: 4 CM
ECHO LA MAJOR AXIS: 5.1 CM
ECHO LA TO AORTIC ROOT RATIO: 1.38
ECHO LA VOL MOD A4C: 82 ML (ref 22–52)
ECHO LA VOLUME INDEX MOD A4C: 46 ML/M2 (ref 16–34)
ECHO LV E' LATERAL VELOCITY: 9 CM/S
ECHO LV E' SEPTAL VELOCITY: 6 CM/S
ECHO LV EDV A4C: 58 ML
ECHO LV EDV INDEX A4C: 33 ML/M2
ECHO LV EJECTION FRACTION A4C: 57 %
ECHO LV ESV A4C: 25 ML
ECHO LV ESV INDEX A4C: 14 ML/M2
ECHO LV FRACTIONAL SHORTENING: 41 % (ref 28–44)
ECHO LV INTERNAL DIMENSION DIASTOLE INDEX: 2.88 CM/M2
ECHO LV INTERNAL DIMENSION DIASTOLIC: 5.1 CM (ref 3.9–5.3)
ECHO LV INTERNAL DIMENSION SYSTOLIC INDEX: 1.69 CM/M2
ECHO LV INTERNAL DIMENSION SYSTOLIC: 3 CM
ECHO LV IVSD: 0.9 CM (ref 0.6–0.9)
ECHO LV MASS 2D: 163.6 G (ref 67–162)
ECHO LV MASS INDEX 2D: 92.4 G/M2 (ref 43–95)
ECHO LV POSTERIOR WALL DIASTOLIC: 0.9 CM (ref 0.6–0.9)
ECHO LV RELATIVE WALL THICKNESS RATIO: 0.35
ECHO LVOT AREA: 2.8 CM2
ECHO LVOT AV VTI INDEX: 0.85
ECHO LVOT DIAM: 1.9 CM
ECHO LVOT MEAN GRADIENT: 4 MMHG
ECHO LVOT PEAK GRADIENT: 8 MMHG
ECHO LVOT PEAK VELOCITY: 1.4 M/S
ECHO LVOT STROKE VOLUME INDEX: 52.7 ML/M2
ECHO LVOT SV: 93.2 ML
ECHO LVOT VTI: 32.9 CM
ECHO MV A VELOCITY: 1.12 M/S
ECHO MV E DECELERATION TIME (DT): 183 MS
ECHO MV E VELOCITY: 1.01 M/S
ECHO MV E/A RATIO: 0.9
ECHO MV E/E' LATERAL: 11.22
ECHO MV E/E' RATIO (AVERAGED): 14.03
ECHO RIGHT VENTRICULAR SYSTOLIC PRESSURE (RVSP): 38 MMHG
ECHO RV MID DIMENSION: 2.7 CM
ECHO TV REGURGITANT MAX VELOCITY: 2.94 M/S
ECHO TV REGURGITANT PEAK GRADIENT: 35 MMHG
EOSINOPHILS ABSOLUTE: 0.5 K/CU MM
EOSINOPHILS RELATIVE PERCENT: 8 % (ref 0–3)
GFR SERPL CREATININE-BSD FRML MDRD: >60 ML/MIN/1.73M2
GLUCOSE BLD-MCNC: 132 MG/DL (ref 70–99)
GLUCOSE BLD-MCNC: 140 MG/DL (ref 70–99)
GLUCOSE SERPL-MCNC: 138 MG/DL (ref 70–99)
HCT VFR BLD CALC: 34.1 % (ref 37–47)
HEMOGLOBIN: 11.3 GM/DL (ref 12.5–16)
IMMATURE NEUTROPHIL %: 0.2 % (ref 0–0.43)
LYMPHOCYTES ABSOLUTE: 2.3 K/CU MM
LYMPHOCYTES RELATIVE PERCENT: 40.8 % (ref 24–44)
MCH RBC QN AUTO: 30.1 PG (ref 27–31)
MCHC RBC AUTO-ENTMCNC: 33.1 % (ref 32–36)
MCV RBC AUTO: 90.9 FL (ref 78–100)
MONOCYTES ABSOLUTE: 0.5 K/CU MM
MONOCYTES RELATIVE PERCENT: 8.3 % (ref 0–4)
NUC STRESS EJECTION FRACTION: 77 %
NUCLEATED RBC %: 0 %
PDW BLD-RTO: 12.2 % (ref 11.7–14.9)
PLATELET # BLD: 191 K/CU MM (ref 140–440)
PMV BLD AUTO: 9.3 FL (ref 7.5–11.1)
POTASSIUM SERPL-SCNC: 4.1 MMOL/L (ref 3.5–5.1)
RBC # BLD: 3.75 M/CU MM (ref 4.2–5.4)
SEGMENTED NEUTROPHILS ABSOLUTE COUNT: 2.4 K/CU MM
SEGMENTED NEUTROPHILS RELATIVE PERCENT: 42.3 % (ref 36–66)
SODIUM BLD-SCNC: 134 MMOL/L (ref 135–145)
STRESS BASELINE DIAS BP: 65 MMHG
STRESS BASELINE HR: 66 BPM
STRESS BASELINE ST DEPRESSION: 0 MM
STRESS BASELINE SYS BP: 178 MMHG
STRESS ESTIMATED WORKLOAD: 1 METS
STRESS PEAK DIAS BP: 65 MMHG
STRESS PEAK SYS BP: 178 MMHG
STRESS PERCENT HR ACHIEVED: 61 %
STRESS POST PEAK HR: 83 BPM
STRESS RATE PRESSURE PRODUCT: NORMAL BPM*MMHG
STRESS TARGET HR: 136 BPM
TOTAL IMMATURE NEUTOROPHIL: 0.01 K/CU MM
TOTAL NUCLEATED RBC: 0 K/CU MM
WBC # BLD: 5.7 K/CU MM (ref 4–10.5)

## 2024-02-19 PROCEDURE — 3430000000 HC RX DIAGNOSTIC RADIOPHARMACEUTICAL: Performed by: ORTHOPAEDIC SURGERY

## 2024-02-19 PROCEDURE — 85025 COMPLETE CBC W/AUTO DIFF WBC: CPT

## 2024-02-19 PROCEDURE — 78452 HT MUSCLE IMAGE SPECT MULT: CPT

## 2024-02-19 PROCEDURE — APPNB15 APP NON BILLABLE TIME 0-15 MINS

## 2024-02-19 PROCEDURE — 78452 HT MUSCLE IMAGE SPECT MULT: CPT | Performed by: INTERNAL MEDICINE

## 2024-02-19 PROCEDURE — 36415 COLL VENOUS BLD VENIPUNCTURE: CPT

## 2024-02-19 PROCEDURE — 6370000000 HC RX 637 (ALT 250 FOR IP): Performed by: INTERNAL MEDICINE

## 2024-02-19 PROCEDURE — 6370000000 HC RX 637 (ALT 250 FOR IP): Performed by: STUDENT IN AN ORGANIZED HEALTH CARE EDUCATION/TRAINING PROGRAM

## 2024-02-19 PROCEDURE — 80048 BASIC METABOLIC PNL TOTAL CA: CPT

## 2024-02-19 PROCEDURE — A9500 TC99M SESTAMIBI: HCPCS | Performed by: ORTHOPAEDIC SURGERY

## 2024-02-19 PROCEDURE — 82962 GLUCOSE BLOOD TEST: CPT

## 2024-02-19 PROCEDURE — 6360000002 HC RX W HCPCS: Performed by: INTERNAL MEDICINE

## 2024-02-19 PROCEDURE — 2580000003 HC RX 258: Performed by: STUDENT IN AN ORGANIZED HEALTH CARE EDUCATION/TRAINING PROGRAM

## 2024-02-19 PROCEDURE — 93306 TTE W/DOPPLER COMPLETE: CPT | Performed by: INTERNAL MEDICINE

## 2024-02-19 PROCEDURE — 93017 CV STRESS TEST TRACING ONLY: CPT

## 2024-02-19 PROCEDURE — 93306 TTE W/DOPPLER COMPLETE: CPT

## 2024-02-19 PROCEDURE — 93018 CV STRESS TEST I&R ONLY: CPT | Performed by: INTERNAL MEDICINE

## 2024-02-19 PROCEDURE — 93016 CV STRESS TEST SUPVJ ONLY: CPT | Performed by: INTERNAL MEDICINE

## 2024-02-19 RX ORDER — REGADENOSON 0.08 MG/ML
0.4 INJECTION, SOLUTION INTRAVENOUS
Status: COMPLETED | OUTPATIENT
Start: 2024-02-19 | End: 2024-02-19

## 2024-02-19 RX ORDER — TETRAKIS(2-METHOXYISOBUTYLISOCYANIDE)COPPER(I) TETRAFLUOROBORATE 1 MG/ML
30 INJECTION, POWDER, LYOPHILIZED, FOR SOLUTION INTRAVENOUS
Status: COMPLETED | OUTPATIENT
Start: 2024-02-19 | End: 2024-02-19

## 2024-02-19 RX ORDER — SENNA AND DOCUSATE SODIUM 50; 8.6 MG/1; MG/1
2 TABLET, FILM COATED ORAL DAILY
Status: DISCONTINUED | OUTPATIENT
Start: 2024-02-19 | End: 2024-02-19 | Stop reason: HOSPADM

## 2024-02-19 RX ORDER — TETRAKIS(2-METHOXYISOBUTYLISOCYANIDE)COPPER(I) TETRAFLUOROBORATE 1 MG/ML
10 INJECTION, POWDER, LYOPHILIZED, FOR SOLUTION INTRAVENOUS
Status: COMPLETED | OUTPATIENT
Start: 2024-02-19 | End: 2024-02-19

## 2024-02-19 RX ADMIN — KIT FOR THE PREPARATION OF TECHNETIUM TC99M SESTAMIBI 10 MILLICURIE: 1 INJECTION, POWDER, LYOPHILIZED, FOR SOLUTION PARENTERAL at 08:40

## 2024-02-19 RX ADMIN — DOCUSATE SODIUM 50MG AND SENNOSIDES 8.6MG 2 TABLET: 8.6; 5 TABLET, FILM COATED ORAL at 13:13

## 2024-02-19 RX ADMIN — REGADENOSON 0.4 MG: 0.08 INJECTION, SOLUTION INTRAVENOUS at 09:43

## 2024-02-19 RX ADMIN — KIT FOR THE PREPARATION OF TECHNETIUM TC99M SESTAMIBI 30 MILLICURIE: 1 INJECTION, POWDER, LYOPHILIZED, FOR SOLUTION PARENTERAL at 09:50

## 2024-02-19 RX ADMIN — SODIUM CHLORIDE, PRESERVATIVE FREE 5 ML: 5 INJECTION INTRAVENOUS at 11:29

## 2024-02-19 RX ADMIN — METOPROLOL TARTRATE 25 MG: 25 TABLET, FILM COATED ORAL at 11:27

## 2024-02-19 RX ADMIN — AMLODIPINE BESYLATE 5 MG: 5 TABLET ORAL at 11:27

## 2024-02-19 RX ADMIN — ASPIRIN 81 MG: 81 TABLET, CHEWABLE ORAL at 11:27

## 2024-02-19 RX ADMIN — GABAPENTIN 300 MG: 300 CAPSULE ORAL at 11:27

## 2024-02-19 RX ADMIN — CLOPIDOGREL BISULFATE 75 MG: 75 TABLET ORAL at 11:27

## 2024-02-19 RX ADMIN — ATORVASTATIN CALCIUM 40 MG: 40 TABLET, FILM COATED ORAL at 11:28

## 2024-02-19 RX ADMIN — LOSARTAN POTASSIUM 100 MG: 100 TABLET, FILM COATED ORAL at 11:27

## 2024-02-19 NOTE — PROGRESS NOTES
Today's plan:  STRESS test tomorrow, keep NPO after mid night      Admit Date:  2/16/2024    Subjective:ok      Chief complaints on admission  Chief Complaint   Patient presents with    Chest Pain     Sees Dr. Montoya     Shortness of Breath         History of present illness:Vita is a 83 y.o.year old who  presents with had concerns including Chest Pain (Sees Dr. Montoya ) and Shortness of Breath.      Past medical history:    has a past medical history of AK (actinic keratosis), CAD (coronary artery disease), Cancer (HCC), Carotid stenosis, Carotid stenosis, left, Coronary arteriosclerosis after percutaneous transluminal coronary angioplasty (PTCA), Coronary artery disease, Cough secondary to angiotensin converting enzyme inhibitor (ACE-I), COVID-19 virus infection, CTS (carpal tunnel syndrome), DDD (degenerative disc disease), cervical, DDD (degenerative disc disease), cervical, DDD (degenerative disc disease), lumbar, Degenerative disc disease, cervical, Diabetes mellitus with neuropathy (HCC), Diffuse cystic mastopathy, GERD without esophagitis, H/O cardiac catheterization, H/O cardiovascular stress test, H/O Doppler ultrasound, H/O echocardiogram, H/O left heart catheterization, H/O left heart catheterization by ventricular puncture, H/O mammogram, H/O tilt table evaluation, Hyperlipidemia, Hypertension, Memory loss, Neuropathy, Osteoarthritis, knee, Peripheral neuropathy, PONV (postoperative nausea and vomiting), Postsurgical menopause, Rheumatoid arthritis(714.0), S/P colonoscopic polypectomy, SCC (squamous cell carcinoma), leg, Thyroid nodule, TMJ (temporomandibular joint syndrome), and Unspecified cerebral artery occlusion with cerebral infarction.  Past surgical history:   has a past surgical history that includes Carpal tunnel release; cyst removal (1963); Hysterectomy, total abdominal (1963); Tonsillectomy (1972); Neck surgery (1973); Breast biopsy (1993); Finger surgery (1998); Percutaneous  replacement **OR** potassium chloride, magnesium sulfate, ondansetron **OR** ondansetron, polyethylene glycol, glucose, dextrose bolus **OR** dextrose bolus, glucagon (rDNA), dextrose    Lab Data:  CBC:   Recent Labs     02/16/24  1246 02/17/24  0451 02/18/24  0426   WBC 6.3 5.0 5.4   HGB 13.2 10.9* 11.2*   HCT 38.8 32.7* 34.7*   MCV 89.6 89.8 92.0    177 184     BMP:   Recent Labs     02/16/24  1246 02/17/24  0451 02/18/24  0426   * 134* 135   K 3.9 4.2 4.2   CL 94* 98* 98*   CO2 24 25 24   BUN 19 24* 29*   CREATININE 0.8 0.8 1.1     LIVER PROFILE:   Recent Labs     02/16/24 1246 02/17/24  0451   AST 23 19   ALT 18 15   BILITOT 0.9 0.6   ALKPHOS 102 90     PT/INR: No results for input(s): \"PROTIME\", \"INR\" in the last 72 hours.  APTT: No results for input(s): \"APTT\" in the last 72 hours.  BNP:  No results for input(s): \"BNP\" in the last 72 hours.  TROPONIN: @TROPONINI:3@      Assessment:  83 y.o.year old who is admitted for          Plan:  Chest pain: No ACS, she had abnormal chest pain enzyme has been trending down, maximum trop was 18, will get a stress test on Monday , Okay to feed patient over weekend.  CAD: Posterior LAD stent and significant risk, RCA moderate disease,Stress test on Monday, last year stress test suggested anterior ischemia however cath did not show any LAD disease,  H/O CVA, continue Plavix continue statins aspirin  DM: Continue insulin check blood glucose avoid hypoglycemia  HTN, continue amlodipine metoprolol losartan  Health maintenance: exerise and diet  All labs, medications and tests reviewed, continue all other medications of all above medical condition listed as is.      Aubrey Zendejas MD, MD 2/18/2024 8:10 PM

## 2024-02-19 NOTE — DISCHARGE INSTRUCTIONS
You were admitted with chest pain. You had a stress test which was normal and your ultrasound of the heart looked good as well.     You are stable for discharge home.    Please follow up with cardiology in 2 weeks and your primary care provider in 1 week.     If your symptoms come back or worsen, please return to the emergency room.

## 2024-02-19 NOTE — PROGRESS NOTES
Stress test negative for ischemia. Echo showing preserved EF without any wall motion abnormalities.    No further ischemic workup planned at this time.     Kal Nolen PA-C 02/19/24 2:22 PM

## 2024-02-19 NOTE — DISCHARGE SUMMARY
V2.0  Discharge Summary    Name:  Vita Clarke /Age/Sex: 1940 (83 y.o. female)   Admit Date: 2024  Discharge Date: 24    MRN & CSN:  [id number] & [id number] Encounter Date and Time 24 3:02 PM EST    Attending:  Jayne Thomason MD Discharging Provider: Jayne Thomason MD       Hospital Course:     Brief HPI: Vita Clarke is a 83 y.o. female who presented with Chest Pain , central, started about noon, pt was sitting in couch when it started, 9/10 intensity, radiating to back, with no diaphoresis or Nausea. CP resolved s/p ASA/ NTG in ED. ROS otherwise unremarkable. VS stable in ED, labs w trop 16< 18, EKG w septal/ anterior changes, RBBB. Pt s/p  mg PO in ED and currently being admitted for further management.      Brief Problem Based Course:   #Chest Pain, ACS rule out  #CAD s/p stentx3  -Pt presented with non exertional Chest Pain, started while patient was sitting in couch, cental/Left side, radiationg to back, 8/10 , improving to 0/10 in ED, with  no associated symptoms, VS stable  in ED, labs w Trop 18-->16, EKG as below -Pt rec'd  mg PO in the ED with improvement   -HEART Score 6   -Trop 18-->16-->15  -s/p 2 NTG at home  - Pt s/p  mg PO in ED   -CXR w no acute changes   -Echo: EF 55 to 60%, grade 1 diastolic dysfunction, mild TR, trace AR  -Normal stress test  Telemetry while inpatient  NTG PRN for Chest Pain   ASA 81 mg PO QD   Atorvastatin 80 mg PO QHS   statin , losartan , metoprolol, continue  Cardiology consulted   follow-up with cardiology in 2 weeks    #Acute anemia-stable  -hb: 10.9, previously 12-13  Continue to monitor     #HTN  losartan, metoprolol    #HLD  c/w statin    #DM  -HbA1c: 6.7  LDSSI w hypoglycemia protocol. Hold metformin    The patient expressed appropriate understanding of, and agreement with the discharge recommendations, medications, and plan.     Consults this admission:  IP CONSULT TO CARDIOLOGY    Discharge Diagnosis:   Chest

## 2024-02-20 ENCOUNTER — CARE COORDINATION (OUTPATIENT)
Dept: CASE MANAGEMENT | Age: 84
End: 2024-02-20

## 2024-02-20 DIAGNOSIS — R07.9 CHEST PAIN, UNSPECIFIED TYPE: Primary | ICD-10-CM

## 2024-02-20 PROCEDURE — 1111F DSCHRG MED/CURRENT MED MERGE: CPT | Performed by: INTERNAL MEDICINE

## 2024-02-20 NOTE — CARE COORDINATION
Care Transitions Outreach Attempt    Call within 2 business days of discharge: Yes     Patient: Vita Clarke Patient : 1940 MRN: [id number]    Last Discharge Facility       Date Complaint Diagnosis Description Type Department Provider    24 Chest Pain; Shortness of Breath Unstable angina pectoris (HCC) ... ED to Hosp-Admission (Discharged) (ADMITTED) [facility] 3E Jayne Thomason MD; Radha Beckett...        Noted following upcoming appointments from discharge chart review:   [facility] follow up appointment(s):   Future Appointments   Date Time Provider Department Center   2024  9:00 AM Slava Hoang MD [facility]   3/8/2024 10:15 AM Gomez Jorge MD [facility]   3/8/2024  1:00 PM [facility] MRI ROOM 1 [facility] MR [facility]   3/8/2024  2:00 PM [facility] MRI ROOM 1 [facility] MR [facility]   3/11/2024  1:00 PM Mary Matson MD [facility]   4/3/2024  9:15 AM James Hoang MD [facility] RHEUM [facility]   2024  9:30 AM Joaquim Gonzalez MD [facility] E S IM [facility]   2024 10:00 AM Maged Whitlock MD [facility] CT SURG [facility]       Challenges to be reviewed by the provider   Additional needs identified to be addressed with provider:    Dx: Chest Pain w/ ACS R/O  Facility: [facility] 24-24    Please contact for scheduling of 7 day hospital follow up / TCM appt due by 24.         Method of communication with provider : chart routing    1st attempt to reach for Care Transition discharge call unsuccessful. HIPAA compliant messages x 2 left requesting call back. UTR letter sent via [employer]

## 2024-02-20 NOTE — CARE COORDINATION
Care Transitions Initial Follow Up Call    Call within 2 business days of discharge: Yes    Patient Current Location:  Home:  [address] [facility] [address]    Care Transition Nurse contacted the patient by telephone to perform post hospital discharge assessment. Verified name and  with patient as identifiers. Provided introduction to self, and explanation of the Care Transition Nurse role.     Patient: iVta Clarke Patient : 1940   MRN: [id number]  Reason for Admission: Chest Pain w/ ACS R/O  Discharge Date: 24 RARS: Readmission Risk Score: 12.1  Facility: [facility] 24-24    Last Discharge Facility       Date Complaint Diagnosis Description Type Department Provider    24 Chest Pain; Shortness of Breath Unstable angina pectoris (HCC) ... ED to Hosp-Admission (Discharged) (ADMITTED) CYRUS[facility] 3E Jayne Thomason MD; Radha Beckett...          Challenges to be reviewed by the provider   Additional needs identified to be addressed with provider: No     Method of communication with provider: none.    Patient reports doing very well since home. Denies chest pain/pressure, palps, sob, n/v, diaphoresis, ac distress. Reports she has prn Nitro and has not needed since home. Noted to be speaking in complete, unlabored sentences. Advised routine monitoring of BP/HR, keeping written record for MD review. Confirmed wrist cuff system. Has appt w/ Dr Jorge- EP Cardio 3/8/24. Reports she will schedule appt w/ Dr Matson- Cardiology; advised calling for appt asap. Reports taking cardiac rx and all routine meds as directed. Advised heart healthy diet; declined need for RD.  Reports she is non-smoker. Reports appetite, fluid intake good w/ b&b wnl. Lives alone, independent adls, drives, has support of Dtr. Reports she uses cane for long distances. Denies resource needs including hhc, dme, community assistance.     Care Transition Nurse reviewed discharge instructions, medical action plan,

## 2024-02-21 LAB
QUANTI TB1 MINUS NIL: 0 IU/ML (ref 0–0.34)
QUANTI TB2 MINUS NIL: 0 IU/ML (ref 0–0.34)
QUANTIFERON (R) TB GOLD (INCUBATED): NEGATIVE IU/ML
QUANTIFERON MITOGEN MINUS NIL: 7.92 IU/ML
QUANTIFERON NIL: 0.72 IU/ML

## 2024-02-22 ENCOUNTER — OFFICE VISIT (OUTPATIENT)
Dept: INTERNAL MEDICINE CLINIC | Age: 84
End: 2024-02-22

## 2024-02-22 VITALS
DIASTOLIC BLOOD PRESSURE: 60 MMHG | OXYGEN SATURATION: 97 % | SYSTOLIC BLOOD PRESSURE: 152 MMHG | BODY MASS INDEX: 33.67 KG/M2 | WEIGHT: 172.4 LBS | TEMPERATURE: 96.8 F | HEART RATE: 60 BPM

## 2024-02-22 DIAGNOSIS — I63.30 CEREBROVASCULAR ACCIDENT (CVA) DUE TO THROMBOSIS OF CEREBRAL ARTERY (HCC): ICD-10-CM

## 2024-02-22 DIAGNOSIS — E11.40 TYPE 2 DIABETES MELLITUS WITH DIABETIC NEUROPATHY, WITHOUT LONG-TERM CURRENT USE OF INSULIN (HCC): ICD-10-CM

## 2024-02-22 DIAGNOSIS — I25.10 CORONARY ARTERY DISEASE INVOLVING NATIVE CORONARY ARTERY OF NATIVE HEART WITHOUT ANGINA PECTORIS: ICD-10-CM

## 2024-02-22 DIAGNOSIS — R68.84 JAW PAIN: ICD-10-CM

## 2024-02-22 DIAGNOSIS — Z09 HOSPITAL DISCHARGE FOLLOW-UP: ICD-10-CM

## 2024-02-22 DIAGNOSIS — R07.89 ATYPICAL CHEST PAIN: Primary | ICD-10-CM

## 2024-02-22 DIAGNOSIS — L60.8 NAIL DEFORMITY: ICD-10-CM

## 2024-02-22 DIAGNOSIS — G44.89 OTHER HEADACHE SYNDROME: ICD-10-CM

## 2024-02-22 RX ORDER — NITROGLYCERIN 0.4 MG/1
TABLET SUBLINGUAL
Qty: 25 TABLET | Refills: 0 | Status: SHIPPED | OUTPATIENT
Start: 2024-02-22

## 2024-02-22 RX ORDER — CLOPIDOGREL BISULFATE 75 MG/1
75 TABLET ORAL DAILY
Qty: 90 TABLET | Refills: 1 | Status: SHIPPED | OUTPATIENT
Start: 2024-02-22

## 2024-02-22 RX ORDER — LANOLIN ALCOHOL/MO/W.PET/CERES
1 CREAM (GRAM) TOPICAL DAILY
Qty: 30 TABLET | Refills: 3 | Status: SHIPPED | OUTPATIENT
Start: 2024-02-22

## 2024-02-22 RX ORDER — GABAPENTIN 300 MG/1
CAPSULE ORAL
Qty: 180 CAPSULE | Refills: 1 | Status: SHIPPED | OUTPATIENT
Start: 2024-02-22 | End: 2024-08-22

## 2024-02-22 RX ORDER — LOSARTAN POTASSIUM 100 MG/1
100 TABLET ORAL DAILY
Qty: 90 TABLET | Refills: 1 | Status: SHIPPED | OUTPATIENT
Start: 2024-02-22

## 2024-02-22 NOTE — PROGRESS NOTES
appropriate understanding of, and agreement with the discharge recommendations, medications, and plan.      Consults this admission:  IP CONSULT TO CARDIOLOGY     Discharge Diagnosis:   Chest pain     CAD    Interval history/Current status: hx of cad and prior stents, has had resolution of prior sx.  Strss lexiscan was benign.  Echo also benign w EF 55-60%  Did have some anemia also noted during admission.      Has persistent L sided jaw swelling and aching, seen by ID earlier this mo, f/u next week.  welling of left side of face  Prednisone brought no relief. Her left side of the face continues to hurt. Concerned with space-occupying lesion, ?amyloidosis. Obtain MRI, SPEP, UPEP, CBC, BMP, ESR, CRP.     not had MRI brain or neck done yet.    Blood tests were benign incl ESR, CRP, CBC and  SPE     Return in about 3 weeks (around 2/22/2024).    Patient Active Problem List   Diagnosis    Peripheral neuropathy (HCC)    ASCVD (arteriosclerotic cardiovascular disease)    Essential hypertension    Rheumatoid arthritis (HCC)    Hyperlipidemia    Osteoarthritis, knee    Carotid stenosis, left    IFG (impaired fasting glucose)    SCC (squamous cell carcinoma), leg    Memory loss    Diabetes mellitus with neuropathy (HCC)    Cerebrovascular accident (CVA) due to thrombosis of cerebral artery (HCC)    Coronary artery disease involving native coronary artery of native heart without angina pectoris    Dysarthria due to recent cerebral infarction    Oropharyngeal dysphagia    Gait disturbance    Thyroid nodule    Type 2 diabetes mellitus with diabetic neuropathy, without long-term current use of insulin (HCC)    Diabetic nephropathy associated with type 2 diabetes mellitus (HCC)    DDD (degenerative disc disease), cervical    DDD (degenerative disc disease), lumbar    S/P colonoscopic polypectomy    COVID-19 virus infection    Headache    Carotid artery calcification, left    Swelling of left side of face    Swelling of joint,

## 2024-02-24 ENCOUNTER — HOSPITAL ENCOUNTER (OUTPATIENT)
Dept: MRI IMAGING | Age: 84
Discharge: HOME | End: 2024-02-24
Attending: INTERNAL MEDICINE
Payer: MEDICARE

## 2024-02-24 DIAGNOSIS — R22.0 SWELLING OF LEFT SIDE OF FACE: ICD-10-CM

## 2024-02-24 PROCEDURE — 70551 MRI BRAIN STEM W/O DYE: CPT

## 2024-02-25 RX ORDER — ATORVASTATIN CALCIUM 40 MG/1
40 TABLET, FILM COATED ORAL DAILY
Qty: 90 TABLET | Refills: 3 | Status: SHIPPED | OUTPATIENT
Start: 2024-02-25

## 2024-02-27 ENCOUNTER — CARE COORDINATION (OUTPATIENT)
Dept: CASE MANAGEMENT | Age: 84
End: 2024-02-27

## 2024-02-27 NOTE — CARE COORDINATION
Care Transitions Follow Up Call    Patient Current Location:  Home: [address] [facility] [address]    Care Transition Nurse contacted the patient by telephone to follow up after admission on 24-24.  Verified name and  with patient as identifiers.    Patient: Vita Clarke  Patient : 1940   MRN: [id number]  Reason for Admission: Chest Pain w/ ACS R/O   Discharge Date: 24 RARS: Readmission Risk Score: 12.1  Facility: [facility]     Needs to be reviewed by the provider   Additional needs identified to be addressed with provider: No     Method of communication with provider: none.    Patient reports \"doing real good\". Denies chest pain/pressure, palpitations, ac distress. Reports chronic sob on exertion and occasional dizziness at baseline. Noted to be speaking in complete, unlabored sentences. Reviewed dizziness and fall safety interventions. Denies need for MD notification. Was seen by PCP 24. Has upcoming [facility] appts as below, transportation confirmed. Reports /64, HR 71. Advised heart healthy diet low in fat and sodium. Denies need for RD. Reports \"I eat the best I can, its hard\". Denies need for mailed education. Reports appetite, fluid intake good w/ b&b wnl.  Continues to deny resource needs including c, community assistance.     Future Appointments   Date Time Provider Department Center   2024  9:45 AM Slava Hoang MD [facility] ID [facility]   3/8/2024 10:15 AM Gomez Jorge MD [facility] Heart [facility]   3/8/2024  1:00 PM [facility] MRI ROOM 1 [facility]   3/8/2024  2:00 PM [facility]   3/11/2024  1:10 PM Mary Matson MD [facility] Heart [facility]   4/3/2024  9:15 AM James Hoang MD SR[facility] RHEUM [facility]   2024  9:30 AM Joaquim Gonzalez MD SR[facility] E S IM [facility]   2024 10:00 AM Maged Whitlock MD SRMX CT SURG [facility]     Care Transition Nurse reviewed medical action plan and red flags with patient and

## 2024-02-29 ENCOUNTER — OFFICE VISIT (OUTPATIENT)
Dept: INFECTIOUS DISEASES | Age: 84
End: 2024-02-29

## 2024-02-29 VITALS
RESPIRATION RATE: 18 BRPM | WEIGHT: 173.6 LBS | BODY MASS INDEX: 33.9 KG/M2 | HEART RATE: 61 BPM | DIASTOLIC BLOOD PRESSURE: 66 MMHG | SYSTOLIC BLOOD PRESSURE: 180 MMHG

## 2024-02-29 DIAGNOSIS — R22.0 SWELLING OF LEFT SIDE OF FACE: Primary | ICD-10-CM

## 2024-02-29 RX ORDER — LACTOBACILLUS RHAMNOSUS GG 10B CELL
1 CAPSULE ORAL DAILY
Qty: 30 CAPSULE | Refills: 0 | Status: SHIPPED | OUTPATIENT
Start: 2024-02-29 | End: 2024-03-30

## 2024-02-29 RX ORDER — AMOXICILLIN AND CLAVULANATE POTASSIUM 875; 125 MG/1; MG/1
1 TABLET, FILM COATED ORAL 2 TIMES DAILY
Qty: 28 TABLET | Refills: 0 | Status: SHIPPED | OUTPATIENT
Start: 2024-02-29 | End: 2024-03-14

## 2024-02-29 NOTE — ASSESSMENT & PLAN NOTE
Labs negative. ?chronic parotitis. Trial of Augmentin and probiotics for 14 days and 30 days respectively.

## 2024-02-29 NOTE — PROGRESS NOTES
History:   Diagnosis Date    AK (actinic keratosis)     Surekha Ledesma following    CAD (coronary artery disease)     5/2005 S/P PTCA and stents X2 - Dr Howard    Cancer ([facility])     skin    Carotid stenosis     Carotid stenosis, left     monitored by Dr Matson- 50% stenosis noted on CTA 10/2016    Coronary arteriosclerosis after percutaneous transluminal coronary angioplasty (PTCA) 12/07/2022    PCI procedure:DE Stent, LAD: DE Stent Plcmt Initl Vsl, LAD: DE Stent Plcmt Addtl Vsl, CIRC: DE Stent Plcmt Initl Vsl, Balloon Angioplasty, LAD: Balloon Angioplasty Initl Vsl, LAD: Balloon Angioplasty Addtl Vsl, CIRC: Balloon Angioplasty Initl Vsl.    Coronary artery disease     Dr Matson    Cough secondary to angiotensin converting enzyme inhibitor (ACE-I)     inactive    COVID-19 virus infection     tested pos in Nov 2020- mild cough.  now resolved.    CTS (carpal tunnel syndrome)     inactive-S/P right CTS surg 4/2001- Dr Parmar    DDD (degenerative disc disease), cervical     DDD (degenerative disc disease), cervical     DDD (degenerative disc disease), lumbar     Degenerative disc disease, cervical     Diabetes mellitus with neuropathy ([facility])     Dr Alvarado/sheyla, - ON NEURONTIN    Diffuse cystic mastopathy     GERD without esophagitis     H/O cardiac catheterization 05/2005 5/16/05 Circ stent 80% prior 0% post, EF 60%    H/O cardiovascular stress test 12/29/2020    ef 60% normal lexiscan    H/O Doppler ultrasound 10/2011, 8/09    carotid 10/4/11 moderat stenosis left internal carotid atery est 50-69%, progression in stenotic changes left internal carotid artery, right WNL    H/O echocardiogram 12/29/2020    EF 55-60% mild TR AR and pulmonic regurg    H/O left heart catheterization 11/30/2022    LM patent.  CX 90% distal to stent, LAD sml caliber vessel, 70%proximal stenosis, 90% distal stenosi, RCA 70% stenosis.  Recommend PCI vs CABG    H/O left heart catheterization by ventricular puncture 09/15/2023    LAD PATENT STENT,

## 2024-03-04 NOTE — PROGRESS NOTES
Physician Progress Note      PATIENT:               EDIS HARTLEY  [facility] #:                  [id number]  :                       1940  ADMIT DATE:       2024 12:25 PM  DISCH DATE:        2024 4:02 PM  RESPONDING  PROVIDER #:        Jayne Thomason MD          QUERY TEXT:    Pt admitted with Chest pain.  Pt noted to have NTG and  mg PO in the ED   with improvement. If possible, please document in progress notes and   discharge summary if you are evaluating and/or treating any of the following:    The medical record reflects the following:  Risk Factors: CAD s/p stentx3  Clinical Indicators: presents with chest pain for last few days, happening   daily, intermittent for 15 to 20 mins and aggravated with activity substernal   also,reproducible with palpation, radiated to shoulder, 6/10, tender to   touch,associated with shortness of breath, + sweating, nausea, did get NTG in   ED. Trop 18-->16-->15-s/p 2 NTG at home, Acute anemia-hb: 10.9, previously   12-13, CAD: Posterior LAD stent and significant risk, RCA moderate disease,   last year stress test suggested anterior ischemia however cath did not show   any LAD disease  Treatment: 2 NTG at home, did get NTG in ED, Trans Thoracic Echocardiogram,   Cardiology consulted, stress test,  mg PO in the ED with improvement,   Nitro SL on DC for chest pain    Thank you, Nay Dang RN, [facility] [phone]  Options provided:  -- Chest pain due to CAD with unstable angina  -- Chest pain due to other, ## please specify, please specify.  -- Other - I will add my own diagnosis  -- Disagree - Not applicable / Not valid  -- Disagree - Clinically unable to determine / Unknown  -- Refer to Clinical Documentation Reviewer    PROVIDER RESPONSE TEXT:    This patient has CAD with unstable angina.    Query created by: Nay Dang on 2024 7:55 AM      Electronically signed by:  Jayne Thomason MD 3/4/2024 3:08 PM

## 2024-03-05 ENCOUNTER — CARE COORDINATION (OUTPATIENT)
Dept: CASE MANAGEMENT | Age: 84
End: 2024-03-05

## 2024-03-05 NOTE — CARE COORDINATION
Care Transitions Follow Up Call    Patient Current Location:  Home: [address]  [address] [place] [id number]    Care Transition Nurse contacted the patient by telephone to follow up after admission on 24-24.   Verified name and  with patient as identifiers.    Patient: Vita Clarke  Patient : 1940   MRN: [id number]  Reason for Admission: Chest Pain w/ ACS R/O    Discharge Date: 24 RARS: Readmission Risk Score: 12.1  Facility: [facility]     Needs to be reviewed by the provider   Additional needs identified to be addressed with provider: No     Method of communication with provider: none.    Reports doing well. Reports occasional mild chest pain which resolves spontaneously. Denies ac distress. Noted to be speaking in complete, unlabored sentences. Has Nitro if needed---has not taken. Reports /80's today. Has appt w/ Dr Jorge 3/8/24; advised to discuss ongoing episodes of cp. Advised low na, heart healthy diet. Patient with DM.  today at 11am after eating breakfast at 6am. Reminded to check fasting bg q morning. Advised low carb diet. Denies need for RD. Reports appetite, fluid intake good w/ b&b wnl. Denies resource needs including hhc, dme, community assistance.     Future Appointments   Date Time Provider Department Center   3/8/2024 10:15 AM Gomez Jorge MD [facility] Heart [facility]   3/8/2024  1:00 PM [facility]OM 1 [facility]   3/8/2024  2:00 PM [facility]   3/11/2024  1:20 PM Mary Matson MD [facility]   3/21/2024  9:00 AM Slava Hoang MD [facility] ID [facility]   4/3/2024  9:15 AM James Hoang MD [facility] RHEUM [facility]   2024  9:30 AM Joaquim Gonzalez MD [facility] E S IM [facility]   2024 10:00 AM Maged Whiltock MD SRMX CT SURG [facility]     Care Transition Nurse reviewed medical action plan and red flags with patient and discussed any barriers to care and/or understanding of plan of care after discharge.

## 2024-03-08 ENCOUNTER — INITIAL CONSULT (OUTPATIENT)
Dept: CARDIOLOGY CLINIC | Age: 84
End: 2024-03-08
Payer: MEDICARE

## 2024-03-08 ENCOUNTER — HOSPITAL ENCOUNTER (OUTPATIENT)
Dept: MRI IMAGING | Age: 84
Discharge: HOME | End: 2024-03-08
Payer: MEDICARE

## 2024-03-08 VITALS
OXYGEN SATURATION: 94 % | HEART RATE: 56 BPM | BODY MASS INDEX: 33.79 KG/M2 | WEIGHT: 173 LBS | SYSTOLIC BLOOD PRESSURE: 130 MMHG | RESPIRATION RATE: 18 BRPM | DIASTOLIC BLOOD PRESSURE: 66 MMHG

## 2024-03-08 DIAGNOSIS — I25.10 ASCVD (ARTERIOSCLEROTIC CARDIOVASCULAR DISEASE): ICD-10-CM

## 2024-03-08 DIAGNOSIS — R22.0 SWELLING OF LEFT SIDE OF FACE: ICD-10-CM

## 2024-03-08 DIAGNOSIS — I25.10 CORONARY ARTERY DISEASE INVOLVING NATIVE CORONARY ARTERY OF NATIVE HEART WITHOUT ANGINA PECTORIS: ICD-10-CM

## 2024-03-08 DIAGNOSIS — R76.8 ANA POSITIVE: ICD-10-CM

## 2024-03-08 DIAGNOSIS — Z87.39 HISTORY OF RHEUMATOID ARTHRITIS: ICD-10-CM

## 2024-03-08 DIAGNOSIS — I48.0 PAF (PAROXYSMAL ATRIAL FIBRILLATION) (HCC): Primary | ICD-10-CM

## 2024-03-08 PROCEDURE — 99205 OFFICE O/P NEW HI 60 MIN: CPT | Performed by: INTERNAL MEDICINE

## 2024-03-08 PROCEDURE — 3078F DIAST BP <80 MM HG: CPT | Performed by: INTERNAL MEDICINE

## 2024-03-08 PROCEDURE — G8417 CALC BMI ABV UP PARAM F/U: HCPCS | Performed by: INTERNAL MEDICINE

## 2024-03-08 PROCEDURE — A9579 GAD-BASE MR CONTRAST NOS,1ML: HCPCS | Performed by: STUDENT IN AN ORGANIZED HEALTH CARE EDUCATION/TRAINING PROGRAM

## 2024-03-08 PROCEDURE — G8399 PT W/DXA RESULTS DOCUMENT: HCPCS | Performed by: INTERNAL MEDICINE

## 2024-03-08 PROCEDURE — 1111F DSCHRG MED/CURRENT MED MERGE: CPT | Performed by: INTERNAL MEDICINE

## 2024-03-08 PROCEDURE — 1036F TOBACCO NON-USER: CPT | Performed by: INTERNAL MEDICINE

## 2024-03-08 PROCEDURE — 1124F ACP DISCUSS-NO DSCNMKR DOCD: CPT | Performed by: INTERNAL MEDICINE

## 2024-03-08 PROCEDURE — 70543 MRI ORBT/FAC/NCK W/O &W/DYE: CPT

## 2024-03-08 PROCEDURE — 70336 MAGNETIC IMAGE JAW JOINT: CPT

## 2024-03-08 PROCEDURE — 6360000004 HC RX CONTRAST MEDICATION: Performed by: STUDENT IN AN ORGANIZED HEALTH CARE EDUCATION/TRAINING PROGRAM

## 2024-03-08 PROCEDURE — G8484 FLU IMMUNIZE NO ADMIN: HCPCS | Performed by: INTERNAL MEDICINE

## 2024-03-08 PROCEDURE — 3075F SYST BP GE 130 - 139MM HG: CPT | Performed by: INTERNAL MEDICINE

## 2024-03-08 PROCEDURE — 1090F PRES/ABSN URINE INCON ASSESS: CPT | Performed by: INTERNAL MEDICINE

## 2024-03-08 PROCEDURE — 93000 ELECTROCARDIOGRAM COMPLETE: CPT | Performed by: INTERNAL MEDICINE

## 2024-03-08 PROCEDURE — G8427 DOCREV CUR MEDS BY ELIG CLIN: HCPCS | Performed by: INTERNAL MEDICINE

## 2024-03-08 RX ORDER — ISOSORBIDE MONONITRATE 30 MG/1
30 TABLET, EXTENDED RELEASE ORAL DAILY
Qty: 30 TABLET | Refills: 3 | Status: SHIPPED | OUTPATIENT
Start: 2024-03-08 | End: 2024-03-28

## 2024-03-08 RX ORDER — POTASSIUM CHLORIDE 750 MG/1
TABLET, FILM COATED, EXTENDED RELEASE ORAL
COMMUNITY
Start: 2023-04-12

## 2024-03-08 RX ORDER — HYDROCHLOROTHIAZIDE 25 MG/1
25 TABLET ORAL DAILY
COMMUNITY
Start: 2024-02-23

## 2024-03-08 RX ADMIN — GADOTERIDOL 15 ML: 279.3 INJECTION, SOLUTION INTRAVENOUS at 15:10

## 2024-03-08 NOTE — PROGRESS NOTES
structures (including esophageal and pulmonary vein injury), injury to the normal conduction system of the heart (possibly requiring a pacemaker), Pericardial effusion or cardiac puncture, stroke, myocardial infarction and death were all discussed. The patient considered the risks, benefits and alternatives; decided to proceed with the procedure.     Education provided to the patient and also education material given to the patient.       Thanks again for allowing me to participate in care of this patient. Please call me if you have any questions.    With best regards.      Gomez Jorge MD, 3/8/2024 10:40 AM     Please note this report has been partially produced using speech recognition software and may contain errors related to that system including errors in grammar, punctuation, and spelling, as well as words and phrases that may be inappropriate. If there are any questions or concerns please feel free to contact the dictating provider for clarification.

## 2024-03-11 ENCOUNTER — OFFICE VISIT (OUTPATIENT)
Dept: CARDIOLOGY CLINIC | Age: 84
End: 2024-03-11
Payer: MEDICARE

## 2024-03-11 VITALS
HEART RATE: 81 BPM | DIASTOLIC BLOOD PRESSURE: 72 MMHG | BODY MASS INDEX: 34.36 KG/M2 | WEIGHT: 175 LBS | HEIGHT: 60 IN | SYSTOLIC BLOOD PRESSURE: 118 MMHG | OXYGEN SATURATION: 98 %

## 2024-03-11 DIAGNOSIS — I25.10 ASCVD (ARTERIOSCLEROTIC CARDIOVASCULAR DISEASE): Primary | ICD-10-CM

## 2024-03-11 PROCEDURE — 3074F SYST BP LT 130 MM HG: CPT | Performed by: INTERNAL MEDICINE

## 2024-03-11 PROCEDURE — 3078F DIAST BP <80 MM HG: CPT | Performed by: INTERNAL MEDICINE

## 2024-03-11 PROCEDURE — G8399 PT W/DXA RESULTS DOCUMENT: HCPCS | Performed by: INTERNAL MEDICINE

## 2024-03-11 PROCEDURE — G8484 FLU IMMUNIZE NO ADMIN: HCPCS | Performed by: INTERNAL MEDICINE

## 2024-03-11 PROCEDURE — 1111F DSCHRG MED/CURRENT MED MERGE: CPT | Performed by: INTERNAL MEDICINE

## 2024-03-11 PROCEDURE — 1090F PRES/ABSN URINE INCON ASSESS: CPT | Performed by: INTERNAL MEDICINE

## 2024-03-11 PROCEDURE — 1124F ACP DISCUSS-NO DSCNMKR DOCD: CPT | Performed by: INTERNAL MEDICINE

## 2024-03-11 PROCEDURE — G8417 CALC BMI ABV UP PARAM F/U: HCPCS | Performed by: INTERNAL MEDICINE

## 2024-03-11 PROCEDURE — 99214 OFFICE O/P EST MOD 30 MIN: CPT | Performed by: INTERNAL MEDICINE

## 2024-03-11 PROCEDURE — 1036F TOBACCO NON-USER: CPT | Performed by: INTERNAL MEDICINE

## 2024-03-11 PROCEDURE — G8427 DOCREV CUR MEDS BY ELIG CLIN: HCPCS | Performed by: INTERNAL MEDICINE

## 2024-03-11 NOTE — PROGRESS NOTES
CARDIOLOGY NOTE      3/11/2024    RE: Vita Clarke  (1940)                               TO:  Joaquim Ramírez MD            CHIEF COMPLAINT   Vita is a 83 y.o. female who was seen today for management of coronary disease                                  Here for follow-up    HPI:                   Pt has h/o coronary artery disease, hypertension, hyperlipidemia, non-insulin-dependent diabetes seen today for follow-up.  Vita Clarke has the following history recorded in care path:  Patient Active Problem List    Diagnosis Date Noted    Cerebrovascular accident (CVA) due to thrombosis of cerebral artery (HCC) 10/12/2016    Headache 08/04/2022    Essential hypertension     Thyroid nodule     Dysarthria due to recent cerebral infarction 10/15/2016    Oropharyngeal dysphagia 10/15/2016    Gait disturbance 10/15/2016    Coronary artery disease involving native coronary artery of native heart without angina pectoris     Diabetes mellitus with neuropathy (HCC)     SCC (squamous cell carcinoma), leg     Memory loss     IFG (impaired fasting glucose)     Carotid stenosis, left     Hyperlipidemia     Osteoarthritis, knee     ASCVD (arteriosclerotic cardiovascular disease)     Rheumatoid arthritis (HCC)     Peripheral neuropathy (HCC)     Chest pain 02/16/2024    Abnormal laboratory test 01/04/2024    ASHLEY positive 01/04/2024    Fungal infection 01/04/2024    Swelling of left side of face 12/09/2023    Swelling of joint, knee, left 12/09/2023    Carotid artery calcification, left 07/12/2023    COVID-19 virus infection     S/P colonoscopic polypectomy 11/20/2020    DDD (degenerative disc disease), cervical     DDD (degenerative disc disease), lumbar     Diabetic nephropathy associated with type 2 diabetes mellitus (HCC) 02/27/2019    Type 2 diabetes mellitus with diabetic neuropathy, without long-term current use of insulin (HCC) 11/03/2017     Current Outpatient Medications   Medication Sig

## 2024-03-11 NOTE — PATIENT INSTRUCTIONS
We are committed to providing you the best care possible.    If you receive a survey after visiting one of our offices, please take time to share your experience concerning your physician office visit.  These surveys are confidential and no health information about you is shared.    We are eager to improve for you and we are counting on your feedback to help make that happen.      **It is YOUR responsibilty to bring medication bottles and/or updated medication list to EACH APPOINTMENT. This will allow us to better serve you and all your healthcare needs**    Thank you for allowing us to care for you today!   We want to ensure we can follow your treatment plan and we strive to give you the best outcomes and experience possible.   If you ever have a life threatening emergency and call 911 - for an ambulance (EMS)   Our providers can only care for you at:   [facility] or [facility].   Even if you have someone take you or you drive yourself we can only care for you in a [facility] facility. Our providers are not setup at the other healthcare locations!   Please be informed that if you contact our office outside of normal business hours the physician on call cannot help with any scheduling or rescheduling issues, procedure instruction questions or any type of medication issue.    We advise you for any urgent/emergency that you go to the nearest emergency room!    PLEASE CALL OUR OFFICE DURING NORMAL BUSINESS HOURS    Monday - Friday   8 am to 5 pm    [place]: [phone]    [place]: [phone]    [place]:  [phone]

## 2024-03-14 ENCOUNTER — CARE COORDINATION (OUTPATIENT)
Dept: CASE MANAGEMENT | Age: 84
End: 2024-03-14

## 2024-03-14 DIAGNOSIS — E11.40 TYPE 2 DIABETES MELLITUS WITH DIABETIC NEUROPATHY, WITHOUT LONG-TERM CURRENT USE OF INSULIN (HCC): ICD-10-CM

## 2024-03-14 LAB
CHOLEST SERPL-MCNC: 143 MG/DL (ref 0–199)
HDLC SERPL-MCNC: 27 MG/DL (ref 40–60)
LDLC SERPL CALC-MCNC: 75 MG/DL
TRIGL SERPL-MCNC: 207 MG/DL (ref 0–150)
VLDLC SERPL CALC-MCNC: 41 MG/DL

## 2024-03-14 PROCEDURE — 36415 COLL VENOUS BLD VENIPUNCTURE: CPT | Performed by: INTERNAL MEDICINE

## 2024-03-14 NOTE — CARE COORDINATION
Care Transitions Follow Up Call    Patient Current Location:  Home:  [address]  [address] [place] [address]    Care Transition Nurse contacted the patient by telephone to follow up after admission on  24-24.  Verified name and  with patient as identifiers.    Patient: Vita Clarke  Patient : 1940   MRN: [id number]  Reason for Admission: Chest Pain w/ ACS R/O     Discharge Date: 24 RARS: Readmission Risk Score: 12.1  Facility[facility]      Needs to be reviewed by the provider   Additional needs identified to be addressed with provider: No     Method of communication with provider: none.    Reports doing well. Reports occasional/chronic mild chest pain which resolves spontaneously. Denies ac distress. Noted to be speaking in complete, unlabored sentences. Has Nitro if needed. Denies need for medial attention or MD notification. Advised 911 if noting ac distress. Was seen by cardiology 3/8/24 w/ Imdur added, reports taking as directed. Scheduled for Watchman 24. Reports BP/HR \"good\" today but unable to recall specific readings. Advised low na, heart healthy diet. Reports appetite, fluid intake good w/ b&b wnl. Denies resource needs including hhc, dme, community assistance.      Future Appointments   Date Time Provider Department Center   3/18/2024  3:00 PM Joaquim Gonzalez MD SRMX E S IM [facility]   3/21/2024  9:00 AM Slava Hoang MD SR[facility] ID [facility]   4/3/2024  9:15 AM James Hoang MD [facility] RHEUM [facility]   2024 10:00 AM Maged Whitlock MD SRMX CT SURG [facility]   2024 11:40 AM Gabrielle Grullon, APRN - CNP [facility] Heart [facility]     Care Transition Nurse reviewed medical action plan and red flags with patient and discussed any barriers to care and/or understanding of plan of care after discharge. Discussed appropriate site of care based on symptoms and resources available to patient including: PCP  Specialist  Urgent care clinics  When to call 911  George

## 2024-03-15 LAB
EST. AVERAGE GLUCOSE BLD GHB EST-MCNC: 151.3 MG/DL
HBA1C MFR BLD: 6.9 %

## 2024-03-18 ENCOUNTER — TELEPHONE (OUTPATIENT)
Age: 84
End: 2024-03-18

## 2024-03-18 ENCOUNTER — TELEPHONE (OUTPATIENT)
Dept: CARDIOLOGY CLINIC | Age: 84
End: 2024-03-18

## 2024-03-18 ENCOUNTER — OFFICE VISIT (OUTPATIENT)
Dept: INTERNAL MEDICINE CLINIC | Age: 84
End: 2024-03-18
Payer: MEDICARE

## 2024-03-18 VITALS
BODY MASS INDEX: 34.1 KG/M2 | OXYGEN SATURATION: 97 % | WEIGHT: 174.6 LBS | SYSTOLIC BLOOD PRESSURE: 160 MMHG | HEART RATE: 63 BPM | DIASTOLIC BLOOD PRESSURE: 70 MMHG

## 2024-03-18 DIAGNOSIS — I48.0 AF (PAROXYSMAL ATRIAL FIBRILLATION) (HCC): Primary | ICD-10-CM

## 2024-03-18 DIAGNOSIS — I25.10 ASCVD (ARTERIOSCLEROTIC CARDIOVASCULAR DISEASE): Primary | ICD-10-CM

## 2024-03-18 DIAGNOSIS — E11.40 TYPE 2 DIABETES MELLITUS WITH DIABETIC NEUROPATHY, WITHOUT LONG-TERM CURRENT USE OF INSULIN (HCC): ICD-10-CM

## 2024-03-18 DIAGNOSIS — G44.89 OTHER HEADACHE SYNDROME: ICD-10-CM

## 2024-03-18 DIAGNOSIS — I10 ESSENTIAL HYPERTENSION: ICD-10-CM

## 2024-03-18 DIAGNOSIS — D68.69 SECONDARY HYPERCOAGULABLE STATE (HCC): ICD-10-CM

## 2024-03-18 PROCEDURE — 3077F SYST BP >= 140 MM HG: CPT | Performed by: INTERNAL MEDICINE

## 2024-03-18 PROCEDURE — G8427 DOCREV CUR MEDS BY ELIG CLIN: HCPCS | Performed by: INTERNAL MEDICINE

## 2024-03-18 PROCEDURE — 99214 OFFICE O/P EST MOD 30 MIN: CPT | Performed by: INTERNAL MEDICINE

## 2024-03-18 PROCEDURE — 1090F PRES/ABSN URINE INCON ASSESS: CPT | Performed by: INTERNAL MEDICINE

## 2024-03-18 PROCEDURE — 3078F DIAST BP <80 MM HG: CPT | Performed by: INTERNAL MEDICINE

## 2024-03-18 PROCEDURE — G8417 CALC BMI ABV UP PARAM F/U: HCPCS | Performed by: INTERNAL MEDICINE

## 2024-03-18 PROCEDURE — G8399 PT W/DXA RESULTS DOCUMENT: HCPCS | Performed by: INTERNAL MEDICINE

## 2024-03-18 PROCEDURE — 1036F TOBACCO NON-USER: CPT | Performed by: INTERNAL MEDICINE

## 2024-03-18 PROCEDURE — G8484 FLU IMMUNIZE NO ADMIN: HCPCS | Performed by: INTERNAL MEDICINE

## 2024-03-18 PROCEDURE — 1124F ACP DISCUSS-NO DSCNMKR DOCD: CPT | Performed by: INTERNAL MEDICINE

## 2024-03-18 PROCEDURE — 3044F HG A1C LEVEL LT 7.0%: CPT | Performed by: INTERNAL MEDICINE

## 2024-03-18 PROCEDURE — 1111F DSCHRG MED/CURRENT MED MERGE: CPT | Performed by: INTERNAL MEDICINE

## 2024-03-18 RX ORDER — ATORVASTATIN CALCIUM 40 MG/1
40 TABLET, FILM COATED ORAL DAILY
Qty: 90 TABLET | Refills: 1 | Status: SHIPPED | OUTPATIENT
Start: 2024-03-18

## 2024-03-18 RX ORDER — AMLODIPINE BESYLATE 5 MG/1
5 TABLET ORAL 2 TIMES DAILY
Qty: 90 TABLET | Refills: 1 | Status: SHIPPED | OUTPATIENT
Start: 2024-03-18

## 2024-03-18 RX ORDER — GABAPENTIN 300 MG/1
CAPSULE ORAL
Qty: 180 CAPSULE | Refills: 1 | Status: SHIPPED | OUTPATIENT
Start: 2024-03-18 | End: 2024-09-16

## 2024-03-18 NOTE — TELEPHONE ENCOUNTER
Saw patient for Pre Watchman Education in the office with Dr Jorge on 3/8/2024. Patient and daughter both present but had other appointments. Limited education provided on 3/8/2024. Patient called now and further Watchman education provided.     My role as Watchman Coordinator explained.   Watchman discussed, Brochure provided and Easel video shared.   Nice Tool utilized and filled out  for shared decision making appointment.  Shared decision PPW faxed to Dr Gonzalez. She has an appointment today at 3:00.    Explained to the patient:  Part of the FDA approval of the Watchman procedure in 2015 mandated that each procedure must participate in a national registry, this requires several follow up appointments and testing following the procedure.      These expectations Include:      7-10 day follow up with the Nurse practitioner or Implanting Physician    45 day follow up lab work and AARON to check positioning of the device.      6 month follow up telephone call     1 year follow up appointment and lab work (AARON per Implanting physician discretion)    2  year follow up appointment and lab work .    Discussed the importance of blood thinners as prescribed and that the patient cannot come off those blood thinners until discontinued by the implanting physician.     Assessment/History of:    Nickel or metal allergy?  [] Yes     [x] No    Contrast allergy?  [] Yes     [x] No    Anesthesia issues?  [] Yes     [x] No (Nausea in the past)    Difficulty swallowing?  [] Yes     [x] No    Do you have any procedures/surgeries planned that would interrupt Plavix and ASA for next 6 months?  [] Yes     [x] No    History of Sleep Apnea?  [] Yes     [x] No    Do you wear a CPAP?  [] Yes     [x] No      Modified Walworth Scale:    [x] 0: No symptoms at all  [] 1: No significant disability despite symptoms  [] 2: Slight disability   [] 3: Moderate disability  [] 4: Moderately severe disability  [] 5: Severe disability    []

## 2024-03-19 ENCOUNTER — TELEPHONE (OUTPATIENT)
Dept: RHEUMATOLOGY | Age: 84
End: 2024-03-19

## 2024-03-19 NOTE — TELEPHONE ENCOUNTER
Pt was previously sent a [employer] message with results but didn't read them. Pt verbally advised of results and expressed verbal understanding.

## 2024-03-20 ENCOUNTER — CARE COORDINATION (OUTPATIENT)
Dept: CASE MANAGEMENT | Age: 84
End: 2024-03-20

## 2024-03-20 NOTE — CARE COORDINATION
patient including: PCP  Specialist  Urgent care clinics  [employer] Mess[employer]  [facility] . The patient agrees to contact PCP office for questions related to  healthcare.     Offered patient enrollment in the Remote Patient Monitoring (RPM) program for in-home monitoring: N/A as PCP not participating in program.     Care Transitions Subsequent and Final Call    Schedule Follow Up Appointment with PCP: Completed  Subsequent and Final Calls  Do you have any ongoing symptoms?: No  Have your medications changed?: No  Do you have any questions related to your medications?: No  Do you currently have any active services?: No  Do you have any needs or concerns that I can assist you with?: No  Identified Barriers: Other  Care Transitions Interventions   Home Care Waiver: Declined Physical Therapy: Declined       Transportation Support: Declined    Meals on Wheels: Declined  DME Assistance: Declined     Senior Services: Declined    Diabetes Education: Completed       Occupational Therapy: Declined     Disease Association: Completed      Other Interventions:             Care Transition Nurse provided contact information for future needs. No further follow-up call indicated based on severity of symptoms and risk factors. Agreeable to final call. CT program closed.     Maya Berman RN

## 2024-03-21 ENCOUNTER — OFFICE VISIT (OUTPATIENT)
Dept: INFECTIOUS DISEASES | Age: 84
End: 2024-03-21
Payer: MEDICARE

## 2024-03-21 VITALS
RESPIRATION RATE: 18 BRPM | HEART RATE: 54 BPM | DIASTOLIC BLOOD PRESSURE: 72 MMHG | BODY MASS INDEX: 33.9 KG/M2 | WEIGHT: 173.6 LBS | SYSTOLIC BLOOD PRESSURE: 134 MMHG

## 2024-03-21 DIAGNOSIS — R22.0 SWELLING OF LEFT SIDE OF FACE: Primary | ICD-10-CM

## 2024-03-21 PROCEDURE — 3075F SYST BP GE 130 - 139MM HG: CPT | Performed by: INTERNAL MEDICINE

## 2024-03-21 PROCEDURE — 3078F DIAST BP <80 MM HG: CPT | Performed by: INTERNAL MEDICINE

## 2024-03-21 PROCEDURE — G8399 PT W/DXA RESULTS DOCUMENT: HCPCS | Performed by: INTERNAL MEDICINE

## 2024-03-21 PROCEDURE — 1036F TOBACCO NON-USER: CPT | Performed by: INTERNAL MEDICINE

## 2024-03-21 PROCEDURE — 1124F ACP DISCUSS-NO DSCNMKR DOCD: CPT | Performed by: INTERNAL MEDICINE

## 2024-03-21 PROCEDURE — G8417 CALC BMI ABV UP PARAM F/U: HCPCS | Performed by: INTERNAL MEDICINE

## 2024-03-21 PROCEDURE — G8427 DOCREV CUR MEDS BY ELIG CLIN: HCPCS | Performed by: INTERNAL MEDICINE

## 2024-03-21 PROCEDURE — 99213 OFFICE O/P EST LOW 20 MIN: CPT | Performed by: INTERNAL MEDICINE

## 2024-03-21 PROCEDURE — 1090F PRES/ABSN URINE INCON ASSESS: CPT | Performed by: INTERNAL MEDICINE

## 2024-03-21 PROCEDURE — G8484 FLU IMMUNIZE NO ADMIN: HCPCS | Performed by: INTERNAL MEDICINE

## 2024-03-21 NOTE — PROGRESS NOTES
membranes moist, tympanic membranes clear bilaterally, no cervical lymphadenopathy noted, neck supple, and left are  Neck: supple, no significant adenopathy  Chest: clear to auscultation, no wheezes, rales or rhonchi, symmetric air entry  Heart: regular rate and rhythm, no murmurs  ABD: abdomen is soft without significant tenderness, masses, organomegaly or guarding.  EXT:peripheral pulses normal, no pedal edema, no clubbing or cyanosis  NEURO: alert, oriented, normal speech, no focal findings or movement disorder noted  Skin: well hydrated, no lesions, surgical site examined  Wounds: none  Labs:   WBC   Date Value Ref Range Status   02/19/2024 5.7 4.0 - 10.5 K/CU MM Final   02/18/2024 5.4 4.0 - 10.5 K/CU MM Final   02/17/2024 5.0 4.0 - 10.5 K/CU MM Final     Creatinine   Date Value Ref Range Status   02/19/2024 0.9 0.6 - 1.1 MG/DL Final   02/18/2024 1.1 0.6 - 1.1 MG/DL Final   02/17/2024 0.8 0.6 - 1.1 MG/DL Final       Cultures:  Culture   Date Value Ref Range Status   01/04/2019 ESCHERICHIA COLI  Final   02/02/2018 NORMAL RESIDENT AND COMMENSAL NARINDER HEAVY GROWTH  Final   11/03/2017   Final    LACTOBACILLUS SP. HEAVY GROWTH, OPPORTUNISTIC PATHOGEN       Imaging Studies:   MRI OF THE BRAIN WITHOUT CONTRAST  2/24/2024 11:57 am     TECHNIQUE:  Multiplanar multisequence MRI of the brain was performed without the  administration of intravenous contrast.     COMPARISON:  08/05/2022.     HISTORY:  ORDERING SYSTEM PROVIDED HISTORY: Swelling of left side of face  TECHNOLOGIST PROVIDED HISTORY:  Reason for exam:->evaluate left anterior facial mass, over the parotid area.  Reason for Exam: headaches  Additional signs and symptoms: headaches, EXAM PERFORMED BY DINORA SUAREZ  WITH [facility]     Initial evaluation.     FINDINGS:  INTRACRANIAL STRUCTURES/VENTRICLES: There is no acute infarct. No mass effect  or midline shift. No evidence of an acute intracranial hemorrhage.  Areas of  T2 FLAIR hyperintensity are seen in

## 2024-03-22 ENCOUNTER — OFFICE VISIT (OUTPATIENT)
Dept: RHEUMATOLOGY | Age: 84
End: 2024-03-22
Payer: MEDICARE

## 2024-03-22 VITALS
DIASTOLIC BLOOD PRESSURE: 80 MMHG | SYSTOLIC BLOOD PRESSURE: 122 MMHG | BODY MASS INDEX: 33.98 KG/M2 | WEIGHT: 174 LBS | HEART RATE: 57 BPM | OXYGEN SATURATION: 96 %

## 2024-03-22 DIAGNOSIS — M15.9 PRIMARY OSTEOARTHRITIS INVOLVING MULTIPLE JOINTS: ICD-10-CM

## 2024-03-22 DIAGNOSIS — Z51.81 ENCOUNTER FOR MONITORING OF HYDROXYCHLOROQUINE THERAPY: ICD-10-CM

## 2024-03-22 DIAGNOSIS — Z79.899 ENCOUNTER FOR MONITORING OF HYDROXYCHLOROQUINE THERAPY: ICD-10-CM

## 2024-03-22 DIAGNOSIS — M06.4 INFLAMMATORY POLYARTHRITIS (HCC): Primary | ICD-10-CM

## 2024-03-22 PROCEDURE — G8399 PT W/DXA RESULTS DOCUMENT: HCPCS | Performed by: STUDENT IN AN ORGANIZED HEALTH CARE EDUCATION/TRAINING PROGRAM

## 2024-03-22 PROCEDURE — 3079F DIAST BP 80-89 MM HG: CPT | Performed by: STUDENT IN AN ORGANIZED HEALTH CARE EDUCATION/TRAINING PROGRAM

## 2024-03-22 PROCEDURE — 3074F SYST BP LT 130 MM HG: CPT | Performed by: STUDENT IN AN ORGANIZED HEALTH CARE EDUCATION/TRAINING PROGRAM

## 2024-03-22 PROCEDURE — G8427 DOCREV CUR MEDS BY ELIG CLIN: HCPCS | Performed by: STUDENT IN AN ORGANIZED HEALTH CARE EDUCATION/TRAINING PROGRAM

## 2024-03-22 PROCEDURE — 99215 OFFICE O/P EST HI 40 MIN: CPT | Performed by: STUDENT IN AN ORGANIZED HEALTH CARE EDUCATION/TRAINING PROGRAM

## 2024-03-22 PROCEDURE — G8484 FLU IMMUNIZE NO ADMIN: HCPCS | Performed by: STUDENT IN AN ORGANIZED HEALTH CARE EDUCATION/TRAINING PROGRAM

## 2024-03-22 PROCEDURE — 1124F ACP DISCUSS-NO DSCNMKR DOCD: CPT | Performed by: STUDENT IN AN ORGANIZED HEALTH CARE EDUCATION/TRAINING PROGRAM

## 2024-03-22 PROCEDURE — G8417 CALC BMI ABV UP PARAM F/U: HCPCS | Performed by: STUDENT IN AN ORGANIZED HEALTH CARE EDUCATION/TRAINING PROGRAM

## 2024-03-22 PROCEDURE — 1036F TOBACCO NON-USER: CPT | Performed by: STUDENT IN AN ORGANIZED HEALTH CARE EDUCATION/TRAINING PROGRAM

## 2024-03-22 PROCEDURE — 1090F PRES/ABSN URINE INCON ASSESS: CPT | Performed by: STUDENT IN AN ORGANIZED HEALTH CARE EDUCATION/TRAINING PROGRAM

## 2024-03-22 RX ORDER — HYDROXYCHLOROQUINE SULFATE 200 MG/1
200 TABLET, FILM COATED ORAL 2 TIMES DAILY
Qty: 180 TABLET | Refills: 0 | Status: SHIPPED | OUTPATIENT
Start: 2024-03-22

## 2024-03-22 NOTE — PROGRESS NOTES
Sig Dispense Refill    gabapentin (NEURONTIN) 300 MG capsule TAKE 1 CAPSULE BY MOUTH EVERY MORNING AND 1 CAPSULE EVERY NIGHT AT BEDTIME 180 capsule 1    atorvastatin (LIPITOR) 40 MG tablet Take 1 tablet by mouth daily 90 tablet 1    amLODIPine (NORVASC) 5 MG tablet Take 1 tablet by mouth in the morning and at bedtime 90 tablet 1    hydroCHLOROthiazide (HYDRODIURIL) 25 MG tablet Take 1 tablet by mouth daily      potassium chloride (KLOR-CON) 10 MEQ extended release tablet DAILY      isosorbide mononitrate (IMDUR) 30 MG extended release tablet Take 1 tablet by mouth daily 30 tablet 3    lactobacillus (CULTURELLE) capsule Take 1 capsule by mouth daily 30 capsule 0    Biotin 300 MCG TABS Take 1 tablet by mouth daily 30 tablet 3    clopidogrel (PLAVIX) 75 MG tablet Take 1 tablet by mouth daily 90 tablet 1    losartan (COZAAR) 100 MG tablet Take 1 tablet by mouth daily 90 tablet 1    metFORMIN (GLUCOPHAGE) 500 MG tablet TAKE 1/2 TABLET BY MOUTH IN THE MORNING 45 tablet 1    nitroGLYCERIN (NITROSTAT) 0.4 MG SL tablet DISSOLVE 1 TABLET UNDER THE TONGUE EVERY 5 MINUTES AS NEEDED FOR CHEST PAIN. MAX OF 3 DOSES IN 15 MINUTES. 25 tablet 0    metoprolol tartrate (LOPRESSOR) 50 MG tablet Take 0.5 tablets by mouth 2 times daily 180 tablet 1    folic acid (FOLVITE) 800 MCG tablet Take 1 tablet by mouth daily      aspirin 81 MG tablet Take 1 tablet by mouth daily      multivitamin (THERAGRAN) per tablet Take 1 tablet by mouth daily      calcium carbonate-vitamin D 600-200 MG-UNIT TABS Take 1 tablet by mouth nightly       Omega-3 Fatty Acids (FISH OIL BURP-LESS) 1000 MG CAPS Take 1 tablet by mouth 2 times daily.         No current facility-administered medications for this visit.       ALLERGIES    Allergies   Allergen Reactions    Actonel [Risedronate Sodium]      Abdominal pain    Ciprofloxacin      nausea    Darvocet [Propoxyphene N-Acetaminophen]      Lightheaded    Lopid [Gemfibrozil] Other (See Comments)     Leg cramping

## 2024-03-25 ENCOUNTER — TELEPHONE (OUTPATIENT)
Dept: RHEUMATOLOGY | Age: 84
End: 2024-03-25

## 2024-03-25 NOTE — TELEPHONE ENCOUNTER
Pts daughter Fransisco called stating the Plaquenil is too expensive being 90.00. They would like to know if you could send anything else in to replace the Plaquenil.

## 2024-03-26 DIAGNOSIS — M06.4 INFLAMMATORY POLYARTHRITIS (HCC): ICD-10-CM

## 2024-03-26 RX ORDER — FOLIC ACID 1 MG/1
1 TABLET ORAL DAILY
Qty: 90 TABLET | Refills: 0 | Status: SHIPPED | OUTPATIENT
Start: 2024-03-26

## 2024-03-26 RX ORDER — METHOTREXATE 2.5 MG/1
15 TABLET ORAL WEEKLY
Qty: 24 TABLET | Refills: 2 | Status: SHIPPED | OUTPATIENT
Start: 2024-03-26

## 2024-03-26 NOTE — TELEPHONE ENCOUNTER
Spoke with the pt Vita and advised her. Pt requested that I touch base with her daughter Fransisco because she doesn't have a phone that will allow her to use [facility].

## 2024-03-27 RX ORDER — METHOTREXATE 2.5 MG/1
TABLET ORAL
Qty: 77 TABLET | OUTPATIENT
Start: 2024-03-27

## 2024-03-28 RX ORDER — ISOSORBIDE MONONITRATE 30 MG/1
30 TABLET, EXTENDED RELEASE ORAL DAILY
Qty: 30 TABLET | Refills: 3 | Status: SHIPPED | OUTPATIENT
Start: 2024-03-28

## 2024-04-01 ENCOUNTER — HOSPITAL ENCOUNTER (OUTPATIENT)
Dept: GENERAL RADIOLOGY | Age: 84
Discharge: HOME | End: 2024-04-01
Payer: MEDICARE

## 2024-04-01 ENCOUNTER — HOSPITAL ENCOUNTER (OUTPATIENT)
Age: 84
Discharge: HOME | End: 2024-04-01
Payer: MEDICARE

## 2024-04-01 ENCOUNTER — NURSE ONLY (OUTPATIENT)
Dept: CARDIOLOGY CLINIC | Age: 84
End: 2024-04-01

## 2024-04-01 DIAGNOSIS — I25.10 CORONARY ARTERY DISEASE INVOLVING NATIVE CORONARY ARTERY OF NATIVE HEART WITHOUT ANGINA PECTORIS: Primary | ICD-10-CM

## 2024-04-01 DIAGNOSIS — I25.10 ASCVD (ARTERIOSCLEROTIC CARDIOVASCULAR DISEASE): ICD-10-CM

## 2024-04-01 LAB
ANION GAP SERPL CALCULATED.3IONS-SCNC: 10 MMOL/L (ref 7–16)
APTT: 28.1 SECONDS (ref 25.1–37.1)
BUN SERPL-MCNC: 22 MG/DL (ref 6–23)
CALCIUM SERPL-MCNC: 9.7 MG/DL (ref 8.3–10.6)
CHLORIDE BLD-SCNC: 102 MMOL/L (ref 99–110)
CO2: 25 MMOL/L (ref 21–32)
CREAT SERPL-MCNC: 0.9 MG/DL (ref 0.6–1.1)
GFR SERPL CREATININE-BSD FRML MDRD: 63 ML/MIN/1.73M2
GLUCOSE SERPL-MCNC: 98 MG/DL (ref 70–99)
HCT VFR BLD CALC: 36 % (ref 37–47)
HEMOGLOBIN: 11.6 GM/DL (ref 12.5–16)
INR BLD: 1.1 INDEX
MAGNESIUM: 2 MG/DL (ref 1.8–2.4)
MCH RBC QN AUTO: 29.1 PG (ref 27–31)
MCHC RBC AUTO-ENTMCNC: 32.2 % (ref 32–36)
MCV RBC AUTO: 90.5 FL (ref 78–100)
PDW BLD-RTO: 12.9 % (ref 11.7–14.9)
PHOSPHORUS: 3.6 MG/DL (ref 2.5–4.9)
PLATELET # BLD: 239 K/CU MM (ref 140–440)
PMV BLD AUTO: 9.3 FL (ref 7.5–11.1)
POTASSIUM SERPL-SCNC: 4.6 MMOL/L (ref 3.5–5.1)
PROTHROMBIN TIME: 14.1 SECONDS (ref 11.7–14.5)
RBC # BLD: 3.98 M/CU MM (ref 4.2–5.4)
SODIUM BLD-SCNC: 137 MMOL/L (ref 135–145)
WBC # BLD: 6.9 K/CU MM (ref 4–10.5)

## 2024-04-01 PROCEDURE — 86901 BLOOD TYPING SEROLOGIC RH(D): CPT

## 2024-04-01 PROCEDURE — 85610 PROTHROMBIN TIME: CPT

## 2024-04-01 PROCEDURE — 86900 BLOOD TYPING SEROLOGIC ABO: CPT

## 2024-04-01 PROCEDURE — 85730 THROMBOPLASTIN TIME PARTIAL: CPT

## 2024-04-01 PROCEDURE — 71046 X-RAY EXAM CHEST 2 VIEWS: CPT

## 2024-04-01 PROCEDURE — 83735 ASSAY OF MAGNESIUM: CPT

## 2024-04-01 PROCEDURE — 86850 RBC ANTIBODY SCREEN: CPT

## 2024-04-01 PROCEDURE — 80048 BASIC METABOLIC PNL TOTAL CA: CPT

## 2024-04-01 PROCEDURE — 36415 COLL VENOUS BLD VENIPUNCTURE: CPT

## 2024-04-01 PROCEDURE — 84100 ASSAY OF PHOSPHORUS: CPT

## 2024-04-01 PROCEDURE — 86922 COMPATIBILITY TEST ANTIGLOB: CPT

## 2024-04-01 PROCEDURE — 87635 SARS-COV-2 COVID-19 AMP PRB: CPT

## 2024-04-01 PROCEDURE — 85027 COMPLETE CBC AUTOMATED: CPT

## 2024-04-01 NOTE — PROGRESS NOTES
Patient here in office and educated on 4/1/2024, scheduled for Watchman Implant on 4/4/2024 @ 1200, with arrival @ 1000, @ [facility]; risk explained; and consents signed. Also copy of orders given for labs, COVID and CXR due 4/1/2024 at [facility]. Instruction given to patient to :NPO after midnight the night before procedure; call hospital at [phone] to pre-register. May take rest of morning meds of procedure except the following; Hold Metformin the day before, the day of and two days after procedure. Hold Diuretics the morning of the procedure. Hold ALL blood pressure medications morning of procedure. Patient to continue to take Asprin as directed. Patient voiced understanding.

## 2024-04-02 LAB
SARS-COV-2 RNA RESP QL NAA+PROBE: NOT DETECTED
SOURCE: NORMAL

## 2024-04-03 ENCOUNTER — TELEPHONE (OUTPATIENT)
Age: 84
End: 2024-04-03

## 2024-04-03 ASSESSMENT — ENCOUNTER SYMPTOMS
PHOTOPHOBIA: 0
COLOR CHANGE: 0
SHORTNESS OF BREATH: 1
VOMITING: 0
BACK PAIN: 0
EYE PAIN: 0
DIARRHEA: 0
CHEST TIGHTNESS: 0
NAUSEA: 0
WHEEZING: 0
BLOOD IN STOOL: 0
COUGH: 0

## 2024-04-03 NOTE — TELEPHONE ENCOUNTER
Watchman pre call done.  Patient states she has been having spells where her chest beats hard and it is painful. States feels cold as well when this happens. She is hoping Dr Jorge can figure it out tomorrow too. Message sent to Dr Jorge regarding these symptoms.   Also states she has moth dryness and was told she could use lemon sticks to keep her mouth moist in the morning. Spoke with anesthesia Dr Mai, who states this will be ok.    Medication changes made:  Plavix: Do not hold before procedure.  ASA: Do not hold before procedure.    Insurance approval obtained:  [x] Yes     [] No    Shared Decision  Date obtained:3/18/2024  Physician: Dr Gonzalez  [] Office     [x] Media tab    All questions verbalized answered. Will follow.    Electronically signed by Gabrielle Mendoza RN on 4/3/2024 at 1:43 PM [place] Care Coordinator

## 2024-04-04 ENCOUNTER — APPOINTMENT (OUTPATIENT)
Dept: NON INVASIVE DIAGNOSTICS | Age: 84
End: 2024-04-04
Attending: INTERNAL MEDICINE
Payer: MEDICARE

## 2024-04-04 ENCOUNTER — ANESTHESIA EVENT (OUTPATIENT)
Age: 84
DRG: 274 | End: 2024-04-04
Payer: MEDICARE

## 2024-04-04 ENCOUNTER — HOSPITAL ENCOUNTER (INPATIENT)
Age: 84
LOS: 1 days | Discharge: HOME | End: 2024-04-05
Attending: INTERNAL MEDICINE | Admitting: INTERNAL MEDICINE
Payer: MEDICARE

## 2024-04-04 ENCOUNTER — ANESTHESIA (OUTPATIENT)
Age: 84
DRG: 274 | End: 2024-04-04
Payer: MEDICARE

## 2024-04-04 DIAGNOSIS — I25.10 CORONARY ARTERY DISEASE INVOLVING NATIVE CORONARY ARTERY OF NATIVE HEART WITHOUT ANGINA PECTORIS: ICD-10-CM

## 2024-04-04 DIAGNOSIS — I48.0 AF (PAROXYSMAL ATRIAL FIBRILLATION) (HCC): Primary | ICD-10-CM

## 2024-04-04 PROBLEM — Z95.818 PRESENCE OF WATCHMAN LEFT ATRIAL APPENDAGE CLOSURE DEVICE: Status: ACTIVE | Noted: 2024-04-04

## 2024-04-04 LAB
ECHO BSA: 1.83 M2
ECHO BSA: 1.83 M2
GLUCOSE BLD-MCNC: 127 MG/DL (ref 70–99)
GLUCOSE BLD-MCNC: 139 MG/DL (ref 70–99)

## 2024-04-04 PROCEDURE — 6370000000 HC RX 637 (ALT 250 FOR IP): Performed by: NURSE PRACTITIONER

## 2024-04-04 PROCEDURE — 33340 PERQ CLSR TCAT L ATR APNDGE: CPT | Performed by: INTERNAL MEDICINE

## 2024-04-04 PROCEDURE — 93325 DOPPLER ECHO COLOR FLOW MAPG: CPT | Performed by: INTERNAL MEDICINE

## 2024-04-04 PROCEDURE — 7100000001 HC PACU RECOVERY - ADDTL 15 MIN: Performed by: INTERNAL MEDICINE

## 2024-04-04 PROCEDURE — 6360000002 HC RX W HCPCS

## 2024-04-04 PROCEDURE — C1769 GUIDE WIRE: HCPCS | Performed by: INTERNAL MEDICINE

## 2024-04-04 PROCEDURE — 82962 GLUCOSE BLOOD TEST: CPT

## 2024-04-04 PROCEDURE — 6360000002 HC RX W HCPCS: Performed by: INTERNAL MEDICINE

## 2024-04-04 PROCEDURE — 02L73DK OCCLUSION OF LEFT ATRIAL APPENDAGE WITH INTRALUMINAL DEVICE, PERCUTANEOUS APPROACH: ICD-10-PCS | Performed by: INTERNAL MEDICINE

## 2024-04-04 PROCEDURE — 93312 ECHO TRANSESOPHAGEAL: CPT

## 2024-04-04 PROCEDURE — 2580000003 HC RX 258: Performed by: NURSE PRACTITIONER

## 2024-04-04 PROCEDURE — 2709999900 HC NON-CHARGEABLE SUPPLY: Performed by: INTERNAL MEDICINE

## 2024-04-04 PROCEDURE — 2140000000 HC CCU INTERMEDIATE R&B

## 2024-04-04 PROCEDURE — 3700000000 HC ANESTHESIA ATTENDED CARE: Performed by: INTERNAL MEDICINE

## 2024-04-04 PROCEDURE — 7100000000 HC PACU RECOVERY - FIRST 15 MIN: Performed by: INTERNAL MEDICINE

## 2024-04-04 PROCEDURE — C1760 CLOSURE DEV, VASC: HCPCS | Performed by: INTERNAL MEDICINE

## 2024-04-04 PROCEDURE — 2500000003 HC RX 250 WO HCPCS

## 2024-04-04 PROCEDURE — 2580000003 HC RX 258: Performed by: INTERNAL MEDICINE

## 2024-04-04 PROCEDURE — 93312 ECHO TRANSESOPHAGEAL: CPT | Performed by: INTERNAL MEDICINE

## 2024-04-04 PROCEDURE — 3700000001 HC ADD 15 MINUTES (ANESTHESIA): Performed by: INTERNAL MEDICINE

## 2024-04-04 PROCEDURE — C1889 IMPLANT/INSERT DEVICE, NOC: HCPCS | Performed by: INTERNAL MEDICINE

## 2024-04-04 PROCEDURE — 6370000000 HC RX 637 (ALT 250 FOR IP): Performed by: INTERNAL MEDICINE

## 2024-04-04 PROCEDURE — 2580000003 HC RX 258

## 2024-04-04 PROCEDURE — C1894 INTRO/SHEATH, NON-LASER: HCPCS | Performed by: INTERNAL MEDICINE

## 2024-04-04 PROCEDURE — 2500000003 HC RX 250 WO HCPCS: Performed by: INTERNAL MEDICINE

## 2024-04-04 PROCEDURE — B24BZZ4 ULTRASONOGRAPHY OF HEART WITH AORTA, TRANSESOPHAGEAL: ICD-10-PCS | Performed by: INTERNAL MEDICINE

## 2024-04-04 DEVICE — LEFT ATRIAL APPENDAGE CLOSURE DEVICE WITH DELIVERY SYSTEM
Type: IMPLANTABLE DEVICE | Site: HEART | Status: FUNCTIONAL
Brand: WATCHMAN FLX™ PRO

## 2024-04-04 RX ORDER — HYDRALAZINE HYDROCHLORIDE 20 MG/ML
10 INJECTION INTRAMUSCULAR; INTRAVENOUS
Status: DISCONTINUED | OUTPATIENT
Start: 2024-04-04 | End: 2024-04-04 | Stop reason: HOSPADM

## 2024-04-04 RX ORDER — LOSARTAN POTASSIUM 100 MG/1
100 TABLET ORAL DAILY
Status: DISCONTINUED | OUTPATIENT
Start: 2024-04-04 | End: 2024-04-05 | Stop reason: HOSPADM

## 2024-04-04 RX ORDER — CLOPIDOGREL BISULFATE 75 MG/1
75 TABLET ORAL DAILY
Status: DISCONTINUED | OUTPATIENT
Start: 2024-04-05 | End: 2024-04-05 | Stop reason: HOSPADM

## 2024-04-04 RX ORDER — FENTANYL CITRATE 50 UG/ML
25 INJECTION, SOLUTION INTRAMUSCULAR; INTRAVENOUS EVERY 5 MIN PRN
Status: DISCONTINUED | OUTPATIENT
Start: 2024-04-04 | End: 2024-04-04 | Stop reason: HOSPADM

## 2024-04-04 RX ORDER — ISOSORBIDE MONONITRATE 30 MG/1
30 TABLET, EXTENDED RELEASE ORAL DAILY
Status: DISCONTINUED | OUTPATIENT
Start: 2024-04-04 | End: 2024-04-05 | Stop reason: HOSPADM

## 2024-04-04 RX ORDER — DIPHENHYDRAMINE HYDROCHLORIDE 50 MG/ML
12.5 INJECTION INTRAMUSCULAR; INTRAVENOUS
Status: DISCONTINUED | OUTPATIENT
Start: 2024-04-04 | End: 2024-04-04 | Stop reason: HOSPADM

## 2024-04-04 RX ORDER — ASPIRIN 81 MG/1
81 TABLET, CHEWABLE ORAL DAILY
Status: DISCONTINUED | OUTPATIENT
Start: 2024-04-04 | End: 2024-04-05 | Stop reason: HOSPADM

## 2024-04-04 RX ORDER — ASPIRIN 81 MG/1
81 TABLET, CHEWABLE ORAL ONCE
Status: COMPLETED | OUTPATIENT
Start: 2024-04-04 | End: 2024-04-04

## 2024-04-04 RX ORDER — PROTAMINE SULFATE 10 MG/ML
INJECTION, SOLUTION INTRAVENOUS PRN
Status: DISCONTINUED | OUTPATIENT
Start: 2024-04-04 | End: 2024-04-04 | Stop reason: SDUPTHER

## 2024-04-04 RX ORDER — FOLIC ACID 1 MG/1
1 TABLET ORAL DAILY
Status: DISCONTINUED | OUTPATIENT
Start: 2024-04-04 | End: 2024-04-05 | Stop reason: HOSPADM

## 2024-04-04 RX ORDER — LABETALOL HYDROCHLORIDE 5 MG/ML
10 INJECTION, SOLUTION INTRAVENOUS
Status: DISCONTINUED | OUTPATIENT
Start: 2024-04-04 | End: 2024-04-04 | Stop reason: HOSPADM

## 2024-04-04 RX ORDER — PROPOFOL 10 MG/ML
INJECTION, EMULSION INTRAVENOUS PRN
Status: DISCONTINUED | OUTPATIENT
Start: 2024-04-04 | End: 2024-04-04 | Stop reason: SDUPTHER

## 2024-04-04 RX ORDER — SODIUM CHLORIDE 0.9 % (FLUSH) 0.9 %
5-40 SYRINGE (ML) INJECTION EVERY 12 HOURS SCHEDULED
Status: DISCONTINUED | OUTPATIENT
Start: 2024-04-04 | End: 2024-04-05 | Stop reason: HOSPADM

## 2024-04-04 RX ORDER — POTASSIUM CHLORIDE 750 MG/1
10 TABLET, FILM COATED, EXTENDED RELEASE ORAL DAILY
Status: DISCONTINUED | OUTPATIENT
Start: 2024-04-04 | End: 2024-04-05 | Stop reason: HOSPADM

## 2024-04-04 RX ORDER — OMEGA-3S/DHA/EPA/FISH OIL/D3 300MG-1000
1 CAPSULE ORAL 2 TIMES DAILY
Status: DISCONTINUED | OUTPATIENT
Start: 2024-04-04 | End: 2024-04-04 | Stop reason: CLARIF

## 2024-04-04 RX ORDER — SODIUM CHLORIDE, SODIUM LACTATE, POTASSIUM CHLORIDE, CALCIUM CHLORIDE 600; 310; 30; 20 MG/100ML; MG/100ML; MG/100ML; MG/100ML
INJECTION, SOLUTION INTRAVENOUS ONCE
Status: DISCONTINUED | OUTPATIENT
Start: 2024-04-04 | End: 2024-04-04 | Stop reason: HOSPADM

## 2024-04-04 RX ORDER — ACETAMINOPHEN 325 MG/1
650 TABLET ORAL EVERY 4 HOURS PRN
Status: DISCONTINUED | OUTPATIENT
Start: 2024-04-04 | End: 2024-04-05 | Stop reason: HOSPADM

## 2024-04-04 RX ORDER — ATORVASTATIN CALCIUM 40 MG/1
40 TABLET, FILM COATED ORAL DAILY
Status: DISCONTINUED | OUTPATIENT
Start: 2024-04-05 | End: 2024-04-05 | Stop reason: HOSPADM

## 2024-04-04 RX ORDER — CEFAZOLIN SODIUM 1 G/3ML
INJECTION, POWDER, FOR SOLUTION INTRAMUSCULAR; INTRAVENOUS PRN
Status: DISCONTINUED | OUTPATIENT
Start: 2024-04-04 | End: 2024-04-04 | Stop reason: SDUPTHER

## 2024-04-04 RX ORDER — ROCURONIUM BROMIDE 10 MG/ML
INJECTION, SOLUTION INTRAVENOUS PRN
Status: DISCONTINUED | OUTPATIENT
Start: 2024-04-04 | End: 2024-04-04 | Stop reason: SDUPTHER

## 2024-04-04 RX ORDER — HEPARIN SODIUM 1000 [USP'U]/ML
INJECTION, SOLUTION INTRAVENOUS; SUBCUTANEOUS PRN
Status: DISCONTINUED | OUTPATIENT
Start: 2024-04-04 | End: 2024-04-04 | Stop reason: SDUPTHER

## 2024-04-04 RX ORDER — DEXAMETHASONE SODIUM PHOSPHATE 4 MG/ML
INJECTION, SOLUTION INTRA-ARTICULAR; INTRALESIONAL; INTRAMUSCULAR; INTRAVENOUS; SOFT TISSUE PRN
Status: DISCONTINUED | OUTPATIENT
Start: 2024-04-04 | End: 2024-04-04 | Stop reason: SDUPTHER

## 2024-04-04 RX ORDER — AMLODIPINE BESYLATE 5 MG/1
5 TABLET ORAL 2 TIMES DAILY
Status: DISCONTINUED | OUTPATIENT
Start: 2024-04-04 | End: 2024-04-05 | Stop reason: HOSPADM

## 2024-04-04 RX ORDER — SODIUM CHLORIDE 0.9 % (FLUSH) 0.9 %
5-40 SYRINGE (ML) INJECTION PRN
Status: DISCONTINUED | OUTPATIENT
Start: 2024-04-04 | End: 2024-04-04 | Stop reason: HOSPADM

## 2024-04-04 RX ORDER — HEPARIN SODIUM 10000 [USP'U]/ML
INJECTION, SOLUTION INTRAVENOUS; SUBCUTANEOUS PRN
Status: DISCONTINUED | OUTPATIENT
Start: 2024-04-04 | End: 2024-04-04 | Stop reason: HOSPADM

## 2024-04-04 RX ORDER — HYDROXYCHLOROQUINE SULFATE 200 MG/1
200 TABLET, FILM COATED ORAL 2 TIMES DAILY
Status: DISCONTINUED | OUTPATIENT
Start: 2024-04-04 | End: 2024-04-05 | Stop reason: HOSPADM

## 2024-04-04 RX ORDER — SODIUM CHLORIDE 9 MG/ML
INJECTION, SOLUTION INTRAVENOUS PRN
Status: DISCONTINUED | OUTPATIENT
Start: 2024-04-04 | End: 2024-04-05 | Stop reason: HOSPADM

## 2024-04-04 RX ORDER — FENTANYL CITRATE 50 UG/ML
INJECTION, SOLUTION INTRAMUSCULAR; INTRAVENOUS PRN
Status: DISCONTINUED | OUTPATIENT
Start: 2024-04-04 | End: 2024-04-04 | Stop reason: SDUPTHER

## 2024-04-04 RX ORDER — HYDROCHLOROTHIAZIDE 25 MG/1
25 TABLET ORAL DAILY
Status: DISCONTINUED | OUTPATIENT
Start: 2024-04-04 | End: 2024-04-05 | Stop reason: HOSPADM

## 2024-04-04 RX ORDER — GABAPENTIN 300 MG/1
300 CAPSULE ORAL 2 TIMES DAILY
Status: DISCONTINUED | OUTPATIENT
Start: 2024-04-04 | End: 2024-04-05 | Stop reason: HOSPADM

## 2024-04-04 RX ORDER — ONDANSETRON 2 MG/ML
INJECTION INTRAMUSCULAR; INTRAVENOUS PRN
Status: DISCONTINUED | OUTPATIENT
Start: 2024-04-04 | End: 2024-04-04 | Stop reason: SDUPTHER

## 2024-04-04 RX ORDER — SODIUM CHLORIDE 9 MG/ML
INJECTION, SOLUTION INTRAVENOUS PRN
Status: COMPLETED | OUTPATIENT
Start: 2024-04-04 | End: 2024-04-04

## 2024-04-04 RX ORDER — DROPERIDOL 2.5 MG/ML
0.62 INJECTION, SOLUTION INTRAMUSCULAR; INTRAVENOUS EVERY 10 MIN PRN
Status: DISCONTINUED | OUTPATIENT
Start: 2024-04-04 | End: 2024-04-04 | Stop reason: HOSPADM

## 2024-04-04 RX ORDER — LIDOCAINE HYDROCHLORIDE 20 MG/ML
INJECTION, SOLUTION INTRAVENOUS PRN
Status: DISCONTINUED | OUTPATIENT
Start: 2024-04-04 | End: 2024-04-04 | Stop reason: SDUPTHER

## 2024-04-04 RX ORDER — OXYCODONE HYDROCHLORIDE 5 MG/1
5 TABLET ORAL
Status: DISCONTINUED | OUTPATIENT
Start: 2024-04-04 | End: 2024-04-04 | Stop reason: HOSPADM

## 2024-04-04 RX ORDER — SODIUM CHLORIDE 0.9 % (FLUSH) 0.9 %
5-40 SYRINGE (ML) INJECTION PRN
Status: DISCONTINUED | OUTPATIENT
Start: 2024-04-04 | End: 2024-04-05 | Stop reason: HOSPADM

## 2024-04-04 RX ORDER — SODIUM CHLORIDE 0.9 % (FLUSH) 0.9 %
5-40 SYRINGE (ML) INJECTION EVERY 12 HOURS SCHEDULED
Status: DISCONTINUED | OUTPATIENT
Start: 2024-04-04 | End: 2024-04-04 | Stop reason: HOSPADM

## 2024-04-04 RX ORDER — LABETALOL HYDROCHLORIDE 5 MG/ML
INJECTION, SOLUTION INTRAVENOUS PRN
Status: DISCONTINUED | OUTPATIENT
Start: 2024-04-04 | End: 2024-04-04 | Stop reason: SDUPTHER

## 2024-04-04 RX ORDER — ONDANSETRON 2 MG/ML
4 INJECTION INTRAMUSCULAR; INTRAVENOUS
Status: DISCONTINUED | OUTPATIENT
Start: 2024-04-04 | End: 2024-04-04 | Stop reason: HOSPADM

## 2024-04-04 RX ORDER — NALOXONE HYDROCHLORIDE 0.4 MG/ML
INJECTION, SOLUTION INTRAMUSCULAR; INTRAVENOUS; SUBCUTANEOUS PRN
Status: DISCONTINUED | OUTPATIENT
Start: 2024-04-04 | End: 2024-04-04 | Stop reason: HOSPADM

## 2024-04-04 RX ADMIN — AMLODIPINE BESYLATE 5 MG: 5 TABLET ORAL at 22:51

## 2024-04-04 RX ADMIN — ISOSORBIDE MONONITRATE 30 MG: 30 TABLET, EXTENDED RELEASE ORAL at 14:59

## 2024-04-04 RX ADMIN — PROTAMINE SULFATE 30 MG: 10 INJECTION, SOLUTION INTRAVENOUS at 13:26

## 2024-04-04 RX ADMIN — ONDANSETRON 4 MG: 2 INJECTION INTRAMUSCULAR; INTRAVENOUS at 12:53

## 2024-04-04 RX ADMIN — LABETALOL HYDROCHLORIDE 5 MG: 5 INJECTION, SOLUTION INTRAVENOUS at 13:37

## 2024-04-04 RX ADMIN — CEFAZOLIN SODIUM 2 G: 1 INJECTION, POWDER, FOR SOLUTION INTRAMUSCULAR; INTRAVENOUS at 12:56

## 2024-04-04 RX ADMIN — POTASSIUM CHLORIDE 10 MEQ: 750 TABLET, FILM COATED, EXTENDED RELEASE ORAL at 14:59

## 2024-04-04 RX ADMIN — SODIUM CHLORIDE: 900 INJECTION INTRAVENOUS at 12:37

## 2024-04-04 RX ADMIN — FOLIC ACID 1 MG: 1 TABLET ORAL at 14:59

## 2024-04-04 RX ADMIN — HYDROXYCHLOROQUINE SULFATE 200 MG: 200 TABLET, FILM COATED ENTERAL at 22:52

## 2024-04-04 RX ADMIN — HEPARIN SODIUM 10000 UNITS: 1000 INJECTION, SOLUTION INTRAVENOUS; SUBCUTANEOUS at 13:09

## 2024-04-04 RX ADMIN — GABAPENTIN 300 MG: 300 CAPSULE ORAL at 22:51

## 2024-04-04 RX ADMIN — LIDOCAINE HYDROCHLORIDE 60 MG: 20 INJECTION, SOLUTION INTRAVENOUS at 12:50

## 2024-04-04 RX ADMIN — METOPROLOL TARTRATE 25 MG: 25 TABLET, FILM COATED ORAL at 22:51

## 2024-04-04 RX ADMIN — DEXAMETHASONE SODIUM PHOSPHATE 4 MG: 4 INJECTION, SOLUTION INTRA-ARTICULAR; INTRALESIONAL; INTRAMUSCULAR; INTRAVENOUS; SOFT TISSUE at 12:53

## 2024-04-04 RX ADMIN — FENTANYL CITRATE 50 MCG: 50 INJECTION, SOLUTION INTRAMUSCULAR; INTRAVENOUS at 12:53

## 2024-04-04 RX ADMIN — ROCURONIUM BROMIDE 50 MG: 10 INJECTION, SOLUTION INTRAVENOUS at 12:50

## 2024-04-04 RX ADMIN — SODIUM CHLORIDE, PRESERVATIVE FREE 10 ML: 5 INJECTION INTRAVENOUS at 22:53

## 2024-04-04 RX ADMIN — LOSARTAN POTASSIUM 100 MG: 100 TABLET, FILM COATED ORAL at 14:59

## 2024-04-04 RX ADMIN — PROPOFOL 100 MG: 10 INJECTION, EMULSION INTRAVENOUS at 12:50

## 2024-04-04 RX ADMIN — ASPIRIN 81 MG 81 MG: 81 TABLET ORAL at 10:15

## 2024-04-04 RX ADMIN — HYDROCHLOROTHIAZIDE 25 MG: 25 TABLET ORAL at 14:59

## 2024-04-04 ASSESSMENT — ENCOUNTER SYMPTOMS
COUGH: 0
CHEST TIGHTNESS: 0
BACK PAIN: 0
BLOOD IN STOOL: 0
EYE PAIN: 0
WHEEZING: 0
NAUSEA: 0
PHOTOPHOBIA: 0
SHORTNESS OF BREATH: 1
VOMITING: 0
COLOR CHANGE: 0
CONSTIPATION: 0
DIARRHEA: 0

## 2024-04-04 ASSESSMENT — PAIN SCALES - GENERAL: PAINLEVEL_OUTOF10: 0

## 2024-04-04 NOTE — H&P
right CTS surg 4/2001- Dr Parmar    DDD (degenerative disc disease), cervical     DDD (degenerative disc disease), cervical     DDD (degenerative disc disease), lumbar     Degenerative disc disease, cervical     Diabetes mellitus with neuropathy (HCC)     Dr Alvarado/sheyla, - ON NEURONTIN    Diffuse cystic mastopathy     GERD without esophagitis     H/O cardiac catheterization 05/2005 5/16/05 Circ stent 80% prior 0% post, EF 60%    H/O cardiovascular stress test 12/29/2020    ef 60% normal lexiscan    H/O Doppler ultrasound 10/2011, 8/09    carotid 10/4/11 moderat stenosis left internal carotid atery est 50-69%, progression in stenotic changes left internal carotid artery, right WNL    H/O echocardiogram 12/29/2020    EF 55-60% mild TR AR and pulmonic regurg    H/O left heart catheterization 11/30/2022    LM patent.  CX 90% distal to stent, LAD sml caliber vessel, 70%proximal stenosis, 90% distal stenosi, RCA 70% stenosis.  Recommend PCI vs CABG    H/O left heart catheterization by ventricular puncture 09/15/2023    LAD PATENT STENT, MILD UNCHANGED DISEASE AT BIFURCATION, SMALL DIAG JAILED.  CX PATENT STENTS MILD DISESAE. RCA 40% MID STENOSIS , HEAVILY CALCIFED UNCHANGED.    H/O mammogram     1/16- recheck 1/17    H/O tilt table evaluation 07/2009 7/20/09 WNL    Hyperlipidemia     Hypertension     Memory loss     MMSE 23/30-- 11/15/13    Neuropathy     Osteoarthritis, knee     Peripheral neuropathy     burning discomfort in feet.    PONV (postoperative nausea and vomiting)     Postsurgical menopause     Rheumatoid arthritis(714.0)     S/P colonoscopic polypectomy 11/20/2020    Dr Taylor, recheck 3 yrs- SESSILE SERATED POLYP    SCC (squamous cell carcinoma), leg     Dr Parmar removed fr legs 10/16    Thyroid nodule     on u/s 10/2016, recheck May 2017- stable--- RECHECK MAY 2018 RECOMMENDED    TMJ (temporomandibular joint syndrome)     Unspecified cerebral artery occlusion with cerebral infarction     2016-Right  pericardial effusion. Pericardial fat pad visualized.       Nuclear 2/18/2024     Stress Combined Conclusion: Normal pharmacological myocardial perfusion study. Findings suggest a low risk of cardiac events.    Stress Function: Left ventricular function post-stress is normal. Post-stress ejection fraction is 77%.    Perfusion Comments: LV perfusion is normal. There is no evidence of inducible ischemia.    Perfusion Conclusion: There is no evidence of transient ischemic dilation (TID).    ECG: The ECG was negative for ischemia.    Stress Test: A pharmacological stress test was performed using lexiscan. The patient reported chest pain during the stress test.      Vitals:    04/04/24 1000   BP: (!) 191/90   Pulse: 75   Resp: 19   Temp: 96.9 °F (36.1 °C)   TempSrc: Temporal   SpO2: 95%   Weight: 78.9 kg (174 lb)   Height: 1.524 m (5')        IMPRESSION / RECOMMENDATIONS:     PAF  High risk for falls  HTN  HLD  CVA  Carotid artery disease  Hypercoagulable secondary to atrial fibrillation     Patient has PAF episodes of the event monitor.  She has a history of stroke already.  She is on aspirin and Plavix.  Stroke initially was thought to be from carotid thrombosis and she has been on aspirin Plavix.  Patient was recommended to be on anticoagulation but she is very scared of starting anticoagulation because she already bleeds and she has risk of falls 2.  So she is worried about starting Xarelto or Eliquis or Coumadin.  She wanted to have further other options so I discussed about watchman with the patient.    Patient rate is controlled on metoprolol.  She has chest pain so I will start her on isosorbide to see if that will help her. -Had recent heart cath and no significant disease and also had CTA which did not show any aneurysm either.      Atrial Fibrillation CHADSVASC2 Score Stroke Risk:   83 y.o. > 75 - 2    female Female - 1   CHF HX: No - 0   HTN HX: Yes - 1   Stroke/TIA/Thromboembolism Yes _2   Vascular Disease

## 2024-04-04 NOTE — PROGRESS NOTES
4 Eyes Skin Assessment     NAME:  Vita Clarke  YOB: 1940  MEDICAL RECORD NUMBER:  [id number]    The patient is being assessed for  Admission    I agree that at least one RN has performed a thorough Head to Toe Skin Assessment on the patient. ALL assessment sites listed below have been assessed.      Areas assessed by both nurses:    Head, Face, Ears, Shoulders, Back, Chest, Arms, Elbows, Hands, Sacrum. Buttock, Coccyx, Ischium, and Legs. Feet and Heels        Does the Patient have a Wound? No noted wound(s)       Kyler Prevention initiated by RN: Yes  Wound Care Orders initiated by RN: No    Pressure Injury (Stage 3,4, Unstageable, DTI, NWPT, and Complex wounds) if present, place Wound referral order by RN under : No    New Ostomies, if present place, Ostomy referral order under : No     Nurse 1 eSignature: Electronically signed by Esther Martin RN on 4/4/24 at 7:10 PM EDT    **SHARE this note so that the co-signing nurse can place an eSignature**    Nurse 2 eSignature: Electronically signed by Capri Orourke RN on 4/4/24 at 8:25 PM EDT

## 2024-04-04 NOTE — PROGRESS NOTES
Sutures cut. Manual compression held for 15 minutes due to slight ooze. Dr Jorge aware, and assessed the site. Bleeding stopped with manual compression. Site covered with DSD.               Right groin soft dry and intact.   Primary nurse checked off site.

## 2024-04-04 NOTE — PROGRESS NOTES
1340 pt arrived from cath lab to pacu. Monitors applied and alarms in place. Report rec'd from VIRGILIO CRNA and Jeanie RN  1345 Pt alert and oriented tolerating ice chips appropriately   1400 pt denies pain or nausea.  1415   1425 pt transported to floor by this RN. Bedside report given to Esther PELAYO. Groin checked at bedside with Esther PELAYO. Dressing clean dry and intact. Groin soft without s/sx of hematoma

## 2024-04-05 VITALS
DIASTOLIC BLOOD PRESSURE: 45 MMHG | TEMPERATURE: 98.4 F | HEART RATE: 77 BPM | BODY MASS INDEX: 33.72 KG/M2 | RESPIRATION RATE: 20 BRPM | WEIGHT: 171.74 LBS | SYSTOLIC BLOOD PRESSURE: 133 MMHG | OXYGEN SATURATION: 98 % | HEIGHT: 60 IN

## 2024-04-05 PROCEDURE — 2580000003 HC RX 258: Performed by: NURSE PRACTITIONER

## 2024-04-05 PROCEDURE — 6370000000 HC RX 637 (ALT 250 FOR IP): Performed by: NURSE PRACTITIONER

## 2024-04-05 PROCEDURE — 99238 HOSP IP/OBS DSCHRG MGMT 30/<: CPT | Performed by: NURSE PRACTITIONER

## 2024-04-05 RX ADMIN — CLOPIDOGREL BISULFATE 75 MG: 75 TABLET ORAL at 08:34

## 2024-04-05 RX ADMIN — GABAPENTIN 300 MG: 300 CAPSULE ORAL at 08:33

## 2024-04-05 RX ADMIN — FOLIC ACID 1 MG: 1 TABLET ORAL at 08:34

## 2024-04-05 RX ADMIN — SODIUM CHLORIDE, PRESERVATIVE FREE 10 ML: 5 INJECTION INTRAVENOUS at 08:39

## 2024-04-05 RX ADMIN — ISOSORBIDE MONONITRATE 30 MG: 30 TABLET, EXTENDED RELEASE ORAL at 08:33

## 2024-04-05 RX ADMIN — LOSARTAN POTASSIUM 100 MG: 100 TABLET, FILM COATED ORAL at 08:34

## 2024-04-05 RX ADMIN — AMLODIPINE BESYLATE 5 MG: 5 TABLET ORAL at 08:35

## 2024-04-05 RX ADMIN — HYDROCHLOROTHIAZIDE 25 MG: 25 TABLET ORAL at 08:34

## 2024-04-05 RX ADMIN — HYDROXYCHLOROQUINE SULFATE 200 MG: 200 TABLET, FILM COATED ENTERAL at 08:38

## 2024-04-05 RX ADMIN — METOPROLOL TARTRATE 25 MG: 25 TABLET, FILM COATED ORAL at 08:35

## 2024-04-05 RX ADMIN — POTASSIUM CHLORIDE 10 MEQ: 750 TABLET, FILM COATED, EXTENDED RELEASE ORAL at 08:35

## 2024-04-05 RX ADMIN — ATORVASTATIN CALCIUM 40 MG: 40 TABLET, FILM COATED ORAL at 08:34

## 2024-04-05 RX ADMIN — ASPIRIN 81 MG: 81 TABLET, CHEWABLE ORAL at 08:34

## 2024-04-05 ASSESSMENT — PAIN SCALES - GENERAL: PAINLEVEL_OUTOF10: 0

## 2024-04-05 NOTE — DISCHARGE SUMMARY
[facility]  Discharge Summary    Vita Clarke  :  1940  MRN:  [id number]    Admit date:  2024  Discharge date:      Admitting Physician:  Gomez Jorge MD    Discharge Diagnoses:     1. Sp Watchman device procedure      Admission Condition:  fair    Discharged Condition:  good    Hospital Course:   Patient with hx of PAF, High risk for falls,HTN,HLD,CVA,Carotid artery disease with CHADS-VAS score of 6 and Has bled score of 4 with high risk of bleeding with anticoagulation and high risk of stroke with out anticoagulation here for BLADIMIR closure device watchman implantation  Patient tolerated the procedure well. Observed overnight. Patient groin access site was clean, no hematoma and no tenderness, No bruit. Echo showed no pericardial effusion.  Patient stable for discharge with out patient follow up.    Patient given instructions of continuing anticoagulation and aspirin for 45 days  Then he will get a AARON at 45 days and based on the AARON results his anticoagulation course will be decided    Patient is doing well following WATCHMAN implant.  Ambulating without any issues      Discharge Medications:      Current Discharge Medication List             Details   isosorbide mononitrate (IMDUR) 30 MG extended release tablet TAKE 1 TABLET BY MOUTH EVERY DAY  Qty: 30 tablet, Refills: 3      methotrexate (RHEUMATREX) 2.5 MG chemo tablet Take 6 tablets by mouth once a week  Qty: 24 tablet, Refills: 2    Associated Diagnoses: Inflammatory polyarthritis (HCC)      folic acid (FOLVITE) 1 MG tablet Take 1 tablet by mouth daily  Qty: 90 tablet, Refills: 0    Associated Diagnoses: Inflammatory polyarthritis (HCC)      hydroxychloroquine (PLAQUENIL) 200 MG tablet Take 1 tablet by mouth 2 times daily  Qty: 180 tablet, Refills: 0    Associated Diagnoses: Inflammatory polyarthritis (HCC)      gabapentin (NEURONTIN) 300 MG capsule TAKE 1 CAPSULE BY MOUTH EVERY MORNING AND 1 CAPSULE EVERY NIGHT AT BEDTIME  Qty: 180 capsule,

## 2024-04-06 LAB
ABO/RH: NORMAL
ANTIBODY SCREEN: NEGATIVE
COMMENT: NORMAL
COMPONENT: NORMAL
CROSSMATCH RESULT: NORMAL
STATUS: NORMAL
TRANSFUSION STATUS: NORMAL
UNIT DIVISION: 0
UNIT NUMBER: NORMAL

## 2024-04-08 ENCOUNTER — CARE COORDINATION (OUTPATIENT)
Dept: CASE MANAGEMENT | Age: 84
End: 2024-04-08

## 2024-04-08 DIAGNOSIS — Z95.818 PRESENCE OF WATCHMAN LEFT ATRIAL APPENDAGE CLOSURE DEVICE: Primary | ICD-10-CM

## 2024-04-08 PROCEDURE — 1111F DSCHRG MED/CURRENT MED MERGE: CPT | Performed by: INTERNAL MEDICINE

## 2024-04-08 NOTE — CARE COORDINATION
Care Transitions Initial Follow Up Call    Call within 2 business days of discharge: Yes    Patient Current Location:  Home: [facility] [address] [facility] [address]    Care Transition Nurse contacted the patient by telephone to perform post hospital discharge assessment. Verified name and  with patient as identifiers. Provided introduction to self, and explanation of the Care Transition Nurse role.     Patient: Vita Clarke Patient : 1940   MRN: [id number]  Reason for Admission: PAF s/p Watchman  Discharge Date: 24 RARS: Readmission Risk Score: 15.7  Facility: [facility] 24-24    Last Discharge Facility       Date Complaint Diagnosis Description Type Department Provider    24  AF (paroxysmal atrial fibrillation) (HCC) ... Admission (Discharged) [facility] 3Gomez Damian MD          Challenges to be reviewed by the provider   Additional needs identified to be addressed with provider: No     Method of communication with provider: none.    Patient reports doing very well s/p Watchman. Denies chest pain, palpitations, dizziness. Reports chronic sob on exertion at baseline. Reports BP 13/80, HR 68. Reports SPO2 94% ra. Noted to be speaking in complete, unlabored sentences. Reports right groin site intact w/ no hard lumps, bruising or bleeding. Reviewed sx that require immediate medical attention. [facility], Dtr to provide transportation. Reports appetite, fluid intake good w/ b&b wnl. Lives alone, independent adls, uses cane prn. Denies resource needs including hhc, dme, community assistance.      Care Transition Nurse reviewed discharge instructions, medical action plan, and red flags with patient who verbalized understanding. The patient was given an opportunity to ask questions and does not have any further questions or concerns at this time. Were discharge instructions available to patient? Yes. Reviewed appropriate site of care based on symptoms and

## 2024-04-11 ENCOUNTER — HOSPITAL ENCOUNTER (OUTPATIENT)
Age: 84
Discharge: HOME | End: 2024-04-11
Payer: MEDICARE

## 2024-04-11 ENCOUNTER — CARE COORDINATION (OUTPATIENT)
Dept: CASE MANAGEMENT | Age: 84
End: 2024-04-11

## 2024-04-11 ENCOUNTER — OFFICE VISIT (OUTPATIENT)
Dept: CARDIOLOGY CLINIC | Age: 84
End: 2024-04-11
Payer: MEDICARE

## 2024-04-11 ENCOUNTER — TELEPHONE (OUTPATIENT)
Dept: CARDIOLOGY CLINIC | Age: 84
End: 2024-04-11

## 2024-04-11 ENCOUNTER — HOSPITAL ENCOUNTER (OUTPATIENT)
Dept: GENERAL RADIOLOGY | Age: 84
Discharge: HOME | End: 2024-04-11
Payer: MEDICARE

## 2024-04-11 VITALS
HEIGHT: 60 IN | SYSTOLIC BLOOD PRESSURE: 126 MMHG | HEART RATE: 69 BPM | WEIGHT: 172 LBS | DIASTOLIC BLOOD PRESSURE: 66 MMHG | BODY MASS INDEX: 33.77 KG/M2

## 2024-04-11 DIAGNOSIS — I48.0 AF (PAROXYSMAL ATRIAL FIBRILLATION) (HCC): ICD-10-CM

## 2024-04-11 DIAGNOSIS — R06.02 SHORTNESS OF BREATH: ICD-10-CM

## 2024-04-11 DIAGNOSIS — Z95.818 PRESENCE OF WATCHMAN LEFT ATRIAL APPENDAGE CLOSURE DEVICE: Primary | ICD-10-CM

## 2024-04-11 DIAGNOSIS — I10 ESSENTIAL HYPERTENSION: ICD-10-CM

## 2024-04-11 DIAGNOSIS — I48.0 HYPERCOAGULABLE STATE DUE TO PAROXYSMAL ATRIAL FIBRILLATION (HCC): ICD-10-CM

## 2024-04-11 DIAGNOSIS — D68.69 HYPERCOAGULABLE STATE DUE TO PAROXYSMAL ATRIAL FIBRILLATION (HCC): ICD-10-CM

## 2024-04-11 PROCEDURE — 93000 ELECTROCARDIOGRAM COMPLETE: CPT | Performed by: NURSE PRACTITIONER

## 2024-04-11 PROCEDURE — G8428 CUR MEDS NOT DOCUMENT: HCPCS | Performed by: NURSE PRACTITIONER

## 2024-04-11 PROCEDURE — 1111F DSCHRG MED/CURRENT MED MERGE: CPT | Performed by: NURSE PRACTITIONER

## 2024-04-11 PROCEDURE — 1090F PRES/ABSN URINE INCON ASSESS: CPT | Performed by: NURSE PRACTITIONER

## 2024-04-11 PROCEDURE — 1124F ACP DISCUSS-NO DSCNMKR DOCD: CPT | Performed by: NURSE PRACTITIONER

## 2024-04-11 PROCEDURE — 71046 X-RAY EXAM CHEST 2 VIEWS: CPT

## 2024-04-11 PROCEDURE — 3074F SYST BP LT 130 MM HG: CPT | Performed by: NURSE PRACTITIONER

## 2024-04-11 PROCEDURE — 3078F DIAST BP <80 MM HG: CPT | Performed by: NURSE PRACTITIONER

## 2024-04-11 PROCEDURE — G8417 CALC BMI ABV UP PARAM F/U: HCPCS | Performed by: NURSE PRACTITIONER

## 2024-04-11 PROCEDURE — 1036F TOBACCO NON-USER: CPT | Performed by: NURSE PRACTITIONER

## 2024-04-11 PROCEDURE — 99214 OFFICE O/P EST MOD 30 MIN: CPT | Performed by: NURSE PRACTITIONER

## 2024-04-11 PROCEDURE — G8399 PT W/DXA RESULTS DOCUMENT: HCPCS | Performed by: NURSE PRACTITIONER

## 2024-04-11 RX ORDER — FUROSEMIDE 20 MG/1
20 TABLET ORAL DAILY
Qty: 5 TABLET | Refills: 0 | Status: SHIPPED | OUTPATIENT
Start: 2024-04-11

## 2024-04-11 RX ORDER — OMEPRAZOLE 20 MG/1
20 CAPSULE, DELAYED RELEASE ORAL DAILY
COMMUNITY

## 2024-04-11 NOTE — CARE COORDINATION
Care Transitions Outreach Attempt    Patient: Vita Clarke Patient : 1940 MRN: [id number] for Admission: PAF s/p Watchman  Discharge Date: 24         RARS: Readmission Risk Score: 15.7  Facility: [facility] 24-24    Last Discharge Facility       Date Complaint Diagnosis Description Type Department Provider    24  AF (paroxysmal atrial fibrillation) (HCC) ... Admission (Discharged) [facility] 3Gomez Damian MD     Future Appointments   Date Time Provider Department Center   2024 10:00 AM Maged Whitlock MD SRMX CT SURG [facility]   2024  1:00 PM Alyssa Platt, APRCONY [facility] Heart [facility]   2024 10:00 AM Joaquim Gonzalez MD SRMX E S IM [facility]   2024 11:40 AM Gabrielle Grullon APRN [facility] Heart [facility]   2024 11:00 AM James Hoang MD SRMX RHEUM [facility]   10/4/2024  1:00 PM Alyssa Platt, APRCONY [facility]     1st attempt to reach for Care Transition follow up call unsuccessful. HIPAA compliant message left requesting call back.

## 2024-04-11 NOTE — PROGRESS NOTES
Vitals:    04/11/24 1133   BP: 126/66   Site: Left Upper Arm   Position: Sitting   Cuff Size: Medium Adult   Pulse: 69   Weight: 78 kg (172 lb)   Height: 1.524 m (5')        Body mass index is 33.59 kg/m².      Physical Exam  Constitutional:       Appearance: Normal appearance. She is not ill-appearing.   HENT:      Head: Normocephalic and atraumatic.      Left Ear: External ear normal.      Nose: Nose normal.      Mouth/Throat:      Mouth: Mucous membranes are moist.   Eyes:      General:         Right eye: No discharge.         Left eye: No discharge.      Conjunctiva/sclera: Conjunctivae normal.   Cardiovascular:      Rate and Rhythm: Regular rhythm. Bradycardia present.      Heart sounds: Murmur (Grade 2/6 systolic murmur) heard.   Pulmonary:      Effort: Pulmonary effort is normal.      Breath sounds: No rales.   Abdominal:      General: Abdomen is flat.      Palpations: Abdomen is soft.   Musculoskeletal:         General: Normal range of motion.      Cervical back: Normal range of motion.      Right lower leg: No edema.      Left lower leg: No edema.   Skin:     General: Skin is warm and dry.      Comments: Right femoral groin site is soft, nontender, no hematoma   Neurological:      General: No focal deficit present.      Mental Status: She is alert and oriented to person, place, and time.   Psychiatric:         Mood and Affect: Mood normal.         Thought Content: Thought content normal.       CBC:   Lab Results   Component Value Date/Time    WBC 6.9 04/01/2024 11:36 AM    HGB 11.6 04/01/2024 11:36 AM    HCT 36.0 04/01/2024 11:36 AM     04/01/2024 11:36 AM     Lipids:  Lab Results   Component Value Date    CHOL 143 03/14/2024    TRIG 207 (H) 03/14/2024    HDL 27 (L) 03/14/2024    LDLCALC 75 03/14/2024    LDLDIRECT 84 08/04/2022     PT/INR:   Lab Results   Component Value Date/Time    INR 1.1 04/01/2024 11:36 AM        BMP:    Lab Results   Component Value Date     04/01/2024    K 4.6

## 2024-04-11 NOTE — TELEPHONE ENCOUNTER
Called patient and advised that cxr was normal. Patient advised to use incentive spirometer and take the lasix that was prescribed today. If no increase urine output then stop the lasix, all per Alyssa. Patient advised to call back if symptoms do not improve or would worsen. Patient stated understanding with no further c/o noted.

## 2024-04-12 ENCOUNTER — CARE COORDINATION (OUTPATIENT)
Dept: CASE MANAGEMENT | Age: 84
End: 2024-04-12

## 2024-04-12 NOTE — CARE COORDINATION
Remote Patient Monitoring (RPM) program for in-home monitoring: N/A as PCP not participating in program. Patient self monitors routinely.     Care Transitions Subsequent and Final Call    Schedule Follow Up Appointment with PCP: Completed  Subsequent and Final Calls  Do you have any ongoing symptoms?: Yes  Onset of Patient-reported symptoms: Other  Patient-reported symptoms: Shortness of Breath, Other, Chest Pain  Interventions for patient-reported symptoms: Other  Have your medications changed?: Yes  Patient Reports: 4/11/24 + Lasix  Do you have any questions related to your medications?: No  Do you currently have any active services?: No  Do you have any needs or concerns that I can assist you with?: No  Identified Barriers: Other, Stress  Care Transitions Interventions   Home Care Waiver: Declined Physical Therapy: Declined       Transportation Support: Declined    Meals on Wheels: Declined  DME Assistance: Declined     Senior Services: Declined    Diabetes Education: Completed       Occupational Therapy: Declined     Disease Association: Completed      Other Interventions:             Care Transition Nurse provided contact information for future needs. Plan for follow-up call in 3-5 days based on severity of symptoms and risk factors.  Plan for next call:  started Lasix?, any rx needs?, sx?, vitals?    Maya Berman RN

## 2024-04-15 ENCOUNTER — TELEPHONE (OUTPATIENT)
Dept: CARDIOLOGY CLINIC | Age: 84
End: 2024-04-15

## 2024-04-15 DIAGNOSIS — I48.0 PAF (PAROXYSMAL ATRIAL FIBRILLATION) (HCC): Primary | ICD-10-CM

## 2024-04-15 NOTE — TELEPHONE ENCOUNTER
Called patient and dates and times of post watchman AARON given;    PPW 4/16/2024 @ 0900  AARON 4/20/2024 @ 1000

## 2024-04-17 ENCOUNTER — CARE COORDINATION (OUTPATIENT)
Dept: CASE MANAGEMENT | Age: 84
End: 2024-04-17

## 2024-04-17 NOTE — CARE COORDINATION
Care Transitions Follow Up Call    Patient Current Location:  Home:  [address] [place] [address]    Care Transition Nurse contacted the patient by telephone to follow up after admission on 24-24.  Verified name and  with patient as identifiers.    Patient: Vita Clarke  Patient : 1940   MRN: [id number]  Reason for Admission: PAF s/p Watchman   Discharge Date: 24 RARS: Readmission Risk Score: 15.7  Facility: [facility]      Needs to be reviewed by the provider   Additional needs identified to be addressed with provider: denies need     Method of communication with provider: none.    Patient reports increased voiding since on Lasix 20mg qd. Reports chronic on/off cp, sob on exertion and edema reagan feet at baseline. Denies acute sx requiring provider notification. Denies ac distress. Noted to be speaking in complete, unlabored sentences. Advised to limit sodium, elevate reagan le when at rest, compression stockings. Reports she has not needed to take prn Nitro but has on hand if needed. Reports taking all rx as directed. Post watchman testing/appts scheduled for May. Denies refill needs. Reports appetite, fluid intake good w/ b&b wnl. Denies resource needs.       Future Appointments   Date Time Provider Department Center   2024  9:00 AM SCHEDULE, [facility] CARDIO SPR [facility] Heart [facility]   2024 10:00 AM Maged Whitlock MD SRMX CT SURG [facility]   2024 10:00 AM SRM ECHO 2 SRMZ TORSTEN [facility] Rad/Car   2024  1:00 PM Alyssa Platt APRN [facility] Heart [facility]   2024 10:00 AM Joaquim Gonzalez MD SRMX E S IM [facility]   2024 11:40 AM Gabrielle Grullon APRN [facility] Heart [facility]   2024 11:00 AM James Hoang MD SRMX RHEUM [facility]   10/4/2024  1:00 PM Alyssa Platt, APRCONY [facility] Heart [facility]     Care Transition Nurse reviewed medical action plan and red flags with patient and discussed any barriers to care and/or understanding of plan of care after

## 2024-04-19 NOTE — ANESTHESIA POSTPROCEDURE EVALUATION
Department of Anesthesiology  Postprocedure Note    Patient: Vita Clarke  MRN: [id number]  YOB: 1940  Date of evaluation: 4/19/2024    Procedure Summary       Date: 04/04/24 Room / Location: [facility] CATH LAB  / [facility] CARDIAC CATH LAB    Anesthesia Start: 1237 Anesthesia Stop: 1343    Procedure: Watchman mark closure device Diagnosis:       PAF (paroxysmal atrial fibrillation) (HCC)      (Coronary artery disease involving native coronary artery of native heart without angina pectoris [I25.10])    Providers: Gomez Jorge MD Responsible Provider: Maged Drew MD    Anesthesia Type: General ASA Status: 4            Anesthesia Type: General    Aria Phase I: Aria Score: 10    Aria Phase II:      Anesthesia Post Evaluation    Patient location during evaluation: PACU  Patient participation: complete - patient participated  Level of consciousness: awake and alert  Pain score: 0  Airway patency: patent  Nausea & Vomiting: no nausea and no vomiting  Cardiovascular status: hemodynamically stable  Respiratory status: acceptable  Hydration status: euvolemic  Pain management: adequate        There were no known notable events for this encounter.

## 2024-04-19 NOTE — ANESTHESIA PRE PROCEDURE
Department of Anesthesiology  Preprocedure Note       Name:  Vita Clarke   Age:  83 y.o.  :  1940                                          MRN:  [id number]         Date:  2024      Surgeon: Surgeon(s):  Gomez Jorge MD    Procedure: Procedure(s):  Watchman mark closure device    Medications prior to admission:   Prior to Admission medications    Medication Sig Start Date End Date Taking? Authorizing Provider   omeprazole (PRILOSEC) 20 MG delayed release capsule Take 1 capsule by mouth daily    Gustavo Burns MD   furosemide (LASIX) 20 MG tablet Take 1 tablet by mouth daily 24   Alyssa Platt, APRN - CNP   isosorbide mononitrate (IMDUR) 30 MG extended release tablet TAKE 1 TABLET BY MOUTH EVERY DAY 3/28/24   Mary Matson MD   methotrexate (RHEUMATREX) 2.5 MG chemo tablet Take 6 tablets by mouth once a week  Patient not taking: Reported on 2024 3/26/24   James Hoang MD   folic acid (FOLVITE) 1 MG tablet Take 1 tablet by mouth daily  Patient not taking: Reported on 2024 3/26/24   James Hoang MD   hydroxychloroquine (PLAQUENIL) 200 MG tablet Take 1 tablet by mouth 2 times daily 3/22/24   James Hoang MD   gabapentin (NEURONTIN) 300 MG capsule TAKE 1 CAPSULE BY MOUTH EVERY MORNING AND 1 CAPSULE EVERY NIGHT AT BEDTIME 3/18/24 9/16/24  Joaquim Gonzalez MD   atorvastatin (LIPITOR) 40 MG tablet Take 1 tablet by mouth daily 3/18/24   Joaquim Gonzalez MD   amLODIPine (NORVASC) 5 MG tablet Take 1 tablet by mouth in the morning and at bedtime 3/18/24   Joaquim Gonzalez MD   hydroCHLOROthiazide (HYDRODIURIL) 25 MG tablet Take 1 tablet by mouth daily 24   Gustavo Burns MD   potassium chloride (KLOR-CON) 10 MEQ extended release tablet DAILY 23   Gustavo Burns MD   Biotin 300 MCG TABS Take 1 tablet by mouth daily 24   Joaquim Gonzalez MD   clopidogrel (PLAVIX) 75 MG tablet Take 1 tablet by mouth

## 2024-04-23 ENCOUNTER — TELEPHONE (OUTPATIENT)
Dept: CARDIOTHORACIC SURGERY | Age: 84
End: 2024-04-23

## 2024-04-23 NOTE — TELEPHONE ENCOUNTER
Spoke with pt to reschedule appt on 5/17/24 due to provider schedule change. Appt rescheduled for 5/23/24 @1:20pm. Pt voiced understanding.

## 2024-04-24 ENCOUNTER — TELEMEDICINE (OUTPATIENT)
Dept: INTERNAL MEDICINE CLINIC | Age: 84
End: 2024-04-24
Payer: MEDICARE

## 2024-04-24 DIAGNOSIS — I48.0 AF (PAROXYSMAL ATRIAL FIBRILLATION) (HCC): ICD-10-CM

## 2024-04-24 DIAGNOSIS — J20.9 ACUTE BRONCHITIS, UNSPECIFIED ORGANISM: Primary | ICD-10-CM

## 2024-04-24 PROCEDURE — G8417 CALC BMI ABV UP PARAM F/U: HCPCS | Performed by: INTERNAL MEDICINE

## 2024-04-24 PROCEDURE — 99213 OFFICE O/P EST LOW 20 MIN: CPT | Performed by: INTERNAL MEDICINE

## 2024-04-24 PROCEDURE — 1111F DSCHRG MED/CURRENT MED MERGE: CPT | Performed by: INTERNAL MEDICINE

## 2024-04-24 PROCEDURE — G8427 DOCREV CUR MEDS BY ELIG CLIN: HCPCS | Performed by: INTERNAL MEDICINE

## 2024-04-24 PROCEDURE — G8399 PT W/DXA RESULTS DOCUMENT: HCPCS | Performed by: INTERNAL MEDICINE

## 2024-04-24 PROCEDURE — 1036F TOBACCO NON-USER: CPT | Performed by: INTERNAL MEDICINE

## 2024-04-24 PROCEDURE — 1124F ACP DISCUSS-NO DSCNMKR DOCD: CPT | Performed by: INTERNAL MEDICINE

## 2024-04-24 PROCEDURE — 1090F PRES/ABSN URINE INCON ASSESS: CPT | Performed by: INTERNAL MEDICINE

## 2024-04-24 RX ORDER — DOXYCYCLINE HYCLATE 100 MG
100 TABLET ORAL 2 TIMES DAILY
Qty: 20 TABLET | Refills: 0 | Status: SHIPPED | OUTPATIENT
Start: 2024-04-24 | End: 2024-05-04

## 2024-04-24 RX ORDER — PROMETHAZINE HYDROCHLORIDE 6.25 MG/5ML
6.25 SYRUP ORAL 4 TIMES DAILY PRN
Qty: 140 ML | Refills: 0 | Status: SHIPPED | OUTPATIENT
Start: 2024-04-24 | End: 2024-05-01

## 2024-04-24 RX ORDER — PREDNISONE 5 MG/1
TABLET ORAL
Qty: 36 TABLET | Refills: 0 | Status: SHIPPED | OUTPATIENT
Start: 2024-04-24

## 2024-04-24 NOTE — PROGRESS NOTES
identification was verified, and a caregiver was present when appropriate.   The patient was located at Home: [address]  [address] [facility] [address]  Provider was located at Facility (Appt Dept): [address] [place] [address]  Confirm you are appropriately licensed, registered, or certified to deliver care in the state where the patient is located as indicated above. If you are not or unsure, please re-schedule the visit: Yes, I confirm.       Total time spent on this encounter: Not billed by time    --Joaquim Gonzalez MD on 4/24/2024 at 3:10 PM    An electronic signature was used to authenticate this note.

## 2024-04-26 ENCOUNTER — CARE COORDINATION (OUTPATIENT)
Dept: CASE MANAGEMENT | Age: 84
End: 2024-04-26

## 2024-04-26 NOTE — CARE COORDINATION
Care Transitions Follow Up Call    Patient Current Location:  Home:  [address] [place] [address]    Care Transition Nurse contacted the patient by telephone to follow up after admission on 24-24.  Verified name and  with patient as identifiers.    Patient: Vita Clarke  Patient : 1940   MRN: [id number]  Reason for Admission: PAF s/p Watchman   Discharge Date: 24 RARS: Readmission Risk Score: 15.7  Facility: [facility]      Needs to be reviewed by the provider   Additional needs identified to be addressed with provider: No     Method of communication with provider: none.    Patient was started on Doxycycline, Prednisone 24 per PCP for cough, sinus congestion. Advised to not skip doses and complete entire course unless otherwise directed by MD. Reports symptoms starting to lessen. Denies ac distress, fever, malaise. Reports using humidifier. Advised cough and deep breathing exercises. Reports Lasix completed w/ lessened eragan le edema. Reports occasional palpitations. Denies cp, sob, ac distress. Reports /70, HR 69, SPO2 95% ra, Wt 169# which is down from 172# on 24. Reports appetite, fluid intake good w/ b&b wnl. Denies resource needs.     Future Appointments   Date Time Provider Department Center   2024  9:00 AM SCHEDULE, [facility] CARDIO SPR [facility] Heart [facility]   2024 10:00 AM SRM ECHO 2 [facility] TORSTEN [facility] Rad/Car   2024  1:00 PM Alyssa Platt APRN - CNP [facility] Heart [facility]   2024  1:20 PM Maged Whitlock MD SRMX CT SURG [facility]   2024 10:00 AM Joaquim Gonzalez MD [facility] E S IM [facility]   2024 11:40 AM Gabrielle Grullon APRN - CNP [facility] Heart [facility]   2024 11:00 AM James Hoang MD [facility] RHEUM [facility]   10/4/2024  1:00 PM Alyssa Platt APRN [facility] Heart [facility]     Care Transition Nurse reviewed medical action plan and red flags with patient and discussed any barriers to care and/or understanding of plan of care after discharge.

## 2024-05-01 DIAGNOSIS — I10 ESSENTIAL HYPERTENSION: ICD-10-CM

## 2024-05-02 RX ORDER — AMLODIPINE BESYLATE 5 MG/1
5 TABLET ORAL 2 TIMES DAILY
Qty: 180 TABLET | Refills: 1 | Status: SHIPPED | OUTPATIENT
Start: 2024-05-02

## 2024-05-02 RX ORDER — HYDROCHLOROTHIAZIDE 25 MG/1
25 TABLET ORAL DAILY
Qty: 90 TABLET | Refills: 1 | Status: SHIPPED | OUTPATIENT
Start: 2024-05-02

## 2024-05-03 ENCOUNTER — CARE COORDINATION (OUTPATIENT)
Dept: CASE MANAGEMENT | Age: 84
End: 2024-05-03

## 2024-05-03 NOTE — CARE COORDINATION
Care Transitions Outreach Attempt      Patient: Vita Clarke Patient : 1940 MRN: [id number]   Reason for Admission: PAF s/p Watchman   Discharge Date: 24         RARS: Readmission Risk Score: 15.7  Facility: [facility]    Last Discharge Facility       Date Complaint Diagnosis Description Type Department Provider    24  AF (paroxysmal atrial fibrillation) (HCC) ... Admission (Discharged) SR[facility]Z 3N Gomez Jorge MD     Future Appointments   Date Time Provider Department Center   2024  9:00 AM SCHEDULE, [facility] CARDIO SPR [facility] Heart [facility]   2024 10:00 AM SRM ECHO 2 [facility] TORSTEN [facility] Rad/Car   2024  1:00 PM Alyssa Platt, JAYME [facility] Heart MMA   2024  1:20 PM Maged Whitlock MD SRMX CT SURG [facility]   2024 10:00 AM Joaquim Gonzalez MD [facility] E S IM [facility]   2024 11:40 AM Gabrielle Grullon APRN [facility] Heart [facility]   2024 11:00 AM James Hoang MD [facility] RHEUM [facility]   10/4/2024  1:00 PM Alyssa Platt [facility]     Attempt to reach for Care Transition follow up call unsuccessful. HIPAA compliant message left requesting call back. Upon next outreach: final call

## 2024-05-06 ENCOUNTER — CARE COORDINATION (OUTPATIENT)
Dept: CASE MANAGEMENT | Age: 84
End: 2024-05-06

## 2024-05-06 NOTE — CARE COORDINATION
Care Transitions Follow Up Call    Patient Current Location:  Home:  [address]  [address] [place] [address]    Care Transition Nurse contacted the patient by telephone to follow up after admission on 24-24.  Verified name and  with patient as identifiers.    Patient: Vita Clarke  Patient : 1940   MRN: [id number]  Reason for Admission: PAF s/p Watchman   Discharge Date: 24 RARS: Readmission Risk Score: 15.7  Facility: [facility]      Needs to be reviewed by the provider   Additional needs identified to be addressed with provider: No     Method of communication with provider: none.    Patient reports doing \"much better, real good\". Reports decreased cp, palps, sob. States \"its all getting better\". Reports chronic reagan le edema at baseline. Noted to be speaking in complete, unlabored sentences. Reports /61, HR 63, SPO2 96% ra,  and weight down to 160# (patient trying to lose some wt).  Reviewed upcoming cardiac appts. Reports she completed Doxycycline and Prednisone. Reports continued occasional cough w/ light yellow sputum; Patient attributes to allergies. Denies need for PCP appt. Advised to contact PCP should cough continue or worsen. Reports appetite, fluid intake good w/ b&b wnl.  Denies resource needs.     Future Appointments   Date Time Provider Department Center   2024  9:00 AM SCHEDULE, [facility] CARDIO SPR [facility] Heart [facility]   2024 10:00 AM SRM ECHO 2 [facility] TORSTEN [facility] Rad/Car   2024  1:00 PM Alyssa Platt APRN [facility] Heart MMA   2024  1:20 PM Maged Whitlock MD SRMX CT SURG [facility]   2024 10:00 AM Joaquim Gonzalez MD SR[facility] E S IM [facility]   2024 11:40 AM Gabrielle Grullon APRN  CNP [facility] Heart MMA   2024 11:00 AM James Hoang MD [facility] RHEUM MMA   10/4/2024  1:00 PM Alyssa Platt APRN [facility] Heart [facility]     Care Transition Nurse reviewed medical action plan and red flags with patient and discussed any barriers to

## 2024-05-16 ENCOUNTER — NURSE ONLY (OUTPATIENT)
Dept: CARDIOLOGY CLINIC | Age: 84
End: 2024-05-16

## 2024-05-16 ENCOUNTER — TELEPHONE (OUTPATIENT)
Dept: CARDIOTHORACIC SURGERY | Age: 84
End: 2024-05-16

## 2024-05-16 ENCOUNTER — HOSPITAL ENCOUNTER (OUTPATIENT)
Age: 84
Discharge: HOME | End: 2024-05-16
Payer: MEDICARE

## 2024-05-16 DIAGNOSIS — I48.0 PAF (PAROXYSMAL ATRIAL FIBRILLATION) (HCC): ICD-10-CM

## 2024-05-16 DIAGNOSIS — I48.0 PAF (PAROXYSMAL ATRIAL FIBRILLATION) (HCC): Primary | ICD-10-CM

## 2024-05-16 LAB
ANION GAP SERPL CALCULATED.3IONS-SCNC: 12 MMOL/L (ref 7–16)
APTT: 30.5 SECONDS (ref 25.1–37.1)
BUN SERPL-MCNC: 16 MG/DL (ref 6–23)
CALCIUM SERPL-MCNC: 9 MG/DL (ref 8.3–10.6)
CHLORIDE BLD-SCNC: 102 MMOL/L (ref 99–110)
CO2: 26 MMOL/L (ref 21–32)
CREAT SERPL-MCNC: 1.1 MG/DL (ref 0.6–1.1)
GFR, ESTIMATED: 50 ML/MIN/1.73M2
GLUCOSE SERPL-MCNC: 117 MG/DL (ref 70–99)
HCT VFR BLD CALC: 32.8 % (ref 37–47)
HEMOGLOBIN: 10.5 GM/DL (ref 12.5–16)
INR BLD: 1.1 INDEX
MAGNESIUM: 1.7 MG/DL (ref 1.8–2.4)
MCH RBC QN AUTO: 30.1 PG (ref 27–31)
MCHC RBC AUTO-ENTMCNC: 32 % (ref 32–36)
MCV RBC AUTO: 94 FL (ref 78–100)
PDW BLD-RTO: 13 % (ref 11.7–14.9)
PHOSPHORUS: 3.7 MG/DL (ref 2.5–4.9)
PLATELET # BLD: 195 K/CU MM (ref 140–440)
PMV BLD AUTO: 10.2 FL (ref 7.5–11.1)
POTASSIUM SERPL-SCNC: 4.5 MMOL/L (ref 3.5–5.1)
PROTHROMBIN TIME: 14.4 SECONDS (ref 11.7–14.5)
RBC # BLD: 3.49 M/CU MM (ref 4.2–5.4)
SODIUM BLD-SCNC: 140 MMOL/L (ref 135–145)
WBC # BLD: 5.4 K/CU MM (ref 4–10.5)

## 2024-05-16 PROCEDURE — 85027 COMPLETE CBC AUTOMATED: CPT

## 2024-05-16 PROCEDURE — 36415 COLL VENOUS BLD VENIPUNCTURE: CPT

## 2024-05-16 PROCEDURE — 84100 ASSAY OF PHOSPHORUS: CPT

## 2024-05-16 PROCEDURE — 83735 ASSAY OF MAGNESIUM: CPT

## 2024-05-16 PROCEDURE — 85730 THROMBOPLASTIN TIME PARTIAL: CPT

## 2024-05-16 PROCEDURE — 85610 PROTHROMBIN TIME: CPT

## 2024-05-16 PROCEDURE — 80048 BASIC METABOLIC PNL TOTAL CA: CPT

## 2024-05-16 NOTE — TELEPHONE ENCOUNTER
Left message on pt vm advising that she is scheduled to have her carotid 05/20/24 at the hospital, this will follow her Echo she already has scheduled at 10:00am, asked that she please call us back to verify

## 2024-05-16 NOTE — PROGRESS NOTES
Patient here in office and educated on 5/16/2024, scheduled for AARON on 5/20/2024 @ 1000, with arrival @ 0900, @ [facility]; risk explained; and consents signed. Also copy of orders given for labs due 5/16/2024 at [facility]. Instruction given to patient to :NPO after midnight the night before procedure; call hospital at [phone] to pre-register. May take rest of morning meds of procedure except the following; Hold Metformin the day before, the day of and two days after procedure. Hold Lasix and HCTZ the morning of procedure. Patient voiced understanding. Copies of consent & info scanned in chart.

## 2024-05-20 ENCOUNTER — HOSPITAL ENCOUNTER (OUTPATIENT)
Dept: NON INVASIVE DIAGNOSTICS | Age: 84
Discharge: HOME | End: 2024-05-22
Payer: MEDICARE

## 2024-05-20 ENCOUNTER — HOSPITAL ENCOUNTER (OUTPATIENT)
Dept: ULTRASOUND IMAGING | Age: 84
Discharge: HOME | End: 2024-05-20
Payer: MEDICARE

## 2024-05-20 VITALS
WEIGHT: 172 LBS | DIASTOLIC BLOOD PRESSURE: 60 MMHG | HEIGHT: 60 IN | BODY MASS INDEX: 33.77 KG/M2 | OXYGEN SATURATION: 95 % | SYSTOLIC BLOOD PRESSURE: 136 MMHG | HEART RATE: 60 BPM | RESPIRATION RATE: 13 BRPM

## 2024-05-20 DIAGNOSIS — Z98.890 S/P CAROTID ENDARTERECTOMY: ICD-10-CM

## 2024-05-20 DIAGNOSIS — I48.0 PAF (PAROXYSMAL ATRIAL FIBRILLATION) (HCC): ICD-10-CM

## 2024-05-20 DIAGNOSIS — I65.22 CAROTID STENOSIS, LEFT: ICD-10-CM

## 2024-05-20 LAB — ECHO BSA: 1.82 M2

## 2024-05-20 PROCEDURE — 93325 DOPPLER ECHO COLOR FLOW MAPG: CPT

## 2024-05-20 PROCEDURE — 99152 MOD SED SAME PHYS/QHP 5/>YRS: CPT | Performed by: INTERNAL MEDICINE

## 2024-05-20 PROCEDURE — 6370000000 HC RX 637 (ALT 250 FOR IP): Performed by: INTERNAL MEDICINE

## 2024-05-20 PROCEDURE — 93880 EXTRACRANIAL BILAT STUDY: CPT

## 2024-05-20 PROCEDURE — 7100000010 HC PHASE II RECOVERY - FIRST 15 MIN: Performed by: INTERNAL MEDICINE

## 2024-05-20 PROCEDURE — 93325 DOPPLER ECHO COLOR FLOW MAPG: CPT | Performed by: INTERNAL MEDICINE

## 2024-05-20 PROCEDURE — 6360000002 HC RX W HCPCS: Performed by: INTERNAL MEDICINE

## 2024-05-20 PROCEDURE — 7100000011 HC PHASE II RECOVERY - ADDTL 15 MIN: Performed by: INTERNAL MEDICINE

## 2024-05-20 PROCEDURE — 93312 ECHO TRANSESOPHAGEAL: CPT | Performed by: INTERNAL MEDICINE

## 2024-05-20 RX ORDER — LIDOCAINE HYDROCHLORIDE 20 MG/ML
SOLUTION OROPHARYNGEAL PRN
Status: COMPLETED | OUTPATIENT
Start: 2024-05-20 | End: 2024-05-20

## 2024-05-20 RX ORDER — MIDAZOLAM HYDROCHLORIDE 1 MG/ML
INJECTION INTRAMUSCULAR; INTRAVENOUS PRN
Status: COMPLETED | OUTPATIENT
Start: 2024-05-20 | End: 2024-05-20

## 2024-05-20 RX ORDER — FENTANYL CITRATE 50 UG/ML
INJECTION, SOLUTION INTRAMUSCULAR; INTRAVENOUS PRN
Status: COMPLETED | OUTPATIENT
Start: 2024-05-20 | End: 2024-05-20

## 2024-05-20 RX ADMIN — FENTANYL CITRATE 25 MCG: 50 INJECTION, SOLUTION INTRAMUSCULAR; INTRAVENOUS at 10:32

## 2024-05-20 RX ADMIN — LIDOCAINE HYDROCHLORIDE 15 ML: 20 SOLUTION ORAL; TOPICAL at 10:28

## 2024-05-20 RX ADMIN — MIDAZOLAM 1 MG: 1 INJECTION INTRAMUSCULAR; INTRAVENOUS at 10:31

## 2024-05-20 ASSESSMENT — ENCOUNTER SYMPTOMS
COLOR CHANGE: 0
ABDOMINAL PAIN: 0
BACK PAIN: 0
SINUS PAIN: 0
SINUS PRESSURE: 0
COUGH: 1
PHOTOPHOBIA: 0
ABDOMINAL DISTENTION: 0
SHORTNESS OF BREATH: 1

## 2024-05-20 NOTE — PROCEDURES
Procedure     Conscious sedation for AARON     ASA 3  Mallampati 3     After obtaining form consent patient was brought to the holding area and underwent conscious sedation with Versed , fentanyl, remained hemodynamically stable  Patient is hemodynamic status, neuro status was evaluated before conscious sedation    Patient's blood pressure, heart rate, pulse ox, neuro status was monitored for 30 minutes post procedure and she remained stable    The details of the conscious sedation is in the flowsheet in epic

## 2024-05-20 NOTE — H&P
Electrophysiology H&p Note      Reason for consultation:  Here to discuss watchman    Chief complaint : Here for post watchman AARON >45 DAYS    Referring physician: Dr. Matson      Primary care physician: Joaquim Gonzalez MD      History of Present Illness:     Today visit (05/20/24)    Patient here for post watchman AARON >45 DAYS. No change in H&P noted from previous clinic visit.     Previous visit 4/11/2024  Patient is here today for 1 week follow up s/p watchman.   Patient is feeling well. She denies chest pain,  palpitations, edema, dizziness, or syncope.  She does complain of shortness of breath with exertion.   This is new for her since the procedure.     Previous visit:     Patient is a 83-year-old female with history of hypertension, hyperlipidemia, coronary artery disease, PAF, CVA, DM-2 referred by Dr. Matson for assessment for watchman.  Patient reports that she is extremely worried about being on anticoagulation because patient reports that she is high risk for fall and falling multiple times before.  Patient has been on aspirin and Plavix.  Patient denies chest pain, dizziness or syncope.  Patient has palpitations off-and-on and shortness of breath with palpitation at times.    Pastmedical history:   Past Medical History:   Diagnosis Date    AF (paroxysmal atrial fibrillation) (HCC)     AK (actinic keratosis)     Surekha Ledesma following    CAD (coronary artery disease)     5/2005 S/P PTCA and stents X2 - Dr Howard    Cancer ([facility])     skin    Carotid stenosis     Carotid stenosis, left     monitored by Dr Matson- 50% stenosis noted on CTA 10/2016    Coronary arteriosclerosis after percutaneous transluminal coronary angioplasty (PTCA) 12/07/2022    PCI procedure:DE Stent, LAD: DE Stent Plcmt Initl Vsl, LAD: DE Stent Plcmt Addtl Vsl, CIRC: DE Stent Plcmt Initl Vsl, Balloon Angioplasty, LAD: Balloon Angioplasty Initl Vsl, LAD: Balloon Angioplasty Addtl Vsl,

## 2024-05-22 ENCOUNTER — OFFICE VISIT (OUTPATIENT)
Dept: CARDIOLOGY CLINIC | Age: 84
End: 2024-05-22
Payer: MEDICARE

## 2024-05-22 VITALS
WEIGHT: 174.2 LBS | HEIGHT: 60 IN | SYSTOLIC BLOOD PRESSURE: 122 MMHG | HEART RATE: 56 BPM | BODY MASS INDEX: 34.2 KG/M2 | DIASTOLIC BLOOD PRESSURE: 62 MMHG

## 2024-05-22 DIAGNOSIS — I10 ESSENTIAL HYPERTENSION: ICD-10-CM

## 2024-05-22 DIAGNOSIS — D68.69 SECONDARY HYPERCOAGULABLE STATE (HCC): ICD-10-CM

## 2024-05-22 DIAGNOSIS — Z95.818 PRESENCE OF WATCHMAN LEFT ATRIAL APPENDAGE CLOSURE DEVICE: Primary | ICD-10-CM

## 2024-05-22 DIAGNOSIS — I48.0 PAF (PAROXYSMAL ATRIAL FIBRILLATION) (HCC): ICD-10-CM

## 2024-05-22 PROCEDURE — 1124F ACP DISCUSS-NO DSCNMKR DOCD: CPT | Performed by: NURSE PRACTITIONER

## 2024-05-22 PROCEDURE — 1090F PRES/ABSN URINE INCON ASSESS: CPT | Performed by: NURSE PRACTITIONER

## 2024-05-22 PROCEDURE — 99214 OFFICE O/P EST MOD 30 MIN: CPT | Performed by: NURSE PRACTITIONER

## 2024-05-22 PROCEDURE — 1036F TOBACCO NON-USER: CPT | Performed by: NURSE PRACTITIONER

## 2024-05-22 PROCEDURE — G8399 PT W/DXA RESULTS DOCUMENT: HCPCS | Performed by: NURSE PRACTITIONER

## 2024-05-22 PROCEDURE — 3074F SYST BP LT 130 MM HG: CPT | Performed by: NURSE PRACTITIONER

## 2024-05-22 PROCEDURE — 93000 ELECTROCARDIOGRAM COMPLETE: CPT | Performed by: NURSE PRACTITIONER

## 2024-05-22 PROCEDURE — G8427 DOCREV CUR MEDS BY ELIG CLIN: HCPCS | Performed by: NURSE PRACTITIONER

## 2024-05-22 PROCEDURE — G8417 CALC BMI ABV UP PARAM F/U: HCPCS | Performed by: NURSE PRACTITIONER

## 2024-05-22 PROCEDURE — 3078F DIAST BP <80 MM HG: CPT | Performed by: NURSE PRACTITIONER

## 2024-05-22 ASSESSMENT — ENCOUNTER SYMPTOMS
COLOR CHANGE: 0
SHORTNESS OF BREATH: 0
SINUS PAIN: 0
COUGH: 0
ABDOMINAL PAIN: 0
SINUS PRESSURE: 0
BACK PAIN: 0
PHOTOPHOBIA: 0
ABDOMINAL DISTENTION: 0

## 2024-05-22 NOTE — PATIENT INSTRUCTIONS
Please be informed that if you contact our office outside of normal business hours the physician on call cannot help with any scheduling or rescheduling issues, procedure instruction questions or any type of medication issue.    We advise you for any urgent/emergency that you go to the nearest emergency room!    PLEASE CALL OUR OFFICE DURING NORMAL BUSINESS HOURS    Monday - Friday   8 am to 5 pm    [place]: [phone]    [place]: [phone]    [place]:  [phone]    **It is YOUR responsibilty to bring medication bottles and/or updated medication list to EACH APPOINTMENT. This will allow us to better serve you and all your healthcare needs**    Thank you for allowing us to care for you today!   We want to ensure we can follow your treatment plan and we strive to give you the best outcomes and experience possible.   If you ever have a life threatening emergency and call 911 - for an ambulance (EMS)   Our providers can only care for you at:   [facility] or [facility].   Even if you have someone take you or you drive yourself we can only care for you in a [facility] facility. Our providers are not setup at the other healthcare locations!

## 2024-05-22 NOTE — PROGRESS NOTES
5/23/2024    Joaquim Gonzalez MD   [address] [facility] [address]    Mary Matson MD         Re:  Vita    Dear Dr. Gonzalez,  Dear Dr. Matson,    I had the pleasure of seeing your patient, Vita Clarke (83 y.o. female) in the office for her 1 year follow up after her left carotid endarterectomy on 7/11/23. She was last seen in office on 11/17/23 and was doing well at that time.  Her postoperative duplex scan showed no residual stenosis.  She had a follow-up carotid duplex completed recently and comes to review the result.  She had one episode of left sided neck/facial pain and swelling over her mandible. She does have a history of TMJ. She went to see an ENT and ID specialist and despite multiple imaging studies, no cause of the swelling could be identified. She is here with her daughter today.    Current Medications    Current Outpatient Medications:     hydroCHLOROthiazide (HYDRODIURIL) 25 MG tablet, TAKE 1 TABLET BY MOUTH DAILY, Disp: 90 tablet, Rfl: 1    amLODIPine (NORVASC) 5 MG tablet, TAKE 1 TABLET BY MOUTH IN THE MORNING AND AT BEDTIME, Disp: 180 tablet, Rfl: 1    predniSONE (DELTASONE) 5 MG tablet, Take 8 pills, then 7,6,5,4,3,2,1 (Patient not taking: Reported on 5/20/2024), Disp: 36 tablet, Rfl: 0    omeprazole (PRILOSEC) 20 MG delayed release capsule, Take 1 capsule by mouth daily, Disp: , Rfl:     furosemide (LASIX) 20 MG tablet, Take 1 tablet by mouth daily, Disp: 5 tablet, Rfl: 0    isosorbide mononitrate (IMDUR) 30 MG extended release tablet, TAKE 1 TABLET BY MOUTH EVERY DAY, Disp: 30 tablet, Rfl: 3    methotrexate (RHEUMATREX) 2.5 MG chemo tablet, Take 6 tablets by mouth once a week, Disp: 24 tablet, Rfl: 2    folic acid (FOLVITE) 1 MG tablet, Take 1 tablet by mouth daily, Disp: 90 tablet, Rfl: 0    hydroxychloroquine (PLAQUENIL) 200 MG tablet, Take 1 tablet by mouth 2 times daily, Disp: 180 tablet, Rfl: 0    gabapentin (NEURONTIN) 300 MG capsule, TAKE 1 CAPSULE BY MOUTH

## 2024-05-22 NOTE — PROGRESS NOTES
normal.       CBC:   Lab Results   Component Value Date/Time    WBC 5.4 05/16/2024 09:56 AM    HGB 10.5 05/16/2024 09:56 AM    HCT 32.8 05/16/2024 09:56 AM     05/16/2024 09:56 AM     Lipids:  Lab Results   Component Value Date    CHOL 143 03/14/2024    TRIG 207 (H) 03/14/2024    HDL 27 (L) 03/14/2024    LDLDIRECT 84 08/04/2022     PT/INR:   Lab Results   Component Value Date/Time    INR 1.1 05/16/2024 09:56 AM        BMP:    Lab Results   Component Value Date     05/16/2024    K 4.5 05/16/2024     05/16/2024    CO2 26 05/16/2024    BUN 16 05/16/2024     CMP:   Lab Results   Component Value Date    AST 19 02/17/2024    ALT 15 02/17/2024    PROT 7.3 02/07/2013    BILITOT 0.6 02/17/2024    ALKPHOS 90 02/17/2024     TSH:    Lab Results   Component Value Date/Time    TSH 2.85 08/04/2023 07:45 AM       EKGINTERPRETATION - EKG Interpretation:  sinus rhythm    Echo 2/16/2024    Left Ventricle Normal left ventricular systolic function with a visually estimated EF of 55 - 60%. Left ventricle size is normal. Normal wall thickness. Normal wall motion. Grade I diastolic dysfunction.   Left Atrium Left atrial volume index is moderately increased (42-48 mL/m2).   Right Ventricle Right ventricle size is normal.   Right Atrium Right atrium size is normal.   Aortic Valve Trace regurgitation. AV PHT is 603.0 ms.   Mitral Valve Trace regurgitation.   Tricuspid Valve Mild regurgitation. The estimated RVSP is 38 mmHg.   Pulmonic Valve The pulmonic valve was not well visualized.   Aorta Not well visualized.   IVC/Hepatic Veins IVC was not well visualized.   Pericardium No pericardial effusion. Pericardial fat pad visualized.       Nuclear 2/18/2024     Stress Combined Conclusion: Normal pharmacological myocardial perfusion study. Findings suggest a low risk of cardiac events.    Stress Function: Left ventricular function post-stress is normal. Post-stress ejection fraction is 77%.    Perfusion Comments: LV perfusion is

## 2024-05-23 ENCOUNTER — OFFICE VISIT (OUTPATIENT)
Dept: CARDIOTHORACIC SURGERY | Age: 84
End: 2024-05-23
Payer: MEDICARE

## 2024-05-23 VITALS
HEART RATE: 82 BPM | DIASTOLIC BLOOD PRESSURE: 64 MMHG | WEIGHT: 172 LBS | SYSTOLIC BLOOD PRESSURE: 136 MMHG | HEIGHT: 60 IN | BODY MASS INDEX: 33.77 KG/M2

## 2024-05-23 DIAGNOSIS — I65.22 CAROTID ARTERY CALCIFICATION, LEFT: Primary | ICD-10-CM

## 2024-05-23 PROCEDURE — 1124F ACP DISCUSS-NO DSCNMKR DOCD: CPT | Performed by: THORACIC SURGERY (CARDIOTHORACIC VASCULAR SURGERY)

## 2024-05-23 PROCEDURE — 1090F PRES/ABSN URINE INCON ASSESS: CPT | Performed by: THORACIC SURGERY (CARDIOTHORACIC VASCULAR SURGERY)

## 2024-05-23 PROCEDURE — 99214 OFFICE O/P EST MOD 30 MIN: CPT | Performed by: THORACIC SURGERY (CARDIOTHORACIC VASCULAR SURGERY)

## 2024-05-23 PROCEDURE — G8417 CALC BMI ABV UP PARAM F/U: HCPCS | Performed by: THORACIC SURGERY (CARDIOTHORACIC VASCULAR SURGERY)

## 2024-05-23 PROCEDURE — G8399 PT W/DXA RESULTS DOCUMENT: HCPCS | Performed by: THORACIC SURGERY (CARDIOTHORACIC VASCULAR SURGERY)

## 2024-05-23 PROCEDURE — G8427 DOCREV CUR MEDS BY ELIG CLIN: HCPCS | Performed by: THORACIC SURGERY (CARDIOTHORACIC VASCULAR SURGERY)

## 2024-05-23 PROCEDURE — 3075F SYST BP GE 130 - 139MM HG: CPT | Performed by: THORACIC SURGERY (CARDIOTHORACIC VASCULAR SURGERY)

## 2024-05-23 PROCEDURE — 3078F DIAST BP <80 MM HG: CPT | Performed by: THORACIC SURGERY (CARDIOTHORACIC VASCULAR SURGERY)

## 2024-05-23 PROCEDURE — 1036F TOBACCO NON-USER: CPT | Performed by: THORACIC SURGERY (CARDIOTHORACIC VASCULAR SURGERY)

## 2024-05-29 ENCOUNTER — OFFICE VISIT (OUTPATIENT)
Dept: CARDIOLOGY CLINIC | Age: 84
End: 2024-05-29
Payer: MEDICARE

## 2024-05-29 ENCOUNTER — NURSE ONLY (OUTPATIENT)
Dept: CARDIOLOGY CLINIC | Age: 84
End: 2024-05-29

## 2024-05-29 VITALS
DIASTOLIC BLOOD PRESSURE: 64 MMHG | OXYGEN SATURATION: 95 % | WEIGHT: 178.6 LBS | HEART RATE: 68 BPM | BODY MASS INDEX: 35.06 KG/M2 | HEIGHT: 60 IN | SYSTOLIC BLOOD PRESSURE: 122 MMHG

## 2024-05-29 DIAGNOSIS — R06.02 SHORTNESS OF BREATH: Primary | ICD-10-CM

## 2024-05-29 DIAGNOSIS — I25.10 ASCVD (ARTERIOSCLEROTIC CARDIOVASCULAR DISEASE): Primary | ICD-10-CM

## 2024-05-29 DIAGNOSIS — I25.10 ASCVD (ARTERIOSCLEROTIC CARDIOVASCULAR DISEASE): ICD-10-CM

## 2024-05-29 DIAGNOSIS — R07.9 CHEST PAIN, UNSPECIFIED TYPE: ICD-10-CM

## 2024-05-29 PROCEDURE — G8399 PT W/DXA RESULTS DOCUMENT: HCPCS | Performed by: NURSE PRACTITIONER

## 2024-05-29 PROCEDURE — 3078F DIAST BP <80 MM HG: CPT | Performed by: NURSE PRACTITIONER

## 2024-05-29 PROCEDURE — 1036F TOBACCO NON-USER: CPT | Performed by: NURSE PRACTITIONER

## 2024-05-29 PROCEDURE — G8427 DOCREV CUR MEDS BY ELIG CLIN: HCPCS | Performed by: NURSE PRACTITIONER

## 2024-05-29 PROCEDURE — 3074F SYST BP LT 130 MM HG: CPT | Performed by: NURSE PRACTITIONER

## 2024-05-29 PROCEDURE — G8417 CALC BMI ABV UP PARAM F/U: HCPCS | Performed by: NURSE PRACTITIONER

## 2024-05-29 PROCEDURE — 93000 ELECTROCARDIOGRAM COMPLETE: CPT | Performed by: NURSE PRACTITIONER

## 2024-05-29 PROCEDURE — 99213 OFFICE O/P EST LOW 20 MIN: CPT | Performed by: NURSE PRACTITIONER

## 2024-05-29 PROCEDURE — 1090F PRES/ABSN URINE INCON ASSESS: CPT | Performed by: NURSE PRACTITIONER

## 2024-05-29 PROCEDURE — 1124F ACP DISCUSS-NO DSCNMKR DOCD: CPT | Performed by: NURSE PRACTITIONER

## 2024-05-29 RX ORDER — ISOSORBIDE MONONITRATE 60 MG/1
60 TABLET, EXTENDED RELEASE ORAL DAILY
Qty: 30 TABLET | Refills: 3 | Status: SHIPPED | OUTPATIENT
Start: 2024-05-29

## 2024-05-29 ASSESSMENT — ENCOUNTER SYMPTOMS
ORTHOPNEA: 0
SHORTNESS OF BREATH: 0

## 2024-05-29 NOTE — PROGRESS NOTES
5/29/2024  Primary cardiologist: Dr. Matson    CC:   Vita  is an established 83 y.o.  female here for a follow up on chest pain       SUBJECTIVE/OBJECTIVE:  MAR Gorman is a 83 y.o. female with a history of coronary artery disease, carotid artery disease s/p left carotid endarterectomy, hypertension, hyperlipidemia, paroxysmal atrial fibrillation s/p Watchman device, CVA and non-insulin-dependent diabetes .   Guerita has remote history of PCI Reomte h/o PCI  (2005).  She then underwent PCI of the circumflex and LAD in December 2022.  Repeat catheterization in September 2023 showed patent stents with mild residual disease which appears to be unchanged..    Vita reports she recently had an episode of chest pain - pain was midsternal radiating to the jaw and to her back- lasting 40 minutes .  States it occurred while she was sitting on the couch.  She did take 2 nitroglycerin for some relief however states pain did come back later on.  Notes intermittent episodes of palpitations.  She does still shortness of breath.  States has noted some dizziness since her episode of chest pain    Review of Systems   Constitutional: Negative for diaphoresis and malaise/fatigue.   Cardiovascular:  Positive for chest pain, dyspnea on exertion and leg swelling. Negative for claudication, irregular heartbeat, near-syncope, orthopnea, palpitations and paroxysmal nocturnal dyspnea.   Respiratory:  Negative for shortness of breath.    Neurological:  Negative for dizziness and light-headedness.       Vitals:    05/29/24 1524   BP: 122/64   Site: Left Upper Arm   Position: Sitting   Cuff Size: Medium Adult   Pulse: 68   SpO2: 95%   Weight: 81 kg (178 lb 9.6 oz)   Height: 1.524 m (5')     Wt Readings from Last 3 Encounters:   05/29/24 81 kg (178 lb 9.6 oz)   05/23/24 78 kg (172 lb)   05/20/24 78 kg (172 lb)      Body mass index is 34.88 kg/m².     Physical Exam  Vitals reviewed.   Eyes:      Pupils: Pupils are equal, round, and reactive to

## 2024-05-29 NOTE — PATIENT INSTRUCTIONS
Thank you for allowing us to care for you today!   We want to ensure we can follow your treatment plan and we strive to give you the best outcomes and experience possible.   If you ever have a life threatening emergency and call 911 - for an ambulance (EMS)   Our providers can only care for you at:   [facility] or [facility].   Even if you have someone take you or you drive yourself we can only care for you in a [facility] facility. Our providers are not setup at the other healthcare locations!   **It is YOUR responsibilty to bring medication bottles and/or updated medication list to EACH APPOINTMENT. This will allow us to better serve you and all your healthcare needs**  We are committed to providing you the best care possible.    If you receive a survey after visiting one of our offices, please take time to share your experience concerning your physician office visit.  These surveys are confidential and no health information about you is shared.    We are eager to improve for you and we are counting on your feedback to help make that happen.

## 2024-05-30 LAB — NT-PROBNP SERPL-MCNC: 473 PG/ML (ref 0–449)

## 2024-05-30 RX ORDER — FUROSEMIDE 20 MG/1
20 TABLET ORAL DAILY
Qty: 5 TABLET | Refills: 1 | Status: SHIPPED | OUTPATIENT
Start: 2024-05-30

## 2024-06-04 DIAGNOSIS — I48.0 AF (PAROXYSMAL ATRIAL FIBRILLATION) (HCC): ICD-10-CM

## 2024-06-04 DIAGNOSIS — I10 ESSENTIAL HYPERTENSION: ICD-10-CM

## 2024-06-04 DIAGNOSIS — E11.40 TYPE 2 DIABETES MELLITUS WITH DIABETIC NEUROPATHY, WITHOUT LONG-TERM CURRENT USE OF INSULIN (HCC): ICD-10-CM

## 2024-06-04 LAB
ALBUMIN SERPL-MCNC: 4.3 G/DL (ref 3.4–5)
ALBUMIN/GLOB SERPL: 1.4 {RATIO} (ref 1.1–2.2)
ALP SERPL-CCNC: 92 U/L (ref 40–129)
ALT SERPL-CCNC: 14 U/L (ref 10–40)
ANION GAP SERPL CALCULATED.3IONS-SCNC: 15 MMOL/L (ref 3–16)
AST SERPL-CCNC: 21 U/L (ref 15–37)
BILIRUB SERPL-MCNC: 0.5 MG/DL (ref 0–1)
BUN SERPL-MCNC: 21 MG/DL (ref 7–20)
CALCIUM SERPL-MCNC: 9.8 MG/DL (ref 8.3–10.6)
CHLORIDE SERPL-SCNC: 101 MMOL/L (ref 99–110)
CHOLEST SERPL-MCNC: 113 MG/DL (ref 0–199)
CO2 SERPL-SCNC: 25 MMOL/L (ref 21–32)
CREAT SERPL-MCNC: 1.2 MG/DL (ref 0.6–1.2)
DEPRECATED RDW RBC AUTO: 13.6 % (ref 12.4–15.4)
GFR SERPLBLD CREATININE-BSD FMLA CKD-EPI: 44 ML/MIN/{1.73_M2}
GLUCOSE SERPL-MCNC: 104 MG/DL (ref 70–99)
HCT VFR BLD AUTO: 30.8 % (ref 36–48)
HDLC SERPL-MCNC: 34 MG/DL (ref 40–60)
HGB BLD-MCNC: 10.6 G/DL (ref 12–16)
LDLC SERPL CALC-MCNC: 47 MG/DL
MAGNESIUM SERPL-MCNC: 1.8 MG/DL (ref 1.8–2.4)
MCH RBC QN AUTO: 30.7 PG (ref 26–34)
MCHC RBC AUTO-ENTMCNC: 34.4 G/DL (ref 31–36)
MCV RBC AUTO: 89 FL (ref 80–100)
PLATELET # BLD AUTO: 211 K/UL (ref 135–450)
PMV BLD AUTO: 8.2 FL (ref 5–10.5)
POTASSIUM SERPL-SCNC: 4.8 MMOL/L (ref 3.5–5.1)
PROT SERPL-MCNC: 7.4 G/DL (ref 6.4–8.2)
RBC # BLD AUTO: 3.46 M/UL (ref 4–5.2)
SODIUM SERPL-SCNC: 141 MMOL/L (ref 136–145)
TRIGL SERPL-MCNC: 162 MG/DL (ref 0–150)
TSH SERPL DL<=0.005 MIU/L-ACNC: 3.74 UIU/ML (ref 0.27–4.2)
VLDLC SERPL CALC-MCNC: 32 MG/DL
WBC # BLD AUTO: 5.7 K/UL (ref 4–11)

## 2024-06-04 PROCEDURE — 36415 COLL VENOUS BLD VENIPUNCTURE: CPT | Performed by: INTERNAL MEDICINE

## 2024-06-05 ENCOUNTER — TELEPHONE (OUTPATIENT)
Dept: INTERNAL MEDICINE CLINIC | Age: 84
End: 2024-06-05

## 2024-06-05 DIAGNOSIS — D64.9 ANEMIA, UNSPECIFIED TYPE: Primary | ICD-10-CM

## 2024-06-05 DIAGNOSIS — D64.9 ANEMIA, UNSPECIFIED TYPE: ICD-10-CM

## 2024-06-05 LAB
EST. AVERAGE GLUCOSE BLD GHB EST-MCNC: 134.1 MG/DL
FERRITIN SERPL IA-MCNC: 45.1 NG/ML (ref 15–150)
HBA1C MFR BLD: 6.3 %
IRON SATN MFR SERPL: 13 % (ref 15–50)
IRON SERPL-MCNC: 46 UG/DL (ref 37–145)
TIBC SERPL-MCNC: 367 UG/DL (ref 260–445)

## 2024-06-05 NOTE — RESULT ENCOUNTER NOTE
Chol, sugars, labs appear in stable range, DM is controlled, thyroid fxn also nl range.   SHE REMAINS MILDLY ANEMIC, ADD IRON, FERRITIN, TIBC FOR DX ANEMIA.  notify pt please, WE'LL MONITOR BLOOD COUNTS FOR NOW.

## 2024-06-05 NOTE — TELEPHONE ENCOUNTER
----- Message from Joaquim Gonzalez MD sent at 6/5/2024  7:18 AM EDT -----  Chol, sugars, labs appear in stable range, DM is controlled, thyroid fxn also nl range.   SHE REMAINS MILDLY ANEMIC, ADD IRON, FERRITIN, TIBC FOR DX ANEMIA.  notify pt please, WE'LL MONITOR BLOOD COUNTS FOR NOW.

## 2024-06-11 ENCOUNTER — OFFICE VISIT (OUTPATIENT)
Dept: INTERNAL MEDICINE CLINIC | Age: 84
End: 2024-06-11
Payer: MEDICARE

## 2024-06-11 VITALS
HEIGHT: 60 IN | OXYGEN SATURATION: 97 % | HEART RATE: 54 BPM | BODY MASS INDEX: 34.28 KG/M2 | SYSTOLIC BLOOD PRESSURE: 120 MMHG | DIASTOLIC BLOOD PRESSURE: 60 MMHG | WEIGHT: 174.6 LBS

## 2024-06-11 DIAGNOSIS — I10 ESSENTIAL HYPERTENSION: ICD-10-CM

## 2024-06-11 DIAGNOSIS — D64.9 ANEMIA, UNSPECIFIED TYPE: Primary | ICD-10-CM

## 2024-06-11 DIAGNOSIS — I48.0 AF (PAROXYSMAL ATRIAL FIBRILLATION) (HCC): ICD-10-CM

## 2024-06-11 DIAGNOSIS — E11.40 TYPE 2 DIABETES MELLITUS WITH DIABETIC NEUROPATHY, WITHOUT LONG-TERM CURRENT USE OF INSULIN (HCC): ICD-10-CM

## 2024-06-11 PROCEDURE — 3074F SYST BP LT 130 MM HG: CPT | Performed by: INTERNAL MEDICINE

## 2024-06-11 PROCEDURE — G8399 PT W/DXA RESULTS DOCUMENT: HCPCS | Performed by: INTERNAL MEDICINE

## 2024-06-11 PROCEDURE — 1124F ACP DISCUSS-NO DSCNMKR DOCD: CPT | Performed by: INTERNAL MEDICINE

## 2024-06-11 PROCEDURE — G8427 DOCREV CUR MEDS BY ELIG CLIN: HCPCS | Performed by: INTERNAL MEDICINE

## 2024-06-11 PROCEDURE — 3044F HG A1C LEVEL LT 7.0%: CPT | Performed by: INTERNAL MEDICINE

## 2024-06-11 PROCEDURE — 1036F TOBACCO NON-USER: CPT | Performed by: INTERNAL MEDICINE

## 2024-06-11 PROCEDURE — G8417 CALC BMI ABV UP PARAM F/U: HCPCS | Performed by: INTERNAL MEDICINE

## 2024-06-11 PROCEDURE — 3078F DIAST BP <80 MM HG: CPT | Performed by: INTERNAL MEDICINE

## 2024-06-11 PROCEDURE — 1090F PRES/ABSN URINE INCON ASSESS: CPT | Performed by: INTERNAL MEDICINE

## 2024-06-11 PROCEDURE — 99214 OFFICE O/P EST MOD 30 MIN: CPT | Performed by: INTERNAL MEDICINE

## 2024-06-11 NOTE — PROGRESS NOTES
Vita Clarke  1940 06/11/24    SUBJECTIVE:    Anemia noted since Feb this yr, recently ~10, iron levels were nl.  Is to see Dr Taylor also for colonoscopy.  IS ON PLAVIX    ALSO FOR AFIB, HAS HAD WATCHMAN, OFF A/C.     DM- sugars stable on lab  Lab Results   Component Value Date    LABA1C 6.3 06/04/2024    LABA1C 6.9 03/14/2024    LABA1C 6.7 (H) 02/16/2024     Lab Results   Component Value Date    GLUF 128 (H) 10/14/2016    MALBCR 78.7 (H) 11/07/2023    CREATININE 1.2 06/04/2024     Hypertension: Stable. Denies CP, SOB, cough, visual changes, dizziness, palpitations or HA.       RA- stable on mtx and unsure if plaquenil is helping, may stop this after her next appt w Dr Zuniga  OBJECTIVE:    /60 (Site: Left Upper Arm, Position: Sitting, Cuff Size: Medium Adult)   Pulse 54   Ht 1.524 m (5')   Wt 79.2 kg (174 lb 9.6 oz)   LMP  (LMP Unknown)   SpO2 97%   BMI 34.10 kg/m²     Physical Exam  Vitals reviewed.   Constitutional:       Appearance: She is well-developed.   HENT:      Head: Normocephalic and atraumatic.      Nose: Nose normal.      Mouth/Throat:      Mouth: Mucous membranes are moist.      Pharynx: Oropharynx is clear. No oropharyngeal exudate.   Eyes:      General: No scleral icterus.        Right eye: No discharge.         Left eye: No discharge.      Conjunctiva/sclera: Conjunctivae normal.      Pupils: Pupils are equal, round, and reactive to light.   Neck:      Thyroid: No thyromegaly.      Vascular: No JVD.      Trachea: No tracheal deviation.   Cardiovascular:      Rate and Rhythm: Normal rate and regular rhythm.      Heart sounds: Normal heart sounds. No murmur heard.     No friction rub. No gallop.   Pulmonary:      Effort: Pulmonary effort is normal. No respiratory distress.      Breath sounds: Normal breath sounds. No wheezing or rales.   Abdominal:      General: Bowel sounds are normal. There is no distension.      Palpations: Abdomen is soft. There is no mass.      Tenderness: 
Home

## 2024-06-15 ENCOUNTER — APPOINTMENT (OUTPATIENT)
Dept: GENERAL RADIOLOGY | Age: 84
DRG: 087 | End: 2024-06-15
Payer: MEDICARE

## 2024-06-15 ENCOUNTER — HOSPITAL ENCOUNTER (INPATIENT)
Age: 84
LOS: 1 days | Discharge: HOME | DRG: 087 | End: 2024-06-16
Attending: STUDENT IN AN ORGANIZED HEALTH CARE EDUCATION/TRAINING PROGRAM | Admitting: STUDENT IN AN ORGANIZED HEALTH CARE EDUCATION/TRAINING PROGRAM
Payer: MEDICARE

## 2024-06-15 ENCOUNTER — APPOINTMENT (OUTPATIENT)
Dept: CT IMAGING | Age: 84
DRG: 087 | End: 2024-06-15
Payer: MEDICARE
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
## 2024-06-15 DIAGNOSIS — I63.30 CEREBROVASCULAR ACCIDENT (CVA) DUE TO THROMBOSIS OF CEREBRAL ARTERY (HCC): ICD-10-CM

## 2024-06-15 DIAGNOSIS — S09.90XA INJURY OF HEAD, INITIAL ENCOUNTER: ICD-10-CM

## 2024-06-15 DIAGNOSIS — S06.360A TRAUMATIC INTRACEREBRAL HEMORRHAGE WITHOUT LOSS OF CONSCIOUSNESS, UNSPECIFIED LATERALITY, INITIAL ENCOUNTER (HCC): ICD-10-CM
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
## 2024-06-15 LAB
ANION GAP SERPL CALCULATED.3IONS-SCNC: 12 MMOL/L (ref 7–16)
APTT: 30.2 SECONDS (ref 25.1–37.1)
BASOPHILS ABSOLUTE: 0 K/CU MM
BASOPHILS RELATIVE PERCENT: 0.3 % (ref 0–1)
BUN SERPL-MCNC: 23 MG/DL (ref 6–23)
CALCIUM SERPL-MCNC: 9.6 MG/DL (ref 8.3–10.6)
CHLORIDE BLD-SCNC: 101 MMOL/L (ref 99–110)
CO2: 25 MMOL/L (ref 21–32)
CREAT SERPL-MCNC: 1.1 MG/DL (ref 0.6–1.1)
DIFFERENTIAL TYPE: ABNORMAL
EOSINOPHILS ABSOLUTE: 0.2 K/CU MM
EOSINOPHILS RELATIVE PERCENT: 3.1 % (ref 0–3)
FIBRINOGEN LEVEL: 358 MG/DL (ref 170–540)
GFR, ESTIMATED: 50 ML/MIN/1.73M2
GLUCOSE BLD-MCNC: 112 MG/DL (ref 70–99)
GLUCOSE SERPL-MCNC: 114 MG/DL (ref 70–99)
HCT VFR BLD CALC: 31.3 % (ref 37–47)
HEMOGLOBIN: 10.4 GM/DL (ref 12.5–16)
IMMATURE NEUTROPHIL %: 0.1 % (ref 0–0.43)
INR BLD: 1 INDEX
LYMPHOCYTES ABSOLUTE: 1.9 K/CU MM
LYMPHOCYTES RELATIVE PERCENT: 24.4 % (ref 24–44)
MAGNESIUM: 1.7 MG/DL (ref 1.8–2.4)
MCH RBC QN AUTO: 30.1 PG (ref 27–31)
MCHC RBC AUTO-ENTMCNC: 33.2 % (ref 32–36)
MCV RBC AUTO: 90.5 FL (ref 78–100)
MONOCYTES ABSOLUTE: 0.7 K/CU MM
MONOCYTES RELATIVE PERCENT: 9.6 % (ref 0–4)
NEUTROPHILS ABSOLUTE: 4.8 K/CU MM
NEUTROPHILS RELATIVE PERCENT: 62.5 % (ref 36–66)
NUCLEATED RBC %: 0 %
PDW BLD-RTO: 12.9 % (ref 11.7–14.9)
PHOSPHORUS: 3.8 MG/DL (ref 2.5–4.9)
PLATELET # BLD: 202 K/CU MM (ref 140–440)
PMV BLD AUTO: 9.3 FL (ref 7.5–11.1)
POTASSIUM SERPL-SCNC: 4.3 MMOL/L (ref 3.5–5.1)
PROTHROMBIN TIME: 13.9 SECONDS (ref 11.7–14.5)
RBC # BLD: 3.46 M/CU MM (ref 4.2–5.4)
SODIUM BLD-SCNC: 138 MMOL/L (ref 135–145)
TOTAL IMMATURE NEUTOROPHIL: 0.01 K/CU MM
TOTAL NUCLEATED RBC: 0 K/CU MM
WBC # BLD: 7.6 K/CU MM (ref 4–10.5)

## 2024-06-15 PROCEDURE — 70450 CT HEAD/BRAIN W/O DYE: CPT

## 2024-06-15 PROCEDURE — 85610 PROTHROMBIN TIME: CPT

## 2024-06-15 PROCEDURE — 2000000000 HC ICU R&B

## 2024-06-15 PROCEDURE — 73562 X-RAY EXAM OF KNEE 3: CPT

## 2024-06-15 PROCEDURE — 73610 X-RAY EXAM OF ANKLE: CPT

## 2024-06-15 PROCEDURE — 72125 CT NECK SPINE W/O DYE: CPT

## 2024-06-15 PROCEDURE — 99285 EMERGENCY DEPT VISIT HI MDM: CPT

## 2024-06-15 PROCEDURE — 6370000000 HC RX 637 (ALT 250 FOR IP): Performed by: NURSE PRACTITIONER

## 2024-06-15 PROCEDURE — 80048 BASIC METABOLIC PNL TOTAL CA: CPT

## 2024-06-15 PROCEDURE — 85025 COMPLETE CBC W/AUTO DIFF WBC: CPT

## 2024-06-15 PROCEDURE — 6370000000 HC RX 637 (ALT 250 FOR IP): Performed by: STUDENT IN AN ORGANIZED HEALTH CARE EDUCATION/TRAINING PROGRAM

## 2024-06-15 PROCEDURE — 84100 ASSAY OF PHOSPHORUS: CPT

## 2024-06-15 PROCEDURE — 73590 X-RAY EXAM OF LOWER LEG: CPT

## 2024-06-15 PROCEDURE — 82962 GLUCOSE BLOOD TEST: CPT

## 2024-06-15 PROCEDURE — 85730 THROMBOPLASTIN TIME PARTIAL: CPT

## 2024-06-15 PROCEDURE — 73552 X-RAY EXAM OF FEMUR 2/>: CPT

## 2024-06-15 PROCEDURE — 83735 ASSAY OF MAGNESIUM: CPT

## 2024-06-15 PROCEDURE — 72170 X-RAY EXAM OF PELVIS: CPT

## 2024-06-15 PROCEDURE — 85384 FIBRINOGEN ACTIVITY: CPT

## 2024-06-15 PROCEDURE — 73630 X-RAY EXAM OF FOOT: CPT

## 2024-06-15 RX ORDER — SODIUM CHLORIDE 0.9 % (FLUSH) 0.9 %
5-40 SYRINGE (ML) INJECTION EVERY 12 HOURS SCHEDULED
Status: DISCONTINUED | OUTPATIENT
Start: 2024-06-15 | End: 2024-06-16 | Stop reason: HOSPADM

## 2024-06-15 RX ORDER — HYDROCODONE BITARTRATE AND ACETAMINOPHEN 5; 325 MG/1; MG/1
1 TABLET ORAL ONCE
Status: COMPLETED | OUTPATIENT
Start: 2024-06-15 | End: 2024-06-15

## 2024-06-15 RX ORDER — FOLIC ACID 1 MG/1
1 TABLET ORAL DAILY
Status: DISCONTINUED | OUTPATIENT
Start: 2024-06-15 | End: 2024-06-16 | Stop reason: HOSPADM

## 2024-06-15 RX ORDER — SODIUM CHLORIDE 9 MG/ML
INJECTION, SOLUTION INTRAVENOUS PRN
Status: DISCONTINUED | OUTPATIENT
Start: 2024-06-15 | End: 2024-06-16 | Stop reason: HOSPADM

## 2024-06-15 RX ORDER — INSULIN LISPRO 100 [IU]/ML
0-4 INJECTION, SOLUTION INTRAVENOUS; SUBCUTANEOUS NIGHTLY
Status: DISCONTINUED | OUTPATIENT
Start: 2024-06-15 | End: 2024-06-16 | Stop reason: HOSPADM

## 2024-06-15 RX ORDER — ISOSORBIDE MONONITRATE 60 MG/1
60 TABLET, EXTENDED RELEASE ORAL DAILY
Status: DISCONTINUED | OUTPATIENT
Start: 2024-06-15 | End: 2024-06-16 | Stop reason: HOSPADM

## 2024-06-15 RX ORDER — PANTOPRAZOLE SODIUM 40 MG/1
40 TABLET, DELAYED RELEASE ORAL
Status: DISCONTINUED | OUTPATIENT
Start: 2024-06-16 | End: 2024-06-16 | Stop reason: HOSPADM

## 2024-06-15 RX ORDER — LOSARTAN POTASSIUM 100 MG/1
100 TABLET ORAL DAILY
Status: DISCONTINUED | OUTPATIENT
Start: 2024-06-15 | End: 2024-06-16 | Stop reason: HOSPADM

## 2024-06-15 RX ORDER — INSULIN LISPRO 100 [IU]/ML
0-8 INJECTION, SOLUTION INTRAVENOUS; SUBCUTANEOUS
Status: DISCONTINUED | OUTPATIENT
Start: 2024-06-16 | End: 2024-06-16 | Stop reason: HOSPADM

## 2024-06-15 RX ORDER — ATORVASTATIN CALCIUM 40 MG/1
40 TABLET, FILM COATED ORAL DAILY
Status: DISCONTINUED | OUTPATIENT
Start: 2024-06-15 | End: 2024-06-16 | Stop reason: HOSPADM

## 2024-06-15 RX ORDER — FUROSEMIDE 20 MG/1
20 TABLET ORAL DAILY
Status: DISCONTINUED | OUTPATIENT
Start: 2024-06-15 | End: 2024-06-16 | Stop reason: HOSPADM

## 2024-06-15 RX ORDER — M-VIT,TX,IRON,MINS/CALC/FOLIC 27MG-0.4MG
1 TABLET ORAL DAILY
Status: DISCONTINUED | OUTPATIENT
Start: 2024-06-15 | End: 2024-06-16 | Stop reason: HOSPADM

## 2024-06-15 RX ORDER — AMLODIPINE BESYLATE 5 MG/1
5 TABLET ORAL 2 TIMES DAILY
Status: DISCONTINUED | OUTPATIENT
Start: 2024-06-15 | End: 2024-06-16 | Stop reason: HOSPADM

## 2024-06-15 RX ORDER — DEXTROSE MONOHYDRATE 100 MG/ML
INJECTION, SOLUTION INTRAVENOUS CONTINUOUS PRN
Status: DISCONTINUED | OUTPATIENT
Start: 2024-06-15 | End: 2024-06-16 | Stop reason: HOSPADM

## 2024-06-15 RX ORDER — DEXTROSE MONOHYDRATE 100 MG/ML
INJECTION, SOLUTION INTRAVENOUS CONTINUOUS PRN
Status: DISCONTINUED | OUTPATIENT
Start: 2024-06-15 | End: 2024-06-15 | Stop reason: SDUPTHER

## 2024-06-15 RX ORDER — ACETAMINOPHEN 325 MG/1
650 TABLET ORAL EVERY 4 HOURS PRN
Status: DISCONTINUED | OUTPATIENT
Start: 2024-06-15 | End: 2024-06-16 | Stop reason: HOSPADM

## 2024-06-15 RX ORDER — ONDANSETRON 2 MG/ML
4 INJECTION INTRAMUSCULAR; INTRAVENOUS EVERY 6 HOURS PRN
Status: DISCONTINUED | OUTPATIENT
Start: 2024-06-15 | End: 2024-06-16 | Stop reason: HOSPADM

## 2024-06-15 RX ORDER — GLUCAGON 1 MG/ML
1 KIT INJECTION PRN
Status: DISCONTINUED | OUTPATIENT
Start: 2024-06-15 | End: 2024-06-16 | Stop reason: HOSPADM

## 2024-06-15 RX ORDER — ONDANSETRON 4 MG/1
4 TABLET, ORALLY DISINTEGRATING ORAL EVERY 8 HOURS PRN
Status: DISCONTINUED | OUTPATIENT
Start: 2024-06-15 | End: 2024-06-16 | Stop reason: HOSPADM

## 2024-06-15 RX ORDER — HYDROCHLOROTHIAZIDE 25 MG/1
25 TABLET ORAL DAILY
Status: DISCONTINUED | OUTPATIENT
Start: 2024-06-15 | End: 2024-06-16 | Stop reason: HOSPADM

## 2024-06-15 RX ORDER — SODIUM CHLORIDE 0.9 % (FLUSH) 0.9 %
5-40 SYRINGE (ML) INJECTION PRN
Status: DISCONTINUED | OUTPATIENT
Start: 2024-06-15 | End: 2024-06-16 | Stop reason: HOSPADM

## 2024-06-15 RX ADMIN — Medication 1 TABLET: at 20:51

## 2024-06-15 RX ADMIN — METOPROLOL TARTRATE 25 MG: 25 TABLET, FILM COATED ORAL at 22:35

## 2024-06-15 RX ADMIN — HYDROCODONE BITARTRATE AND ACETAMINOPHEN 1 TABLET: 5; 325 TABLET ORAL at 16:36

## 2024-06-15 RX ADMIN — FOLIC ACID 1 MG: 1 TABLET ORAL at 20:51

## 2024-06-15 RX ADMIN — AMLODIPINE BESYLATE 5 MG: 5 TABLET ORAL at 22:36

## 2024-06-15 RX ADMIN — ISOSORBIDE MONONITRATE 60 MG: 60 TABLET, EXTENDED RELEASE ORAL at 20:50

## 2024-06-15 RX ADMIN — FUROSEMIDE 20 MG: 20 TABLET ORAL at 20:50

## 2024-06-15 RX ADMIN — HYDROCHLOROTHIAZIDE 25 MG: 25 TABLET ORAL at 20:51

## 2024-06-15 RX ADMIN — ATORVASTATIN CALCIUM 40 MG: 40 TABLET, FILM COATED ORAL at 20:51

## 2024-06-15 RX ADMIN — LOSARTAN POTASSIUM 100 MG: 100 TABLET, FILM COATED ORAL at 20:50

## 2024-06-15 ASSESSMENT — PAIN DESCRIPTION - ORIENTATION
ORIENTATION: LEFT
ORIENTATION: LEFT

## 2024-06-15 ASSESSMENT — PAIN DESCRIPTION - LOCATION
LOCATION: LEG
LOCATION: LEG

## 2024-06-15 ASSESSMENT — PAIN SCALES - GENERAL
PAINLEVEL_OUTOF10: 8
PAINLEVEL_OUTOF10: 8

## 2024-06-15 ASSESSMENT — PAIN DESCRIPTION - DESCRIPTORS: DESCRIPTORS: ACHING

## 2024-06-15 NOTE — H&P
V2.0  History and Physical      Name:  Vita Clarke /Age/Sex: 1940  (83 y.o. female)   MRN & CSN:  [id number] & [id number] Encounter Date/Time: 6/15/2024 6:41 PM EDT   Location:   PCP: Joaquim Gonzalez MD       Hospital Day: 1    Assessment and Plan:   Vita Clarke is a 83 y.o. female who presents with Fall from ground level    Problem list  Fall  IVH versus calcified choroid plexus  History of paroxysmal A-fib status post Watchman  Hypertension  Hyperlipidemia  CVA  CAD  Chronic anemia  Diabetes mellitus type 2    Neuro: Ground-level fall with initial head CT with faint attenuation in the temporal horns with choroid plexus versus IVH.  Neurosurgery following.  Recommend head CT in 3 to 6 hours; if IVH still suspected will need follow-up head CT at 6 AM tomorrow.  Every 1-2-hour neurochecks until head CT complete.  No need for Keppra at this time.  Cardio: Hemodynamically stable.  Goal SBP less than 160.  Continue home antihypertensives.  Will hold home Plavix and aspirin.  Monitor on telemetry.  Resp: No acute issues, on room air.  GI: Tolerating p.o.  GI PPx, pantoprazole.  : No history of renal issues.  Labs pending.  Heme: History of chronic anemia; hemoglobin unknown.  CBC pending.  Coags pending.  Holding home aspirin and Plavix.  ID: Low concern for infection at this time.  CBC pending  Endo: Holding home metformin.  Will start insulin sliding scale.  Hypoglycemia protocol ordered.  MSK: No fracture seen on left lower extremity imaging.  PT/OT consult pending.    Tubes/Lines/Drains: PIV    Code Status: Full    Disposition:   Current Living situation: Home  Expected Disposition: Home  Estimated D/C: 1 to 2 days    Diet No diet orders on file   DVT Prophylaxis [] Lovenox, []  Heparin, [x] SCDs, [] Ambulation,  [] Eliquis, [] Xarelto   Code Status Prior   Surrogate Decision Maker/ POA Daughter Fransisco     History from:     patient    History of Present Illness:     Chief Complaint:  loosening or fracture. Prominent vascular calcifications.     As above. Electronically signed by [employer]    XR ANKLE LEFT (MIN 3 VIEWS)    Result Date: 6/15/2024  Left foot x-ray 3 views Left ankle x-ray 3 views Left femur x-ray 2 views Left tib-fib x-ray 2 views Left knee x-ray 3 views INDICATION: Trauma COMPARISON:  None FINDINGS: Nonspecific lateral subluxation of the third through fifth PIP joints. Correlate for dislocations. Medial malleolus soft tissue swelling is nonspecific Osseous ruptures are otherwise intact. Joint space preserved. Scattered degenerative changes throughout. Intact total knee arthroplasty without evidence of loosening or fracture. Prominent vascular calcifications.     As above. Electronically signed by [employer]    XR FOOT LEFT (MIN 3 VIEWS)    Result Date: 6/15/2024  Left foot x-ray 3 views Left ankle x-ray 3 views Left femur x-ray 2 views Left tib-fib x-ray 2 views Left knee x-ray 3 views INDICATION: Trauma COMPARISON:  None FINDINGS: Nonspecific lateral subluxation of the third through fifth PIP joints. Correlate for dislocations. Medial malleolus soft tissue swelling is nonspecific Osseous ruptures are otherwise intact. Joint space preserved. Scattered degenerative changes throughout. Intact total knee arthroplasty without evidence of loosening or fracture. Prominent vascular calcifications.     As above. Electronically signed by [employer]    XR PELVIS (1-2 VIEWS)    Result Date: 6/15/2024  EXAM: XR PELVIS (1-2 VIEWS) INDICATION: Trauma COMPARISON: None FINDINGS: MINERALIZATION: Bone loss. FRACTURE/ACUTE DISEASE: No acute abnormality. ALIGNMENT: Normal. JOINT SPACES: Scattered degenerative changes. SOFT TISSUES: Normal. OTHER FINDINGS: None.     No acute radiographic process. Electronically signed by [employer]    CT CSpine W/O Contrast    Result Date: 6/15/2024  CT of the Cervical Spine INDICATION: Trauma TECHNIQUE: [id number]  axial images were obtained through the

## 2024-06-15 NOTE — PROGRESS NOTES
Neurosurgery Brief Note    83 year old female s/p fall on plavix for Afib.    Neuro intact per report    CTH reviewed   : hype density in temporal horns;  IVH vs calcified choroid plexus    -Would recommend short term interval CTH at 3-6 hours   -If IVH still suspected would follow-up with CTH at 6am  -Will need q1-2h neuro checks in ICU setting until CTHs completed  -Obtain stat CTH if neuroexam declines  -Notify neurosurgery if CTH shows worsening  - No need for keppra or CTA at this time  - SBP <160

## 2024-06-15 NOTE — ED PROVIDER NOTES
Emergency Department Encounter        Pt Name: Vita Clarke  MRN: [id number]  Birthdate 1940  Date of evaluation: 6/15/2024  ED Physician: Tae Garcia MD    CHIEF COMPLAINT     Triage Chief Complaint:   Fall (Pt presents to the ED with complaints of a fall that occurred this afternoon. States she slipped down some stairs. Complaining of left leg and foot pain. )      HISTORY OF PRESENT ILLNESS & REVIEW OF SYSTEMS     History obtained from the patient and staff and family member at bedside.    Vita Clarke is a 83 y.o. female who presents to the emergency department for evaluation of fall.  Says that she tripped and fell down 4-5 stairs.  Says she landed on her left leg.  Says that it folding back underneath her.  Denies hitting her head.  Denies loss conscious.  Denies any neck or back pain.  Says she was able to get up and walk afterward but is having a lot of pain to her left leg.  Denies any numbness weakness or tingling.  Says that she is on Plavix.  Denies any nausea vomiting.        Patient denies any new Headache, Fever, Chills, Cough, Chest pain, Shortness of breath, Abdominal pain, Nausea, Vomiting, Diarrhea, Constipation, and Leg swelling.    The patient has no other acute complaints at this time.  Review of systems as above.          PAST MED/SURG/SOCIAL/FAM HISTORY & ALLERGY & MEDICATIONS     Past Medical History:   Diagnosis Date    AF (paroxysmal atrial fibrillation) ([facility])     AK (actinic keratosis)     Surekha Grooms following    CAD (coronary artery disease)     5/2005 S/P PTCA and stents X2 - Dr Howard    Cancer ([facility])     skin    Carotid stenosis     Carotid stenosis, left     monitored by Dr Matson- 50% stenosis noted on CTA 10/2016    Coronary arteriosclerosis after percutaneous transluminal coronary angioplasty (PTCA) 12/07/2022    PCI procedure:DE Stent, LAD: DE Stent Plcmt Initl Vsl, LAD: DE Stent Plcmt Addtl Vsl, CIRC: DE Stent Plcmt Initl Vsl, Balloon Angioplasty, LAD: Balloon  phrases that may be inappropriate.  The transcription may contain errors not detected in proofreading.  Efforts were made to edit the dictations.    Electronically Signed: Tae Garcia MD, 06/15/24, 10:38 PM    I am the Primary Clinician of Record.      Clinical Impression:  1. Fall, initial encounter    2. Injury of head, initial encounter    3. Traumatic intracerebral hemorrhage without loss of consciousness, unspecified laterality, initial encounter (HCC)      Disposition referral (if applicable):  No follow-up provider specified.  Disposition medications (if applicable):  Current Discharge Medication List        ED Provider Disposition Time  DISPOSITION Admitted 06/15/2024 06:06:36 PM            Tae Garcia MD  06/15/24 0720

## 2024-06-15 NOTE — ED NOTES
also seen in the prior exam of   August 4, 2022 probably due to chronic ischemic disease.      Electronically signed by Cora Gonsales      CT CSpine W/O Contrast   Final Result   No acute abnormality noted in cervical spine         Electronically signed by Cora Gonsales        Abnormal labs: Abnormal Labs Reviewed - No data to display     Background  History:   Past Medical History:   Diagnosis Date    AF (paroxysmal atrial fibrillation) ([facility])     AK (actinic keratosis)     Surekha Grooms following    CAD (coronary artery disease)     5/2005 S/P PTCA and stents X2 - Dr Howard    Cancer ([facility])     skin    Carotid stenosis     Carotid stenosis, left     monitored by Dr Matson- 50% stenosis noted on CTA 10/2016    Coronary arteriosclerosis after percutaneous transluminal coronary angioplasty (PTCA) 12/07/2022    PCI procedure:DE Stent, LAD: DE Stent Plcmt Initl Vsl, LAD: DE Stent Plcmt Addtl Vsl, CIRC: DE Stent Plcmt Initl Vsl, Balloon Angioplasty, LAD: Balloon Angioplasty Initl Vsl, LAD: Balloon Angioplasty Addtl Vsl, CIRC: Balloon Angioplasty Initl Vsl.    Coronary artery disease     Dr Matson    Cough secondary to angiotensin converting enzyme inhibitor (ACE-I)     inactive    COVID-19 virus infection     tested pos in Nov 2020- mild cough.  now resolved.    CTS (carpal tunnel syndrome)     inactive-S/P right CTS surg 4/2001- Dr Parmar    DDD (degenerative disc disease), cervical     DDD (degenerative disc disease), cervical     DDD (degenerative disc disease), lumbar     Degenerative disc disease, cervical     Diabetes mellitus with neuropathy ([facility])     Dr Alvarado/sheyla, - ON NEURONTIN    Diffuse cystic mastopathy     GERD without esophagitis     H/O cardiac catheterization 05/2005 5/16/05 Circ stent 80% prior 0% post, EF 60%    H/O cardiovascular stress test 12/29/2020    ef 60% normal lexiscan    H/O Doppler ultrasound 10/2011, 8/09    carotid 10/4/11 moderat stenosis left internal carotid atery est 50-69%,  progression in stenotic changes left internal carotid artery, right WNL    H/O echocardiogram 12/29/2020    EF 55-60% mild TR AR and pulmonic regurg    H/O left heart catheterization 11/30/2022    LM patent.  CX 90% distal to stent, LAD sml caliber vessel, 70%proximal stenosis, 90% distal stenosi, RCA 70% stenosis.  Recommend PCI vs CABG    H/O left heart catheterization by ventricular puncture 09/15/2023    LAD PATENT STENT, MILD UNCHANGED DISEASE AT BIFURCATION, SMALL DIAG JAILED.  CX PATENT STENTS MILD DISESAE. RCA 40% MID STENOSIS , HEAVILY CALCIFED UNCHANGED.    H/O mammogram     1/16- recheck 1/17    H/O tilt table evaluation 07/2009 7/20/09 WNL    Hyperlipidemia     Hypertension     Memory loss     MMSE 23/30-- 11/15/13    Neuropathy     Osteoarthritis, knee     Peripheral neuropathy     burning discomfort in feet.    PONV (postoperative nausea and vomiting)     Postsurgical menopause     Rheumatoid arthritis(714.0)     S/P colonoscopic polypectomy 11/20/2020    Dr Taylor, recheck 3 yrs- SESSILE SERATED POLYP    SCC (squamous cell carcinoma), leg     Dr Parmar removed fr legs 10/16    Thyroid nodule     on u/s 10/2016, recheck May 2017- stable--- RECHECK MAY 2018 RECOMMENDED    TMJ (temporomandibular joint syndrome)     Unspecified cerebral artery occlusion with cerebral infarction     2016-Right hemiparesis, aphasia, dysphagia, gait disturbance,       Assessment    Vitals: MEWS Score: 1  Level of Consciousness: Alert (0)   Vitals:    06/15/24 1822 06/15/24 1829 06/15/24 1842 06/15/24 1852   BP: (!) 170/71 (!) 154/57 (!) 147/58    Pulse: 66 63 60 61   Resp: 16 15 11 13   Temp:       TempSrc:       SpO2: 94% 93% 92% 93%   Weight:       Height:         PO Status: Regular  O2 Flow Rate: O2 Device: None (Room air)    Cardiac Rhythm: nsr    Last documented pain medication administered: norco at 1636    NIH Score: NIH     Active LDA's:   Peripheral IV 06/15/24 Left Antecubital (Active)   Site Assessment Clean, dry

## 2024-06-16 VITALS
BODY MASS INDEX: 34.76 KG/M2 | HEART RATE: 52 BPM | TEMPERATURE: 98.1 F | RESPIRATION RATE: 11 BRPM | OXYGEN SATURATION: 96 % | DIASTOLIC BLOOD PRESSURE: 45 MMHG | WEIGHT: 177.03 LBS | SYSTOLIC BLOOD PRESSURE: 115 MMHG | HEIGHT: 60 IN

## 2024-06-16 LAB
ANION GAP SERPL CALCULATED.3IONS-SCNC: 12 MMOL/L (ref 7–16)
BASOPHILS ABSOLUTE: 0 K/CU MM
BASOPHILS RELATIVE PERCENT: 0.3 % (ref 0–1)
BUN SERPL-MCNC: 22 MG/DL (ref 6–23)
CALCIUM SERPL-MCNC: 9.1 MG/DL (ref 8.3–10.6)
CHLORIDE BLD-SCNC: 101 MMOL/L (ref 99–110)
CHOLEST SERPL-MCNC: 104 MG/DL
CO2: 26 MMOL/L (ref 21–32)
CREAT SERPL-MCNC: 1.1 MG/DL (ref 0.6–1.1)
DIFFERENTIAL TYPE: ABNORMAL
EOSINOPHILS ABSOLUTE: 0.2 K/CU MM
EOSINOPHILS RELATIVE PERCENT: 3.7 % (ref 0–3)
ESTIMATED AVERAGE GLUCOSE: 126 MG/DL
GFR, ESTIMATED: 50 ML/MIN/1.73M2
GLUCOSE BLD-MCNC: 122 MG/DL (ref 70–99)
GLUCOSE BLD-MCNC: 143 MG/DL (ref 70–99)
GLUCOSE SERPL-MCNC: 122 MG/DL (ref 70–99)
HBA1C MFR BLD: 6 % (ref 4.2–6.3)
HCT VFR BLD CALC: 28.6 % (ref 37–47)
HDLC SERPL-MCNC: 34 MG/DL
HEMOGLOBIN: 9.6 GM/DL (ref 12.5–16)
IMMATURE NEUTROPHIL %: 0.3 % (ref 0–0.43)
LDLC SERPL CALC-MCNC: 45 MG/DL
LYMPHOCYTES ABSOLUTE: 1.4 K/CU MM
LYMPHOCYTES RELATIVE PERCENT: 22.9 % (ref 24–44)
MAGNESIUM: 1.7 MG/DL (ref 1.8–2.4)
MCH RBC QN AUTO: 30.6 PG (ref 27–31)
MCHC RBC AUTO-ENTMCNC: 33.6 % (ref 32–36)
MCV RBC AUTO: 91.1 FL (ref 78–100)
MONOCYTES ABSOLUTE: 0.7 K/CU MM
MONOCYTES RELATIVE PERCENT: 11.8 % (ref 0–4)
NEUTROPHILS ABSOLUTE: 3.8 K/CU MM
NEUTROPHILS RELATIVE PERCENT: 61 % (ref 36–66)
NUCLEATED RBC %: 0 %
PDW BLD-RTO: 12.9 % (ref 11.7–14.9)
PHOSPHORUS: 4.3 MG/DL (ref 2.5–4.9)
PLATELET # BLD: 196 K/CU MM (ref 140–440)
PMV BLD AUTO: 9.7 FL (ref 7.5–11.1)
POTASSIUM SERPL-SCNC: 4.2 MMOL/L (ref 3.5–5.1)
RBC # BLD: 3.14 M/CU MM (ref 4.2–5.4)
SODIUM BLD-SCNC: 139 MMOL/L (ref 135–145)
TOTAL IMMATURE NEUTOROPHIL: 0.02 K/CU MM
TOTAL NUCLEATED RBC: 0 K/CU MM
TRIGL SERPL-MCNC: 125 MG/DL
WBC # BLD: 6.3 K/CU MM (ref 4–10.5)

## 2024-06-16 PROCEDURE — 97166 OT EVAL MOD COMPLEX 45 MIN: CPT

## 2024-06-16 PROCEDURE — 82962 GLUCOSE BLOOD TEST: CPT

## 2024-06-16 PROCEDURE — 2580000003 HC RX 258: Performed by: NURSE PRACTITIONER

## 2024-06-16 PROCEDURE — 85025 COMPLETE CBC W/AUTO DIFF WBC: CPT

## 2024-06-16 PROCEDURE — 83735 ASSAY OF MAGNESIUM: CPT

## 2024-06-16 PROCEDURE — 94761 N-INVAS EAR/PLS OXIMETRY MLT: CPT

## 2024-06-16 PROCEDURE — 80061 LIPID PANEL: CPT

## 2024-06-16 PROCEDURE — 80048 BASIC METABOLIC PNL TOTAL CA: CPT

## 2024-06-16 PROCEDURE — 83036 HEMOGLOBIN GLYCOSYLATED A1C: CPT

## 2024-06-16 PROCEDURE — 84100 ASSAY OF PHOSPHORUS: CPT

## 2024-06-16 PROCEDURE — 2700000000 HC OXYGEN THERAPY PER DAY

## 2024-06-16 PROCEDURE — 97161 PT EVAL LOW COMPLEX 20 MIN: CPT

## 2024-06-16 PROCEDURE — 6370000000 HC RX 637 (ALT 250 FOR IP): Performed by: NURSE PRACTITIONER

## 2024-06-16 PROCEDURE — 97116 GAIT TRAINING THERAPY: CPT

## 2024-06-16 RX ORDER — CLOPIDOGREL BISULFATE 75 MG/1
75 TABLET ORAL DAILY
Qty: 90 TABLET | Refills: 1 | Status: SHIPPED | OUTPATIENT
Start: 2024-06-20

## 2024-06-16 RX ADMIN — ISOSORBIDE MONONITRATE 60 MG: 60 TABLET, EXTENDED RELEASE ORAL at 08:38

## 2024-06-16 RX ADMIN — Medication 1 TABLET: at 08:38

## 2024-06-16 RX ADMIN — FUROSEMIDE 20 MG: 20 TABLET ORAL at 08:38

## 2024-06-16 RX ADMIN — ATORVASTATIN CALCIUM 40 MG: 40 TABLET, FILM COATED ORAL at 08:38

## 2024-06-16 RX ADMIN — METOPROLOL TARTRATE 25 MG: 25 TABLET, FILM COATED ORAL at 08:38

## 2024-06-16 RX ADMIN — LOSARTAN POTASSIUM 100 MG: 100 TABLET, FILM COATED ORAL at 08:39

## 2024-06-16 RX ADMIN — AMLODIPINE BESYLATE 5 MG: 5 TABLET ORAL at 08:38

## 2024-06-16 RX ADMIN — SODIUM CHLORIDE, PRESERVATIVE FREE 10 ML: 5 INJECTION INTRAVENOUS at 08:40

## 2024-06-16 RX ADMIN — FOLIC ACID 1 MG: 1 TABLET ORAL at 08:38

## 2024-06-16 RX ADMIN — PANTOPRAZOLE SODIUM 40 MG: 40 TABLET, DELAYED RELEASE ORAL at 06:41

## 2024-06-16 RX ADMIN — HYDROCHLOROTHIAZIDE 25 MG: 25 TABLET ORAL at 08:38

## 2024-06-16 ASSESSMENT — PAIN SCALES - GENERAL: PAINLEVEL_OUTOF10: 0

## 2024-06-16 NOTE — DISCHARGE SUMMARY
V2.0  Discharge Summary    Name:  Vita Clarke /Age/Sex: 1940 (83 y.o. female)   Admit Date: 6/15/2024  Discharge Date: 24    MRN & CSN:  [id number] & [id number] Encounter Date and Time 24 11:06 AM EDT    Attending:  Sharmaine Birmingham MD Discharging Provider: Lilly Alvarado APRN - CNP       Hospital Course:     Brief HPI: Vita Clarke is a 83 y.o. female who presented after ground-level fall from home.    Brief Problem Based Course:     Patient initially presented to the emergency department after falling down 3-4 stairs.  She reports that she tripped and her foot and ankle buckled underneath her.  She denies hitting her head or any loss of consciousness.  Initial head CT in the emergency department revealed a faint hypoattenuation in the temporal horn which was a choroid plexus versus a possible acute hemorrhage.  It was decided that she should be admitted to the ICU for closer monitoring and repeat imaging.  She is well was noted to have nonspecific lateral subluxation of the third through fifth PIP joints of her left foot.  Her neuroexam remained stable during admission.  She did work with PT/OT who had no further recommendations.  Her repeat head imaging was stable.  Neurosurgery did recommend holding her aspirin and Plavix for 5 days with resumption on 2024.  She did recently have a Watchman procedure on 2024.  This was discussed with cardiology as well to let them know that her aspirin and Plavix would be held, they are in agreement with this plan.  Orthopedics was reached out to, and they recommended outpatient follow-up and weightbearing as tolerated.  All these recommendations were discussed with the patient and she is stable for discharge.      The patient expressed appropriate understanding of, and agreement with the discharge recommendations, medications, and plan.     Consults this admission:  IP CONSULT TO NEUROSURGERY  IP CONSULT TO CRITICAL CARE  IP CONSULT TO    Component Value Date/Time    CHOL 104 06/16/2024 05:00 AM    HDL 34 06/16/2024 05:00 AM    HDL 40 05/11/2012 10:01 AM    TRIG 125 06/16/2024 05:00 AM     Hemoglobin A1C:   Lab Results   Component Value Date/Time    LABA1C 6.0 06/16/2024 05:00 AM     TSH:   Lab Results   Component Value Date/Time    TSH 3.74 06/04/2024 07:45 AM     Troponin:   Lab Results   Component Value Date/Time    TROPONINT <0.010 07/12/2023 06:02 AM     Lactic Acid: No results for input(s): \"LACTA\" in the last 72 hours.  BNP: No results for input(s): \"PROBNP\" in the last 72 hours.  UA:  Lab Results   Component Value Date/Time    NITRU NEGATIVE 02/02/2024 01:07 PM    NITRU Negative 11/16/2023 09:56 AM    COLORU YELLOW 02/02/2024 01:07 PM    PHUR 6.0 02/02/2024 01:07 PM    PHUR 6.0 11/16/2023 09:56 AM    WBCUA 1 02/02/2024 01:07 PM    RBCUA 1 02/02/2024 01:07 PM    MUCUS RARE 11/03/2017 11:02 AM    TRICHOMONAS NONE SEEN 11/03/2017 11:02 AM    BACTERIA NEGATIVE 02/02/2024 01:07 PM    CLARITYU CLEAR 02/02/2024 01:07 PM    SPECGRAV 1.020 05/31/2022 11:36 AM    LEUKOCYTESUR TRACE 02/02/2024 01:07 PM    UROBILINOGEN 0.2 02/02/2024 01:07 PM    BILIRUBINUR NEGATIVE 02/02/2024 01:07 PM    BILIRUBINUR neg 05/31/2022 11:36 AM    BLOODU TRACE 02/02/2024 01:07 PM    GLUCOSEU NEGATIVE 02/02/2024 01:07 PM    GLUCOSEU Negative 11/16/2023 09:56 AM    KETUA NEGATIVE 02/02/2024 01:07 PM     Urine Cultures:   Lab Results   Component Value Date/Time    LABURIN  11/07/2023 08:10 AM     >50,000 CFU/ml mixed skin/urogenital jackson. No further workup     Blood Cultures: No results found for: \"BC\"  No results found for: \"BLOODCULT2\"  Organism:   Lab Results   Component Value Date/Time    ORG Escherichia coli 05/08/2023 07:48 AM       Time Spent Discharging patient 58 minutes    Electronically signed by JAYME Matias CNP on 6/16/2024 at 11:06 AM

## 2024-06-16 NOTE — PROGRESS NOTES
4 Eyes Skin Assessment     NAME:  Vita Clarke  YOB: 1940  MEDICAL RECORD NUMBER:  [id number]    The patient is being assessed for  Admission    I agree that at least one RN has performed a thorough Head to Toe Skin Assessment on the patient. ALL assessment sites listed below have been assessed.      Areas assessed by both nurses:    Head, Face, Ears, Shoulders, Back, Chest, Arms, Elbows, Hands, Sacrum. Buttock, Coccyx, Ischium, Legs. Feet and Heels, and Under Medical Devices         Does the Patient have a Wound? No noted wound(s)       Kyler Prevention initiated by RN: Yes  Wound Care Orders initiated by RN: No    Pressure Injury (Stage 3,4, Unstageable, DTI, NWPT, and Complex wounds) if present, place Wound referral order by RN under : No    New Ostomies, if present place, Ostomy referral order under : No     Nurse 1 eSignature: Electronically signed by Amari Keene RN on 6/16/24 at 4:52 AM EDT    **SHARE this note so that the co-signing nurse can place an eSignature**    Nurse 2 eSignature: Electronically signed by Monse Gibson RN on 6/16/24 at 4:56 AM EDT

## 2024-06-16 NOTE — PROGRESS NOTES
Pt walked to bathroom x1 assist due to left foot. Tolerated well. Walked back to a chair at this time.

## 2024-06-16 NOTE — PROGRESS NOTES
Occupational Therapy  [facility] OCCUPATIONAL THERAPY EVALUATION    History  [place]:  The primary encounter diagnosis was Fall, initial encounter. Diagnoses of Injury of head, initial encounter and Traumatic intracerebral hemorrhage without loss of consciousness, unspecified laterality, initial encounter ([facility]) were also pertinent to this visit.      Restrictions:                           Communication with other providers: RN, PT.    Subjective:  Patient states:  \"I am independent I am supposed to drive to [place] this weekend\"  Pain:  2/10 L leg/foot  Patient goal:  home, pain reduction    Occupational profile (relevant social history and personal factors):    Social/Functional History  Lives With: Alone  Type of Home: House  Home Layout: Two level, Bed/Bath upstairs  Home Access: Level entry  Home Equipment:  (does not use AD at baseline)  ADL Assistance: Independent  Homemaking Assistance: Independent  Homemaking Responsibilities: Yes  Ambulation Assistance: Independent  Transfer Assistance: Independent    Examination of body systems (includes body structures/functions, activity/participation limitations):  Orientation: WFL   Cognition:  WFL   Observation:  Received pt in chair. Alert and cooperative.   Vision:  WFL   Hearing:  WFL   ROM:  WFL BUE  Strength: WFL BUE  Sensation: WFL BUE    ADLs  Feeding: INDEP    Grooming: NATHAN, partially in seated    Dressing: UB SBA in seated LB SBA    Bathing: UB CGA in seated LB CGA    Toileting: SBA    *Some ADL determined per observation of actual ADL performance, functional mobility, balance, activity tolerance, and cognition.     AM-PAC 6 click short form for inpatient daily activity:    24/24 = unimpaired  23/24 = 1-20% impaired   20/24-22/24 = 21-40% impaired  15/24-19/24 = 41-59% impaired   10/24-14/24 = 60%-79% impaired  7/24-9/24 = 80%-99% impaired  6/24 = 100% impaired    Functional Mobility  Bed mobility: NT, pt already up out of bed just prior to OT's arrival    Sitting

## 2024-06-16 NOTE — CONSULTS
[facility] ACUTE CARE PHYSICAL THERAPY EVALUATION  Vita Clarke, 1940, 2105/2105-A, 6/16/2024    History  [place]:  The primary encounter diagnosis was Fall, initial encounter. Diagnoses of Injury of head, initial encounter, Traumatic intracerebral hemorrhage without loss of consciousness, unspecified laterality, initial encounter ([facility]), and Cerebrovascular accident (CVA) due to thrombosis of cerebral artery ([facility]) were also pertinent to this visit.  Patient  has a past medical history of AF (paroxysmal atrial fibrillation) ([facility]), AK (actinic keratosis), CAD (coronary artery disease), Cancer ([facility]), Carotid stenosis, Carotid stenosis, left, Coronary arteriosclerosis after percutaneous transluminal coronary angioplasty (PTCA), Coronary artery disease, Cough secondary to angiotensin converting enzyme inhibitor (ACE-I), COVID-19 virus infection, CTS (carpal tunnel syndrome), DDD (degenerative disc disease), cervical, DDD (degenerative disc disease), cervical, DDD (degenerative disc disease), lumbar, Degenerative disc disease, cervical, Diabetes mellitus with neuropathy ([facility]), Diffuse cystic mastopathy, GERD without esophagitis, H/O cardiac catheterization, H/O cardiovascular stress test, H/O Doppler ultrasound, H/O echocardiogram, H/O left heart catheterization, H/O left heart catheterization by ventricular puncture, H/O mammogram, H/O tilt table evaluation, Hyperlipidemia, Hypertension, Memory loss, Neuropathy, Osteoarthritis, knee, Peripheral neuropathy, PONV (postoperative nausea and vomiting), Postsurgical menopause, Rheumatoid arthritis(714.0), S/P colonoscopic polypectomy, SCC (squamous cell carcinoma), leg, Thyroid nodule, TMJ (temporomandibular joint syndrome), and Unspecified cerebral artery occlusion with cerebral infarction.  Patient  has a past surgical history that includes Carpal tunnel release; cyst removal (1963); Hysterectomy, total abdominal (1963); Tonsillectomy (1972); Neck surgery  (1973); Breast biopsy (1993); Finger surgery (1998); Percutaneous Transluminal Coronary Angio (05/2005); Knee arthroscopy (02/21/2012); Vein Surgery; Wrist surgery (2011); Skin cancer excision (10/16/2013); Knee arthroscopy (Left, 09/23/2014); Total knee arthroplasty (Left, 07/28/2015); Cataract removal with implant (Left, 02/06/2017); Total knee arthroplasty (Left, 11/14/2017); Coronary angioplasty with stent (12/2022); Ovary removal; Skin cancer excision (04/2023); Carotid endarterectomy (Left, 07/11/2023); left atrial appendage occluder device (Left, 04/04/2024); and ep device procedure (N/A, 4/4/2024).    Discharge Recommendation: [facility]    Equipment: none    Subjective:    Patient states:  \"I just slipped on the stairs.\"      Pain:  2/10 L foot pain.      Communication with other providers:  Handoff to RN, OT    Restrictions: general precautions, fall risk    Home Setup/Prior level of function  Social/Functional History  Lives With: Alone  Type of Home: House  Home Layout: Two level, Bed/Bath upstairs  Home Access: Level entry  Home Equipment:  (does not use AD at baseline)  ADL Assistance: Independent  Homemaking Assistance: Independent  Homemaking Responsibilities: Yes  Ambulation Assistance: Independent  Transfer Assistance: Independent    Examination of body systems (includes body structures/functions, activity/participation limitations):  Observation:  pt up in chair with RN present upon arrival and agreeable to therapy  Vision:  WFL  Hearing:  WFL  Cardiopulmonary:  no O2 needs  Cognition: WFL, see OT/SLP note for further evaluation.    Musculoskeletal  ROM R/L:  WFL.    Strength R/L:  4+/5, minimal impairment in function and endurance.    Neuro:  WFL      Mobility:  Rolling L/R:  NT, pt up at beginning and end of session  Supine to sit:  NT, pt up at beginning and end of session  Transfers: pt completed STS to/from chair SBA with safe sequencing  Sitting balance:  good.    Standing balance:  good.    Gait:

## 2024-06-16 NOTE — PROGRESS NOTES
Neurosurgery Brief Note    83 year old female s/p fall on plavix for Afib.     Neuro intact per report     CTH reviewed   hyperdensity in temporal horns;  IVH vs calcified choroid plexus  Not any worse on follow-up CTH imaging x2     -No need for neurosurgical intervention  -Would recommend patient to hold plavix for a total of 5 days as a precaution, can resume 6/20/24  -No further neurosurgical follow-up needed  -Ok for discharge home  -Patient should be given instructions to return to the hospital for worsening headache, nausea, vomiting, new weakness, slurred speech, new visual change, or new neurological deficits.    Figueroa Oconnor MD  Neurosurgery

## 2024-06-17 ENCOUNTER — CARE COORDINATION (OUTPATIENT)
Dept: CASE MANAGEMENT | Age: 84
End: 2024-06-17

## 2024-06-17 ENCOUNTER — TELEPHONE (OUTPATIENT)
Dept: INTERNAL MEDICINE CLINIC | Age: 84
End: 2024-06-17

## 2024-06-17 DIAGNOSIS — D64.9 ANEMIA, UNSPECIFIED TYPE: Primary | ICD-10-CM

## 2024-06-17 DIAGNOSIS — D64.9 ANEMIA, UNSPECIFIED TYPE: ICD-10-CM

## 2024-06-17 LAB
HEMOCCULT SP1 STL QL: NORMAL
HEMOCCULT SP2 STL QL: NORMAL
HEMOCCULT SP3 STL QL: NORMAL

## 2024-06-17 NOTE — CARE COORDINATION
Care Transitions Note  Initial Call - Call within 2 business days of discharge: Yes    Attempted to reach patient for transitions of care follow up. Unable to reach patient.    Outreach Attempts:   HIPAA compliant voicemail left for patient.   [employer] message sent.     Patient: Vita Clarke    Patient : 1940   MRN: [id number]    Reason for Admission: Fall  Discharge Date: 24  RURS: Readmission Risk Score: 18.8    Last Discharge Facility       Date Complaint Diagnosis Description Type Department Provider    6/15/24 Fall Fall, initial encounter ... ED to Hosp-Admission (Discharged) (ADMITTED) [facility] ICU Sharmaine Birmingham MD; Tobin Garcia...     Future Appointments         Provider Specialty Dept Phone    2024 9:45 AM Joaquim Gonzalez MD Internal Medicine [phone]    2024 11:00 AM James Hoang MD Rheumatology [phone]    2024 4:50 PM Mary Matson MD Cardiology [phone]    2024 1:30 PM Maged Whitlock MD Cardiothoracic Surgery [phone]    2024 10:00 AM Joaquim Gonzalez MD Internal Medicine [phone]    10/4/2024 1:00 PM Alyssa Platt, APRN - CNP Cardiology [phone]              Maya Berman RN

## 2024-06-17 NOTE — TELEPHONE ENCOUNTER
Care Transitions Initial Follow Up Call    Outreach made within 2 business days of discharge: Yes    Patient: Vita Clarke Patient : 1940   MRN: [id number]  Reason for Admission: There are no discharge diagnoses documented for the most recent discharge.  Discharge Date: 24       Spoke with: Vita     Discharge department/facility: [facility] Interactive Patient Contact:  Was patient able to fill all prescriptions: Yes  Was patient instructed to bring all medications to the follow-up visit: Yes  Is patient taking all medications as directed in the discharge summary? Yes  Does patient understand their discharge instructions: Yes  Does patient have questions or concerns that need addressed prior to 7-14 day follow up office visit: no    Scheduled appointment with PCP within 7-14 days    Follow Up  Future Appointments   Date Time Provider Department Center   2024  9:45 AM Joaquim Gonzalez MD [facility] E S IM [facility]   2024 11:00 AM James Hoang MD [facility] RHEUM [facility]   2024  4:50 PM Mary Matson MD [facility] Heart [facility]   2024  1:30 PM Maged Whitlock MD [facility] CT SURG [facility]   2024 10:00 AM Joaquim Gonzalez MD [facility] E S IM [facility]   10/4/2024  1:00 PM Alyssa Platt, APRN - CNP [facility]       Lang Rollins MA

## 2024-06-17 NOTE — TELEPHONE ENCOUNTER
Rec'vd a call from [place] with [facility] requesting orders for blood occult stool # 1 and Blood occult stool # 2 to be placed. They received 3 cards but only one order for lab # 3 .

## 2024-06-18 ENCOUNTER — APPOINTMENT (OUTPATIENT)
Dept: CT IMAGING | Age: 84
End: 2024-06-18
Payer: MEDICARE

## 2024-06-18 ENCOUNTER — HOSPITAL ENCOUNTER (EMERGENCY)
Age: 84
Discharge: HOME | End: 2024-06-18
Payer: MEDICARE

## 2024-06-18 ENCOUNTER — CARE COORDINATION (OUTPATIENT)
Dept: CASE MANAGEMENT | Age: 84
End: 2024-06-18

## 2024-06-18 ENCOUNTER — TELEPHONE (OUTPATIENT)
Dept: INTERNAL MEDICINE CLINIC | Age: 84
End: 2024-06-18

## 2024-06-18 VITALS
HEART RATE: 67 BPM | RESPIRATION RATE: 15 BRPM | OXYGEN SATURATION: 90 % | SYSTOLIC BLOOD PRESSURE: 159 MMHG | TEMPERATURE: 97.8 F | DIASTOLIC BLOOD PRESSURE: 56 MMHG

## 2024-06-18 DIAGNOSIS — R51.9 ACUTE NONINTRACTABLE HEADACHE, UNSPECIFIED HEADACHE TYPE: Primary | ICD-10-CM

## 2024-06-18 DIAGNOSIS — R79.89 ELEVATED SERUM CREATININE: ICD-10-CM
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
## 2024-06-18 LAB
ANION GAP SERPL CALCULATED.3IONS-SCNC: 13 MMOL/L (ref 7–16)
BASOPHILS ABSOLUTE: 0 K/CU MM
BASOPHILS RELATIVE PERCENT: 0.4 % (ref 0–1)
BUN SERPL-MCNC: 33 MG/DL (ref 6–23)
CALCIUM SERPL-MCNC: 8.8 MG/DL (ref 8.3–10.6)
CHLORIDE BLD-SCNC: 95 MMOL/L (ref 99–110)
CO2: 22 MMOL/L (ref 21–32)
CREAT SERPL-MCNC: 1.5 MG/DL (ref 0.6–1.1)
DIFFERENTIAL TYPE: ABNORMAL
EOSINOPHILS ABSOLUTE: 0.3 K/CU MM
EOSINOPHILS RELATIVE PERCENT: 4 % (ref 0–3)
GFR, ESTIMATED: 34 ML/MIN/1.73M2
GLUCOSE SERPL-MCNC: 102 MG/DL (ref 70–99)
HCT VFR BLD CALC: 32 % (ref 37–47)
HEMOGLOBIN: 10.5 GM/DL (ref 12.5–16)
IMMATURE NEUTROPHIL %: 0.1 % (ref 0–0.43)
LYMPHOCYTES ABSOLUTE: 2.1 K/CU MM
LYMPHOCYTES RELATIVE PERCENT: 29.5 % (ref 24–44)
MCH RBC QN AUTO: 29.8 PG (ref 27–31)
MCHC RBC AUTO-ENTMCNC: 32.8 % (ref 32–36)
MCV RBC AUTO: 90.9 FL (ref 78–100)
MONOCYTES ABSOLUTE: 0.7 K/CU MM
MONOCYTES RELATIVE PERCENT: 9.2 % (ref 0–4)
NEUTROPHILS ABSOLUTE: 4.1 K/CU MM
NEUTROPHILS RELATIVE PERCENT: 56.8 % (ref 36–66)
NUCLEATED RBC %: 0 %
PDW BLD-RTO: 12.7 % (ref 11.7–14.9)
PLATELET # BLD: 208 K/CU MM (ref 140–440)
PMV BLD AUTO: 9.9 FL (ref 7.5–11.1)
POTASSIUM SERPL-SCNC: 4.2 MMOL/L (ref 3.5–5.1)
RBC # BLD: 3.52 M/CU MM (ref 4.2–5.4)
REASON FOR REJECTION: NORMAL
REJECTED TEST: NORMAL
SODIUM BLD-SCNC: 130 MMOL/L (ref 135–145)
TOTAL IMMATURE NEUTOROPHIL: 0.01 K/CU MM
TOTAL NUCLEATED RBC: 0 K/CU MM
WBC # BLD: 7.2 K/CU MM (ref 4–10.5)

## 2024-06-18 PROCEDURE — 80048 BASIC METABOLIC PNL TOTAL CA: CPT

## 2024-06-18 PROCEDURE — 2580000003 HC RX 258: Performed by: PHYSICIAN ASSISTANT

## 2024-06-18 PROCEDURE — 70498 CT ANGIOGRAPHY NECK: CPT

## 2024-06-18 PROCEDURE — 6360000004 HC RX CONTRAST MEDICATION: Performed by: PHYSICIAN ASSISTANT

## 2024-06-18 PROCEDURE — 99285 EMERGENCY DEPT VISIT HI MDM: CPT

## 2024-06-18 PROCEDURE — 85025 COMPLETE CBC W/AUTO DIFF WBC: CPT

## 2024-06-18 PROCEDURE — 70450 CT HEAD/BRAIN W/O DYE: CPT

## 2024-06-18 RX ORDER — 0.9 % SODIUM CHLORIDE 0.9 %
1000 INTRAVENOUS SOLUTION INTRAVENOUS ONCE
Status: COMPLETED | OUTPATIENT
Start: 2024-06-18 | End: 2024-06-18

## 2024-06-18 RX ADMIN — SODIUM CHLORIDE 1000 ML: 9 INJECTION, SOLUTION INTRAVENOUS at 21:46

## 2024-06-18 RX ADMIN — IOPAMIDOL 75 ML: 755 INJECTION, SOLUTION INTRAVENOUS at 20:46

## 2024-06-18 NOTE — TELEPHONE ENCOUNTER
Had fall down steps. Went to ER on 6/15/24. Has headache described as foggy headache. Per Dr. Gonzalez sent to ER of choice.

## 2024-06-18 NOTE — ED PROVIDER NOTES
Triage Chief Complaint:   Headache (Sharp pains intermittently to the right side of her face, four times today. Was taken off of her plavix on Saturday due to a fall. )    [place]:  Today in the ED I had the pleasure of caring for Vita Clarke who is a 83 y.o. female that presents today to the ED for evaluation for headache.  Context is 3 days ago patient fell down stairs.  Unsure whether or not she hit her head at the time.  Seen here in the emergency department had negative CT scan of the head.  Last night around 10:00 PM she had a sharp headache in the right temporal region.  It then moved to the posterior parietal region of the right scalp only lasting several minutes.  Before spontaneously resolving.  This has happened about 3 or 4 times today as well which concerned her she called PCP who advised her to come here to the ED for CT scan.  She denies any confusion.  No change facial pain or asymmetry.  No musculoskeletal weakness.  No paresthesias.  No neck pain or stiffness.  She does endorse occasional visual disturbances associated with this.    ROS:  REVIEW OF SYSTEMS    At least 10 systems reviewed      All other review of systems are negative  See HPI and nursing notes for additional information       Past Medical History:   Diagnosis Date    AF (paroxysmal atrial fibrillation) ([facility])     AK (actinic keratosis)     Surekha Grooms following    CAD (coronary artery disease)     5/2005 S/P PTCA and stents X2 - Dr Howard    Cancer ([facility])     skin    Carotid stenosis     Carotid stenosis, left     monitored by Dr Matson- 50% stenosis noted on CTA 10/2016    Coronary arteriosclerosis after percutaneous transluminal coronary angioplasty (PTCA) 12/07/2022    PCI procedure:DE Stent, LAD: DE Stent Plcmt Initl Vsl, LAD: DE Stent Plcmt Addtl Vsl, CIRC: DE Stent Plcmt Initl Vsl, Balloon Angioplasty, LAD: Balloon Angioplasty Initl Vsl, LAD: Balloon Angioplasty Addtl Vsl, CIRC: Balloon Angioplasty Initl Vsl.    Coronary

## 2024-06-18 NOTE — CARE COORDINATION
this time. Patient instructed to continue to monitor s/s, reporting any that may present to MD immediately for early intervention.  Patient is agreeable to f/u calls.    LPN Care Coordinator reviewed discharge instructions, medical action plan, and red flags with patient. The patient was given an opportunity to ask questions; questions regarding brain numbing pain sent to provider for clarification.. The patient verbalized understanding.   Were discharge instructions available to patient? Yes.   Reviewed appropriate site of care based on symptoms and resources available to patient including: PCP  Specialist  Urgent care clinics  When to call 911  [employer]. The patient agrees to contact the primary care provider and/or specialist office for questions related to their healthcare.      Advance Care Planning   The patient has the following advanced directives on file:  Advance Directives       Power of  Living Will ACP-Advance Directive ACP-Power of     Not on File Not on File Not on File Not on File            The patient has appointed the following active healthcare agents:    Primary Decision Maker: VinceFransisco - Child - [phone]         Medication Reconciliation:  Medication reconciliation was performed with patient,1111F entered: yes.     Remote Patient Monitoring:  Offered patient enrollment in the Remote Patient Monitoring (RPM) program for in-home monitoring: Yes, but did not enroll at this time: patient declined .    Assessments:  Care Transitions 24 Hour Call    Do you have a copy of your discharge instructions?: Yes  Do you have all of your prescriptions and are they filled?: Yes  Have you been contacted by a [facility] Pharmacist?: No  Have you scheduled your follow up appointment?: Yes  How are you going to get to your appointment?: Car - family or friend to transport  Patient DME: Straight cane, Walker, Wheelchair  Patient Home Equipment:  (Comment: 10/15 glucometer)  Do you have

## 2024-06-18 NOTE — ED TRIAGE NOTES
Pt came to the ED via triage with c/o right sided sharp head pains that are coming and going four times today. Pt states that she had a fall on Saturday and was taken off of her plavix until 6/20. Pt states that she does not get headaches and would not describe this as a headache. When the pain comes, she rates it an 8/10.

## 2024-06-19 ENCOUNTER — TELEPHONE (OUTPATIENT)
Dept: INTERNAL MEDICINE CLINIC | Age: 84
End: 2024-06-19

## 2024-06-19 ENCOUNTER — CARE COORDINATION (OUTPATIENT)
Dept: CASE MANAGEMENT | Age: 84
End: 2024-06-19

## 2024-06-19 NOTE — TELEPHONE ENCOUNTER
Called pt. Back notified her per Dr. Gonzalez he reviewed ER imaging. It is ok to follow ER instruction and restart blood thinner tomorrow.

## 2024-06-19 NOTE — CARE COORDINATION
Care Transitions Note    Follow Up Call     Patient: Vita Clarke                                    Patient : 1940   MRN: [id number]                             Reason for Admission: Fall  Discharge Date: 24       RURS: Readmission Risk Score: 18.8    Patient Current Location:  [place]    Care Transition Nurse contacted the patient by telephone. Verified name and  as identifiers.    Additional needs identified to be addressed with provider   No needs identified         Method of communication with provider: none.    Care Summary Note: Patient was advised to present to ED yesterday per PCP.  Dx: headache; no rx changes. Patient reports h/a, pain, feeling of fogginess/numbness/head heaviness, rt eye blurriness have all resolved. Denies neuro deficits. States \"Im doing real good, so good Im going shopping with my GreatGrndDtr\"  Appears to be a&o, answering questions appropriately, speech clear. Patient to resume Asa, Plavix tomorrow as per MD advisement while inpt. Denies further falls. Reviewed fall safety interventions. Encouraged use of cane/walker. Reports when first home she was using cane but no longer needing. Reports rt foot is bruised. Reports she is able to bear wt today with ease. Denies need for HHC, Outpt PT. Reports appetite, fluid intake good w/ b&b wnl. Denies resource, rx needs.     Plan of care updates since last contact:  As above     Advance Care Planning:   Does patient have an Advance Directive: not on file; education provided - to place on file at PCP office or [facility] .  Healthcare Decision Maker:    Primary Decision Maker: Fransisco Clarke - Child - [phone]    Remote Patient Monitoring:  Offered patient enrollment in the Remote Patient Monitoring (RPM) program for in-home monitoring: N/A as PCP not participating in program.    Assessments:  Care Transitions Subsequent and Final Call    Schedule Follow Up Appointment with PCP: Declined  Subsequent and Final Calls  Do you have any

## 2024-06-24 ENCOUNTER — CARE COORDINATION (OUTPATIENT)
Dept: CASE MANAGEMENT | Age: 84
End: 2024-06-24

## 2024-06-24 NOTE — CARE COORDINATION
Care Transitions Note    Follow Up Call     Patient Current Location:  Home: [address] [facility] [address]    [facility] Care Coordinator contacted the patient by telephone. Verified name and  as identifiers.    Additional needs identified to be addressed with provider   No needs identified                 Method of communication with provider: none.    Care Summary Note: Spoke with Vita Clarke who reported that she was doing good. Patient denied cp, sob, cough, dizziness, headache, n/v, diarrhea, abdominal pains, fever, or chills. Patient report that appetite and fluid intake is good and denied any problems with bowel or bladder. Patient reported that she is taking all medications as ordered. Patient denied any other needs at this time. Patient instructed to continue to monitor s/s, reporting any that may present to MD immediately for early intervention.  Patient is agreeable to f/u calls    Plan of care updates since last contact:  none       Advance Care Planning   The patient has the following advanced directives on file:  Advance Directives       Power of  Living Will ACP-Advance Directive ACP-Power of     Not on File Not on File Not on File Not on File            The patient has appointed the following active healthcare agents:    Primary Decision Maker: VinceFransisco - Child - [phone]         Medication Review:  No changes since last call.     Remote Patient Monitoring:  Offered patient enrollment in the Remote Patient Monitoring (RPM) program for in-home monitoring: Patient is not eligible for RPM program because: affiliate provider.    Assessments:  Care Transitions Subsequent and Final Call    Subsequent and Final Calls  Do you have any ongoing symptoms?: No  Have your medications changed?: No  Do you have any questions related to your medications?: No  Do you currently have any active services?: No  Do you have any needs or concerns that I can assist you with?:

## 2024-06-26 NOTE — PROGRESS NOTES
state (HCC)    Presence of Watchman left atrial appendage closure device    Shortness of breath    Fall from ground level       MEDICATIONS    Current Outpatient Medications   Medication Sig Dispense Refill    aspirin 81 MG tablet Take 1 tablet by mouth daily (Patient not taking: Reported on 6/18/2024) 30 tablet 3    clopidogrel (PLAVIX) 75 MG tablet Take 1 tablet by mouth daily (Patient not taking: Reported on 6/18/2024) 90 tablet 1    furosemide (LASIX) 20 MG tablet Take 1 tablet by mouth daily 5 tablet 1    isosorbide mononitrate (IMDUR) 60 MG extended release tablet Take 1 tablet by mouth daily 30 tablet 3    hydroCHLOROthiazide (HYDRODIURIL) 25 MG tablet TAKE 1 TABLET BY MOUTH DAILY 90 tablet 1    amLODIPine (NORVASC) 5 MG tablet TAKE 1 TABLET BY MOUTH IN THE MORNING AND AT BEDTIME 180 tablet 1    omeprazole (PRILOSEC) 20 MG delayed release capsule Take 1 capsule by mouth daily      methotrexate (RHEUMATREX) 2.5 MG chemo tablet Take 6 tablets by mouth once a week 24 tablet 2    folic acid (FOLVITE) 1 MG tablet Take 1 tablet by mouth daily 90 tablet 0    hydroxychloroquine (PLAQUENIL) 200 MG tablet Take 1 tablet by mouth 2 times daily 180 tablet 0    gabapentin (NEURONTIN) 300 MG capsule TAKE 1 CAPSULE BY MOUTH EVERY MORNING AND 1 CAPSULE EVERY NIGHT AT BEDTIME 180 capsule 1    atorvastatin (LIPITOR) 40 MG tablet Take 1 tablet by mouth daily 90 tablet 1    potassium chloride (KLOR-CON) 10 MEQ extended release tablet DAILY      Biotin 300 MCG TABS Take 1 tablet by mouth daily 30 tablet 3    losartan (COZAAR) 100 MG tablet Take 1 tablet by mouth daily 90 tablet 1    metFORMIN (GLUCOPHAGE) 500 MG tablet TAKE 1/2 TABLET BY MOUTH IN THE MORNING 45 tablet 1    nitroGLYCERIN (NITROSTAT) 0.4 MG SL tablet DISSOLVE 1 TABLET UNDER THE TONGUE EVERY 5 MINUTES AS NEEDED FOR CHEST PAIN. MAX OF 3 DOSES IN 15 MINUTES. 25 tablet 0    metoprolol tartrate (LOPRESSOR) 50 MG tablet Take 0.5 tablets by mouth 2 times daily 180 tablet 1

## 2024-06-28 ENCOUNTER — OFFICE VISIT (OUTPATIENT)
Dept: INTERNAL MEDICINE CLINIC | Age: 84
End: 2024-06-28

## 2024-06-28 VITALS
DIASTOLIC BLOOD PRESSURE: 60 MMHG | SYSTOLIC BLOOD PRESSURE: 120 MMHG | WEIGHT: 173 LBS | BODY MASS INDEX: 33.96 KG/M2 | OXYGEN SATURATION: 95 % | HEART RATE: 51 BPM | HEIGHT: 60 IN

## 2024-06-28 VITALS — SYSTOLIC BLOOD PRESSURE: 142 MMHG | DIASTOLIC BLOOD PRESSURE: 62 MMHG

## 2024-06-28 DIAGNOSIS — I77.1 STENOSIS OF LEFT SUBCLAVIAN ARTERY (HCC): Primary | ICD-10-CM

## 2024-06-28 DIAGNOSIS — Z00.00 MEDICARE ANNUAL WELLNESS VISIT, SUBSEQUENT: Primary | ICD-10-CM

## 2024-06-28 DIAGNOSIS — M79.672 LEFT FOOT PAIN: ICD-10-CM

## 2024-06-28 DIAGNOSIS — I66.22 OCCLUSION AND STENOSIS OF LEFT POSTERIOR CEREBRAL ARTERY: ICD-10-CM

## 2024-06-28 ASSESSMENT — PATIENT HEALTH QUESTIONNAIRE - PHQ9
SUM OF ALL RESPONSES TO PHQ QUESTIONS 1-9: 0
SUM OF ALL RESPONSES TO PHQ QUESTIONS 1-9: 0
1. LITTLE INTEREST OR PLEASURE IN DOING THINGS: NOT AT ALL
SUM OF ALL RESPONSES TO PHQ QUESTIONS 1-9: 0
2. FEELING DOWN, DEPRESSED OR HOPELESS: NOT AT ALL
SUM OF ALL RESPONSES TO PHQ9 QUESTIONS 1 & 2: 0
SUM OF ALL RESPONSES TO PHQ QUESTIONS 1-9: 0

## 2024-06-28 NOTE — PROGRESS NOTES
[place] M Vince  1940 06/28/24    SUBJECTIVE:    Seen for f/u ED,  had headache, her head CTA and neck CT indicated both high grade L subclav and also a L posterior cerebral artery stenosis.  Has f/u w Dr Matson next week, Dr Flores for end of Aug    Denies left arm weakness or pain, no focal weakness arms or legs.  Denies blurred vision, blindness.    She had pain of L foot also after a fall and had apparent nonspecif lat subluexation of 3-5 PIP joint- has seen Dr Hugo before and we'll also refer  OBJECTIVE:    /60 (Site: Right Upper Arm, Position: Sitting, Cuff Size: Medium Adult)   Pulse 51   Ht 1.524 m (5')   Wt 78.5 kg (173 lb)   LMP  (LMP Unknown)   SpO2 95%   BMI 33.79 kg/m²     Physical Exam  Constitutional:       General: She is not in acute distress.     Appearance: Normal appearance. She is well-developed. She is not diaphoretic.   HENT:      Head: Normocephalic and atraumatic.   Neck:      Thyroid: No thyromegaly.      Trachea: No tracheal deviation.   Cardiovascular:      Rate and Rhythm: Normal rate and regular rhythm.      Heart sounds: Normal heart sounds. No murmur heard.     No friction rub. No gallop.   Pulmonary:      Effort: No respiratory distress.      Breath sounds: Normal breath sounds. No wheezing or rales.   Abdominal:      General: Bowel sounds are normal. There is no distension.      Palpations: Abdomen is soft. There is no mass.      Tenderness: There is no abdominal tenderness.      Hernia: No hernia is present.   Musculoskeletal:      Right lower leg: No edema.      Left lower leg: No edema.   Lymphadenopathy:      Cervical: No cervical adenopathy.   Neurological:      General: No focal deficit present.      Mental Status: She is alert.   Psychiatric:         Mood and Affect: Mood normal.         ASSESSMENT:    1. Stenosis of left subclavian artery (HCC)    2. 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 Left foot pain        PLAN:    Vita

## 2024-06-28 NOTE — PROGRESS NOTES
Medicare Annual Wellness Visit    Vita Clarke is here for Medicare AWV    Assessment & Plan   Medicare annual wellness visit, subsequent  Recommendations for Preventive Services Due: see orders and patient instructions/AVS.  Recommended screening schedule for the next 5-10 years is provided to the patient in written form: see Patient Instructions/AVS.     Return in 1 year (on 6/28/2025).     Subjective       Patient's complete Health Risk Assessment and screening values have been reviewed and are found in Flowsheets. The following problems were reviewed today and where indicated follow up appointments were made and/or referrals ordered.    Positive Risk Factor Screenings with Interventions:                Activity, Diet, and Weight:  On average, how many days per week do you engage in moderate to strenuous exercise (like a brisk walk)?: 0 days  On average, how many minutes do you engage in exercise at this level?: 0 min    Do you eat balanced/healthy meals regularly?: Yes    There is no height or weight on file to calculate BMI. (!) Abnormal      Inactivity Interventions:  Patient declined any further interventions or treatment  Obesity Interventions:  Patient declines any further evaluation or treatment            Dentist Screen:  Have you seen the dentist within the past year?: (!) No    Intervention:  Advised to schedule with their dentist                      Objective   Vitals:    06/28/24 0954   BP: (!) 142/62      There is no height or weight on file to calculate BMI.               Allergies   Allergen Reactions    Actonel [Risedronate Sodium]      Abdominal pain    Ciprofloxacin      nausea    Darvocet [Propoxyphene N-Acetaminophen]      Lightheaded    Lopid [Gemfibrozil] Other (See Comments)     Leg cramping    Mevacor [Lovastatin] Other (See Comments)     Leg cramping    Neosporin [Neomycin-Polymyx-Gramicid] Other (See Comments)     Pt states the her skin gets welts/streaks    Nsaids      HAS EPISODIC

## 2024-06-28 NOTE — PATIENT INSTRUCTIONS
Chest pain or pressure, or a strange feeling in the chest.     Sweating.     Shortness of breath.     Pain, pressure, or a strange feeling in the back, neck, jaw, or upper belly or in one or both shoulders or arms.     Lightheadedness or sudden weakness.     A fast or irregular heartbeat.   After you call 911, the  may tell you to chew 1 adult-strength or 2 to 4 low-dose aspirin. Wait for an ambulance. Do not try to drive yourself.  Watch closely for changes in your health, and be sure to contact your doctor if you have any problems.  Where can you learn more?  Go to https://www.[employer].net/patientEd and enter F075 to learn more about \"A Healthy Heart: Care Instructions.\"  Current as of: June 24, 2023  Content Version: 14.1  © 5699-1382 [employer].   Care instructions adapted under license by [employer]. If you have questions about a medical condition or this instruction, always ask your healthcare professional. [employer] disclaims any warranty or liability for your use of this information.      Personalized Preventive Plan for Vita Clarke - 6/28/2024  Medicare offers a range of preventive health benefits. Some of the tests and screenings are paid in full while other may be subject to a deductible, co-insurance, and/or copay.    Some of these benefits include a comprehensive review of your medical history including lifestyle, illnesses that may run in your family, and various assessments and screenings as appropriate.    After reviewing your medical record and screening and assessments performed today your provider may have ordered immunizations, labs, imaging, and/or referrals for you.  A list of these orders (if applicable) as well as your Preventive Care list are included within your After Visit Summary for your review.    Other Preventive Recommendations:    A preventive eye exam performed by an eye specialist is recommended every 1-2 years to screen for glaucoma;

## 2024-07-01 ENCOUNTER — CARE COORDINATION (OUTPATIENT)
Dept: CARE COORDINATION | Age: 84
End: 2024-07-01

## 2024-07-01 NOTE — CARE COORDINATION
Care Transitions Note    Follow Up Call     Patient Current Location:  [place]    Care Transition Nurse contacted the patient by telephone. Verified name and  as identifiers.    Additional needs identified to be addressed with provider   No needs identified                 Method of communication with provider: none.    Care Summary Note: Pt states she's doing well and is out shopping right now. Reports getting around well and denies falls. Denies CP, SOB, lightheadedness, dizziness, s/s of bleeding, weakness, or changes in speech or vision. Reports taking all medications as prescribed. Completed PCP f/u appt and has cardiology f/u tomorrow. Denies questions or concerns at this time.     Plan of care updates since last contact:  Review of patient management of conditions/medications:         Advance Care Planning:   Does patient have an Advance Directive: deferred at this time, will discuss on future follow up. .    Medication Review:  No changes since last call.     Assessments:  Care Transitions Subsequent and Final Call    Subsequent and Final Calls  Care Transitions Interventions  Other Interventions:              Follow Up Appointment:   Reviewed upcoming appointment(s).  Future Appointments         Provider Specialty Dept Phone    2024 11:00 AM James Hoang MD Rheumatology [phone]    2024 4:50 PM Mary Matson MD Cardiology [phone]    2024 1:30 PM Maged Whitlock MD Cardiothoracic Surgery [phone]    2024 10:00 AM Joaquim Gonzalez MD Internal Medicine [phone]    10/4/2024 1:00 PM Alyssa Platt, APRN - CNP Cardiology [phone]    2025 10:00 AM Lpn, [facility] Internal Medicine [phone]            Care Transition Nurse provided contact information.  Plan for follow-up call in 6-10 days based on severity of symptoms and risk factors.  Plan for next call: symptom management-   self management-       Linh Short

## 2024-07-02 ENCOUNTER — TELEPHONE (OUTPATIENT)
Dept: INTERNAL MEDICINE CLINIC | Age: 84
End: 2024-07-02

## 2024-07-02 ENCOUNTER — OFFICE VISIT (OUTPATIENT)
Dept: CARDIOLOGY CLINIC | Age: 84
End: 2024-07-02
Payer: MEDICARE

## 2024-07-02 ENCOUNTER — TELEPHONE (OUTPATIENT)
Age: 84
End: 2024-07-02

## 2024-07-02 ENCOUNTER — OFFICE VISIT (OUTPATIENT)
Dept: RHEUMATOLOGY | Age: 84
End: 2024-07-02
Payer: MEDICARE

## 2024-07-02 VITALS
HEIGHT: 60 IN | OXYGEN SATURATION: 97 % | SYSTOLIC BLOOD PRESSURE: 140 MMHG | BODY MASS INDEX: 34.36 KG/M2 | HEART RATE: 64 BPM | DIASTOLIC BLOOD PRESSURE: 62 MMHG | WEIGHT: 175 LBS

## 2024-07-02 VITALS
WEIGHT: 173.4 LBS | HEIGHT: 60 IN | SYSTOLIC BLOOD PRESSURE: 138 MMHG | HEART RATE: 62 BPM | RESPIRATION RATE: 14 BRPM | OXYGEN SATURATION: 97 % | DIASTOLIC BLOOD PRESSURE: 72 MMHG | BODY MASS INDEX: 34.04 KG/M2

## 2024-07-02 DIAGNOSIS — E06.3 AUTOIMMUNE THYROIDITIS: ICD-10-CM

## 2024-07-02 DIAGNOSIS — I66.22 OCCLUSION AND STENOSIS OF LEFT POSTERIOR CEREBRAL ARTERY: Primary | ICD-10-CM

## 2024-07-02 DIAGNOSIS — M15.9 PRIMARY OSTEOARTHRITIS INVOLVING MULTIPLE JOINTS: Primary | ICD-10-CM

## 2024-07-02 DIAGNOSIS — I66.21 OCCLUSION AND STENOSIS OF RIGHT POSTERIOR CEREBRAL ARTERY: Primary | ICD-10-CM

## 2024-07-02 DIAGNOSIS — I25.10 ASCVD (ARTERIOSCLEROTIC CARDIOVASCULAR DISEASE): Primary | ICD-10-CM

## 2024-07-02 PROCEDURE — 1090F PRES/ABSN URINE INCON ASSESS: CPT | Performed by: INTERNAL MEDICINE

## 2024-07-02 PROCEDURE — 3077F SYST BP >= 140 MM HG: CPT | Performed by: INTERNAL MEDICINE

## 2024-07-02 PROCEDURE — G8417 CALC BMI ABV UP PARAM F/U: HCPCS | Performed by: INTERNAL MEDICINE

## 2024-07-02 PROCEDURE — 1036F TOBACCO NON-USER: CPT | Performed by: INTERNAL MEDICINE

## 2024-07-02 PROCEDURE — 1036F TOBACCO NON-USER: CPT | Performed by: STUDENT IN AN ORGANIZED HEALTH CARE EDUCATION/TRAINING PROGRAM

## 2024-07-02 PROCEDURE — G8427 DOCREV CUR MEDS BY ELIG CLIN: HCPCS | Performed by: INTERNAL MEDICINE

## 2024-07-02 PROCEDURE — 1111F DSCHRG MED/CURRENT MED MERGE: CPT | Performed by: INTERNAL MEDICINE

## 2024-07-02 PROCEDURE — 1124F ACP DISCUSS-NO DSCNMKR DOCD: CPT | Performed by: INTERNAL MEDICINE

## 2024-07-02 PROCEDURE — 3078F DIAST BP <80 MM HG: CPT | Performed by: INTERNAL MEDICINE

## 2024-07-02 PROCEDURE — 1124F ACP DISCUSS-NO DSCNMKR DOCD: CPT | Performed by: STUDENT IN AN ORGANIZED HEALTH CARE EDUCATION/TRAINING PROGRAM

## 2024-07-02 PROCEDURE — G8427 DOCREV CUR MEDS BY ELIG CLIN: HCPCS | Performed by: STUDENT IN AN ORGANIZED HEALTH CARE EDUCATION/TRAINING PROGRAM

## 2024-07-02 PROCEDURE — 99214 OFFICE O/P EST MOD 30 MIN: CPT | Performed by: INTERNAL MEDICINE

## 2024-07-02 PROCEDURE — G8399 PT W/DXA RESULTS DOCUMENT: HCPCS | Performed by: INTERNAL MEDICINE

## 2024-07-02 PROCEDURE — G8399 PT W/DXA RESULTS DOCUMENT: HCPCS | Performed by: STUDENT IN AN ORGANIZED HEALTH CARE EDUCATION/TRAINING PROGRAM

## 2024-07-02 PROCEDURE — 99215 OFFICE O/P EST HI 40 MIN: CPT | Performed by: STUDENT IN AN ORGANIZED HEALTH CARE EDUCATION/TRAINING PROGRAM

## 2024-07-02 PROCEDURE — 3078F DIAST BP <80 MM HG: CPT | Performed by: STUDENT IN AN ORGANIZED HEALTH CARE EDUCATION/TRAINING PROGRAM

## 2024-07-02 PROCEDURE — 1090F PRES/ABSN URINE INCON ASSESS: CPT | Performed by: STUDENT IN AN ORGANIZED HEALTH CARE EDUCATION/TRAINING PROGRAM

## 2024-07-02 PROCEDURE — 1111F DSCHRG MED/CURRENT MED MERGE: CPT | Performed by: STUDENT IN AN ORGANIZED HEALTH CARE EDUCATION/TRAINING PROGRAM

## 2024-07-02 PROCEDURE — 3075F SYST BP GE 130 - 139MM HG: CPT | Performed by: STUDENT IN AN ORGANIZED HEALTH CARE EDUCATION/TRAINING PROGRAM

## 2024-07-02 PROCEDURE — G8417 CALC BMI ABV UP PARAM F/U: HCPCS | Performed by: STUDENT IN AN ORGANIZED HEALTH CARE EDUCATION/TRAINING PROGRAM

## 2024-07-02 RX ORDER — ASPIRIN 81 MG/1
81 TABLET, COATED ORAL DAILY
COMMUNITY
Start: 2024-06-16

## 2024-07-02 NOTE — H&P (VIEW-ONLY)
true   Transportation Needs: No Transportation Needs (6/15/2024)    PRAPARE - Transportation     Lack of Transportation (Medical): No     Lack of Transportation (Non-Medical): No   Physical Activity: Inactive (2024)    Exercise Vital Sign     Days of Exercise per Week: 0 days     Minutes of Exercise per Session: 0 min   Stress: No Stress Concern Present (2022)    [employer] [place] [employer] - Occupational Stress Questionnaire     Feeling of Stress : Only a little   Social Connections: Socially Isolated (2022)    Social Connection and Isolation Panel [NHANES]     Frequency of Communication with Friends and Family: Once a week     Frequency of Social Gatherings with Friends and Family: Once a week     Attends Denominational Services: Never     Active Member of Clubs or Organizations: No     Attends Club or Organization Meetings: Never     Marital Status:    Intimate Partner Violence: Not on file   Housing Stability: High Risk (6/15/2024)    Housing Stability Vital Sign     Unable to Pay for Housing in the Last Year: No     Number of Places Lived in the Last Year: 1     Unstable Housing in the Last Year: Yes      Family History   Problem Relation Age of Onset    Coronary Art Dis Mother          of MI    Diabetes Mother     Heart Attack Mother     Hypertension Mother     Cancer Father     Diabetes Brother     Thyroid Disease Daughter         hypothyroid    Breast Cancer Neg Hx     Ovarian Cancer Neg Hx        Current Outpatient Medications   Medication Sig Dispense Refill    ASPIRIN LOW DOSE 81 MG EC tablet Take 1 tablet by mouth daily      aspirin 81 MG tablet Take 1 tablet by mouth daily (Patient not taking: Reported on 2024) 30 tablet 3    clopidogrel (PLAVIX) 75 MG tablet Take 1 tablet by mouth daily 90 tablet 1    furosemide (LASIX) 20 MG tablet Take 1 tablet by mouth daily 5 tablet 1    isosorbide mononitrate (IMDUR) 60 MG extended release tablet Take 1 tablet by mouth daily

## 2024-07-02 NOTE — PROGRESS NOTES
6/18:    IMPRESSION:  1. No acute intracranial process. If clinical concern for acute ischemia, MRI is  a more sensitive exam.  2. Extensive chronic small vessel ischemic disease.  3. Mild atrophy.                 Electronically signed by Roberto Galicia           Specimen Collected: 06/18/24 21:52 EDT Last Resulted: 06/18/24 21:54 EDT               CTA Head and Neck:    IMPRESSION:  1. Short segment of severe stenosis involving the P2 segment of the left  posterior cerebral artery.  2. Severe stenosis involving the origin/proximal left subclavian artery.  3. Additional findings as above.        Electronically signed by Roberto Galicia           Specimen Collected: 06/18/24 21:55 EDT Last Resulted: 06/18/24 22:05 EDT             2/24 MRI:    IMPRESSION:  1. No acute intracranial abnormality.  No acute infarct.  2. Minimal global parenchymal volume loss with extensive chronic  microvascular ischemic changes.          ASSESSMENT/PLAN:     PCA stenosis  Subclavian artery stenosis  Right CCA/ICA stenosis    Continue Plavix, Aspirin, and Lipitor for stroke prevention    Pertinent images discussed/reviewed with Dr. Trujillo who has fully participated in the care of this patient and agrees with plan.     CTA with atherosclerotic calcifications involving the origin of the left  subclavian artery and proximal left subclavian artery with severe stenosis at approximately 70%.  As well as atherosclerotic calcifications of the right carotid bulb/proximal right internal and left PCA stenosis.  Diagnostic cerebral angiogram with possible right ICA with Dr. Trujillo.      Patient has history of a left CEA    Discussion of all treatment options for carotid stenosis given including explanation of risk and benefits of carotid enterectomy and endovascular carotid artery stenting (transfemoral and TCAR) and optimal medical therapy.  There is a 0.5% risk of serious injury to the CFA or groin area that could require blood transfusion or surgery.  There

## 2024-07-02 NOTE — PROGRESS NOTES
CL 95 06/18/2024 07:30 AM    CO2 22 06/18/2024 07:30 AM    BUN 33 06/18/2024 07:30 AM    CREATININE 1.5 06/18/2024 07:30 AM     CMP:   Lab Results   Component Value Date/Time     06/18/2024 07:30 AM    K 4.2 06/18/2024 07:30 AM    CL 95 06/18/2024 07:30 AM    CO2 22 06/18/2024 07:30 AM    BUN 33 06/18/2024 07:30 AM     TSH:    Lab Results   Component Value Date/Time    TSH 3.74 06/04/2024 07:45 AM    TSHHS 2.440 02/18/2024 04:26 AM           Assessment & Plan:       -IMPRESSION:  1. Short segment of severe stenosis involving the P2 segment of the left  posterior cerebral artery.  2. Severe stenosis involving the origin/proximal left subclavian artery.  3. Additional findings as above.    Check BP in 2 arms         -     CORONARY ARTERY DISEASE:  symptomatic     All available  tests in chart reviewed. Management discussed .  Testing ordered  stress  cardioolite  was normal                On Plavix compliant with medicine will be continued   CT chest was negative    -  Hypertension: Patients blood pressure is normal. Patient is advised about low sodium diet. Present medical regimen will not be changed.    Amlodipine l 5 mg p.o. daily p.o. twice daily, losartan 100 aspirin daily which will become           -  LIPID MANAGEMENT:  Importance of lipid levels discussed with patient   and patient was given dietary advice. NCEP- ATP III guidelines reviewed with patient.    -   Changes  in medicines made: No     On simvastatin 20 mg p.o. daily omega-3 fatty acids                          -   DIABETES MELLITUS: Available pertinent lab data reviewed   and  patient was given dietary advice . Advised to check blood glucose level on a regular basis.      -   Changes  in medicines made: No        On Metformin hemoglobin A1c 6.3    -Carotid artery stenosis on the left side however the patient is asymptomatic      -  A fib  see EP   Had watchman  [id number] Score for Atrial Fibrillation Stroke Risk   Risk   Factors

## 2024-07-02 NOTE — PATIENT INSTRUCTIONS
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 Please schedule with endocrinology clinic  Follow up with PCP for reduced kidney function test  RTC as needed if hand/ knee injections are needed

## 2024-07-02 NOTE — TELEPHONE ENCOUNTER
Received a referral from Joaquim Gonzalez MD for right PCA occlusion/stenosis.       Spoke to patient. Appointment scheduled with Aliyah MENDOSA on Wednesday, 7/10/24 at 945 am.  Detailed instructions to our office given to patient - agreeable and verbalized understanding.

## 2024-07-03 ENCOUNTER — TELEPHONE (OUTPATIENT)
Dept: CARDIOTHORACIC SURGERY | Age: 84
End: 2024-07-03

## 2024-07-03 NOTE — TELEPHONE ENCOUNTER
Patient's daughter: Fransisco marie in regards to patient's CTA head/neck that was completed on 6/18 while in the ED at [facility]. Patient is scheduled for her 3 month follow-up on 8/26 with CTA results. Patient saw her PCP: Carlos on 6/28 and was advised to contact our office to see if she needed to be seen sooner than 8/26/24.     Will discuss with Dr. Whitlock later today.

## 2024-07-03 NOTE — TELEPHONE ENCOUNTER
Discussed the below with Dr. Whitlock. Dr. Whitlock stated that he reviewed the images and stated that there is no urgency.     Spoke with patient's daughter: Fransisco, advised of the above and verbalizes understanding. Fransisco declines to move the patient's up at this time and will see patient on 8/26/24.

## 2024-07-08 ENCOUNTER — CARE COORDINATION (OUTPATIENT)
Dept: CASE MANAGEMENT | Age: 84
End: 2024-07-08

## 2024-07-08 NOTE — CARE COORDINATION
Patient Current Location:  Home: [facility] [address] [place] [id number]    Care Transition Nurse contacted the patient by telephone. Verified name and  as identifiers.    Additional needs identified to be addressed with provider   No needs identified                 Method of communication with provider: face to face.    Care Summary Note: Spoke with patient. States that she followed up with cardiology last week, and has been referred to vascular physician this week. States no changes in meds. States she has a follow up with neurology on 7/10/24. States that she has appointment with foot doctor on 24 for foot swelling and bruising for the past few weeks. States appetite is good. States no complications with bowel or bladder. States still having dizziness and shortness of breath off and on.     Plan of care updates since last contact:  Review of patient management of conditions/medications: dizziness and shortness of breath.       Advance Care Planning:   Does patient have an Advance Directive: reviewed during previous call, see note. .    Medication Review:  No changes since last call.     Remote Patient Monitoring:  Offered patient enrollment in the Remote Patient Monitoring (RPM) program for in-home monitoring: Patient is not eligible for RPM program because: patient does not have qualifying diagnosis.    Assessments:  No changes since last call    Follow Up Appointment:   Reviewed upcoming appointment(s).  Future Appointments         Provider Specialty Dept Phone    7/10/2024 9:45 AM Aliyah Chopra APRN - CNP Neurology [phone]    2024 1:30 PM Maged Whitlock MD Cardiothoracic Surgery [phone]    2024 10:00 AM Joaquim Gonzalez MD Internal Medicine [phone]    10/4/2024 1:00 PM Alyssa Platt APRN - CNP Cardiology [phone]    10/8/2024 11:50 AM Mary Matson MD Cardiology [phone]    2025 10:00 AM [facility]

## 2024-07-10 ENCOUNTER — PREP FOR PROCEDURE (OUTPATIENT)
Age: 84
End: 2024-07-10

## 2024-07-10 ENCOUNTER — OFFICE VISIT (OUTPATIENT)
Age: 84
End: 2024-07-10
Payer: MEDICARE

## 2024-07-10 ENCOUNTER — HOSPITAL ENCOUNTER (OUTPATIENT)
Age: 84
Discharge: HOME | End: 2024-07-10
Payer: MEDICARE

## 2024-07-10 ENCOUNTER — TELEPHONE (OUTPATIENT)
Age: 84
End: 2024-07-10

## 2024-07-10 VITALS
DIASTOLIC BLOOD PRESSURE: 72 MMHG | RESPIRATION RATE: 16 BRPM | OXYGEN SATURATION: 95 % | SYSTOLIC BLOOD PRESSURE: 168 MMHG | WEIGHT: 172.13 LBS | HEART RATE: 62 BPM | BODY MASS INDEX: 33.62 KG/M2

## 2024-07-10 DIAGNOSIS — N18.32 STAGE 3B CHRONIC KIDNEY DISEASE (HCC): ICD-10-CM

## 2024-07-10 DIAGNOSIS — R51.9 RIGHT SIDED TEMPORAL HEADACHE: ICD-10-CM

## 2024-07-10 DIAGNOSIS — R51.9 INTRACTABLE HEADACHE, UNSPECIFIED CHRONICITY PATTERN, UNSPECIFIED HEADACHE TYPE: ICD-10-CM

## 2024-07-10 DIAGNOSIS — I67.9 INTRACRANIAL VASCULAR STENOSIS: ICD-10-CM

## 2024-07-10 DIAGNOSIS — I77.1 SUBCLAVIAN ARTERY STENOSIS (HCC): ICD-10-CM

## 2024-07-10 DIAGNOSIS — R51.9 INTRACTABLE HEADACHE, UNSPECIFIED CHRONICITY PATTERN, UNSPECIFIED HEADACHE TYPE: Primary | ICD-10-CM

## 2024-07-10 DIAGNOSIS — I65.23 BILATERAL CAROTID ARTERY STENOSIS: ICD-10-CM

## 2024-07-10 LAB
CRP SERPL HS-MCNC: 3 MG/L
SED RATE, AUTOMATED: 6 MM/HR (ref 0–30)

## 2024-07-10 PROCEDURE — 1124F ACP DISCUSS-NO DSCNMKR DOCD: CPT | Performed by: NURSE PRACTITIONER

## 2024-07-10 PROCEDURE — 36415 COLL VENOUS BLD VENIPUNCTURE: CPT

## 2024-07-10 PROCEDURE — G8417 CALC BMI ABV UP PARAM F/U: HCPCS | Performed by: NURSE PRACTITIONER

## 2024-07-10 PROCEDURE — 1111F DSCHRG MED/CURRENT MED MERGE: CPT | Performed by: NURSE PRACTITIONER

## 2024-07-10 PROCEDURE — 3077F SYST BP >= 140 MM HG: CPT | Performed by: NURSE PRACTITIONER

## 2024-07-10 PROCEDURE — 1036F TOBACCO NON-USER: CPT | Performed by: NURSE PRACTITIONER

## 2024-07-10 PROCEDURE — G8427 DOCREV CUR MEDS BY ELIG CLIN: HCPCS | Performed by: NURSE PRACTITIONER

## 2024-07-10 PROCEDURE — G8399 PT W/DXA RESULTS DOCUMENT: HCPCS | Performed by: NURSE PRACTITIONER

## 2024-07-10 PROCEDURE — 1090F PRES/ABSN URINE INCON ASSESS: CPT | Performed by: NURSE PRACTITIONER

## 2024-07-10 PROCEDURE — 99205 OFFICE O/P NEW HI 60 MIN: CPT | Performed by: NURSE PRACTITIONER

## 2024-07-10 PROCEDURE — 86140 C-REACTIVE PROTEIN: CPT

## 2024-07-10 PROCEDURE — 85652 RBC SED RATE AUTOMATED: CPT

## 2024-07-10 PROCEDURE — 3078F DIAST BP <80 MM HG: CPT | Performed by: NURSE PRACTITIONER

## 2024-07-10 RX ORDER — SODIUM CHLORIDE 9 MG/ML
INJECTION, SOLUTION INTRAVENOUS PRN
Status: CANCELLED | OUTPATIENT
Start: 2024-07-10

## 2024-07-10 RX ORDER — SODIUM CHLORIDE 9 MG/ML
INJECTION, SOLUTION INTRAVENOUS CONTINUOUS
Status: CANCELLED | OUTPATIENT
Start: 2024-07-10

## 2024-07-10 RX ORDER — SODIUM CHLORIDE 0.9 % (FLUSH) 0.9 %
5-40 SYRINGE (ML) INJECTION PRN
Status: CANCELLED | OUTPATIENT
Start: 2024-07-10

## 2024-07-10 RX ORDER — SODIUM CHLORIDE 0.9 % (FLUSH) 0.9 %
5-40 SYRINGE (ML) INJECTION EVERY 12 HOURS SCHEDULED
Status: CANCELLED | OUTPATIENT
Start: 2024-07-10

## 2024-07-10 NOTE — TELEPHONE ENCOUNTER
Letter sent to referring provider Joaquim Gonzalez MD via in basket - New patient visit 7/10/24.

## 2024-07-12 LAB
CREAT SERPL-MCNC: 1 MG/DL (ref 0.6–1.1)
GFR, ESTIMATED: 55 ML/MIN/1.73M2

## 2024-07-16 ENCOUNTER — TELEPHONE (OUTPATIENT)
Age: 84
End: 2024-07-16

## 2024-07-16 NOTE — TELEPHONE ENCOUNTER
Patient notified of results and Aliyah DELACRUZ-TERESITA recommendations. Patient agreeable and verbalized understanding. Nothing further needed.

## 2024-07-16 NOTE — TELEPHONE ENCOUNTER
----- Message from JAYME Booker - CNP sent at 7/16/2024  9:16 AM EDT -----  Regarding: test results  Could you let the patient know her inflammation markers were normal.  Her renal function is stable so we are ok to proceed with her angiogram.    Thanks  Aliyah

## 2024-07-17 ENCOUNTER — TELEPHONE (OUTPATIENT)
Age: 84
End: 2024-07-17

## 2024-07-17 ENCOUNTER — CARE COORDINATION (OUTPATIENT)
Dept: CASE MANAGEMENT | Age: 84
End: 2024-07-17

## 2024-07-17 NOTE — CARE COORDINATION
Care Transitions Note    Follow Up Call     Patient: Vita Clarke                                    Patient : 1940   MRN: [id number]                             Reason for Admission: Fall  Discharge Date: 24       RURS: Readmission Risk Score: 18.8    Attempt to reach for Care Transition follow up call unsuccessful. HIPAA compliant message left requesting call back.     Outreach Attempts:   HIPAA compliant voicemail left for patient.     Follow Up Appointment:   Future Appointments         Provider Specialty Dept Phone    2024 10:00 AM (Arrive by 8:00 AM) Radiologist, Argenis Ir; [facility] IR NEURO Radiology [phone]    2024 1:30 PM Maged Whitlock MD Cardiothoracic Surgery [phone]    2024 10:00 AM Joaquim Gonzalez MD Internal Medicine [phone]    10/4/2024 1:00 PM Alyssa Platt, APRN - CNP Cardiology [phone]    10/8/2024 11:50 AM Mary Matson MD Cardiology [phone]    2025 10:00 AM Lpn, [facility] Internal Medicine [phone]            Plan for follow-up on next business day.  based on severity of symptoms and risk factors. Plan for next call:  Final Call    Maya Berman RN

## 2024-07-18 ENCOUNTER — CARE COORDINATION (OUTPATIENT)
Dept: CASE MANAGEMENT | Age: 84
End: 2024-07-18

## 2024-07-18 NOTE — CARE COORDINATION
Care Transitions Note    Final Call     Patient: Vita Clarke                                    Patient : 1940   MRN: [id number]                             Reason for Admission: Fall  Discharge Date: 24       RURS: Readmission Risk Score: 18.8      Patient Current Location:  [place]    Care Transition Nurse contacted the patient by telephone. Verified name and  as identifiers.    Patient graduated from Care Transitions program on 24.  Patient verbalizes confidence in the ability to self-manage at this time..      Handoff:   Patient was not referred to the ACM team due to patient declined services.    RPM: N/A as PCP not participating in program.    Care Summary Note: Reports she was seen by podiatrist after most recent fall pta. Reports she was found to have \"broken, dislocated left second toe\". Reports MD is having her wrap w/ ace bandage and wearing walking shoe/boot. Reports toe/foot does throb at times. Patient declines need for prn rx. Advised elevation, ice, otc. Reports ambulating well, denies further falls. Has cane, walker if needed.     Reviewed A Fib. Reports occasional mild palps--states \"slight, I can tell its there sometimes\". Denies sob, dizziness, blurred vision, cp, acute distress/issues.  Reports taking Asa, Plavix and all routine meds as directed. Denies refill needs. [facility] angi[facility] for carotid/subclavian/PCA stenosis per Dr Trujillo.  Follows w/ M Carlton NP-Neuro. [facility].     Reports appetite, fluid intake good w/ b&b wnl.     Denies resource needs. Agreeable to final CT call.     Assessments:  Care Transitions Subsequent and Final Call    Subsequent and Final Calls  Do you have any ongoing symptoms?: Yes  Onset of Patient-reported symptoms: Other  Patient-reported symptoms: Other  Interventions for patient-reported symptoms: Other  Have your medications changed?: No  Do you have any questions related to your medications?: No  Do you

## 2024-07-22 NOTE — PROGRESS NOTES
IR Procedure at [facility]:  Spoke with patient and she will arrive at 0800 at [facility] on 7/30/2024 for her procedure at 1000, also went over below instructions and told patient to take her medications as scheduled except for metformin , she will hold it for 24 hours before her procedure and will not restart it for 48 hours after her procedure.    NPO at Midnight      Follow your directions as prescribed by the doctor for your procedure and medications.  3.   Consult your provider as to when to stop blood thinner  4.   Do not take any pain medication within 6 hours of your procedure  5.   Do not drink any alcoholic beverages or use any street drugs 24 hours before procedure.  6.   Please wear simple, loose fitting clothing to the hospital.  Do not bring valuables (money,             credit cards, checkbooks, etc.)     7.   If you  have a Living Will and Durable Power of  for Healthcare, please bring in a copy.  8.   Please bring picture ID,  insurance card, paperwork from the doctors office            (H & P, Consent,  & card for implantable devices).  9.   Report to the information desk on the ground floor.  10. Take a shower the night before or morning of your procedure, do not apply any lotion, oil or powder.  11. If you are going to be sedated for the procedure, you will need a responsible adult to drive you home.

## 2024-07-24 NOTE — PROGRESS NOTES
Physician Progress Note      PATIENT:               EDIS HARTLEY  CSN #:                  [id number]  :                       1940  ADMIT DATE:       6/15/2024 4:15 PM  DISCH DATE:        2024 11:36 AM  RESPONDING  PROVIDER #:        Sharmaine Paulson MD          QUERY TEXT:    Internal Medicine,    Patient admitted with  following a fall down the stairs. Denied head trauma or   LOC.I VH versus calcified choroid plexus is documented however it is unclear   if the IVH was confirmed or ruled out. If possible, please clarify in progress   notes and discharge summary if the IVH was confirmed or ruled out:    The medical record reflects the following:  Risk Factors: fall. plavix  Clinical Indicators: H&P documents: \"IVH versus calcified choroid plexus\";   Discharge summary documents: \"She denies hitting her head or any loss of   consciousness.  Initial head CT in the emergency department revealed a faint   hypoattenuation in the temporal horn which was a choroid plexus versus a   possible acute hemorrhage. Her repeat head imaging was stable.\"  Neurosurgery documents: \"CTH reviewed hyperdensity in temporal horns;  IVH vs   calcified choroid plexus-Not any worse on follow-up CTH imaging x2.\"  Treatment: labs, imaging, Neurosurgery consult, medical management and   supportive care    Thank you,  Brianna Cintron RN CDS  [phone]  Options provided:  -- Traumatic IVH without LOC  -- Nontraumatic IVH  -- Calcified choroid plexus, IVH ruled out.  -- Other - I will add my own diagnosis  -- Disagree - Not applicable / Not valid  -- Disagree - Clinically unable to determine / Unknown  -- Refer to Clinical Documentation Reviewer    PROVIDER RESPONSE TEXT:    This patient has a Calcified choroid plexus, IVH ruled out.    Query created by: Brianna Cintron on 2024 1:31 PM      Electronically signed by:  Sharmaine Paulson MD 2024 2:06 PM

## 2024-07-24 NOTE — CARE COORDINATION
JEFFERY mailed out [facility] to ptCari Oliver will follow up. [Menstruating] : menstruating

## 2024-07-26 ENCOUNTER — TELEPHONE (OUTPATIENT)
Age: 84
End: 2024-07-26

## 2024-07-26 NOTE — TELEPHONE ENCOUNTER
Pt left vm to schedule XRs. Please call her back to schedule.   Spoke to patient regarding upcoming diagnostic cerebral angiogram possible SHYLA stent procedure scheduled with Dr Trujillo on Tuesday, 7/30/24 @ 10 am.    Patient was informed of the following:  - Arrive 2 hours before the scheduled procedure - 8 am  - Check in at the main registration desk  - Do not eat or drink anything 8 hours before the scheduled procedure  - Confirmed that patient does NOT have any allergies to IV dye/contrast, shellfish, metal or latex  - Patient does take oral diabetic medication (metformin) and informed to hold this medication starting 24 hours prior to procedure and not to restart medication until 48 hours after the procedure   - Take all other prescription medication as directed with a small sip of water  - Continue ASA and Plavix   - Make sure you have someone to drive you home after the procedure as you will not be permitted to drive home      Patient agreeable and verbalized understanding.

## 2024-07-30 ENCOUNTER — HOSPITAL ENCOUNTER (OUTPATIENT)
Dept: INTERVENTIONAL RADIOLOGY/VASCULAR | Age: 84
Discharge: HOME | End: 2024-07-30
Payer: MEDICARE

## 2024-07-30 VITALS
HEART RATE: 68 BPM | DIASTOLIC BLOOD PRESSURE: 55 MMHG | HEIGHT: 60 IN | RESPIRATION RATE: 18 BRPM | WEIGHT: 169 LBS | OXYGEN SATURATION: 96 % | TEMPERATURE: 97.6 F | SYSTOLIC BLOOD PRESSURE: 146 MMHG | BODY MASS INDEX: 33.18 KG/M2

## 2024-07-30 LAB
ANION GAP SERPL CALCULATED.3IONS-SCNC: 12 MMOL/L (ref 7–16)
APTT: 29.9 SECONDS (ref 25.1–37.1)
BUN SERPL-MCNC: 18 MG/DL (ref 6–23)
CALCIUM SERPL-MCNC: 9.8 MG/DL (ref 8.3–10.6)
CHLORIDE BLD-SCNC: 101 MMOL/L (ref 99–110)
CO2: 25 MMOL/L (ref 21–32)
CREAT SERPL-MCNC: 1 MG/DL (ref 0.6–1.1)
GFR, ESTIMATED: 55 ML/MIN/1.73M2
GLUCOSE SERPL-MCNC: 129 MG/DL (ref 70–99)
HCT VFR BLD CALC: 34.6 % (ref 37–47)
HEMOGLOBIN: 11.6 GM/DL (ref 12.5–16)
INR BLD: 1 INDEX
MCH RBC QN AUTO: 28.8 PG (ref 27–31)
MCHC RBC AUTO-ENTMCNC: 33.5 % (ref 32–36)
MCV RBC AUTO: 85.9 FL (ref 78–100)
PDW BLD-RTO: 12.7 % (ref 11.7–14.9)
PLATELET # BLD: 217 K/CU MM (ref 140–440)
PMV BLD AUTO: 9.4 FL (ref 7.5–11.1)
POTASSIUM SERPL-SCNC: 4.3 MMOL/L (ref 3.5–5.1)
PROTHROMBIN TIME: 13.9 SECONDS (ref 11.7–14.5)
RBC # BLD: 4.03 M/CU MM (ref 4.2–5.4)
SODIUM BLD-SCNC: 138 MMOL/L (ref 135–145)
WBC # BLD: 5.4 K/CU MM (ref 4–10.5)

## 2024-07-30 PROCEDURE — 6360000002 HC RX W HCPCS

## 2024-07-30 PROCEDURE — 85730 THROMBOPLASTIN TIME PARTIAL: CPT

## 2024-07-30 PROCEDURE — 6360000004 HC RX CONTRAST MEDICATION

## 2024-07-30 PROCEDURE — 7100000010 HC PHASE II RECOVERY - FIRST 15 MIN

## 2024-07-30 PROCEDURE — 36227 PLACE CATH XTRNL CAROTID: CPT

## 2024-07-30 PROCEDURE — 80048 BASIC METABOLIC PNL TOTAL CA: CPT

## 2024-07-30 PROCEDURE — 85027 COMPLETE CBC AUTOMATED: CPT

## 2024-07-30 PROCEDURE — 36224 PLACE CATH CAROTD ART: CPT

## 2024-07-30 PROCEDURE — 7100000011 HC PHASE II RECOVERY - ADDTL 15 MIN

## 2024-07-30 PROCEDURE — 85610 PROTHROMBIN TIME: CPT

## 2024-07-30 PROCEDURE — 2500000003 HC RX 250 WO HCPCS: Performed by: PSYCHIATRY & NEUROLOGY

## 2024-07-30 PROCEDURE — 36226 PLACE CATH VERTEBRAL ART: CPT

## 2024-07-30 PROCEDURE — 6360000002 HC RX W HCPCS: Performed by: PSYCHIATRY & NEUROLOGY

## 2024-07-30 PROCEDURE — 36223 PLACE CATH CAROTID/INOM ART: CPT

## 2024-07-30 PROCEDURE — C1760 CLOSURE DEV, VASC: HCPCS

## 2024-07-30 PROCEDURE — 99153 MOD SED SAME PHYS/QHP EA: CPT

## 2024-07-30 PROCEDURE — 99152 MOD SED SAME PHYS/QHP 5/>YRS: CPT

## 2024-07-30 RX ORDER — SODIUM CHLORIDE 0.9 % (FLUSH) 0.9 %
5-40 SYRINGE (ML) INJECTION PRN
Status: DISCONTINUED | OUTPATIENT
Start: 2024-07-30 | End: 2024-07-31 | Stop reason: HOSPADM

## 2024-07-30 RX ORDER — MIDAZOLAM HYDROCHLORIDE 5 MG/ML
INJECTION INTRAMUSCULAR; INTRAVENOUS PRN
Status: COMPLETED | OUTPATIENT
Start: 2024-07-30 | End: 2024-07-30

## 2024-07-30 RX ORDER — SODIUM CHLORIDE 9 MG/ML
INJECTION, SOLUTION INTRAVENOUS PRN
Status: DISCONTINUED | OUTPATIENT
Start: 2024-07-30 | End: 2024-07-31 | Stop reason: HOSPADM

## 2024-07-30 RX ORDER — LIDOCAINE HYDROCHLORIDE 10 MG/ML
INJECTION, SOLUTION EPIDURAL; INFILTRATION; INTRACAUDAL; PERINEURAL PRN
Status: COMPLETED | OUTPATIENT
Start: 2024-07-30 | End: 2024-07-30

## 2024-07-30 RX ORDER — ONDANSETRON 4 MG/1
4 TABLET, ORALLY DISINTEGRATING ORAL EVERY 8 HOURS PRN
Status: DISCONTINUED | OUTPATIENT
Start: 2024-07-30 | End: 2024-07-31 | Stop reason: HOSPADM

## 2024-07-30 RX ORDER — SODIUM CHLORIDE 0.9 % (FLUSH) 0.9 %
5-40 SYRINGE (ML) INJECTION EVERY 12 HOURS SCHEDULED
Status: DISCONTINUED | OUTPATIENT
Start: 2024-07-30 | End: 2024-07-31 | Stop reason: HOSPADM

## 2024-07-30 RX ORDER — ACETAMINOPHEN 325 MG/1
650 TABLET ORAL EVERY 4 HOURS PRN
Status: DISCONTINUED | OUTPATIENT
Start: 2024-07-30 | End: 2024-07-31 | Stop reason: HOSPADM

## 2024-07-30 RX ORDER — ONDANSETRON 2 MG/ML
4 INJECTION INTRAMUSCULAR; INTRAVENOUS EVERY 6 HOURS PRN
Status: DISCONTINUED | OUTPATIENT
Start: 2024-07-30 | End: 2024-07-31 | Stop reason: HOSPADM

## 2024-07-30 RX ORDER — SODIUM CHLORIDE 9 MG/ML
INJECTION, SOLUTION INTRAVENOUS CONTINUOUS
Status: DISCONTINUED | OUTPATIENT
Start: 2024-07-30 | End: 2024-07-31 | Stop reason: HOSPADM

## 2024-07-30 RX ORDER — FENTANYL CITRATE 50 UG/ML
INJECTION, SOLUTION INTRAMUSCULAR; INTRAVENOUS PRN
Status: COMPLETED | OUTPATIENT
Start: 2024-07-30 | End: 2024-07-30

## 2024-07-30 RX ADMIN — LIDOCAINE HYDROCHLORIDE 8 ML: 10 INJECTION, SOLUTION EPIDURAL; INFILTRATION; INTRACAUDAL; PERINEURAL at 11:26

## 2024-07-30 RX ADMIN — MIDAZOLAM 1 MG: 5 INJECTION INTRAMUSCULAR; INTRAVENOUS at 11:25

## 2024-07-30 RX ADMIN — FENTANYL CITRATE 50 MCG: 50 INJECTION, SOLUTION INTRAMUSCULAR; INTRAVENOUS at 11:25

## 2024-07-30 NOTE — PROGRESS NOTES
Dr. Trujillo over to speak to pt and daughter at bedside. Ques/concerns addressed. Preparing for discharge soon.

## 2024-07-30 NOTE — OR NURSING
PROCEDURE START: 1125  PROCEDURE END: 1150  FLUORO TIME: 6.5 min  AK: 396 mgy  DAP: [id number]  CONTRAST VOLUME: 80mL    ARRHYTHMIA: NONE   INTERVENTION: NONE

## 2024-07-30 NOTE — PROGRESS NOTES
Baseline assessment done. Neuro assessment intact- no deficits noted. X2 PIVs. Daughter at bedside. Pt. is ready, awaiting IR procedure with Dr. Trujillo.

## 2024-07-30 NOTE — PRE SEDATION
infarction.    Past Surgical History:   has a past surgical history that includes Carpal tunnel release; cyst removal (1963); Hysterectomy, total abdominal (1963); Tonsillectomy (1972); Neck surgery (1973); Breast biopsy (1993); Finger surgery (1998); Percutaneous Transluminal Coronary Angio (05/2005); Knee arthroscopy (02/21/2012); Vein Surgery; Wrist surgery (2011); Skin cancer excision (10/16/2013); Knee arthroscopy (Left, 09/23/2014); Total knee arthroplasty (Left, 07/28/2015); Cataract removal with implant (Left, 02/06/2017); Total knee arthroplasty (Left, 11/14/2017); Coronary angioplasty with stent (12/2022); Ovary removal; Skin cancer excision (04/2023); Carotid endarterectomy (Left, 07/11/2023); left atrial appendage occluder device (Left, 04/04/2024); and ep device procedure (N/A, 4/4/2024).    Medications:   Scheduled Meds:    sodium chloride flush  5-40 mL IntraVENous 2 times per day     Continuous Infusions:    sodium chloride      sodium chloride      phenylephrine      niCARdipine       PRN Meds: sodium chloride flush, sodium chloride, phenylephrine, niCARdipine  Home Meds:   Prior to Admission medications    Medication Sig Start Date End Date Taking? Authorizing Provider   aspirin 81 MG tablet Take 1 tablet by mouth daily 6/20/24   Lilly Alvarado APRN - CNP   clopidogrel (PLAVIX) 75 MG tablet Take 1 tablet by mouth daily 6/20/24   Lilly Alvarado APRN - CNP   furosemide (LASIX) 20 MG tablet Take 1 tablet by mouth daily  Patient not taking: Reported on 7/30/2024 5/30/24   Gabrielle Grullon APRN - CNP   isosorbide mononitrate (IMDUR) 60 MG extended release tablet Take 1 tablet by mouth daily 5/29/24   Gabrielle Grullon APRN - CNP   hydroCHLOROthiazide (HYDRODIURIL) 25 MG tablet TAKE 1 TABLET BY MOUTH DAILY 5/2/24   Joaquim Gonzalez MD   amLODIPine (NORVASC) 5 MG tablet TAKE 1 TABLET BY MOUTH IN THE MORNING AND AT BEDTIME 5/2/24   Joaquim Gonzalez MD   omeprazole (PRILOSEC) 20 MG delayed

## 2024-07-30 NOTE — PROGRESS NOTES
Returned from IR angiogram with Dr. Trujillo. Pt. tolerated well. Neuro. assessment remains unchanged from baseline. Right groin site is clean, dry, intact. No s/s of bleeding noted. Updated daughter, Leydi, at bedside.

## 2024-07-30 NOTE — INTERVAL H&P NOTE
Update History & Physical    The patient's History and Physical of July 30, 2024 was reviewed with the patient and I examined the patient. There was no change. The surgical site was confirmed by the patient and me.     Plan: The risks, benefits, expected outcome, and alternative to the recommended procedure have been discussed with the patient. Patient understands and wants to proceed with the procedure.     Electronically signed by JAYME Booker CNP on 7/30/2024 at 10:03 AM

## 2024-07-30 NOTE — PROGRESS NOTES
TRANSFER - OUT REPORT:    Verbal report given to Domonique on Vita Clarke being transferred to IR [facility] for routine progression of patient care       Report consisted of patient's Situation, Background, Assessment and   Recommendations(SBAR).     Information from the following report(s) Nurse Handoff Report was reviewed with the receiving nurse.    Opportunity for questions and clarification was provided.      Patient transported with:   Registered Nurse

## 2024-08-06 ENCOUNTER — TELEPHONE (OUTPATIENT)
Dept: CARDIOTHORACIC SURGERY | Age: 84
End: 2024-08-06

## 2024-08-06 DIAGNOSIS — E11.40 TYPE 2 DIABETES MELLITUS WITH DIABETIC NEUROPATHY, WITHOUT LONG-TERM CURRENT USE OF INSULIN (HCC): ICD-10-CM

## 2024-08-06 RX ORDER — LOSARTAN POTASSIUM 100 MG/1
100 TABLET ORAL DAILY
Qty: 90 TABLET | Refills: 1 | Status: SHIPPED | OUTPATIENT
Start: 2024-08-06

## 2024-08-06 NOTE — TELEPHONE ENCOUNTER
Called and spoke with patient, upon telling her why I was calling she informed me that she just had a CTA Head/Neck w and w/o contrast back on 06/18/24 she also advised she had a procedure with Dr Trujillo last week, I advised I would cancel the CTA we scheduled and we would let Dr Whitlock know and see how he would like to proceed, she was good with this. Will route this to our PA and Nurse to advise on how to proceed

## 2024-08-12 NOTE — TELEPHONE ENCOUNTER
Spoke with patient advised that the CTA she had in June was good, she will not have to repeat before she comes in to see Dr Whitlock, she voiced understanding

## 2024-09-03 ENCOUNTER — HOSPITAL ENCOUNTER (OUTPATIENT)
Age: 84
Discharge: HOME | End: 2024-09-03
Payer: MEDICARE

## 2024-09-03 DIAGNOSIS — E11.40 TYPE 2 DIABETES MELLITUS WITH DIABETIC NEUROPATHY, WITHOUT LONG-TERM CURRENT USE OF INSULIN (HCC): ICD-10-CM

## 2024-09-03 DIAGNOSIS — D64.9 ANEMIA, UNSPECIFIED TYPE: ICD-10-CM

## 2024-09-03 DIAGNOSIS — I48.0 AF (PAROXYSMAL ATRIAL FIBRILLATION) (HCC): ICD-10-CM

## 2024-09-03 DIAGNOSIS — I10 ESSENTIAL HYPERTENSION: ICD-10-CM

## 2024-09-03 LAB
ALBUMIN SERPL-MCNC: 4.3 GM/DL (ref 3.4–5)
ALP BLD-CCNC: 103 IU/L (ref 40–129)
ALT SERPL-CCNC: 15 U/L (ref 10–40)
ANION GAP SERPL CALCULATED.3IONS-SCNC: 11 MMOL/L (ref 7–16)
AST SERPL-CCNC: 19 IU/L (ref 15–37)
BILIRUB SERPL-MCNC: 0.5 MG/DL (ref 0–1)
BUN SERPL-MCNC: 24 MG/DL (ref 6–23)
CALCIUM SERPL-MCNC: 9.7 MG/DL (ref 8.3–10.6)
CHLORIDE BLD-SCNC: 102 MMOL/L (ref 99–110)
CHOLEST SERPL-MCNC: 221 MG/DL
CO2: 27 MMOL/L (ref 21–32)
CREAT SERPL-MCNC: 0.8 MG/DL (ref 0.6–1.1)
CREAT UR-MCNC: 27.3 MG/DL (ref 28–217)
ESTIMATED AVERAGE GLUCOSE: 123 MG/DL
FERRITIN: 35 NG/ML (ref 15–150)
GFR, ESTIMATED: 72 ML/MIN/1.73M2
GLUCOSE SERPL-MCNC: 138 MG/DL (ref 70–99)
HBA1C MFR BLD: 5.9 % (ref 4.2–6.3)
HDLC SERPL-MCNC: 43 MG/DL
IRON: 60 UG/DL (ref 37–145)
LDLC SERPL CALC-MCNC: 146 MG/DL
MICROALBUMIN 24H UR-MCNC: 2.6 MG/DL
MICROALBUMIN/CREAT UR-RTO: 95.2 MG/G CREAT (ref 0–30)
PCT TRANSFERRIN: 18 % (ref 10–44)
POTASSIUM SERPL-SCNC: 4.2 MMOL/L (ref 3.5–5.1)
RETICULOCYTE COUNT PCT: 1.4 % (ref 0.2–2.2)
SODIUM BLD-SCNC: 140 MMOL/L (ref 135–145)
TOTAL IRON BINDING CAPACITY: 327 UG/DL (ref 250–450)
TOTAL PROTEIN: 7.5 GM/DL (ref 6.4–8.2)
TRIGL SERPL-MCNC: 162 MG/DL
TSH SERPL DL<=0.005 MIU/L-ACNC: 2.37 UIU/ML (ref 0.27–4.2)
UNSATURATED IRON BINDING CAPACITY: 267 UG/DL (ref 110–370)
VITAMIN B-12: 597.3 PG/ML (ref 211–911)

## 2024-09-03 PROCEDURE — 82607 VITAMIN B-12: CPT

## 2024-09-03 PROCEDURE — 80053 COMPREHEN METABOLIC PANEL: CPT

## 2024-09-03 PROCEDURE — 82043 UR ALBUMIN QUANTITATIVE: CPT

## 2024-09-03 PROCEDURE — 83036 HEMOGLOBIN GLYCOSYLATED A1C: CPT

## 2024-09-03 PROCEDURE — 85045 AUTOMATED RETICULOCYTE COUNT: CPT

## 2024-09-03 PROCEDURE — 82570 ASSAY OF URINE CREATININE: CPT

## 2024-09-03 PROCEDURE — 36415 COLL VENOUS BLD VENIPUNCTURE: CPT

## 2024-09-03 PROCEDURE — 82728 ASSAY OF FERRITIN: CPT

## 2024-09-03 PROCEDURE — 80061 LIPID PANEL: CPT

## 2024-09-03 PROCEDURE — 84443 ASSAY THYROID STIM HORMONE: CPT

## 2024-09-03 PROCEDURE — 83550 IRON BINDING TEST: CPT

## 2024-09-03 PROCEDURE — 83540 ASSAY OF IRON: CPT

## 2024-09-05 NOTE — RESULT ENCOUNTER NOTE
Please call pt, lab ok incl THYROID FXN, SUGARS, KIDNEY FXN BUT CHOL IS HIGHER.  PLS CONTINUE LIPITOR AS TOLERATED, IS SHE TAKING THIS REGULARLY?

## 2024-09-09 ENCOUNTER — OFFICE VISIT (OUTPATIENT)
Dept: INTERNAL MEDICINE CLINIC | Age: 84
End: 2024-09-09

## 2024-09-09 VITALS
SYSTOLIC BLOOD PRESSURE: 142 MMHG | WEIGHT: 172 LBS | BODY MASS INDEX: 33.77 KG/M2 | HEART RATE: 65 BPM | OXYGEN SATURATION: 96 % | HEIGHT: 60 IN | DIASTOLIC BLOOD PRESSURE: 64 MMHG

## 2024-09-09 DIAGNOSIS — I10 ESSENTIAL HYPERTENSION: ICD-10-CM

## 2024-09-09 DIAGNOSIS — E11.40 TYPE 2 DIABETES MELLITUS WITH DIABETIC NEUROPATHY, WITHOUT LONG-TERM CURRENT USE OF INSULIN (HCC): ICD-10-CM

## 2024-09-09 DIAGNOSIS — I48.0 PAF (PAROXYSMAL ATRIAL FIBRILLATION) (HCC): ICD-10-CM

## 2024-09-09 DIAGNOSIS — I25.10 ASCVD (ARTERIOSCLEROTIC CARDIOVASCULAR DISEASE): ICD-10-CM

## 2024-09-09 DIAGNOSIS — I65.22 CAROTID STENOSIS, LEFT: ICD-10-CM

## 2024-09-09 DIAGNOSIS — I77.1 STENOSIS OF LEFT SUBCLAVIAN ARTERY (HCC): Primary | ICD-10-CM

## 2024-09-09 DIAGNOSIS — Z95.818 PRESENCE OF WATCHMAN LEFT ATRIAL APPENDAGE CLOSURE DEVICE: ICD-10-CM

## 2024-10-01 ENCOUNTER — HOSPITAL ENCOUNTER (OUTPATIENT)
Dept: ULTRASOUND IMAGING | Age: 84
Discharge: HOME | End: 2024-10-01
Attending: INTERNAL MEDICINE
Payer: MEDICARE

## 2024-10-01 DIAGNOSIS — E07.89 OTHER SPECIFIED DISORDERS OF THYROID: ICD-10-CM

## 2024-10-01 PROCEDURE — 76536 US EXAM OF HEAD AND NECK: CPT

## 2024-10-04 ENCOUNTER — OFFICE VISIT (OUTPATIENT)
Dept: CARDIOLOGY CLINIC | Age: 84
End: 2024-10-04

## 2024-10-04 VITALS
HEIGHT: 60 IN | WEIGHT: 172.8 LBS | BODY MASS INDEX: 33.92 KG/M2 | DIASTOLIC BLOOD PRESSURE: 78 MMHG | SYSTOLIC BLOOD PRESSURE: 132 MMHG | HEART RATE: 63 BPM

## 2024-10-04 DIAGNOSIS — D68.69 SECONDARY HYPERCOAGULABLE STATE (HCC): ICD-10-CM

## 2024-10-04 DIAGNOSIS — I25.10 ASCVD (ARTERIOSCLEROTIC CARDIOVASCULAR DISEASE): ICD-10-CM

## 2024-10-04 DIAGNOSIS — I48.0 PAF (PAROXYSMAL ATRIAL FIBRILLATION) (HCC): ICD-10-CM

## 2024-10-04 DIAGNOSIS — Z95.818 PRESENCE OF WATCHMAN LEFT ATRIAL APPENDAGE CLOSURE DEVICE: Primary | ICD-10-CM

## 2024-10-04 DIAGNOSIS — I10 ESSENTIAL HYPERTENSION: ICD-10-CM

## 2024-10-04 ASSESSMENT — ENCOUNTER SYMPTOMS
SINUS PAIN: 0
BACK PAIN: 0
ABDOMINAL PAIN: 0
SINUS PRESSURE: 0
PHOTOPHOBIA: 0
ABDOMINAL DISTENTION: 0
SHORTNESS OF BREATH: 0
COUGH: 0
COLOR CHANGE: 0

## 2024-10-04 NOTE — PROGRESS NOTES
10/7/2024    Joaquim Gonzalez MD   [address] [facility] [id number]     Mary Matson MD         Re:  Vita      Dear Dr. Gonzalez,  Dear Dr. Matson,     I had the pleasure of seeing your patient, Vita Clarke (84 y.o. female) in the office for follow up after her left carotid endarterectomy on 7/11/23.  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  This then led to a angiography by Dr. Antonia Trujillo which revealed no stenosis that need any intervention.  She was found to have a left subclavian stenosis and she has a patent left internal carotid artery, which on the CT angiography has a minimal angulation at the end of the patch where it tapers down to the normal carotid size.  She is here with her daughter today.  She has no neurologic symptoms and feels well.  She is curious about the follow-up in the future.    Current Medications    Current Outpatient Medications:     losartan (COZAAR) 100 MG tablet, TAKE 1 TABLET BY MOUTH DAILY, Disp: 90 tablet, Rfl: 1    metFORMIN (GLUCOPHAGE) 500 MG tablet, TAKE 1/2 TABLET BY MOUTH IN THE MORNING, Disp: 45 tablet, Rfl: 1    clopidogrel (PLAVIX) 75 MG tablet, Take 1 tablet by mouth daily, Disp: 90 tablet, Rfl: 1    furosemide (LASIX) 20 MG tablet, Take 1 tablet by mouth daily, Disp: 5 tablet, Rfl: 1    isosorbide mononitrate (IMDUR) 60 MG extended release tablet, Take 1 tablet by mouth daily, Disp: 30 tablet, Rfl: 3    hydroCHLOROthiazide (HYDRODIURIL) 25 MG tablet, TAKE 1 TABLET BY MOUTH DAILY, Disp: 90 tablet, Rfl: 1    amLODIPine (NORVASC) 5 MG tablet, TAKE 1 TABLET BY MOUTH IN THE MORNING AND AT BEDTIME, Disp: 180 tablet, Rfl: 1    omeprazole (PRILOSEC) 20 MG delayed release capsule, Take 1 capsule by mouth daily, Disp: , Rfl:     gabapentin (NEURONTIN) 300 MG capsule, TAKE 1 CAPSULE BY MOUTH EVERY MORNING AND 1 CAPSULE EVERY NIGHT AT BEDTIME, Disp: 180

## 2024-10-04 NOTE — PROGRESS NOTES
Electrophysiology Follow up Note      Reason for consultation:  Here to discuss watchman    Chief complaint: 6 month follow up on watchman    Referring physician: Dr. Matson      Primary care physician: Joaquim Gonzalez MD      History of Present Illness:     This visit 10/4/2024  Patient is here today for 6-month follow-up status post implantation of Watchman device.  Patient reports she feels well.  She denies chest pain, palpitations, shortness of breath, lightheadedness, dizziness, edema or syncope    Previous visit 5/22/2024  Patient is here today for follow-up on recent AARON status post watchman.  She reports on Mother's Day she had chest pain that lasted about 40 minutes and then resolved after taking 2 nitro tablets  She does complain of shortness of breath with exertion that will resolve with rest however this is not a new complaint for her.  She occasionally will have palpitations and feel her heart racing.  She does complain of bilateral lower leg swelling    Previous visit 4/11/2024  Patient is here today for 1 week follow up s/p watchman.   Patient is feeling well. She denies chest pain,  palpitations, edema, dizziness, or syncope.  She does complain of shortness of breath with exertion.   This is new for her since the procedure.     Previous visit:     Patient is a 83-year-old female with history of hypertension, hyperlipidemia, coronary artery disease, PAF, CVA, DM-2 referred by Dr. Matson for assessment for watchman.  Patient reports that she is extremely worried about being on anticoagulation because patient reports that she is high risk for fall and falling multiple times before.  Patient has been on aspirin and Plavix.  Patient denies chest pain, dizziness or syncope.  Patient has palpitations off-and-on and shortness of breath with palpitation at times.    Pastmedical history:   Past Medical History:   Diagnosis Date    AF (paroxysmal atrial

## 2024-10-04 NOTE — PATIENT INSTRUCTIONS
Please be informed that if you contact our office outside of normal business hours the physician on call cannot help with any scheduling or rescheduling issues, procedure instruction questions or any type of medication issue.    We advise you for any urgent/emergency that you go to the nearest emergency room!    PLEASE CALL OUR OFFICE DURING NORMAL BUSINESS HOURS    Monday - Friday   8 am to 5 pm    [place]: [phone]    [place]: [phone]    [place]:  [phone]      Thank you for allowing us to care for you today!   We want to ensure we can follow your treatment plan and we strive to give you the best outcomes and experience possible.   If you ever have a life threatening emergency and call 911 - for an ambulance (EMS)   Our providers can only care for you at:   [facility] or [facility].   Even if you have someone take you or you drive yourself we can only care for you in a [facility] facility. Our providers are not setup at the other healthcare locations!       **It is YOUR responsibilty to bring medication bottles and/or updated medication list to EACH APPOINTMENT. This will allow us to better serve you and all your healthcare needs**    We are committed to providing you the best care possible.    If you receive a survey after visiting one of our offices, please take time to share your experience concerning your physician office visit.  These surveys are confidential and no health information about you is shared.    We are eager to improve for you and we are counting on your feedback to help make that happen.

## 2024-10-07 ENCOUNTER — OFFICE VISIT (OUTPATIENT)
Dept: CARDIOTHORACIC SURGERY | Age: 84
End: 2024-10-07
Payer: MEDICARE

## 2024-10-07 VITALS
BODY MASS INDEX: 33.23 KG/M2 | SYSTOLIC BLOOD PRESSURE: 116 MMHG | WEIGHT: 169.25 LBS | HEART RATE: 66 BPM | HEIGHT: 60 IN | OXYGEN SATURATION: 97 % | DIASTOLIC BLOOD PRESSURE: 58 MMHG

## 2024-10-07 DIAGNOSIS — Z98.890 S/P CAROTID ENDARTERECTOMY: Primary | ICD-10-CM

## 2024-10-07 PROCEDURE — 99214 OFFICE O/P EST MOD 30 MIN: CPT | Performed by: THORACIC SURGERY (CARDIOTHORACIC VASCULAR SURGERY)

## 2024-10-07 PROCEDURE — 3074F SYST BP LT 130 MM HG: CPT | Performed by: THORACIC SURGERY (CARDIOTHORACIC VASCULAR SURGERY)

## 2024-10-07 PROCEDURE — G8399 PT W/DXA RESULTS DOCUMENT: HCPCS | Performed by: THORACIC SURGERY (CARDIOTHORACIC VASCULAR SURGERY)

## 2024-10-07 PROCEDURE — 1090F PRES/ABSN URINE INCON ASSESS: CPT | Performed by: THORACIC SURGERY (CARDIOTHORACIC VASCULAR SURGERY)

## 2024-10-07 PROCEDURE — G8417 CALC BMI ABV UP PARAM F/U: HCPCS | Performed by: THORACIC SURGERY (CARDIOTHORACIC VASCULAR SURGERY)

## 2024-10-07 PROCEDURE — 1124F ACP DISCUSS-NO DSCNMKR DOCD: CPT | Performed by: THORACIC SURGERY (CARDIOTHORACIC VASCULAR SURGERY)

## 2024-10-07 PROCEDURE — G8427 DOCREV CUR MEDS BY ELIG CLIN: HCPCS | Performed by: THORACIC SURGERY (CARDIOTHORACIC VASCULAR SURGERY)

## 2024-10-07 PROCEDURE — G8484 FLU IMMUNIZE NO ADMIN: HCPCS | Performed by: THORACIC SURGERY (CARDIOTHORACIC VASCULAR SURGERY)

## 2024-10-07 PROCEDURE — 3078F DIAST BP <80 MM HG: CPT | Performed by: THORACIC SURGERY (CARDIOTHORACIC VASCULAR SURGERY)

## 2024-10-07 PROCEDURE — 1036F TOBACCO NON-USER: CPT | Performed by: THORACIC SURGERY (CARDIOTHORACIC VASCULAR SURGERY)

## 2024-10-08 ENCOUNTER — OFFICE VISIT (OUTPATIENT)
Dept: CARDIOLOGY CLINIC | Age: 84
End: 2024-10-08
Payer: MEDICARE

## 2024-10-08 VITALS
SYSTOLIC BLOOD PRESSURE: 122 MMHG | DIASTOLIC BLOOD PRESSURE: 60 MMHG | HEIGHT: 60 IN | WEIGHT: 170 LBS | HEART RATE: 60 BPM | BODY MASS INDEX: 33.38 KG/M2

## 2024-10-08 DIAGNOSIS — M79.89 SWELLING OF EXTREMITY: ICD-10-CM

## 2024-10-08 DIAGNOSIS — I10 ESSENTIAL HYPERTENSION: Primary | ICD-10-CM

## 2024-10-08 PROCEDURE — G8428 CUR MEDS NOT DOCUMENT: HCPCS | Performed by: INTERNAL MEDICINE

## 2024-10-08 PROCEDURE — 1090F PRES/ABSN URINE INCON ASSESS: CPT | Performed by: INTERNAL MEDICINE

## 2024-10-08 PROCEDURE — G8417 CALC BMI ABV UP PARAM F/U: HCPCS | Performed by: INTERNAL MEDICINE

## 2024-10-08 PROCEDURE — G8399 PT W/DXA RESULTS DOCUMENT: HCPCS | Performed by: INTERNAL MEDICINE

## 2024-10-08 PROCEDURE — 1124F ACP DISCUSS-NO DSCNMKR DOCD: CPT | Performed by: INTERNAL MEDICINE

## 2024-10-08 PROCEDURE — 1036F TOBACCO NON-USER: CPT | Performed by: INTERNAL MEDICINE

## 2024-10-08 PROCEDURE — 99214 OFFICE O/P EST MOD 30 MIN: CPT | Performed by: INTERNAL MEDICINE

## 2024-10-08 PROCEDURE — 3074F SYST BP LT 130 MM HG: CPT | Performed by: INTERNAL MEDICINE

## 2024-10-08 PROCEDURE — G8484 FLU IMMUNIZE NO ADMIN: HCPCS | Performed by: INTERNAL MEDICINE

## 2024-10-08 PROCEDURE — 3078F DIAST BP <80 MM HG: CPT | Performed by: INTERNAL MEDICINE

## 2024-10-08 NOTE — PROGRESS NOTES
BMI.  GENERAL - Alert, oriented, pleasant, in no apparent distress.  EYES: No jaundice, no conjunctival pallor.  SKIN: It is warm & dry. No rashes. No Echhymosis    HEENT - No clinically significant abnormalities seen.  Neck - Supple.  No jugular venous distention noted. No carotid bruits.   Cardiovascular - Normal S1 and S2 without obvious murmur or gallop.    Extremities - No cyanosis, clubbing, or significant edema.    Pulmonary - No respiratory distress.  No wheezes or rales.    Abdomen - No masses, tenderness, or organomegaly.  Musculoskeletal - No significant edema. No joint deformities. No muscle wasting.  Neurologic - Cranial nerves II through XII are grossly intact.  There were no gross focal neurologic abnormalities.    Lab Review   Lab Results   Component Value Date/Time    TROPONINT <0.010 07/12/2023 06:02 AM     BNP:  No results found for: \"BNP\"  PT/INR:    Lab Results   Component Value Date    INR 1.0 07/30/2024     Lab Results   Component Value Date    LABA1C 5.9 09/03/2024    LABA1C 6.0 06/16/2024     Lab Results   Component Value Date    WBC 5.4 07/30/2024    HGB 11.6 (L) 07/30/2024    HCT 34.6 (L) 07/30/2024    MCV 85.9 07/30/2024     07/30/2024     Lab Results   Component Value Date    CHOL 221 (H) 09/03/2024    TRIG 162 (H) 09/03/2024    HDL 43 09/03/2024    LDLDIRECT 84 08/04/2022     Lab Results   Component Value Date    ALT 15 09/03/2024    AST 19 09/03/2024     BMP:    Lab Results   Component Value Date/Time     09/03/2024 08:46 AM    K 4.2 09/03/2024 08:46 AM     09/03/2024 08:46 AM    CO2 27 09/03/2024 08:46 AM    BUN 24 09/03/2024 08:46 AM    CREATININE 0.8 09/03/2024 08:46 AM     CMP:   Lab Results   Component Value Date/Time     09/03/2024 08:46 AM    K 4.2 09/03/2024 08:46 AM     09/03/2024 08:46 AM    CO2 27 09/03/2024 08:46 AM    BUN 24 09/03/2024 08:46 AM     TSH:    Lab Results   Component Value Date/Time    TSH 3.74 06/04/2024 07:45 AM    TSHHS 2.370

## 2024-10-08 NOTE — PATIENT INSTRUCTIONS
We are committed to providing you the best care possible.    If you receive a survey after visiting one of our offices, please take time to share your experience concerning your physician office visit.  These surveys are confidential and no health information about you is shared.    We are eager to improve for you and we are counting on your feedback to help make that happen.      Thank you for allowing us to care for you today!   We want to ensure we can follow your treatment plan and we strive to give you the best outcomes and experience possible.   If you ever have a life threatening emergency and call 911 - for an ambulance (EMS)   Our providers can only care for you at:   [facility] or [facility].   Even if you have someone take you or you drive yourself we can only care for you in a [facility] facility. Our providers are not setup at the other healthcare locations!   **It is YOUR responsibilty to bring medication bottles and/or updated medication list to EACH APPOINTMENT. This will allow us to better serve you and all your healthcare needs**  Please be informed that if you contact our office outside of normal business hours the physician on call cannot help with any scheduling or rescheduling issues, procedure instruction questions or any type of medication issue.    We advise you for any urgent/emergency that you go to the nearest emergency room!    PLEASE CALL OUR OFFICE DURING NORMAL BUSINESS HOURS    Monday - Friday   8 am to 5 pm    [place]: [phone]    [place]: [phone]    [place]:  [phone]

## 2024-10-14 DIAGNOSIS — G44.89 OTHER HEADACHE SYNDROME: ICD-10-CM

## 2024-10-14 RX ORDER — GABAPENTIN 300 MG/1
CAPSULE ORAL
Qty: 180 CAPSULE | Refills: 1 | Status: SHIPPED | OUTPATIENT
Start: 2024-10-14 | End: 2025-04-14

## 2024-10-19 DIAGNOSIS — I25.10 CORONARY ARTERY DISEASE INVOLVING NATIVE CORONARY ARTERY OF NATIVE HEART WITHOUT ANGINA PECTORIS: ICD-10-CM

## 2024-10-19 DIAGNOSIS — I10 ESSENTIAL HYPERTENSION: ICD-10-CM

## 2024-10-19 DIAGNOSIS — E11.40 TYPE 2 DIABETES MELLITUS WITH DIABETIC NEUROPATHY, WITHOUT LONG-TERM CURRENT USE OF INSULIN (HCC): ICD-10-CM

## 2024-10-21 RX ORDER — METOPROLOL TARTRATE 50 MG
50 TABLET ORAL 2 TIMES DAILY
Qty: 180 TABLET | Refills: 1 | Status: SHIPPED | OUTPATIENT
Start: 2024-10-21

## 2024-10-21 RX ORDER — FUROSEMIDE 20 MG/1
20 TABLET ORAL DAILY
Qty: 30 TABLET | Refills: 5 | Status: SHIPPED | OUTPATIENT
Start: 2024-10-21

## 2024-10-21 RX ORDER — LOSARTAN POTASSIUM 100 MG/1
100 TABLET ORAL DAILY
Qty: 90 TABLET | Refills: 1 | Status: SHIPPED | OUTPATIENT
Start: 2024-10-21

## 2024-10-21 RX ORDER — HYDROCHLOROTHIAZIDE 25 MG/1
25 TABLET ORAL DAILY
Qty: 90 TABLET | Refills: 1 | Status: SHIPPED | OUTPATIENT
Start: 2024-10-21

## 2024-10-24 ENCOUNTER — TELEPHONE (OUTPATIENT)
Dept: CARDIOLOGY CLINIC | Age: 84
End: 2024-10-24

## 2024-10-24 DIAGNOSIS — M79.89 SWELLING OF EXTREMITY: ICD-10-CM

## 2024-10-24 DIAGNOSIS — I10 ESSENTIAL HYPERTENSION: Primary | ICD-10-CM

## 2024-10-24 NOTE — TELEPHONE ENCOUNTER
Test preformed 10/24, Next OV 11/12    Notified       Vascular US Duplex Lower Extremity Venous Bilateral       Bilateral GSV and left SSV are not visualized.    No evidence of venous insufficiency.    No evidence of DVT or SVT.

## 2024-10-31 DIAGNOSIS — I10 ESSENTIAL HYPERTENSION: ICD-10-CM

## 2024-10-31 RX ORDER — AMLODIPINE BESYLATE 5 MG/1
5 TABLET ORAL 2 TIMES DAILY
Qty: 180 TABLET | Refills: 0 | Status: SHIPPED | OUTPATIENT
Start: 2024-10-31

## 2024-11-11 RX ORDER — ISOSORBIDE MONONITRATE 30 MG/1
30 TABLET, EXTENDED RELEASE ORAL DAILY
Qty: 90 TABLET | OUTPATIENT
Start: 2024-11-11

## 2024-11-12 ENCOUNTER — OFFICE VISIT (OUTPATIENT)
Dept: CARDIOLOGY CLINIC | Age: 84
End: 2024-11-12

## 2024-11-12 ENCOUNTER — LAB (OUTPATIENT)
Dept: CARDIOLOGY CLINIC | Age: 84
End: 2024-11-12
Payer: MEDICARE

## 2024-11-12 VITALS
WEIGHT: 167 LBS | SYSTOLIC BLOOD PRESSURE: 142 MMHG | HEIGHT: 60 IN | OXYGEN SATURATION: 96 % | DIASTOLIC BLOOD PRESSURE: 70 MMHG | BODY MASS INDEX: 32.79 KG/M2 | HEART RATE: 61 BPM

## 2024-11-12 DIAGNOSIS — I10 ESSENTIAL HYPERTENSION: Primary | ICD-10-CM

## 2024-11-12 DIAGNOSIS — R60.0 LOCALIZED EDEMA: ICD-10-CM

## 2024-11-12 PROCEDURE — 36415 COLL VENOUS BLD VENIPUNCTURE: CPT | Performed by: INTERNAL MEDICINE

## 2024-11-12 ASSESSMENT — ENCOUNTER SYMPTOMS
SHORTNESS OF BREATH: 0
ORTHOPNEA: 0

## 2024-11-12 NOTE — PROGRESS NOTES
11/12/2024  Primary cardiologist: Dr. Matson    CC:   Vita  is an established 84 y.o.  female here for a follow up on venous doppler       SUBJECTIVE/OBJECTIVE:  Vita is a 84 y.o. female with a history of coronary artery disease, carotid artery disease s/p left carotid endarterectomy (07/2023),left subclavian artery stenosis, hypertension, hyperlipidemia, paroxysmal atrial fibrillation s/p Watchman device, CVA and non-insulin-dependent diabetes.   Guerita has remote history of PCI Reomte h/o PCI (2005).  She then underwent PCI of the circumflex and LAD in December 2022.  Repeat catheterization in September 2023 showed patent stents with mild residual disease which appears to be unchanged..    HPI:  Vita reports since starting Lasix edema to lower legs has subsided.  She states she is feeling well and that this is the best that she has felt in a long time    Review of Systems   Constitutional: Negative for diaphoresis and malaise/fatigue.   Cardiovascular:  Negative for chest pain, claudication, dyspnea on exertion, irregular heartbeat, leg swelling, near-syncope, orthopnea, palpitations and paroxysmal nocturnal dyspnea.   Respiratory:  Negative for shortness of breath.    Neurological:  Negative for dizziness and light-headedness.       Vitals:    11/12/24 1308   BP: (!) 140/64   Site: Left Upper Arm   Position: Sitting   Cuff Size: Medium Adult   Pulse: 61   SpO2: 96%   Weight: 75.8 kg (167 lb)   Height: 1.524 m (5')     Wt Readings from Last 3 Encounters:   11/12/24 75.8 kg (167 lb)   10/08/24 77.1 kg (170 lb)   10/07/24 76.8 kg (169 lb 4 oz)      Body mass index is 32.61 kg/m².     Physical Exam  Vitals reviewed.   Eyes:      Pupils: Pupils are equal, round, and reactive to light.   Cardiovascular:      Rate and Rhythm: Normal rate and regular rhythm.      Pulses: Normal pulses.   Pulmonary:      Effort: Pulmonary effort is normal.      Breath sounds: Normal breath sounds.   Musculoskeletal:      Right

## 2024-11-13 ENCOUNTER — TELEPHONE (OUTPATIENT)
Dept: CARDIOLOGY CLINIC | Age: 84
End: 2024-11-13

## 2024-11-13 LAB
ANION GAP SERPL CALCULATED.3IONS-SCNC: 11 MMOL/L (ref 3–16)
BUN SERPL-MCNC: 31 MG/DL (ref 7–20)
CALCIUM SERPL-MCNC: 10 MG/DL (ref 8.3–10.6)
CHLORIDE SERPL-SCNC: 100 MMOL/L (ref 99–110)
CO2 SERPL-SCNC: 28 MMOL/L (ref 21–32)
CREAT SERPL-MCNC: 1.2 MG/DL (ref 0.6–1.2)
GFR SERPLBLD CREATININE-BSD FMLA CKD-EPI: 44 ML/MIN/{1.73_M2}
GLUCOSE SERPL-MCNC: 130 MG/DL (ref 70–99)
POTASSIUM SERPL-SCNC: 4.1 MMOL/L (ref 3.5–5.1)
SODIUM SERPL-SCNC: 139 MMOL/L (ref 136–145)

## 2024-11-13 NOTE — TELEPHONE ENCOUNTER
----- Message from JAYME Cheng - CNP sent at 11/13/2024 10:58 AM EST -----  Labs show decrease in renal function - suggestive of being dry -hold lasix for now -will use it as needed for 3 lb weight gain over night or 5 lb in one week

## 2024-12-03 ENCOUNTER — HOSPITAL ENCOUNTER (OUTPATIENT)
Age: 84
Discharge: HOME | End: 2024-12-03
Payer: MEDICARE

## 2024-12-03 DIAGNOSIS — I10 ESSENTIAL HYPERTENSION: ICD-10-CM

## 2024-12-03 DIAGNOSIS — E11.40 TYPE 2 DIABETES MELLITUS WITH DIABETIC NEUROPATHY, WITHOUT LONG-TERM CURRENT USE OF INSULIN (HCC): ICD-10-CM

## 2024-12-03 DIAGNOSIS — I48.0 PAF (PAROXYSMAL ATRIAL FIBRILLATION) (HCC): ICD-10-CM

## 2024-12-03 LAB
ALBUMIN SERPL-MCNC: 4.4 G/DL (ref 3.4–5)
ALBUMIN/GLOB SERPL: 1.4 {RATIO} (ref 1.1–2.2)
ALP SERPL-CCNC: 100 U/L (ref 40–129)
ALT SERPL-CCNC: 16 U/L (ref 10–40)
ANION GAP SERPL CALCULATED.3IONS-SCNC: 15 MMOL/L (ref 9–17)
AST SERPL-CCNC: 26 U/L (ref 15–37)
BILIRUB SERPL-MCNC: 0.8 MG/DL (ref 0–1)
BUN SERPL-MCNC: 20 MG/DL (ref 7–20)
CALCIUM SERPL-MCNC: 10 MG/DL (ref 8.3–10.6)
CHLORIDE SERPL-SCNC: 98 MMOL/L (ref 99–110)
CHOLEST SERPL-MCNC: 150 MG/DL (ref 125–199)
CO2 SERPL-SCNC: 26 MMOL/L (ref 21–32)
CREAT SERPL-MCNC: 1.1 MG/DL (ref 0.6–1.2)
CREAT UR-MCNC: 27.6 MG/DL (ref 28–217)
ERYTHROCYTE [DISTWIDTH] IN BLOOD BY AUTOMATED COUNT: 13.2 % (ref 11.7–14.9)
EST. AVERAGE GLUCOSE BLD GHB EST-MCNC: 145 MG/DL
GFR, ESTIMATED: 50 ML/MIN/1.73M2
GLUCOSE SERPL-MCNC: 140 MG/DL (ref 74–99)
HBA1C MFR BLD: 6.7 % (ref 4.2–6.3)
HCT VFR BLD AUTO: 38.2 % (ref 37–47)
HDLC SERPL-MCNC: 45 MG/DL
HGB BLD-MCNC: 12.5 G/DL (ref 12.5–16)
LDLC SERPL CALC-MCNC: 74 MG/DL
MCH RBC QN AUTO: 29 PG (ref 27–31)
MCHC RBC AUTO-ENTMCNC: 32.7 G/DL (ref 32–36)
MCV RBC AUTO: 88.6 FL (ref 78–100)
MICROALBUMIN UR-MCNC: 3 MG/L
MICROALBUMIN/CREAT UR-RTO: 10 MCG/MG CREAT (ref 0–2)
PLATELET # BLD AUTO: 225 K/UL (ref 140–440)
PMV BLD AUTO: 9.5 FL (ref 7.5–11.1)
POTASSIUM SERPL-SCNC: 4.4 MMOL/L (ref 3.5–5.1)
PROT SERPL-MCNC: 7.5 G/DL (ref 6.4–8.2)
RBC # BLD AUTO: 4.31 M/UL (ref 4.2–5.4)
SODIUM SERPL-SCNC: 138 MMOL/L (ref 136–145)
TRIGL SERPL-MCNC: 158 MG/DL
TSH SERPL DL<=0.05 MIU/L-ACNC: 2.92 UIU/ML (ref 0.27–4.2)
WBC OTHER # BLD: 5.6 K/UL (ref 4–10.5)

## 2024-12-03 PROCEDURE — 36415 COLL VENOUS BLD VENIPUNCTURE: CPT

## 2024-12-03 PROCEDURE — 83036 HEMOGLOBIN GLYCOSYLATED A1C: CPT

## 2024-12-03 PROCEDURE — 80053 COMPREHEN METABOLIC PANEL: CPT

## 2024-12-03 PROCEDURE — 82043 UR ALBUMIN QUANTITATIVE: CPT

## 2024-12-03 PROCEDURE — 84443 ASSAY THYROID STIM HORMONE: CPT

## 2024-12-03 PROCEDURE — 85027 COMPLETE CBC AUTOMATED: CPT

## 2024-12-03 PROCEDURE — 82570 ASSAY OF URINE CREATININE: CPT

## 2024-12-03 PROCEDURE — 80061 LIPID PANEL: CPT

## 2024-12-04 NOTE — RESULT ENCOUNTER NOTE
Chol, sugars, labs appear in stable range, DM is controlled, thyroid fxn nl range and urine neg for protein-urine much better overall.   notify pt please.

## 2024-12-09 NOTE — PROGRESS NOTES
Vita Clarke  1940 12/09/24    SUBJECTIVE:   Had eval Dr Trujillo for L carotid stenosis and no intervention recommended.    Dm- neuropathy worse in feet and asked if can incr neurontin which helps.    Lab Results   Component Value Date    LABA1C 6.7 (H) 12/03/2024    LABA1C 5.9 09/03/2024    LABA1C 6.0 06/16/2024     Lab Results   Component Value Date    GLUF 128 (H) 10/14/2016    MALBCR 10 (H) 12/03/2024    CREATININE 1.1 12/03/2024     Ra, no current sx and seeing Dr Hoang now prn.    Coronary artery disease has been asymptomatic w/o any ongoing sx of CP, SOB/MOORE, palpitations, lt headedness, diaphoresis.  Is continuing medications regularly.    Hypertension: Stable. Denies CP, SOB, cough, visual changes, dizziness, palpitations or HA.       OBJECTIVE:    /64 (Site: Left Upper Arm, Position: Sitting, Cuff Size: Medium Adult)   Pulse 63   Ht 1.524 m (5')   Wt 77 kg (169 lb 12.8 oz)   LMP  (LMP Unknown)   SpO2 96%   BMI 33.16 kg/m²     Physical Exam  Constitutional:       General: She is not in acute distress.     Appearance: Normal appearance. She is well-developed. She is not diaphoretic.   HENT:      Head: Normocephalic and atraumatic.   Neck:      Thyroid: No thyromegaly.      Trachea: No tracheal deviation.   Cardiovascular:      Rate and Rhythm: Normal rate and regular rhythm.      Heart sounds: Normal heart sounds. No murmur heard.     No friction rub. No gallop.   Pulmonary:      Effort: No respiratory distress.      Breath sounds: Normal breath sounds. No wheezing or rales.   Abdominal:      General: Bowel sounds are normal. There is no distension.      Palpations: Abdomen is soft. There is no mass.      Tenderness: There is no abdominal tenderness.      Hernia: No hernia is present.   Musculoskeletal:      Right lower leg: No edema.      Left lower leg: No edema.   Lymphadenopathy:      Cervical: No cervical adenopathy.   Neurological:      General: No focal deficit present.

## 2024-12-10 ENCOUNTER — OFFICE VISIT (OUTPATIENT)
Dept: INTERNAL MEDICINE CLINIC | Age: 84
End: 2024-12-10

## 2024-12-10 VITALS
DIASTOLIC BLOOD PRESSURE: 64 MMHG | BODY MASS INDEX: 33.34 KG/M2 | HEART RATE: 63 BPM | SYSTOLIC BLOOD PRESSURE: 120 MMHG | WEIGHT: 169.8 LBS | HEIGHT: 60 IN | OXYGEN SATURATION: 96 %

## 2024-12-10 DIAGNOSIS — I25.10 CORONARY ARTERY DISEASE INVOLVING NATIVE CORONARY ARTERY OF NATIVE HEART WITHOUT ANGINA PECTORIS: ICD-10-CM

## 2024-12-10 DIAGNOSIS — G58.8 OTHER MONONEUROPATHY: ICD-10-CM

## 2024-12-10 DIAGNOSIS — E11.40 TYPE 2 DIABETES MELLITUS WITH DIABETIC NEUROPATHY, WITHOUT LONG-TERM CURRENT USE OF INSULIN (HCC): Primary | ICD-10-CM

## 2024-12-10 DIAGNOSIS — I10 ESSENTIAL HYPERTENSION: ICD-10-CM

## 2024-12-10 DIAGNOSIS — G44.89 OTHER HEADACHE SYNDROME: ICD-10-CM

## 2024-12-10 DIAGNOSIS — M05.79 RHEUMATOID ARTHRITIS INVOLVING MULTIPLE SITES WITH POSITIVE RHEUMATOID FACTOR (HCC): ICD-10-CM

## 2024-12-10 RX ORDER — GABAPENTIN 300 MG/1
600 CAPSULE ORAL 2 TIMES DAILY
Qty: 360 CAPSULE | Refills: 1 | Status: SHIPPED | OUTPATIENT
Start: 2024-12-10 | End: 2025-06-08

## 2024-12-10 SDOH — ECONOMIC STABILITY: FOOD INSECURITY: WITHIN THE PAST 12 MONTHS, YOU WORRIED THAT YOUR FOOD WOULD RUN OUT BEFORE YOU GOT MONEY TO BUY MORE.: NEVER TRUE

## 2024-12-10 SDOH — ECONOMIC STABILITY: FOOD INSECURITY: WITHIN THE PAST 12 MONTHS, THE FOOD YOU BOUGHT JUST DIDN'T LAST AND YOU DIDN'T HAVE MONEY TO GET MORE.: NEVER TRUE

## 2024-12-10 SDOH — ECONOMIC STABILITY: INCOME INSECURITY: HOW HARD IS IT FOR YOU TO PAY FOR THE VERY BASICS LIKE FOOD, HOUSING, MEDICAL CARE, AND HEATING?: NOT HARD AT ALL

## 2025-01-22 ENCOUNTER — TRANSCRIBE ORDERS (OUTPATIENT)
Dept: ADMINISTRATIVE | Age: 85
End: 2025-01-22

## 2025-01-22 DIAGNOSIS — R60.9 PAROTID SWELLING: Primary | ICD-10-CM

## 2025-01-23 ENCOUNTER — HOSPITAL ENCOUNTER (OUTPATIENT)
Dept: CT IMAGING | Age: 85
Discharge: HOME | End: 2025-01-23
Payer: MEDICARE

## 2025-01-23 ENCOUNTER — HOSPITAL ENCOUNTER (OUTPATIENT)
Age: 85
Discharge: HOME | End: 2025-01-23
Payer: MEDICARE

## 2025-01-23 DIAGNOSIS — R60.9 PAROTID SWELLING: ICD-10-CM

## 2025-01-23 LAB
EGFR, POC: 63 ML/MIN/1.73M2
ERYTHROCYTE [SEDIMENTATION RATE] IN BLOOD BY WESTERGREN METHOD: 7 MM/HR (ref 0–30)
POC CREATININE: 0.9 MG/DL (ref 0.5–1.2)

## 2025-01-23 PROCEDURE — 36415 COLL VENOUS BLD VENIPUNCTURE: CPT

## 2025-01-23 PROCEDURE — 2500000003 HC RX 250 WO HCPCS: Performed by: OTOLARYNGOLOGY

## 2025-01-23 PROCEDURE — 6360000004 HC RX CONTRAST MEDICATION: Performed by: OTOLARYNGOLOGY

## 2025-01-23 PROCEDURE — 70491 CT SOFT TISSUE NECK W/DYE: CPT

## 2025-01-23 PROCEDURE — 82565 ASSAY OF CREATININE: CPT

## 2025-01-23 PROCEDURE — 85652 RBC SED RATE AUTOMATED: CPT

## 2025-01-23 PROCEDURE — 86235 NUCLEAR ANTIGEN ANTIBODY: CPT

## 2025-01-23 RX ORDER — SODIUM CHLORIDE 9 MG/ML
10 INJECTION, SOLUTION INTRAMUSCULAR; INTRAVENOUS; SUBCUTANEOUS PRN
Status: DISCONTINUED | OUTPATIENT
Start: 2025-01-23 | End: 2025-01-24 | Stop reason: HOSPADM

## 2025-01-23 RX ORDER — IOPAMIDOL 755 MG/ML
75 INJECTION, SOLUTION INTRAVASCULAR
Status: COMPLETED | OUTPATIENT
Start: 2025-01-23 | End: 2025-01-23

## 2025-01-23 RX ADMIN — IOPAMIDOL 75 ML: 755 INJECTION, SOLUTION INTRAVENOUS at 10:54

## 2025-01-23 RX ADMIN — SODIUM CHLORIDE, PRESERVATIVE FREE 10 ML: 5 INJECTION INTRAVENOUS at 10:50

## 2025-01-27 ENCOUNTER — OFFICE VISIT (OUTPATIENT)
Dept: INTERNAL MEDICINE CLINIC | Age: 85
End: 2025-01-27
Payer: MEDICARE

## 2025-01-27 VITALS
DIASTOLIC BLOOD PRESSURE: 82 MMHG | WEIGHT: 173 LBS | OXYGEN SATURATION: 98 % | SYSTOLIC BLOOD PRESSURE: 116 MMHG | HEART RATE: 69 BPM | BODY MASS INDEX: 33.96 KG/M2 | HEIGHT: 60 IN

## 2025-01-27 DIAGNOSIS — K11.21 ACUTE SIALOADENITIS: ICD-10-CM

## 2025-01-27 DIAGNOSIS — N30.00 ACUTE CYSTITIS WITHOUT HEMATURIA: Primary | ICD-10-CM

## 2025-01-27 LAB
BILIRUBIN, POC: NORMAL
BLOOD URINE, POC: NORMAL
CLARITY, POC: CLEAR
COLOR, POC: NORMAL
GLUCOSE URINE, POC: NORMAL MG/DL
KETONES, POC: NORMAL MG/DL
LEUKOCYTE EST, POC: NORMAL
NITRITE, POC: NORMAL
PH, POC: 6
PROTEIN, POC: NORMAL MG/DL
SPECIFIC GRAVITY, POC: 1.01
UROBILINOGEN, POC: 0.2 MG/DL

## 2025-01-27 PROCEDURE — 1124F ACP DISCUSS-NO DSCNMKR DOCD: CPT | Performed by: INTERNAL MEDICINE

## 2025-01-27 PROCEDURE — 1160F RVW MEDS BY RX/DR IN RCRD: CPT | Performed by: INTERNAL MEDICINE

## 2025-01-27 PROCEDURE — G8427 DOCREV CUR MEDS BY ELIG CLIN: HCPCS | Performed by: INTERNAL MEDICINE

## 2025-01-27 PROCEDURE — 1036F TOBACCO NON-USER: CPT | Performed by: INTERNAL MEDICINE

## 2025-01-27 PROCEDURE — 1090F PRES/ABSN URINE INCON ASSESS: CPT | Performed by: INTERNAL MEDICINE

## 2025-01-27 PROCEDURE — G2211 COMPLEX E/M VISIT ADD ON: HCPCS | Performed by: INTERNAL MEDICINE

## 2025-01-27 PROCEDURE — G8417 CALC BMI ABV UP PARAM F/U: HCPCS | Performed by: INTERNAL MEDICINE

## 2025-01-27 PROCEDURE — G8399 PT W/DXA RESULTS DOCUMENT: HCPCS | Performed by: INTERNAL MEDICINE

## 2025-01-27 PROCEDURE — 1159F MED LIST DOCD IN RCRD: CPT | Performed by: INTERNAL MEDICINE

## 2025-01-27 PROCEDURE — 99213 OFFICE O/P EST LOW 20 MIN: CPT | Performed by: INTERNAL MEDICINE

## 2025-01-27 PROCEDURE — 81002 URINALYSIS NONAUTO W/O SCOPE: CPT | Performed by: INTERNAL MEDICINE

## 2025-01-27 PROCEDURE — 3074F SYST BP LT 130 MM HG: CPT | Performed by: INTERNAL MEDICINE

## 2025-01-27 PROCEDURE — 3079F DIAST BP 80-89 MM HG: CPT | Performed by: INTERNAL MEDICINE

## 2025-01-27 RX ORDER — DOXYCYCLINE HYCLATE 100 MG
100 TABLET ORAL 2 TIMES DAILY
Qty: 14 TABLET | Refills: 0 | Status: SHIPPED | OUTPATIENT
Start: 2025-01-27 | End: 2025-02-03

## 2025-01-27 SDOH — ECONOMIC STABILITY: FOOD INSECURITY: WITHIN THE PAST 12 MONTHS, YOU WORRIED THAT YOUR FOOD WOULD RUN OUT BEFORE YOU GOT MONEY TO BUY MORE.: NEVER TRUE

## 2025-01-27 SDOH — ECONOMIC STABILITY: FOOD INSECURITY: WITHIN THE PAST 12 MONTHS, THE FOOD YOU BOUGHT JUST DIDN'T LAST AND YOU DIDN'T HAVE MONEY TO GET MORE.: NEVER TRUE

## 2025-01-27 ASSESSMENT — PATIENT HEALTH QUESTIONNAIRE - PHQ9
SUM OF ALL RESPONSES TO PHQ QUESTIONS 1-9: 0
2. FEELING DOWN, DEPRESSED OR HOPELESS: NOT AT ALL
SUM OF ALL RESPONSES TO PHQ9 QUESTIONS 1 & 2: 0
SUM OF ALL RESPONSES TO PHQ QUESTIONS 1-9: 0
1. LITTLE INTEREST OR PLEASURE IN DOING THINGS: NOT AT ALL

## 2025-01-27 NOTE — PROGRESS NOTES
Vita Clarke  1940 01/27/25    SUBJECTIVE:    L sialoadenitis, has had eval at [facility] Dr Rueda and for eval/2/14.    The past week w onset of dysuria, freq and UA poct pos.      Jn Hopkins MD - 01/22/2025 12:00 PM EST  Formatting of this note is different from the original.  Chief Complaint  Patient presents with  New Patient  Pt is here for a salivary evaluation. She expressed that she doesn't what it is. She was told that it may be clogged. The left side of the behind ear. No pain,pressure and pulsation go down in her ear. When she turns her head it hurts in that area. Feels like it is going to go down in the ear. She is wanting to know if could be affecting the top of the left side of the mouth feels swollen, sinus drainage. She had found out that she also has hashimoto.    HPI:  84 y.o. female presenting to the [facility] Department of Otolaryngology -Head and Neck Surgery at the request of Dr. Davis for evaluation of parotid swelling.    History of Present Illness  The patient presents for left parotid gland swelling.    She has been under the care of Dr. Davis, who prescribed antibiotics for her left parotid gland swelling. Despite undergoing several imaging studies, including ultrasound and CT scans, no abnormalities have been detected. The onset of her symptoms dates back to 2021. She was initially treated with antibiotics by various physicians, but when her condition did not improve, she was referred to an infectious disease specialist. This specialist prescribed a course of antibiotics and steroids, but ultimately concluded that there was no infection present. She was then advised to consult an ENT specialist. The ENT specialist suggested that her symptoms might be due to TMJ or dental issues. A second ENT specialist referred her to [facility], where it was hypothesized that a clogged salivary gland might be causing her symptoms. She reports that the swelling in her left parotid

## 2025-02-04 ENCOUNTER — HOSPITAL ENCOUNTER (OUTPATIENT)
Age: 85
Setting detail: SPECIMEN
Discharge: HOME | End: 2025-02-04
Payer: MEDICARE

## 2025-02-04 ENCOUNTER — OFFICE VISIT (OUTPATIENT)
Dept: CARDIOLOGY CLINIC | Age: 85
End: 2025-02-04
Payer: MEDICARE

## 2025-02-04 VITALS
WEIGHT: 171.4 LBS | SYSTOLIC BLOOD PRESSURE: 124 MMHG | OXYGEN SATURATION: 98 % | BODY MASS INDEX: 33.65 KG/M2 | HEIGHT: 60 IN | DIASTOLIC BLOOD PRESSURE: 66 MMHG | HEART RATE: 64 BPM

## 2025-02-04 DIAGNOSIS — I65.22 CAROTID ARTERY CALCIFICATION, LEFT: ICD-10-CM

## 2025-02-04 DIAGNOSIS — N30.00 ACUTE CYSTITIS WITHOUT HEMATURIA: Primary | ICD-10-CM

## 2025-02-04 DIAGNOSIS — I48.0 PAF (PAROXYSMAL ATRIAL FIBRILLATION) (HCC): ICD-10-CM

## 2025-02-04 DIAGNOSIS — I10 ESSENTIAL HYPERTENSION: ICD-10-CM

## 2025-02-04 DIAGNOSIS — I25.10 ASCVD (ARTERIOSCLEROTIC CARDIOVASCULAR DISEASE): Primary | ICD-10-CM

## 2025-02-04 LAB
BACTERIA URNS QL MICRO: ABNORMAL
BILIRUB UR QL STRIP: NEGATIVE
CHARACTER UR: ABNORMAL
CLARITY UR: ABNORMAL
COLOR UR: YELLOW
GLUCOSE UR STRIP-MCNC: NEGATIVE MG/DL
HGB UR QL STRIP.AUTO: ABNORMAL
KETONES UR STRIP-MCNC: NEGATIVE MG/DL
LEUKOCYTE ESTERASE UR QL STRIP: ABNORMAL
MUCOUS THREADS URNS QL MICRO: ABNORMAL
NITRITE UR QL STRIP: NEGATIVE
PH UR STRIP: 6.5 [PH] (ref 5–8)
PROT UR STRIP-MCNC: NEGATIVE MG/DL
RBC #/AREA URNS HPF: 0 /HPF (ref 0–2)
SP GR UR STRIP: 1.01 (ref 1–1.03)
UROBILINOGEN UR STRIP-ACNC: 0.2 EU/DL (ref 0–1)
WBC #/AREA URNS HPF: 1484 /HPF (ref 0–5)

## 2025-02-04 PROCEDURE — 87086 URINE CULTURE/COLONY COUNT: CPT

## 2025-02-04 PROCEDURE — 1124F ACP DISCUSS-NO DSCNMKR DOCD: CPT | Performed by: NURSE PRACTITIONER

## 2025-02-04 PROCEDURE — 1090F PRES/ABSN URINE INCON ASSESS: CPT | Performed by: NURSE PRACTITIONER

## 2025-02-04 PROCEDURE — G8417 CALC BMI ABV UP PARAM F/U: HCPCS | Performed by: NURSE PRACTITIONER

## 2025-02-04 PROCEDURE — 1160F RVW MEDS BY RX/DR IN RCRD: CPT | Performed by: NURSE PRACTITIONER

## 2025-02-04 PROCEDURE — 3074F SYST BP LT 130 MM HG: CPT | Performed by: NURSE PRACTITIONER

## 2025-02-04 PROCEDURE — 99214 OFFICE O/P EST MOD 30 MIN: CPT | Performed by: NURSE PRACTITIONER

## 2025-02-04 PROCEDURE — 81001 URINALYSIS AUTO W/SCOPE: CPT

## 2025-02-04 PROCEDURE — 87186 SC STD MICRODIL/AGAR DIL: CPT

## 2025-02-04 PROCEDURE — G8399 PT W/DXA RESULTS DOCUMENT: HCPCS | Performed by: NURSE PRACTITIONER

## 2025-02-04 PROCEDURE — 3078F DIAST BP <80 MM HG: CPT | Performed by: NURSE PRACTITIONER

## 2025-02-04 PROCEDURE — 1159F MED LIST DOCD IN RCRD: CPT | Performed by: NURSE PRACTITIONER

## 2025-02-04 PROCEDURE — G8427 DOCREV CUR MEDS BY ELIG CLIN: HCPCS | Performed by: NURSE PRACTITIONER

## 2025-02-04 PROCEDURE — 87088 URINE BACTERIA CULTURE: CPT

## 2025-02-04 PROCEDURE — 1036F TOBACCO NON-USER: CPT | Performed by: NURSE PRACTITIONER

## 2025-02-04 RX ORDER — SULFAMETHOXAZOLE AND TRIMETHOPRIM 800; 160 MG/1; MG/1
1 TABLET ORAL 2 TIMES DAILY
Qty: 20 TABLET | Refills: 0 | Status: SHIPPED | OUTPATIENT
Start: 2025-02-04 | End: 2025-02-06

## 2025-02-04 ASSESSMENT — ENCOUNTER SYMPTOMS
SHORTNESS OF BREATH: 0
ORTHOPNEA: 0

## 2025-02-04 NOTE — PROGRESS NOTES
2/4/2025  Primary cardiologist: Dr. Matson    CC:   Vita  is an established 84 y.o.  female here for a follow up on CAD      SUBJECTIVE/OBJECTIVE:  Vita is a 84 y.o. female with a history of coronary artery disease, PCI, carotid artery disease s/p left carotid endarterectomy (07/2023), left subclavian artery stenosis, hypertension, hyperlipidemia, paroxysmal atrial fibrillation s/p Watchman device, CVA and non-insulin-dependent diabetes.       HPI:  Vita she has swelling to the left upper jaw./Left parotid gland swelling.  She was seen at [facility] and is planning for surgical intervention.  From cardiovascular standpoint she states she is feeling well.  Has no chest pain or shortness of breath.  Had 1 episode of palpitations since last visit.  She was followed up with electrophysiology post watchman and aspirin was discontinued.    Review of Systems   Constitutional: Negative for diaphoresis and malaise/fatigue.   HENT:          Left upper jaw tenderness and swelling   Cardiovascular:  Positive for palpitations. Negative for chest pain, claudication, dyspnea on exertion, irregular heartbeat, leg swelling, near-syncope, orthopnea and paroxysmal nocturnal dyspnea.   Respiratory:  Negative for shortness of breath.    Neurological:  Negative for dizziness and light-headedness.       Vitals:    02/04/25 1018   BP: 124/66   Site: Left Upper Arm   Position: Sitting   Cuff Size: Medium Adult   Pulse: 64   SpO2: 98%   Weight: 77.7 kg (171 lb 6.4 oz)   Height: 1.524 m (5')       Wt Readings from Last 3 Encounters:   02/04/25 77.7 kg (171 lb 6.4 oz)   01/27/25 78.5 kg (173 lb)   12/10/24 77 kg (169 lb 12.8 oz)      Body mass index is 33.47 kg/m².     Physical Exam  Vitals reviewed.   HENT:      Head:      Comments: Nodule noted to the left upper posterior jaw  Tender to touch  Eyes:      Pupils: Pupils are equal, round, and reactive to light.   Cardiovascular:      Rate and Rhythm: Normal rate and regular rhythm.

## 2025-02-04 NOTE — RESULT ENCOUNTER NOTE
Please call pt, Vita has a significant bladder infection.  We are waiting on the culture results but in the meantime rec to start abx ASAP today, bactrim twice/day for 10d.  Then if culture comes back w any resistance we can change this pending results  Also is very important to drink 4-5 glasses of water/day to help flush out the infection.

## 2025-02-06 ENCOUNTER — TELEPHONE (OUTPATIENT)
Dept: INTERNAL MEDICINE CLINIC | Age: 85
End: 2025-02-06

## 2025-02-06 DIAGNOSIS — N30.00 ACUTE CYSTITIS WITHOUT HEMATURIA: Primary | ICD-10-CM

## 2025-02-06 LAB
MICROORGANISM SPEC CULT: ABNORMAL
SPECIMEN DESCRIPTION: ABNORMAL

## 2025-02-06 RX ORDER — NITROFURANTOIN 25; 75 MG/1; MG/1
100 CAPSULE ORAL 2 TIMES DAILY
Qty: 20 CAPSULE | Refills: 0 | Status: SHIPPED | OUTPATIENT
Start: 2025-02-06 | End: 2025-02-16

## 2025-02-06 NOTE — RESULT ENCOUNTER NOTE
Please call pt, HER BLADDER INFECTION IS RESISTANT TO THE BACTRIM WE STARTED.  I';LL SWITCH TO 10 DAYS OF MACROBID TWICE/DAY.  WE NEED A RECHECK UA AND CULTURE IN TWO WEEKS, DX ACUTE CYSTITIS

## 2025-02-06 NOTE — TELEPHONE ENCOUNTER
----- Message from Dr. Joaquim Gonzalez MD sent at 2/6/2025 11:51 AM EST -----  Please call pt, HER BLADDER INFECTION IS RESISTANT TO THE BACTRIM WE STARTED.  I';LL SWITCH TO 10 DAYS OF MACROBID TWICE/DAY.  WE NEED A RECHECK UA AND CULTURE IN TWO WEEKS, DX ACUTE CYSTITIS

## 2025-02-21 ENCOUNTER — HOSPITAL ENCOUNTER (OUTPATIENT)
Age: 85
Setting detail: SPECIMEN
Discharge: HOME | End: 2025-02-21
Payer: MEDICARE

## 2025-02-21 LAB
BACTERIA URNS QL MICRO: ABNORMAL
BILIRUB UR QL STRIP: NEGATIVE
CLARITY UR: CLEAR
COLOR UR: YELLOW
EPI CELLS #/AREA URNS HPF: 1 /HPF
GLUCOSE UR STRIP-MCNC: NEGATIVE MG/DL
HGB UR QL STRIP.AUTO: NEGATIVE
KETONES UR STRIP-MCNC: NEGATIVE MG/DL
LEUKOCYTE ESTERASE UR QL STRIP: ABNORMAL
NITRITE UR QL STRIP: NEGATIVE
PH UR STRIP: 6 [PH] (ref 5–8)
PROT UR STRIP-MCNC: NEGATIVE MG/DL
RBC #/AREA URNS HPF: 1 /HPF (ref 0–2)
SP GR UR STRIP: <1.005 (ref 1–1.03)
UROBILINOGEN UR STRIP-ACNC: 1 EU/DL (ref 0–1)
WBC #/AREA URNS HPF: 12 /HPF (ref 0–5)

## 2025-02-21 PROCEDURE — 87086 URINE CULTURE/COLONY COUNT: CPT

## 2025-02-21 PROCEDURE — 81001 URINALYSIS AUTO W/SCOPE: CPT

## 2025-02-22 LAB
MICROORGANISM SPEC CULT: NORMAL
SPECIMEN DESCRIPTION: NORMAL

## 2025-03-04 ENCOUNTER — HOSPITAL ENCOUNTER (OUTPATIENT)
Age: 85
Discharge: HOME | End: 2025-03-04
Payer: MEDICARE

## 2025-03-04 DIAGNOSIS — M05.79 RHEUMATOID ARTHRITIS INVOLVING MULTIPLE SITES WITH POSITIVE RHEUMATOID FACTOR (HCC): ICD-10-CM

## 2025-03-04 DIAGNOSIS — I25.10 CORONARY ARTERY DISEASE INVOLVING NATIVE CORONARY ARTERY OF NATIVE HEART WITHOUT ANGINA PECTORIS: ICD-10-CM

## 2025-03-04 DIAGNOSIS — E11.40 TYPE 2 DIABETES MELLITUS WITH DIABETIC NEUROPATHY, WITHOUT LONG-TERM CURRENT USE OF INSULIN (HCC): ICD-10-CM

## 2025-03-04 LAB
ALBUMIN SERPL-MCNC: 4.1 G/DL (ref 3.4–5)
ALBUMIN/GLOB SERPL: 1.2 {RATIO} (ref 1.1–2.2)
ALP SERPL-CCNC: 101 U/L (ref 40–129)
ALT SERPL-CCNC: 10 U/L (ref 10–40)
ANION GAP SERPL CALCULATED.3IONS-SCNC: 12 MMOL/L (ref 9–17)
AST SERPL-CCNC: 24 U/L (ref 15–37)
BASOPHILS # BLD: 0.02 K/UL
BASOPHILS NFR BLD: 0 % (ref 0–1)
BILIRUB SERPL-MCNC: 0.7 MG/DL (ref 0–1)
BUN SERPL-MCNC: 17 MG/DL (ref 7–20)
CALCIUM SERPL-MCNC: 9.8 MG/DL (ref 8.3–10.6)
CHLORIDE SERPL-SCNC: 95 MMOL/L (ref 99–110)
CHOLEST SERPL-MCNC: 122 MG/DL (ref 125–199)
CO2 SERPL-SCNC: 25 MMOL/L (ref 21–32)
CREAT SERPL-MCNC: 1 MG/DL (ref 0.6–1.2)
CRP SERPL HS-MCNC: <3 MG/L (ref 0–5)
EOSINOPHIL # BLD: 0.29 K/UL
EOSINOPHILS RELATIVE PERCENT: 3 % (ref 0–3)
ERYTHROCYTE [DISTWIDTH] IN BLOOD BY AUTOMATED COUNT: 12.6 % (ref 11.7–14.9)
EST. AVERAGE GLUCOSE BLD GHB EST-MCNC: 139 MG/DL
GFR, ESTIMATED: 55 ML/MIN/1.73M2
GLUCOSE SERPL-MCNC: 119 MG/DL (ref 74–99)
HBA1C MFR BLD: 6.5 % (ref 4.2–6.3)
HCT VFR BLD AUTO: 36.2 % (ref 37–47)
HDLC SERPL-MCNC: 34 MG/DL
HGB BLD-MCNC: 11.8 G/DL (ref 12.5–16)
IMM GRANULOCYTES # BLD AUTO: 0.04 K/UL
IMM GRANULOCYTES NFR BLD: 0 %
LDLC SERPL CALC-MCNC: 58 MG/DL
LYMPHOCYTES NFR BLD: 2.34 K/UL
LYMPHOCYTES RELATIVE PERCENT: 25 % (ref 24–44)
MCH RBC QN AUTO: 29.4 PG (ref 27–31)
MCHC RBC AUTO-ENTMCNC: 32.6 G/DL (ref 32–36)
MCV RBC AUTO: 90 FL (ref 78–100)
MONOCYTES NFR BLD: 0.63 K/UL
MONOCYTES NFR BLD: 7 % (ref 0–4)
NEUTROPHILS NFR BLD: 64 % (ref 36–66)
NEUTS SEG NFR BLD: 5.93 K/UL
PLATELET # BLD AUTO: 266 K/UL (ref 140–440)
PMV BLD AUTO: 9.8 FL (ref 7.5–11.1)
POTASSIUM SERPL-SCNC: 4.2 MMOL/L (ref 3.5–5.1)
PROT SERPL-MCNC: 7.7 G/DL (ref 6.4–8.2)
RBC # BLD AUTO: 4.02 M/UL (ref 4.2–5.4)
SODIUM SERPL-SCNC: 131 MMOL/L (ref 136–145)
TRIGL SERPL-MCNC: 151 MG/DL
WBC OTHER # BLD: 9.3 K/UL (ref 4–10.5)

## 2025-03-04 PROCEDURE — 85025 COMPLETE CBC W/AUTO DIFF WBC: CPT

## 2025-03-04 PROCEDURE — 36415 COLL VENOUS BLD VENIPUNCTURE: CPT

## 2025-03-04 PROCEDURE — 86140 C-REACTIVE PROTEIN: CPT

## 2025-03-04 PROCEDURE — 80053 COMPREHEN METABOLIC PANEL: CPT

## 2025-03-04 PROCEDURE — 86200 CCP ANTIBODY: CPT

## 2025-03-04 PROCEDURE — 80061 LIPID PANEL: CPT

## 2025-03-04 PROCEDURE — 83036 HEMOGLOBIN GLYCOSYLATED A1C: CPT

## 2025-03-04 PROCEDURE — 86431 RHEUMATOID FACTOR QUANT: CPT

## 2025-03-05 LAB — RHEUMATOID FACTOR: <10 IU/ML

## 2025-03-06 LAB — CYCLIC CITRULLIN PEPTIDE AB: 4 UNITS (ref 0–19)

## 2025-03-06 RX ORDER — HYDROCHLOROTHIAZIDE 25 MG/1
12.5 TABLET ORAL DAILY
Qty: 90 TABLET | Refills: 1
Start: 2025-03-06

## 2025-03-06 NOTE — RESULT ENCOUNTER NOTE
Chol, sugars, labs appear in stable range, DM is controlled.   notify pt please.- - her scr lab for rheumatoid arthritis is negative.  One issue is her sodium is low, likely from the water pill so suggest we decr her hydrochlorothiazide  From 1/day to 1/2 tab daily.

## 2025-03-10 NOTE — PROGRESS NOTES
Vita Clarke  1940 03/11/25    SUBJECTIVE:    L sialoadenitis, no surgery possible so may get botox inj  for pain which also rad jaw to mouth and ear. - Dr Jn Hopkins.- she's leaving so will then f/u w alt ENT at [facility]    Dm- neuropathy better w incr neurontin.    Lab Results   Component Value Date    LABA1C 6.5 (H) 03/04/2025    LABA1C 6.7 (H) 12/03/2024    LABA1C 5.9 09/03/2024     Lab Results   Component Value Date    GLUF 128 (H) 10/14/2016    MALBCR 10 (H) 12/03/2024    CREATININE 1.0 03/04/2025     Ra, no current sx and seeing Dr Hoang now prn.    Afib, s/p watchman, seeing Alyssa Platt next mo    Coronary artery disease has been asymptomatic w/o any ongoing sx of CP, SOB/MOORE, palpitations, lt headedness, diaphoresis.  Is continuing medications regularly.  Sees Dr Matson in May    Coronary artery disease has been asymptomatic w/o any ongoing sx of CP, SOB/MOORE, palpitations, lt headedness, diaphoresis.  Is continuing medications regularly.    Hypertension: Stable. Denies CP, SOB, cough, visual changes, dizziness, palpitations or HA.       OBJECTIVE:    /80   Pulse 55   Ht 1.524 m (5')   Wt 74.7 kg (164 lb 9.6 oz)   LMP  (LMP Unknown)   SpO2 98%   BMI 32.15 kg/m²     Physical Exam  Constitutional:       General: She is not in acute distress.     Appearance: Normal appearance. She is well-developed. She is not diaphoretic.   HENT:      Head: Normocephalic and atraumatic.   Neck:      Thyroid: No thyromegaly.      Trachea: No tracheal deviation.   Cardiovascular:      Rate and Rhythm: Normal rate and regular rhythm.      Heart sounds: Normal heart sounds. No murmur heard.     No friction rub. No gallop.   Pulmonary:      Effort: No respiratory distress.      Breath sounds: Normal breath sounds. No wheezing or rales.   Abdominal:      General: Bowel sounds are normal. There is no distension.      Palpations: Abdomen is soft. There is no mass.      Tenderness: There is no abdominal

## 2025-03-11 ENCOUNTER — OFFICE VISIT (OUTPATIENT)
Dept: INTERNAL MEDICINE CLINIC | Age: 85
End: 2025-03-11
Payer: MEDICARE

## 2025-03-11 VITALS
HEIGHT: 60 IN | OXYGEN SATURATION: 98 % | SYSTOLIC BLOOD PRESSURE: 120 MMHG | WEIGHT: 164.6 LBS | DIASTOLIC BLOOD PRESSURE: 80 MMHG | HEART RATE: 55 BPM | BODY MASS INDEX: 32.31 KG/M2

## 2025-03-11 DIAGNOSIS — E11.40 TYPE 2 DIABETES MELLITUS WITH DIABETIC NEUROPATHY, WITHOUT LONG-TERM CURRENT USE OF INSULIN (HCC): Primary | ICD-10-CM

## 2025-03-11 DIAGNOSIS — I10 ESSENTIAL HYPERTENSION: ICD-10-CM

## 2025-03-11 DIAGNOSIS — I48.0 PAF (PAROXYSMAL ATRIAL FIBRILLATION) (HCC): ICD-10-CM

## 2025-03-11 DIAGNOSIS — G58.8 OTHER MONONEUROPATHY: ICD-10-CM

## 2025-03-11 DIAGNOSIS — G44.89 OTHER HEADACHE SYNDROME: ICD-10-CM

## 2025-03-11 DIAGNOSIS — N18.32 STAGE 3B CHRONIC KIDNEY DISEASE (HCC): ICD-10-CM

## 2025-03-11 DIAGNOSIS — M05.79 RHEUMATOID ARTHRITIS INVOLVING MULTIPLE SITES WITH POSITIVE RHEUMATOID FACTOR (HCC): ICD-10-CM

## 2025-03-11 PROCEDURE — 99214 OFFICE O/P EST MOD 30 MIN: CPT | Performed by: INTERNAL MEDICINE

## 2025-03-11 PROCEDURE — G8417 CALC BMI ABV UP PARAM F/U: HCPCS | Performed by: INTERNAL MEDICINE

## 2025-03-11 PROCEDURE — 1036F TOBACCO NON-USER: CPT | Performed by: INTERNAL MEDICINE

## 2025-03-11 PROCEDURE — G8399 PT W/DXA RESULTS DOCUMENT: HCPCS | Performed by: INTERNAL MEDICINE

## 2025-03-11 PROCEDURE — 1124F ACP DISCUSS-NO DSCNMKR DOCD: CPT | Performed by: INTERNAL MEDICINE

## 2025-03-11 PROCEDURE — 3044F HG A1C LEVEL LT 7.0%: CPT | Performed by: INTERNAL MEDICINE

## 2025-03-11 PROCEDURE — G8427 DOCREV CUR MEDS BY ELIG CLIN: HCPCS | Performed by: INTERNAL MEDICINE

## 2025-03-11 PROCEDURE — G2211 COMPLEX E/M VISIT ADD ON: HCPCS | Performed by: INTERNAL MEDICINE

## 2025-03-11 PROCEDURE — 3079F DIAST BP 80-89 MM HG: CPT | Performed by: INTERNAL MEDICINE

## 2025-03-11 PROCEDURE — 1159F MED LIST DOCD IN RCRD: CPT | Performed by: INTERNAL MEDICINE

## 2025-03-11 PROCEDURE — 1090F PRES/ABSN URINE INCON ASSESS: CPT | Performed by: INTERNAL MEDICINE

## 2025-03-11 PROCEDURE — 3074F SYST BP LT 130 MM HG: CPT | Performed by: INTERNAL MEDICINE

## 2025-03-11 PROCEDURE — 1160F RVW MEDS BY RX/DR IN RCRD: CPT | Performed by: INTERNAL MEDICINE

## 2025-03-11 RX ORDER — LOSARTAN POTASSIUM 100 MG/1
100 TABLET ORAL DAILY
Qty: 90 TABLET | Refills: 1 | Status: SHIPPED | OUTPATIENT
Start: 2025-03-11

## 2025-03-11 RX ORDER — GABAPENTIN 600 MG/1
600 TABLET ORAL 2 TIMES DAILY
Qty: 180 TABLET | Refills: 1 | Status: SHIPPED | OUTPATIENT
Start: 2025-03-11 | End: 2025-09-07

## 2025-03-11 RX ORDER — AMLODIPINE BESYLATE 5 MG/1
5 TABLET ORAL 2 TIMES DAILY
Qty: 180 TABLET | Refills: 0 | Status: SHIPPED | OUTPATIENT
Start: 2025-03-11

## 2025-03-11 RX ORDER — ATORVASTATIN CALCIUM 40 MG/1
40 TABLET, FILM COATED ORAL DAILY
Qty: 90 TABLET | Refills: 1 | Status: SHIPPED | OUTPATIENT
Start: 2025-03-11

## 2025-03-25 RX ORDER — ISOSORBIDE MONONITRATE 30 MG/1
30 TABLET, EXTENDED RELEASE ORAL DAILY
Qty: 30 TABLET | Refills: 5 | Status: SHIPPED | OUTPATIENT
Start: 2025-03-25

## 2025-04-04 ENCOUNTER — OFFICE VISIT (OUTPATIENT)
Age: 85
End: 2025-04-04

## 2025-04-04 VITALS
WEIGHT: 165.2 LBS | SYSTOLIC BLOOD PRESSURE: 146 MMHG | DIASTOLIC BLOOD PRESSURE: 68 MMHG | HEIGHT: 60 IN | BODY MASS INDEX: 32.43 KG/M2 | HEART RATE: 60 BPM

## 2025-04-04 DIAGNOSIS — D68.69 SECONDARY HYPERCOAGULABLE STATE: ICD-10-CM

## 2025-04-04 DIAGNOSIS — Z95.818 PRESENCE OF WATCHMAN LEFT ATRIAL APPENDAGE CLOSURE DEVICE: Primary | ICD-10-CM

## 2025-04-04 DIAGNOSIS — I10 ESSENTIAL HYPERTENSION: ICD-10-CM

## 2025-04-04 DIAGNOSIS — I48.0 PAF (PAROXYSMAL ATRIAL FIBRILLATION) (HCC): ICD-10-CM

## 2025-04-04 DIAGNOSIS — I25.10 ASCVD (ARTERIOSCLEROTIC CARDIOVASCULAR DISEASE): ICD-10-CM

## 2025-04-04 ASSESSMENT — ENCOUNTER SYMPTOMS
SHORTNESS OF BREATH: 0
COLOR CHANGE: 0
ABDOMINAL PAIN: 0
PHOTOPHOBIA: 0
BACK PAIN: 0
SINUS PRESSURE: 0
COUGH: 0
ABDOMINAL DISTENTION: 0
SINUS PAIN: 0

## 2025-04-04 NOTE — PROGRESS NOTES
4/7/2025    Joaquim Gonzalez MD   [address] [facility] [address]     Mary Matson MD         Re:  Vita      Dear Dr. Gonzalez,  Dear Dr. Matson,     I had the pleasure of seeing your patient, Vita Clarke (84 y.o. female) in the office for follow up after her left carotid endarterectomy on 7/11/23.  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  This then led to a angiography by Dr. Antonia Trujillo which revealed no stenosis.  She is here with her daughter today.  She has no neurologic symptoms and feels well.  She is curious to see her carotid duplex scan result.  In terms of her left parotid gland, where she had chronic pain, she has seen multiple ENT doctors and went to [facility], where a atrophic salivary gland duct was diagnosed and there is currently nothing that can be done to reopen this.  However her left parotid gland pain has been well-controlled.    Current Medications    Current Outpatient Medications:     isosorbide mononitrate (IMDUR) 30 MG extended release tablet, Take 1 tablet by mouth daily, Disp: 30 tablet, Rfl: 5    atorvastatin (LIPITOR) 40 MG tablet, Take 1 tablet by mouth daily, Disp: 90 tablet, Rfl: 1    amLODIPine (NORVASC) 5 MG tablet, Take 1 tablet by mouth in the morning and at bedtime, Disp: 180 tablet, Rfl: 0    losartan (COZAAR) 100 MG tablet, Take 1 tablet by mouth daily, Disp: 90 tablet, Rfl: 1    gabapentin (NEURONTIN) 600 MG tablet, Take 1 tablet by mouth 2 times daily for 180 days., Disp: 180 tablet, Rfl: 1    hydroCHLOROthiazide (HYDRODIURIL) 25 MG tablet, Take 0.5 tablets by mouth daily, Disp: 90 tablet, Rfl: 1    furosemide (LASIX) 20 MG tablet, Take 1 tablet by mouth daily, Disp: 30 tablet, Rfl: 5    metoprolol tartrate (LOPRESSOR) 50 MG tablet, TAKE 1 TABLET BY MOUTH TWICE DAILY, Disp: 180 tablet, Rfl: 1    metFORMIN (GLUCOPHAGE) 500 MG tablet, TAKE 1/2 TABLET BY

## 2025-04-04 NOTE — PATIENT INSTRUCTIONS
Thank you for allowing us to care for you today!   We want to ensure we can follow your treatment plan and we strive to give you the best outcomes and experience possible.   If you ever have a life threatening emergency and call 911 - for an ambulance (EMS)  REMEMBER  Our providers can only care for you at:   [facility] or [facility]   Even if you have someone take you or you drive yourself we can only care for you in a [facility] facility. Our providers are not setup at the other healthcare locations!    PLEASE CALL OUR OFFICE DURING NORMAL BUSINESS HOURS  Monday through Friday 8 am to 5 pm  AFTER HOURS the physician on-call cannot help with scheduling, rescheduling, procedure instruction questions or any type of medication need or issue.  [facility] P:[phone] - [facility] P:[phone] - [facility] P:[phone]      If you receive a survey:  We would appreciate you taking the time to share your experience concerning your provider visit in the office.    These surveys are confidential!  We are eager to improve and are counting on you to share your feedback so we can ensure you get the best care possible.

## 2025-04-07 ENCOUNTER — OFFICE VISIT (OUTPATIENT)
Dept: CARDIOTHORACIC SURGERY | Age: 85
End: 2025-04-07
Payer: MEDICARE

## 2025-04-07 VITALS
OXYGEN SATURATION: 96 % | SYSTOLIC BLOOD PRESSURE: 127 MMHG | BODY MASS INDEX: 32.58 KG/M2 | HEART RATE: 64 BPM | WEIGHT: 166.8 LBS | DIASTOLIC BLOOD PRESSURE: 64 MMHG

## 2025-04-07 DIAGNOSIS — Z98.890 S/P CAROTID ENDARTERECTOMY: Primary | ICD-10-CM

## 2025-04-07 DIAGNOSIS — I65.23 MILD ATHEROSCLEROSIS OF CAROTID ARTERY, BILATERAL: ICD-10-CM

## 2025-04-07 PROCEDURE — 1090F PRES/ABSN URINE INCON ASSESS: CPT | Performed by: THORACIC SURGERY (CARDIOTHORACIC VASCULAR SURGERY)

## 2025-04-07 PROCEDURE — G8427 DOCREV CUR MEDS BY ELIG CLIN: HCPCS | Performed by: THORACIC SURGERY (CARDIOTHORACIC VASCULAR SURGERY)

## 2025-04-07 PROCEDURE — 1159F MED LIST DOCD IN RCRD: CPT | Performed by: THORACIC SURGERY (CARDIOTHORACIC VASCULAR SURGERY)

## 2025-04-07 PROCEDURE — 3074F SYST BP LT 130 MM HG: CPT | Performed by: THORACIC SURGERY (CARDIOTHORACIC VASCULAR SURGERY)

## 2025-04-07 PROCEDURE — 3078F DIAST BP <80 MM HG: CPT | Performed by: THORACIC SURGERY (CARDIOTHORACIC VASCULAR SURGERY)

## 2025-04-07 PROCEDURE — 99214 OFFICE O/P EST MOD 30 MIN: CPT | Performed by: THORACIC SURGERY (CARDIOTHORACIC VASCULAR SURGERY)

## 2025-04-07 PROCEDURE — G8399 PT W/DXA RESULTS DOCUMENT: HCPCS | Performed by: THORACIC SURGERY (CARDIOTHORACIC VASCULAR SURGERY)

## 2025-04-07 PROCEDURE — 1124F ACP DISCUSS-NO DSCNMKR DOCD: CPT | Performed by: THORACIC SURGERY (CARDIOTHORACIC VASCULAR SURGERY)

## 2025-04-07 PROCEDURE — 1036F TOBACCO NON-USER: CPT | Performed by: THORACIC SURGERY (CARDIOTHORACIC VASCULAR SURGERY)

## 2025-04-07 PROCEDURE — G8417 CALC BMI ABV UP PARAM F/U: HCPCS | Performed by: THORACIC SURGERY (CARDIOTHORACIC VASCULAR SURGERY)

## 2025-04-11 NOTE — PLAN OF CARE
Outpatient Physical Therapy           [place]           [x] Phone: [phone]   Fax: [phone]  [place]           [] Phone: [phone]   Fax: [phone]     To: Jn Hopkins* Jn Rueda MD   From: Genie Mandujano, PT, DPT     Patient: Vita Clarke       : 1940  Diagnosis: Dizziness and giddiness [R42] Diagnosis: dizziness  Treatment Diagnosis: dizziness , decreased balance  Date: 2025    Physical Therapy Certification/Re-Certification Form  Dear Dr. Hopkins,   The following patient has been evaluated for physical therapy services and for therapy to continue, insurance requires physician review of the treatment plan initially and every 90 days. Please review the attached evaluation and/or summary of the patient's plan of care, and verify that you agree therapy should continue by signing the attached document and sending it back to our office.    Assessment:     Patient is an 84 year old female who presents to physical therapy with reports of dizziness that has been going on for over 5 years. She reports the dizziness is relatively random in nature. She has had botox in the past for her “TMJ” symptoms, which they are currently seeking secondary opinion at [facility] for salivary gland insufficiency.  She is accompanied by her adult daughter who notes she often gets off balance when she turns quickly. She denies any room spinning dizziness at this time. VBI testing negative this date. She does demonstrate moderate forward head posturing with rounded shoulders. There is TTP along suboccipital and crepitus/ pain noted with active cervical ROM in all directions with decreased ROM.  VOR is affected at this time, with symptoms provocation with vertical testing. Convergence is WFL at this time. DGI and GANSOP testing reveal deficits in VSR as well. She would benefit from ongoing skilled physical therapy to address deficits, return to PLOF, and reduce risk for further decline

## 2025-04-11 NOTE — FLOWSHEET NOTE
Assessment:  (Response towards treatment session) (Pain Rating)  Patient is an 84 year old female who presents to physical therapy with reports of dizziness that has been going on for over 5 years. She reports the dizziness is relatively random in nature. She has had botox in the past for her “TMJ” symptoms, which they are currently seeking secondary opinion at [facility] for salivary gland insufficiency.  She is accompanied by her adult daughter who notes she often gets off balance when she turns quickly. She denies any room spinning dizziness at this time. VBI testing negative this date. She does demonstrate moderate forward head posturing with rounded shoulders. There is TTP along suboccipital and crepitus/ pain noted with active cervical ROM in all directions with decreased ROM.  VOR is affected at this time, with symptoms provocation with vertical testing. Convergence is WFL at this time. DGI and GANSOP testing reveal deficits in VSR as well. She would benefit from ongoing skilled physical therapy to address deficits, return to PLOF, and reduce risk for further decline in function.      Plan for Next Session: progress as tolerable; consider STM cervical musculature, check- canals if warranted        Time In / Time Out:    [id number]           Timed Code/Total Treatment Minutes:  10/50: 1 low eval 1 NMR      Next Progress Note due:  30 days or 10 visits       Plan of Care Interventions:  [x] Therapeutic Exercise  [x] Modalities:  [x] Therapeutic Activity     [] Ultrasound  [] Estim  [x] Gait Training      [] Cervical Traction [] Lumbar Traction  [x] Neuromuscular Re-education    [x] Cold/hotpack [] Iontophoresis   [x] Instruction in HEP      [] Vasopneumatic   [] Dry Needling    [x] Manual Therapy               [] Aquatic Therapy              Electronically signed by:  Genie Mandujano, PT, DPT 4/14/2025, 11:04 AM

## 2025-04-11 NOTE — PROGRESS NOTES
Physical Therapy: Initial Evaluation    Patient: Vita Clarke (84 y.o. female)   Examination Date: 2025  Plan of Care Certification Period:2025 to        :  1940 ;    Confirmed: Yes MRN: [id number]  CSN: [id number]   Insurance: Payor: MEDICARE / Plan: MEDICARE PART A AND B / Product Type: *No Product type* /   Insurance ID: [id number] - (Medicare) Secondary Insurance (if applicable): [facility]   Referring Physician: Jn Hopkins MD Schoefield, Minka Latrice, MD   PCP: Joaquim Gonzalez MD Visits to Date/Visits Approved:   (bomn)    No Show/Cancelled Appts:   /       Medical Diagnosis: Dizziness and giddiness [R42] dizziness  Treatment Diagnosis: dizziness , decreased balance     PERTINENT MEDICAL HISTORY   Patient Assessed for Rehabilitation Services: Yes  Self reported health status:: Good    Medical History: Chart Reviewed: Yes   Past Medical History:   Diagnosis Date    AF (paroxysmal atrial fibrillation) (HCC)     AK (actinic keratosis)     Surekha Groneema following    CAD (coronary artery disease)     2005 S/P PTCA and stents X2 - Dr Howard    Cancer (HCC)     skin    Carotid stenosis     Carotid stenosis, left     monitored by Dr Matson- - eval dr Trujillo 2024 w angiogram 70% and no intervention recommended    Coronary arteriosclerosis after percutaneous transluminal coronary angioplasty (PTCA) 2022    PCI procedure:DE Stent, LAD: DE Stent Plcmt Initl Vsl, LAD: DE Stent Plcmt Addtl Vsl, CIRC: DE Stent Plcmt Initl Vsl, Balloon Angioplasty, LAD: Balloon Angioplasty Initl Vsl, LAD: Balloon Angioplasty Addtl Vsl, CIRC: Balloon Angioplasty Initl Vsl.    Coronary artery disease     Dr Matson    Cough secondary to angiotensin converting enzyme inhibitor (ACE-I)     inactive    COVID-19 virus infection     tested pos in 2020- mild cough.  now resolved.    CTS (carpal tunnel syndrome)     inactive-S/P right CTS surg 2001- Dr Parmar    DDD (degenerative

## 2025-04-14 ENCOUNTER — HOSPITAL ENCOUNTER (OUTPATIENT)
Dept: PHYSICAL THERAPY | Age: 85
Setting detail: THERAPIES SERIES
Discharge: HOME | End: 2025-04-14
Payer: MEDICARE

## 2025-04-14 DIAGNOSIS — E11.40 TYPE 2 DIABETES MELLITUS WITH DIABETIC NEUROPATHY, WITHOUT LONG-TERM CURRENT USE OF INSULIN (HCC): ICD-10-CM

## 2025-04-14 PROCEDURE — 97161 PT EVAL LOW COMPLEX 20 MIN: CPT

## 2025-04-14 PROCEDURE — 97112 NEUROMUSCULAR REEDUCATION: CPT

## 2025-04-14 RX ORDER — HYDROCHLOROTHIAZIDE 25 MG/1
25 TABLET ORAL DAILY
Qty: 90 TABLET | Refills: 1 | OUTPATIENT
Start: 2025-04-14

## 2025-04-16 NOTE — PLAN OF CARE
Patients Plan of Care was received and signed. Signed POC was scanned and placed in the patients chart.    Misa Love

## 2025-04-21 ENCOUNTER — HOSPITAL ENCOUNTER (OUTPATIENT)
Dept: PHYSICAL THERAPY | Age: 85
Setting detail: THERAPIES SERIES
Discharge: HOME | End: 2025-04-21
Payer: MEDICARE

## 2025-04-21 PROCEDURE — 97112 NEUROMUSCULAR REEDUCATION: CPT

## 2025-04-21 NOTE — FLOWSHEET NOTE
Outpatient Physical Therapy  [place]           [x] Phone: [phone]   Fax: [phone]  [place]           [] Phone: [phone]   Fax: [phone]        Physical Therapy Daily Treatment Note  Date:  2025    Patient Name:  Vita Clarke    :  1940  MRN: [id number]  Restrictions/Precautions: No data recorded      Diagnosis:   Dizziness and giddiness [R42]    Date of Injury/Surgery:   Treatment Diagnosis:     Insurance/Certification information:    Referring Physician:  Jn Hopkins*     PCP: Joaquim Gonzalez MD  Next Doctor Visit:    Plan of care signed (Y/N):  sent day of eval   Outcome Measure: dhiq. Dgi   Visit# / total visits:   bomn   Pain level: 0/10 Denied but relates chronic neck pain   Goals:     Patient goals: to not get as dizzy or off balance    Long Term Goals  Time Frame for Long Term Goals: 6 weeks  Patient will demonstrate independence with HEP  Patient will improve DGI from  to > to demonstrate decreased fall risk with community negotiation.  Patient will demonstrate ability to perform VORx1 vertical for 20s with only mild symptoms to demonstrate imrpoved VOR.  Patient will demonstrate ability to stand on blue foam with EC for >15s with no LOB to demonstrate improved VSR.  Patient will report >50% improvement in overall condition to demo subjective improvement.      Summary of Evaluation:  Patient is an 84 year old female who presents to physical therapy with reports of dizziness that has been going on for over 5 years. She reports the dizziness is relatively random in nature. She has had botox in the past for her “TMJ” symptoms, which they are currently seeking secondary opinion at [facility] for salivary gland insufficiency.  She is accompanied by her adult daughter who notes she often gets off balance when she turns quickly. She denies any room spinning dizziness at this time. VBI testing negative this date. She does demonstrate moderate forward

## 2025-04-23 ENCOUNTER — OFFICE VISIT (OUTPATIENT)
Dept: INTERNAL MEDICINE CLINIC | Age: 85
End: 2025-04-23
Payer: MEDICARE

## 2025-04-23 VITALS
HEART RATE: 80 BPM | HEIGHT: 60 IN | DIASTOLIC BLOOD PRESSURE: 72 MMHG | WEIGHT: 168.2 LBS | OXYGEN SATURATION: 98 % | RESPIRATION RATE: 18 BRPM | BODY MASS INDEX: 33.02 KG/M2 | SYSTOLIC BLOOD PRESSURE: 138 MMHG

## 2025-04-23 DIAGNOSIS — N30.01 ACUTE CYSTITIS WITH HEMATURIA: Primary | ICD-10-CM

## 2025-04-23 LAB
BILIRUBIN, POC: NORMAL
BLOOD URINE, POC: NORMAL
CLARITY, POC: NORMAL
COLOR, POC: YELLOW
GLUCOSE URINE, POC: NORMAL MG/DL
KETONES, POC: NORMAL MG/DL
LEUKOCYTE EST, POC: NORMAL
NITRITE, POC: NORMAL
PH, POC: 7
PROTEIN, POC: NORMAL MG/DL
SPECIFIC GRAVITY, POC: 1.01
UROBILINOGEN, POC: 0.2 MG/DL

## 2025-04-23 PROCEDURE — G8427 DOCREV CUR MEDS BY ELIG CLIN: HCPCS | Performed by: NURSE PRACTITIONER

## 2025-04-23 PROCEDURE — 99213 OFFICE O/P EST LOW 20 MIN: CPT | Performed by: NURSE PRACTITIONER

## 2025-04-23 PROCEDURE — G8399 PT W/DXA RESULTS DOCUMENT: HCPCS | Performed by: NURSE PRACTITIONER

## 2025-04-23 PROCEDURE — 3078F DIAST BP <80 MM HG: CPT | Performed by: NURSE PRACTITIONER

## 2025-04-23 PROCEDURE — G8417 CALC BMI ABV UP PARAM F/U: HCPCS | Performed by: NURSE PRACTITIONER

## 2025-04-23 PROCEDURE — 1036F TOBACCO NON-USER: CPT | Performed by: NURSE PRACTITIONER

## 2025-04-23 PROCEDURE — 1159F MED LIST DOCD IN RCRD: CPT | Performed by: NURSE PRACTITIONER

## 2025-04-23 PROCEDURE — 1160F RVW MEDS BY RX/DR IN RCRD: CPT | Performed by: NURSE PRACTITIONER

## 2025-04-23 PROCEDURE — 3075F SYST BP GE 130 - 139MM HG: CPT | Performed by: NURSE PRACTITIONER

## 2025-04-23 PROCEDURE — 1090F PRES/ABSN URINE INCON ASSESS: CPT | Performed by: NURSE PRACTITIONER

## 2025-04-23 PROCEDURE — 81002 URINALYSIS NONAUTO W/O SCOPE: CPT | Performed by: NURSE PRACTITIONER

## 2025-04-23 PROCEDURE — 1124F ACP DISCUSS-NO DSCNMKR DOCD: CPT | Performed by: NURSE PRACTITIONER

## 2025-04-23 RX ORDER — SULFAMETHOXAZOLE AND TRIMETHOPRIM 800; 160 MG/1; MG/1
1 TABLET ORAL 2 TIMES DAILY
Qty: 14 TABLET | Refills: 0 | Status: SHIPPED | OUTPATIENT
Start: 2025-04-23 | End: 2025-04-30

## 2025-04-23 RX ORDER — ISOSORBIDE MONONITRATE 30 MG/1
30 TABLET, EXTENDED RELEASE ORAL DAILY
Qty: 90 TABLET | Refills: 1 | Status: CANCELLED | OUTPATIENT
Start: 2025-04-23

## 2025-04-23 ASSESSMENT — ENCOUNTER SYMPTOMS
APNEA: 0
COLOR CHANGE: 0
SINUS PAIN: 0
CHEST TIGHTNESS: 0
DIARRHEA: 0
COUGH: 0
SHORTNESS OF BREATH: 0
VOMITING: 0
NAUSEA: 0
SINUS PRESSURE: 0
ABDOMINAL PAIN: 0

## 2025-04-23 NOTE — PROGRESS NOTES
Vita CARLOS Clarke  1940 04/23/25    Chief Complaint   Patient presents with    Dysuria     Frequent urination, abdominal pain, burning during urination sx started last night        SUBJECTIVE:      Mrs Clarke is a current patient of Dr Gonzalez who presents to the office this afternoon for c/o painful, frequent urination and suprapubic pressure after voiding which she first noticed yesterday evening. Denies any fevers or chills, flank pain, or gross hematuria. POCT UA does show moderate blood and large leukocytes. Negative for nitrates.     Review of Systems   Constitutional:  Negative for activity change, appetite change, fatigue and fever.   HENT:  Negative for congestion, nosebleeds, sinus pressure and sinus pain.    Respiratory:  Negative for apnea, cough, chest tightness and shortness of breath.    Cardiovascular:  Negative for chest pain and palpitations.   Gastrointestinal:  Negative for abdominal pain, diarrhea, nausea and vomiting.   Genitourinary:  Positive for dysuria, frequency and urgency. Negative for difficulty urinating, flank pain and hematuria.   Musculoskeletal:  Negative for arthralgias, joint swelling and myalgias.   Skin:  Negative for color change and rash.   Neurological:  Negative for dizziness, light-headedness and headaches.   Psychiatric/Behavioral: Negative.  Negative for behavioral problems.        OBJECTIVE:    /72   Pulse 80   Resp 18   Ht 1.524 m (5')   Wt 76.3 kg (168 lb 3.2 oz)   LMP  (LMP Unknown)   SpO2 98%   BMI 32.85 kg/m²     Physical Exam  Constitutional:       General: She is not in acute distress.     Appearance: She is well-developed. She is not diaphoretic.   HENT:      Head: Normocephalic and atraumatic.   Cardiovascular:      Rate and Rhythm: Normal rate and regular rhythm.      Heart sounds: Normal heart sounds. No murmur heard.     No friction rub.   Pulmonary:      Effort: Pulmonary effort is normal. No respiratory distress.      Breath sounds: Normal

## 2025-04-25 LAB — BACTERIA UR CULT: NORMAL

## 2025-04-28 ENCOUNTER — RESULTS FOLLOW-UP (OUTPATIENT)
Dept: INTERNAL MEDICINE CLINIC | Age: 85
End: 2025-04-28

## 2025-04-28 ENCOUNTER — HOSPITAL ENCOUNTER (OUTPATIENT)
Dept: PHYSICAL THERAPY | Age: 85
Setting detail: THERAPIES SERIES
Discharge: HOME | End: 2025-04-28
Payer: MEDICARE

## 2025-04-28 PROCEDURE — 97112 NEUROMUSCULAR REEDUCATION: CPT

## 2025-04-28 NOTE — FLOWSHEET NOTE
Outpatient Physical Therapy  [place]           [x] Phone: [phone]   Fax: [phone]  [place]           [] Phone: [phone]   Fax: [phone]        Physical Therapy Daily Treatment Note  Date:  2025    Patient Name:  Vita Clarke    :  1940  MRN: [id number]  Restrictions/Precautions: No data recorded      Diagnosis:   Dizziness and giddiness [R42]    Date of Injury/Surgery:   Treatment Diagnosis:     Insurance/Certification information:    Referring Physician:  Jn Hopkins*     PCP: Joaquim Gonzalez MD  Next Doctor Visit:    Plan of care signed (Y/N):  sent day of eval   Outcome Measure: dhiq. Dgi   Visit# / total visits: 3 /6  bomn   Pain level: 0/10 Denied but relates chronic neck pain   Goals:     Patient goals: to not get as dizzy or off balance    Long Term Goals  Time Frame for Long Term Goals: 6 weeks  Patient will demonstrate independence with HEP  Patient will improve DGI from  to > to demonstrate decreased fall risk with community negotiation.  Patient will demonstrate ability to perform VORx1 vertical for 20s with only mild symptoms to demonstrate imrpoved VOR.  Patient will demonstrate ability to stand on blue foam with EC for >15s with no LOB to demonstrate improved VSR.  Patient will report >50% improvement in overall condition to demo subjective improvement.      Summary of Evaluation:  Patient is an 84 year old female who presents to physical therapy with reports of dizziness that has been going on for over 5 years. She reports the dizziness is relatively random in nature. She has had botox in the past for her “TMJ” symptoms, which they are currently seeking secondary opinion at [facility] for salivary gland insufficiency.  She is accompanied by her adult daughter who notes she often gets off balance when she turns quickly. She denies any room spinning dizziness at this time. VBI testing negative this date. She does demonstrate moderate forward

## 2025-05-02 DIAGNOSIS — I63.30 CEREBROVASCULAR ACCIDENT (CVA) DUE TO THROMBOSIS OF CEREBRAL ARTERY (HCC): ICD-10-CM

## 2025-05-02 RX ORDER — CLOPIDOGREL BISULFATE 75 MG/1
75 TABLET ORAL DAILY
Qty: 90 TABLET | Refills: 1 | Status: SHIPPED | OUTPATIENT
Start: 2025-05-02

## 2025-05-02 RX ORDER — HYDROCHLOROTHIAZIDE 25 MG/1
12.5 TABLET ORAL DAILY
Qty: 90 TABLET | Refills: 1 | Status: SHIPPED | OUTPATIENT
Start: 2025-05-02

## 2025-05-05 ENCOUNTER — HOSPITAL ENCOUNTER (OUTPATIENT)
Dept: PHYSICAL THERAPY | Age: 85
Setting detail: THERAPIES SERIES
Discharge: HOME | End: 2025-05-05
Payer: MEDICARE

## 2025-05-05 PROCEDURE — 97112 NEUROMUSCULAR REEDUCATION: CPT

## 2025-05-05 NOTE — FLOWSHEET NOTE
Outpatient Physical Therapy  [place]           [x] Phone: [phone]   Fax: [phone]  [place]           [] Phone: [phone]   Fax: [phone]        Physical Therapy Daily Treatment Note  Date:  2025    Patient Name:  Vita Clarke    :  1940  MRN: [id number]  Restrictions/Precautions: No data recorded      Diagnosis:   Dizziness and giddiness [R42]    Date of Injury/Surgery:   Treatment Diagnosis:     Insurance/Certification information:    Referring Physician:  Jn Hopkins*     PCP: Joaquim Gonzalez MD  Next Doctor Visit:    Plan of care signed (Y/N):  sent day of eval   Outcome Measure: dhiq. Dgi   Visit# / total visits: 3 /6  bomn   Pain level: 0/10 Denied but relates chronic neck pain   Goals:     Patient goals: to not get as dizzy or off balance    Long Term Goals  Time Frame for Long Term Goals: 6 weeks  Patient will demonstrate independence with HEP  Patient will improve DGI from  to > to demonstrate decreased fall risk with community negotiation.  Patient will demonstrate ability to perform VORx1 vertical for 20s with only mild symptoms to demonstrate imrpoved VOR.  Patient will demonstrate ability to stand on blue foam with EC for >15s with no LOB to demonstrate improved VSR.  Patient will report >50% improvement in overall condition to demo subjective improvement.      Summary of Evaluation:  Patient is an 84 year old female who presents to physical therapy with reports of dizziness that has been going on for over 5 years. She reports the dizziness is relatively random in nature. She has had botox in the past for her “TMJ” symptoms, which they are currently seeking secondary opinion at [facility] for salivary gland insufficiency.  She is accompanied by her adult daughter who notes she often gets off balance when she turns quickly. She denies any room spinning dizziness at this time. VBI testing negative this date. She does demonstrate moderate forward

## 2025-05-08 NOTE — PROGRESS NOTES
MRN: [id number]  Name: Vita Clarke  : 1940    Insurance: Payor: MEDICARE /  /  /      Phone #: [phone]  Provider: Mary Matson MD     Date of Visit: 5/15/2025    Reason for visit: 3 Month f/u  Recent Hospitalization Date:    Reason for Hospitalization:    Last EK2025  Type of Device:       Vitals BP HR O2% WT HT ORTHO BP LYING ORTHO BP SITTING ORTHO BP SITTING   Today's Findings           Patients work up- Check List     Testing Last Date Completed Date Expected  ([facility] [employer] [facility]) Additional Notes    MA to document For provider to complete Either MA or Provider    Carotid Duplex 2025 STAT 1 WK 6 MTH       THIS WK 2 WK 1 YEAR     Cardiac CTA  STAT 1 WK 6 MTH       THIS WK 2 WK 1 YEAR     Cardiac CT Calcium scoring  STAT 1 WK 6 MTH       THIS WK 2 WK 1 YEAR     CTA Chest, Abdomen & Pelvis  STAT 1 WK 6 MTH       THIS WK 2 WK 1 YEAR     CT Chest IV w/ Contrast  STAT 1 WK 6 MTH       THIS WK 2 WK 1 YEAR     CT Chest w/o Contrast  STAT 1 WK 6 MTH       THIS WK 2 WK 1 YEAR     CXR  STAT 1 WK 6 MTH       THIS WK 2 WK 1 YEAR     ECHO  Stress Complete Limited     MRI- Cardiac  STAT 1 WK 6 MTH       THIS WK 2 WK 1 YEAR     MUGA Scan  STAT 1 WK 6 MTH       THIS WK 2 WK 1 YEAR     Nuclear Stress  Lexiscan Cardiolite     PFT  STAT 1 WK 6 MTH       THIS WK 2 WK 1 YEAR     Treadmill Stress Test  STAT 1 WK 6 MTH       THIS WK 2 WK 1 YEAR     Vascular Duplex 10/2024 Lower: Right Left Bilat       Upper: Right Left Bilat     Other Test Not Listed:    Monitors Last Date Completed Day's Request/Ordered     Holter  Short term 24 hours 48 hours      Long term 3 days 7 days 14 days   Event   (1-30 days)      Procedures Last Date Performed Procedure Details Date Expected   Additional Notes    ASD Closure        Carotid Angio        Cardioversion        Heart Cath  R L R&L      Peripheral Angio  R L      PFO Closure        PTCA/PCI        AARON 2024       AARON/Cardioversion        Venogram        Tilt Table        Other Type

## 2025-05-09 ENCOUNTER — OFFICE VISIT (OUTPATIENT)
Dept: INTERNAL MEDICINE CLINIC | Age: 85
End: 2025-05-09
Payer: MEDICARE

## 2025-05-09 VITALS
BODY MASS INDEX: 32.81 KG/M2 | SYSTOLIC BLOOD PRESSURE: 138 MMHG | WEIGHT: 168 LBS | DIASTOLIC BLOOD PRESSURE: 74 MMHG | HEART RATE: 66 BPM | OXYGEN SATURATION: 97 %

## 2025-05-09 DIAGNOSIS — N30.00 ACUTE CYSTITIS WITHOUT HEMATURIA: Primary | ICD-10-CM

## 2025-05-09 DIAGNOSIS — E11.40 TYPE 2 DIABETES MELLITUS WITH DIABETIC NEUROPATHY, WITHOUT LONG-TERM CURRENT USE OF INSULIN (HCC): ICD-10-CM

## 2025-05-09 LAB
BILIRUBIN, POC: NEGATIVE
BLOOD URINE, POC: NORMAL
CLARITY, POC: NORMAL
COLOR, POC: NORMAL
GLUCOSE URINE, POC: NEGATIVE MG/DL
KETONES, POC: NEGATIVE MG/DL
LEUKOCYTE EST, POC: NORMAL
NITRITE, POC: NEGATIVE
PH, POC: 7
PROTEIN, POC: NEGATIVE MG/DL
SPECIFIC GRAVITY, POC: 1.01
UROBILINOGEN, POC: 0.2 MG/DL

## 2025-05-09 PROCEDURE — 1159F MED LIST DOCD IN RCRD: CPT | Performed by: INTERNAL MEDICINE

## 2025-05-09 PROCEDURE — 81002 URINALYSIS NONAUTO W/O SCOPE: CPT | Performed by: INTERNAL MEDICINE

## 2025-05-09 PROCEDURE — 1124F ACP DISCUSS-NO DSCNMKR DOCD: CPT | Performed by: INTERNAL MEDICINE

## 2025-05-09 PROCEDURE — 1160F RVW MEDS BY RX/DR IN RCRD: CPT | Performed by: INTERNAL MEDICINE

## 2025-05-09 PROCEDURE — G2211 COMPLEX E/M VISIT ADD ON: HCPCS | Performed by: INTERNAL MEDICINE

## 2025-05-09 PROCEDURE — 3044F HG A1C LEVEL LT 7.0%: CPT | Performed by: INTERNAL MEDICINE

## 2025-05-09 PROCEDURE — G8427 DOCREV CUR MEDS BY ELIG CLIN: HCPCS | Performed by: INTERNAL MEDICINE

## 2025-05-09 PROCEDURE — 1036F TOBACCO NON-USER: CPT | Performed by: INTERNAL MEDICINE

## 2025-05-09 PROCEDURE — 3075F SYST BP GE 130 - 139MM HG: CPT | Performed by: INTERNAL MEDICINE

## 2025-05-09 PROCEDURE — 99213 OFFICE O/P EST LOW 20 MIN: CPT | Performed by: INTERNAL MEDICINE

## 2025-05-09 PROCEDURE — G8399 PT W/DXA RESULTS DOCUMENT: HCPCS | Performed by: INTERNAL MEDICINE

## 2025-05-09 PROCEDURE — 1090F PRES/ABSN URINE INCON ASSESS: CPT | Performed by: INTERNAL MEDICINE

## 2025-05-09 PROCEDURE — 3078F DIAST BP <80 MM HG: CPT | Performed by: INTERNAL MEDICINE

## 2025-05-09 PROCEDURE — G8417 CALC BMI ABV UP PARAM F/U: HCPCS | Performed by: INTERNAL MEDICINE

## 2025-05-09 RX ORDER — NITROFURANTOIN 25; 75 MG/1; MG/1
100 CAPSULE ORAL 2 TIMES DAILY
Qty: 20 CAPSULE | Refills: 0 | Status: SHIPPED | OUTPATIENT
Start: 2025-05-09 | End: 2025-05-19

## 2025-05-09 NOTE — PROGRESS NOTES
NOT BE A FACTOR PREDISPOSING TO RECURRENT INFECTION  iVta was seen today for cystitis.    Diagnoses and all orders for this visit:    Acute cystitis without hematuria  -     Culture, Urine  -     nitrofurantoin, macrocrystal-monohydrate, (MACROBID) 100 MG capsule; Take 1 capsule by mouth 2 times daily for 10 days  -     POCT Urinalysis no Micro

## 2025-05-11 LAB
BACTERIA UR CULT: ABNORMAL
BACTERIA UR CULT: ABNORMAL
ORGANISM: ABNORMAL
ORGANISM: ABNORMAL

## 2025-05-12 ENCOUNTER — HOSPITAL ENCOUNTER (OUTPATIENT)
Dept: PHYSICAL THERAPY | Age: 85
Setting detail: THERAPIES SERIES
Discharge: HOME | End: 2025-05-12
Payer: MEDICARE

## 2025-05-12 ENCOUNTER — RESULTS FOLLOW-UP (OUTPATIENT)
Dept: INTERNAL MEDICINE CLINIC | Age: 85
End: 2025-05-12

## 2025-05-12 DIAGNOSIS — N39.0 URINARY TRACT INFECTION WITHOUT HEMATURIA, SITE UNSPECIFIED: Primary | ICD-10-CM

## 2025-05-12 DIAGNOSIS — B96.5 PSEUDOMONAS URINARY TRACT INFECTION: Primary | ICD-10-CM

## 2025-05-12 DIAGNOSIS — N39.0 PSEUDOMONAS URINARY TRACT INFECTION: Primary | ICD-10-CM

## 2025-05-12 LAB
BACTERIA UR CULT: ABNORMAL
BACTERIA UR CULT: ABNORMAL
ORGANISM: ABNORMAL
ORGANISM: ABNORMAL

## 2025-05-12 PROCEDURE — 97112 NEUROMUSCULAR REEDUCATION: CPT

## 2025-05-12 RX ORDER — CIPROFLOXACIN 250 MG/1
250 TABLET, FILM COATED ORAL 2 TIMES DAILY
Qty: 14 TABLET | Refills: 0 | Status: SHIPPED | OUTPATIENT
Start: 2025-05-12 | End: 2025-05-19

## 2025-05-12 NOTE — DISCHARGE SUMMARY
Outpatient Physical Therapy           [place]           [x] Phone: [phone]   Fax: [phone]  [place]           [] Phone: [phone]   Fax: [phone]      To: Jn Hopkins*     From: Genie Mandujano, PT, DPT     Patient: Vita Clarke                    : 1940  Diagnosis:  Dizziness and giddiness [R42]        Treatment Diagnosis:      dizziness , decreased balance   Date: 2025  []  Progress Note                [x]  Discharge Note    Evaluation Date:  25   Total Visits to date:   5 Cancels/No-shows to date:  0    Subjective:    \"the weather has been giving my arthritis fits. Back is probably the worst right now.  I have not even been busy at home for the last week. Balance has been doing pretty good. I still wobble to the side a little bit . \"      Dizziness: 0/10 denied     Plan of Care/Treatment to date:  [x] Therapeutic Exercise    [] Modalities:  [x] Therapeutic Activity     [] Ultrasound  [] Electrical Stimulation  [x] Gait Training      [] Cervical Traction   [] Lumbar Traction  [x] Neuromuscular Re-education  [] Cold/hotpack [] Iontophoresis  [x] Instruction in HEP      Other:  [x] Manual Therapy       []  Vasopneumatic  [x] Canalith repositioning       []   Dry Needle Therapy                      Objective/Significant Findings At Last Visit/Comments:    25     DGI:   VOR: denied dizziness, good speed today   -denied dizziness with cone/UE taps   Rhomberg EC blue foam: 20s minimum sway!     Assessment:     Today is a progress check for Vita. Vita denies any dizziness over the past 2 weeks. She and her adult daughter report balance has also improved aside from occasional sway when ambulating longer distances. VSR has improved secondary to GANSOP and DGI testing today. Vita feels confident with her discharge at this time. She was provided updated HEP to continue working on LE strength and VOR. She was encouraged to reach back out with any

## 2025-05-12 NOTE — RESULT ENCOUNTER NOTE
Please CALL - EDIS HAS A BACTERIA CALLED PSEUDOMONAS W HER BLADDER INFECTION.  THE BEST MEDICATION IS CIPRO- SHE HAD NAUSEA ON THIS BEFORE BUT I'D LIKE TO RETRY FOR A WEEK.  SUGGEST TRYING ONCE/DAY FOR TWO DAYS, THEN IF TOLERATING OK, INCR TO TWICE/DAY FOR THE NEXT WEEK.  PUSH FLUIDS DRINKING AT LEAST 3-4 GLASSES OF WATER/DAY, THEN RECHECK UA AND UCX ONE WEEK, DX UTI

## 2025-05-12 NOTE — PROGRESS NOTES
Outpatient Physical Therapy           [place]           [x] Phone: [phone]   Fax: [phone]  [place]           [] Phone: [phone]   Fax: [phone]      To: Jn Hopkins*     From: Genie Mandujano, PT, DPT     Patient: Vita Clarke                    : 1940  Diagnosis:  Dizziness and giddiness [R42]        Treatment Diagnosis:      dizziness , decreased balance   Date: 2025  []  Progress Note                [x]  Discharge Note    Evaluation Date:  25   Total Visits to date:   5 Cancels/No-shows to date:  0    Subjective:    \"the weather has been giving my arthritis fits. Back is probably the worst right now.  I have not even been busy at home for the last week. Balance has been doing pretty good. I still wobble to the side a little bit . \"      Dizziness: 0/10 denied     Plan of Care/Treatment to date:  [x] Therapeutic Exercise    [] Modalities:  [x] Therapeutic Activity     [] Ultrasound  [] Electrical Stimulation  [x] Gait Training      [] Cervical Traction   [] Lumbar Traction  [x] Neuromuscular Re-education  [] Cold/hotpack [] Iontophoresis  [x] Instruction in HEP      Other:  [x] Manual Therapy       []  Vasopneumatic  [x] Canalith repositioning       []   Dry Needle Therapy                      Objective/Significant Findings At Last Visit/Comments:    25     DGI:   VOR: denied dizziness, good speed today   -denied dizziness with cone/UE taps   Rhomberg EC blue foam: 20s minimum sway!     Assessment:     Today is a progress check for Vita. Vita denies any dizziness over the past 2 weeks. She and her adult daughter report balance has also improved aside from occasional sway when ambulating longer distances. VSR has improved secondary to GANSOP and DGI testing today. Vita feels confident with her discharge at this time. She was provided updated HEP to continue working on LE strength and VOR. She was encouraged to reach back out with any

## 2025-05-12 NOTE — FLOWSHEET NOTE
Outpatient Physical Therapy  [place]           [x] Phone: [phone]   Fax: [phone]  [place]           [] Phone: [phone]   Fax: [phone]        Physical Therapy Daily Treatment Note  Date:  2025    Patient Name:  Vita Clarke    :  1940  MRN: [id number]  Restrictions/Precautions: No data recorded      Diagnosis:   Dizziness and giddiness [R42]    Date of Injury/Surgery:   Treatment Diagnosis:     Insurance/Certification information:    Referring Physician:  Jn Hopkins*     PCP: Joaquim Gonzalez MD  Next Doctor Visit:    Plan of care signed (Y/N):  sent day of eval   Outcome Measure: dhiq. Dgi   Visit# / total visits:   bomn   Pain level: 0/10 Denied but relates chronic neck pain   Goals:     Patient goals: to not get as dizzy or off balance MET 25    Long Term Goals  Time Frame for Long Term Goals: 6 weeks  Patient will demonstrate independence with HEP MET 25  Patient will improve DGI from  to > to demonstrate decreased fall risk with community negotiation. MET 25 22/24  Patient will demonstrate ability to perform VORx1 vertical for 20s with only mild symptoms to demonstrate imrpoved VOR. MET 25  Patient will demonstrate ability to stand on blue foam with EC for >15s with no LOB to demonstrate improved VSR. MET 25 20s min sway   Patient will report >50% improvement in overall condition to demo subjective improvement. MET 25 90%       Summary of Evaluation:  Patient is an 84 year old female who presents to physical therapy with reports of dizziness that has been going on for over 5 years. She reports the dizziness is relatively random in nature. She has had botox in the past for her “TMJ” symptoms, which they are currently seeking secondary opinion at [facility] for salivary gland insufficiency.  She is accompanied by her adult daughter who notes she often gets off balance when she turns quickly. She denies any room

## 2025-05-15 ENCOUNTER — OFFICE VISIT (OUTPATIENT)
Dept: CARDIOLOGY CLINIC | Age: 85
End: 2025-05-15
Payer: MEDICARE

## 2025-05-15 VITALS
HEART RATE: 58 BPM | HEIGHT: 60 IN | DIASTOLIC BLOOD PRESSURE: 66 MMHG | SYSTOLIC BLOOD PRESSURE: 130 MMHG | BODY MASS INDEX: 32.98 KG/M2 | WEIGHT: 168 LBS

## 2025-05-15 DIAGNOSIS — I25.10 ASCVD (ARTERIOSCLEROTIC CARDIOVASCULAR DISEASE): Primary | ICD-10-CM

## 2025-05-15 PROCEDURE — 1090F PRES/ABSN URINE INCON ASSESS: CPT | Performed by: INTERNAL MEDICINE

## 2025-05-15 PROCEDURE — G8399 PT W/DXA RESULTS DOCUMENT: HCPCS | Performed by: INTERNAL MEDICINE

## 2025-05-15 PROCEDURE — 1124F ACP DISCUSS-NO DSCNMKR DOCD: CPT | Performed by: INTERNAL MEDICINE

## 2025-05-15 PROCEDURE — 1036F TOBACCO NON-USER: CPT | Performed by: INTERNAL MEDICINE

## 2025-05-15 PROCEDURE — G8417 CALC BMI ABV UP PARAM F/U: HCPCS | Performed by: INTERNAL MEDICINE

## 2025-05-15 PROCEDURE — G8427 DOCREV CUR MEDS BY ELIG CLIN: HCPCS | Performed by: INTERNAL MEDICINE

## 2025-05-15 PROCEDURE — 3075F SYST BP GE 130 - 139MM HG: CPT | Performed by: INTERNAL MEDICINE

## 2025-05-15 PROCEDURE — 99214 OFFICE O/P EST MOD 30 MIN: CPT | Performed by: INTERNAL MEDICINE

## 2025-05-15 PROCEDURE — 3078F DIAST BP <80 MM HG: CPT | Performed by: INTERNAL MEDICINE

## 2025-05-15 PROCEDURE — 1159F MED LIST DOCD IN RCRD: CPT | Performed by: INTERNAL MEDICINE

## 2025-05-15 NOTE — PROGRESS NOTES
CARDIOLOGY NOTE      5/15/2025    RE: Vita Clarke  (1940)                               TO:  Joaquim Ramírez MD            CHIEF COMPLAINT   Vita is a 84 y.o. female who was seen today for management of coronary disease                                  Here for follow-up    HPI:                   Pt has h/o coronary artery disease, hypertension, hyperlipidemia, non-insulin-dependent diabetes seen today for follow-up.  Vita Clarke has the following history recorded in care path:  Patient Active Problem List    Diagnosis Date Noted    Presence of Watchman left atrial appendage closure device 04/04/2024    Cerebrovascular accident (CVA) due to thrombosis of cerebral artery (HCC) 10/12/2016    Headache 08/04/2022    Essential hypertension     Thyroid nodule     Dysarthria due to recent cerebral infarction 10/15/2016    Oropharyngeal dysphagia 10/15/2016    Gait disturbance 10/15/2016    Coronary artery disease involving native coronary artery of native heart without angina pectoris     Diabetes mellitus with neuropathy (HCC)     SCC (squamous cell carcinoma), leg     Memory loss     IFG (impaired fasting glucose)     Carotid stenosis, left     Hyperlipidemia     Osteoarthritis, knee     ASCVD (arteriosclerotic cardiovascular disease)     Rheumatoid arthritis (HCC)     Peripheral neuropathy (HCC)     Stage 3b chronic kidney disease (HCC) 03/11/2025    Localized edema 11/12/2024    Fall from ground level 06/15/2024    Shortness of breath 05/29/2024    PAF (paroxysmal atrial fibrillation) (HCC) 03/18/2024    Secondary hypercoagulable state 03/18/2024    Chest pain 02/16/2024    Abnormal laboratory test 01/04/2024    ASHLEY positive 01/04/2024    Fungal infection 01/04/2024    Swelling of left side of face 12/09/2023    Swelling of joint, knee, left 12/09/2023    Carotid artery calcification, left 07/12/2023    COVID-19 virus infection     S/P colonoscopic polypectomy 11/20/2020    DDD

## 2025-05-15 NOTE — PATIENT INSTRUCTIONS
Thank you for allowing us to care for you today!   We want to ensure we can follow your treatment plan and we strive to give you the best outcomes and experience possible.   If you ever have a life threatening emergency and call 911 - for an ambulance (EMS)  REMEMBER  Our providers can only care for you at:   [facility] or [facility]   Even if you have someone take you or you drive yourself we can only care for you in a [facility] facility. Our providers are not setup at the other healthcare locations!    PLEASE CALL OUR OFFICE DURING NORMAL BUSINESS HOURS  Monday through Friday 8 am to 5 pm  AFTER HOURS the physician on-call cannot help with scheduling, rescheduling, procedure instruction questions or any type of medication need or issue.  [facility] P:[phone] - [facility] P:[phone] - [facility] P:[phone]      If you receive a survey:  We would appreciate you taking the time to share your experience concerning your provider visit in the office.    These surveys are confidential!  We are eager to improve and are counting on you to share your feedback so we can ensure you get the best care possible.

## 2025-05-15 NOTE — PROGRESS NOTES
CLINICAL STAFF DOCUMENTATION    Dr. Mary Clarke  1940  [id number]    Have you had any Chest Pain recently? - Yes not new   If Yes DO EKG   When did the pain begin? - Years   What type of pain is it? - Sharp Pain   Tender to palpate (touch)? No  Does the pain radiate or stay in one place? - goes into back and jaw sometimes  How long does the pain last? - 4 minutes    How Severe is the pain? -   Is there anything that aggravates or triggers the pain?   Did you take any medication? Nitro   And did it relieve the pain - Yes  Is there anything that relieves it?   Do you have any other symptoms at the same time?     DO EKG IF: Patient has a Heart Rate above 100 or below 50 (Ex:A-fib, Aflutter, PAF, SVT, VT, Bradycardia)      CAD (Coronary Artery Disease) patient should have one on file every 6 months     New Consult or New Patient     If patient is following up from a current Stent/PCI     If patient is following up from a current Cardioversion        Have you had any Shortness of Breath - Yes not new  When did it begin? - Years   If Yes - When walking  How many flights of stairs can you go up without having SOB? - 1  Are you on Oxygen during the day or at night? - No  How many liters of Oxygen are you on? -   How many pillows do you sleep with under your head? - sleeps in recliner    Have you had any dizziness - Yes just went through for the dizziness and it helped  If Yes DO ORTHOSTATIC BP including pulse  When do you feel dizzy? with position change  Does the room spin?   How long does it last .2  minutes     Have patient lie down for 5 minutes then measure blood pressure and pulse rate.   Have the patient stand.   Repeat blood pressure and pulse rate after patient has been standing for 1 minute   Repeat blood pressure and pulse rate after patient has been standing for 3 minutes.   Be sure to ask what symptoms they are having if they get dizzy while completing ortho stats such as: room spinning,

## 2025-05-16 ENCOUNTER — TELEPHONE (OUTPATIENT)
Dept: INTERNAL MEDICINE CLINIC | Age: 85
End: 2025-05-16

## 2025-05-16 NOTE — TELEPHONE ENCOUNTER
Pt called stating for her hydrochlorothiazide her directions states 0.5mg by mouth daily but pt states she has never taken half she has always taken a whole pill

## 2025-06-03 ENCOUNTER — HOSPITAL ENCOUNTER (OUTPATIENT)
Age: 85
Discharge: HOME | End: 2025-06-03
Payer: MEDICARE

## 2025-06-03 LAB
BILIRUB UR QL STRIP: NEGATIVE
CLARITY UR: CLEAR
COLOR UR: YELLOW
EPI CELLS #/AREA URNS HPF: 1 /HPF
GLUCOSE UR STRIP-MCNC: NEGATIVE MG/DL
HGB UR QL STRIP.AUTO: NEGATIVE
KETONES UR STRIP-MCNC: NEGATIVE MG/DL
LEUKOCYTE ESTERASE UR QL STRIP: ABNORMAL
NITRITE UR QL STRIP: NEGATIVE
PH UR STRIP: 6.5 [PH] (ref 5–8)
PROT UR STRIP-MCNC: NEGATIVE MG/DL
RBC #/AREA URNS HPF: 0 /HPF (ref 0–2)
SP GR UR STRIP: <1.005 (ref 1–1.03)
UROBILINOGEN UR STRIP-ACNC: 0.2 EU/DL (ref 0–1)
WBC #/AREA URNS HPF: 1 /HPF (ref 0–5)

## 2025-06-03 PROCEDURE — 87086 URINE CULTURE/COLONY COUNT: CPT

## 2025-06-03 PROCEDURE — 81001 URINALYSIS AUTO W/SCOPE: CPT

## 2025-06-04 ENCOUNTER — RESULTS FOLLOW-UP (OUTPATIENT)
Dept: INTERNAL MEDICINE CLINIC | Age: 85
End: 2025-06-04

## 2025-06-04 LAB
MICROORGANISM SPEC CULT: NORMAL
SERVICE CMNT-IMP: NORMAL
SPECIMEN DESCRIPTION: NORMAL

## 2025-06-11 ENCOUNTER — HOSPITAL ENCOUNTER (OUTPATIENT)
Age: 85
Discharge: HOME | End: 2025-06-11
Payer: MEDICARE

## 2025-06-11 ENCOUNTER — RESULTS FOLLOW-UP (OUTPATIENT)
Dept: INTERNAL MEDICINE CLINIC | Age: 85
End: 2025-06-11

## 2025-06-11 DIAGNOSIS — E11.40 TYPE 2 DIABETES MELLITUS WITH DIABETIC NEUROPATHY, WITHOUT LONG-TERM CURRENT USE OF INSULIN (HCC): ICD-10-CM

## 2025-06-11 DIAGNOSIS — I10 ESSENTIAL HYPERTENSION: ICD-10-CM

## 2025-06-11 DIAGNOSIS — N18.32 STAGE 3B CHRONIC KIDNEY DISEASE (HCC): ICD-10-CM

## 2025-06-11 DIAGNOSIS — I48.0 PAF (PAROXYSMAL ATRIAL FIBRILLATION) (HCC): ICD-10-CM

## 2025-06-11 LAB
ALBUMIN SERPL-MCNC: 4.4 G/DL (ref 3.4–5)
ALBUMIN/GLOB SERPL: 1.3 {RATIO} (ref 1.1–2.2)
ALP SERPL-CCNC: 103 U/L (ref 40–129)
ALT SERPL-CCNC: 18 U/L (ref 10–40)
ANION GAP SERPL CALCULATED.3IONS-SCNC: 14 MMOL/L (ref 9–17)
AST SERPL-CCNC: 23 U/L (ref 15–37)
BASOPHILS # BLD: 0.03 K/UL
BASOPHILS NFR BLD: 1 % (ref 0–1)
BILIRUB SERPL-MCNC: 0.6 MG/DL (ref 0–1)
BUN SERPL-MCNC: 23 MG/DL (ref 7–20)
CALCIUM SERPL-MCNC: 9.8 MG/DL (ref 8.3–10.6)
CHLORIDE SERPL-SCNC: 98 MMOL/L (ref 99–110)
CHOLEST SERPL-MCNC: 138 MG/DL (ref 125–199)
CO2 SERPL-SCNC: 25 MMOL/L (ref 21–32)
CREAT SERPL-MCNC: 0.8 MG/DL (ref 0.6–1.2)
CREAT UR-MCNC: 30.9 MG/DL (ref 28–217)
EOSINOPHIL # BLD: 0.26 K/UL
EOSINOPHILS RELATIVE PERCENT: 4 % (ref 0–3)
ERYTHROCYTE [DISTWIDTH] IN BLOOD BY AUTOMATED COUNT: 12.8 % (ref 11.7–14.9)
EST. AVERAGE GLUCOSE BLD GHB EST-MCNC: 146 MG/DL
GFR, ESTIMATED: 65 ML/MIN/1.73M2
GLUCOSE SERPL-MCNC: 131 MG/DL (ref 74–99)
HBA1C MFR BLD: 6.7 % (ref 4.2–6.3)
HCT VFR BLD AUTO: 37.7 % (ref 37–47)
HDLC SERPL-MCNC: 42 MG/DL
HGB BLD-MCNC: 12.6 G/DL (ref 12.5–16)
IMM GRANULOCYTES # BLD AUTO: 0.01 K/UL
IMM GRANULOCYTES NFR BLD: 0 %
LDLC SERPL CALC-MCNC: 68 MG/DL
LYMPHOCYTES NFR BLD: 1.85 K/UL
LYMPHOCYTES RELATIVE PERCENT: 31 % (ref 24–44)
MAGNESIUM SERPL-MCNC: 1.8 MG/DL (ref 1.8–2.4)
MCH RBC QN AUTO: 30.3 PG (ref 27–31)
MCHC RBC AUTO-ENTMCNC: 33.4 G/DL (ref 32–36)
MCV RBC AUTO: 90.6 FL (ref 78–100)
MICROALBUMIN UR-MCNC: 2 MG/L (ref 0–2)
MICROALBUMIN/CREAT UR-RTO: 64 MCG/MG CREAT
MONOCYTES NFR BLD: 0.47 K/UL
MONOCYTES NFR BLD: 8 % (ref 0–5)
NEUTROPHILS NFR BLD: 57 % (ref 36–66)
NEUTS SEG NFR BLD: 3.41 K/UL
PLATELET # BLD AUTO: 226 K/UL (ref 140–440)
PMV BLD AUTO: 9.7 FL (ref 7.5–11.1)
POTASSIUM SERPL-SCNC: 4.1 MMOL/L (ref 3.5–5.1)
PROT SERPL-MCNC: 7.7 G/DL (ref 6.4–8.2)
RBC # BLD AUTO: 4.16 M/UL (ref 4.2–5.4)
SODIUM SERPL-SCNC: 137 MMOL/L (ref 136–145)
TRIGL SERPL-MCNC: 143 MG/DL
TSH SERPL DL<=0.05 MIU/L-ACNC: 2.73 UIU/ML (ref 0.27–4.2)
WBC OTHER # BLD: 6 K/UL (ref 4–10.5)

## 2025-06-11 PROCEDURE — 80053 COMPREHEN METABOLIC PANEL: CPT

## 2025-06-11 PROCEDURE — 84443 ASSAY THYROID STIM HORMONE: CPT

## 2025-06-11 PROCEDURE — 83036 HEMOGLOBIN GLYCOSYLATED A1C: CPT

## 2025-06-11 PROCEDURE — 80061 LIPID PANEL: CPT

## 2025-06-11 PROCEDURE — 82570 ASSAY OF URINE CREATININE: CPT

## 2025-06-11 PROCEDURE — 82043 UR ALBUMIN QUANTITATIVE: CPT

## 2025-06-11 PROCEDURE — 85025 COMPLETE CBC W/AUTO DIFF WBC: CPT

## 2025-06-11 PROCEDURE — 83735 ASSAY OF MAGNESIUM: CPT

## 2025-06-16 NOTE — PROGRESS NOTES
Vita Clarke  1940 06/16/25    SUBJECTIVE:   bDAY coming up, to be 85.  Had bday cake w firetruck.    DM- sugars stable   Lab Results   Component Value Date    LABA1C 6.7 (H) 06/11/2025    LABA1C 6.5 (H) 03/04/2025    LABA1C 6.7 (H) 12/03/2024     Lab Results   Component Value Date    GLUF 128 (H) 10/14/2016    MALBCR 64 06/11/2025    CREATININE 0.8 06/11/2025     Recurring UTI last UA clear 6/3.  We'll check UA at 3mo intervals.    Carotid stenosis, seeing Dr Whitlock periodically postop surgery L CEA    Hypertension: Stable. Denies CP, SOB, cough, visual changes, dizziness, palpitations or HA.     HER HCTZ CRUMBLES AT 1/2 TAB DAILY, WE'LL SWITCH TO QOD FULL TAB.    Afib, s/p watchman, no palpitations.    OBJECTIVE:    /60 (BP Site: Left Upper Arm, Patient Position: Sitting, BP Cuff Size: Medium Adult)   Pulse 56   Wt 76.4 kg (168 lb 6.4 oz)   LMP  (LMP Unknown)   SpO2 94%   BMI 32.89 kg/m²     Physical Exam    ASSESSMENT:    1. Type 2 diabetes mellitus with diabetic neuropathy, without long-term current use of insulin (formerly [facility])    2. Stage 3b chronic kidney disease (HCC)    3. Essential hypertension    4. Coronary artery disease involving native coronary artery of native heart without angina pectoris    5. PAF (paroxysmal atrial fibrillation) (formerly [facility])        PLAN:  Assessment & Plan    Sugars are stable and will continue monitoring lab, CKD also stable.  Will monitor renal function and urinalysis    Blood pressure is stable, refilled medications.  Coronary artery disease stable without symptoms of chest pain or shortness of breath, refill beta-blocker therapy and continue follow-up with cardiology.  Also atrial fibrillation status post watchman and clinically stable  Vita was seen today for 3 month follow-up.    Diagnoses and all orders for this visit:    Type 2 diabetes mellitus with diabetic neuropathy, without long-term current use of insulin (formerly [facility])  -     Comprehensive Metabolic Panel;

## 2025-06-17 ENCOUNTER — OFFICE VISIT (OUTPATIENT)
Dept: INTERNAL MEDICINE CLINIC | Age: 85
End: 2025-06-17
Payer: MEDICARE

## 2025-06-17 VITALS
BODY MASS INDEX: 32.89 KG/M2 | DIASTOLIC BLOOD PRESSURE: 60 MMHG | WEIGHT: 168.4 LBS | HEART RATE: 56 BPM | OXYGEN SATURATION: 94 % | SYSTOLIC BLOOD PRESSURE: 120 MMHG

## 2025-06-17 DIAGNOSIS — E11.40 TYPE 2 DIABETES MELLITUS WITH DIABETIC NEUROPATHY, WITHOUT LONG-TERM CURRENT USE OF INSULIN (HCC): Primary | ICD-10-CM

## 2025-06-17 DIAGNOSIS — N18.32 STAGE 3B CHRONIC KIDNEY DISEASE (HCC): ICD-10-CM

## 2025-06-17 DIAGNOSIS — I48.0 PAF (PAROXYSMAL ATRIAL FIBRILLATION) (HCC): ICD-10-CM

## 2025-06-17 DIAGNOSIS — I25.10 CORONARY ARTERY DISEASE INVOLVING NATIVE CORONARY ARTERY OF NATIVE HEART WITHOUT ANGINA PECTORIS: ICD-10-CM

## 2025-06-17 DIAGNOSIS — I10 ESSENTIAL HYPERTENSION: ICD-10-CM

## 2025-06-17 PROCEDURE — G8417 CALC BMI ABV UP PARAM F/U: HCPCS | Performed by: INTERNAL MEDICINE

## 2025-06-17 PROCEDURE — G8427 DOCREV CUR MEDS BY ELIG CLIN: HCPCS | Performed by: INTERNAL MEDICINE

## 2025-06-17 PROCEDURE — 1090F PRES/ABSN URINE INCON ASSESS: CPT | Performed by: INTERNAL MEDICINE

## 2025-06-17 PROCEDURE — 1036F TOBACCO NON-USER: CPT | Performed by: INTERNAL MEDICINE

## 2025-06-17 PROCEDURE — 3074F SYST BP LT 130 MM HG: CPT | Performed by: INTERNAL MEDICINE

## 2025-06-17 PROCEDURE — 1159F MED LIST DOCD IN RCRD: CPT | Performed by: INTERNAL MEDICINE

## 2025-06-17 PROCEDURE — G8399 PT W/DXA RESULTS DOCUMENT: HCPCS | Performed by: INTERNAL MEDICINE

## 2025-06-17 PROCEDURE — 3078F DIAST BP <80 MM HG: CPT | Performed by: INTERNAL MEDICINE

## 2025-06-17 PROCEDURE — G2211 COMPLEX E/M VISIT ADD ON: HCPCS | Performed by: INTERNAL MEDICINE

## 2025-06-17 PROCEDURE — 1160F RVW MEDS BY RX/DR IN RCRD: CPT | Performed by: INTERNAL MEDICINE

## 2025-06-17 PROCEDURE — 99214 OFFICE O/P EST MOD 30 MIN: CPT | Performed by: INTERNAL MEDICINE

## 2025-06-17 PROCEDURE — 3044F HG A1C LEVEL LT 7.0%: CPT | Performed by: INTERNAL MEDICINE

## 2025-06-17 PROCEDURE — 1124F ACP DISCUSS-NO DSCNMKR DOCD: CPT | Performed by: INTERNAL MEDICINE

## 2025-06-17 RX ORDER — HYDROCHLOROTHIAZIDE 25 MG/1
12.5 TABLET ORAL EVERY OTHER DAY
Qty: 45 TABLET | Refills: 1 | Status: SHIPPED | OUTPATIENT
Start: 2025-06-17

## 2025-06-17 RX ORDER — METOPROLOL TARTRATE 50 MG
50 TABLET ORAL 2 TIMES DAILY
Qty: 180 TABLET | Refills: 1 | Status: SHIPPED | OUTPATIENT
Start: 2025-06-17

## 2025-06-17 RX ORDER — AMLODIPINE BESYLATE 5 MG/1
5 TABLET ORAL 2 TIMES DAILY
Qty: 180 TABLET | Refills: 1 | Status: SHIPPED | OUTPATIENT
Start: 2025-06-17

## 2025-07-29 ENCOUNTER — TELEMEDICINE (OUTPATIENT)
Dept: INTERNAL MEDICINE CLINIC | Age: 85
End: 2025-07-29
Payer: MEDICARE

## 2025-07-29 DIAGNOSIS — Z00.00 MEDICARE ANNUAL WELLNESS VISIT, SUBSEQUENT: Primary | ICD-10-CM

## 2025-07-29 PROCEDURE — G0439 PPPS, SUBSEQ VISIT: HCPCS | Performed by: INTERNAL MEDICINE

## 2025-07-29 PROCEDURE — 1160F RVW MEDS BY RX/DR IN RCRD: CPT | Performed by: INTERNAL MEDICINE

## 2025-07-29 PROCEDURE — 1124F ACP DISCUSS-NO DSCNMKR DOCD: CPT | Performed by: INTERNAL MEDICINE

## 2025-07-29 PROCEDURE — 1159F MED LIST DOCD IN RCRD: CPT | Performed by: INTERNAL MEDICINE

## 2025-07-29 SDOH — HEALTH STABILITY: PHYSICAL HEALTH: ON AVERAGE, HOW MANY DAYS PER WEEK DO YOU ENGAGE IN MODERATE TO STRENUOUS EXERCISE (LIKE A BRISK WALK)?: 0 DAYS

## 2025-07-29 ASSESSMENT — PATIENT HEALTH QUESTIONNAIRE - PHQ9
1. LITTLE INTEREST OR PLEASURE IN DOING THINGS: MORE THAN HALF THE DAYS
SUM OF ALL RESPONSES TO PHQ QUESTIONS 1-9: 2
2. FEELING DOWN, DEPRESSED OR HOPELESS: NOT AT ALL
SUM OF ALL RESPONSES TO PHQ QUESTIONS 1-9: 2

## 2025-07-29 ASSESSMENT — LIFESTYLE VARIABLES
HOW MANY STANDARD DRINKS CONTAINING ALCOHOL DO YOU HAVE ON A TYPICAL DAY: PATIENT DOES NOT DRINK
HOW OFTEN DO YOU HAVE A DRINK CONTAINING ALCOHOL: NEVER
HOW OFTEN DO YOU HAVE A DRINK CONTAINING ALCOHOL: 1
HOW MANY STANDARD DRINKS CONTAINING ALCOHOL DO YOU HAVE ON A TYPICAL DAY: 0

## 2025-07-29 NOTE — PROGRESS NOTES
the direct supervision of the attending physician.  Vita Clarke, was evaluated through a synchronous (real-time) telephone visit lasting 15 minutes. The patient (or guardian if applicable) is aware that this is a billable service, which includes applicable co-pays. This Virtual Visit was conducted with patient's (and/or legal guardian's) consent. Patient identification was verified, and a caregiver was present when appropriate.   The patient was located at Home: [address]  [address] [place], [place] [id number]  Provider was located at Facility (Appt Dept): [address] [place] [address]  Confirm you are appropriately licensed, registered, or certified to deliver care in the state where the patient is located as indicated above. If you are not or unsure, please re-schedule the visit: Yes, I confirm.    This encounter was performed under my, Joaquim Gonzalez MD’s, direct supervision, 7/29/2025.

## 2025-07-29 NOTE — PATIENT INSTRUCTIONS
Personalized Preventive Plan for Vtia Clarke - 7/29/2025  Medicare offers a range of preventive health benefits. Some of the tests and screenings are paid in full while other may be subject to a deductible, co-insurance, and/or copay.  Some of these benefits include a comprehensive review of your medical history including lifestyle, illnesses that may run in your family, and various assessments and screenings as appropriate.  After reviewing your medical record and screening and assessments performed today your provider may have ordered immunizations, labs, imaging, and/or referrals for you.  A list of these orders (if applicable) as well as your Preventive Care list are included within your After Visit Summary for your review.

## 2025-08-25 ENCOUNTER — HOSPITAL ENCOUNTER (OUTPATIENT)
Dept: LAB | Age: 85
Discharge: HOME | End: 2025-08-25
Payer: MEDICARE

## 2025-08-25 DIAGNOSIS — I10 ESSENTIAL HYPERTENSION: ICD-10-CM

## 2025-08-25 DIAGNOSIS — E11.40 TYPE 2 DIABETES MELLITUS WITH DIABETIC NEUROPATHY, WITHOUT LONG-TERM CURRENT USE OF INSULIN (HCC): ICD-10-CM

## 2025-08-25 DIAGNOSIS — I48.0 PAF (PAROXYSMAL ATRIAL FIBRILLATION) (HCC): ICD-10-CM

## 2025-08-25 DIAGNOSIS — I25.10 CORONARY ARTERY DISEASE INVOLVING NATIVE CORONARY ARTERY OF NATIVE HEART WITHOUT ANGINA PECTORIS: ICD-10-CM

## 2025-08-25 LAB
ALBUMIN SERPL-MCNC: 4.6 G/DL (ref 3.4–5)
ALBUMIN/GLOB SERPL: 1.3 {RATIO} (ref 1.1–2.2)
ALP SERPL-CCNC: 91 U/L (ref 40–129)
ALT SERPL-CCNC: 13 U/L (ref 10–40)
ANION GAP SERPL CALCULATED.3IONS-SCNC: 13 MMOL/L (ref 9–17)
AST SERPL-CCNC: 27 U/L (ref 15–37)
BASOPHILS # BLD: 0.02 K/UL
BASOPHILS NFR BLD: 0 % (ref 0–1)
BILIRUB SERPL-MCNC: 0.7 MG/DL (ref 0–1)
BILIRUB UR QL STRIP: NEGATIVE
BUN SERPL-MCNC: 27 MG/DL (ref 7–20)
CALCIUM SERPL-MCNC: 9.7 MG/DL (ref 8.3–10.6)
CHLORIDE SERPL-SCNC: 97 MMOL/L (ref 99–110)
CHOLEST SERPL-MCNC: 151 MG/DL (ref 125–199)
CLARITY UR: CLEAR
CO2 SERPL-SCNC: 24 MMOL/L (ref 21–32)
COLOR UR: YELLOW
CREAT SERPL-MCNC: 0.9 MG/DL (ref 0.6–1.2)
EOSINOPHIL # BLD: 0.2 K/UL
EOSINOPHILS RELATIVE PERCENT: 3 % (ref 0–3)
ERYTHROCYTE [DISTWIDTH] IN BLOOD BY AUTOMATED COUNT: 13 % (ref 11.7–14.9)
EST. AVERAGE GLUCOSE BLD GHB EST-MCNC: 136 MG/DL
GFR, ESTIMATED: 57 ML/MIN/1.73M2
GLUCOSE SERPL-MCNC: 124 MG/DL (ref 74–99)
GLUCOSE UR STRIP-MCNC: NEGATIVE MG/DL
HBA1C MFR BLD: 6.4 % (ref 4.2–6.3)
HCT VFR BLD AUTO: 38.2 % (ref 37–47)
HDLC SERPL-MCNC: 38 MG/DL
HGB BLD-MCNC: 12.6 G/DL (ref 12.5–16)
HGB UR QL STRIP.AUTO: NEGATIVE
IMM GRANULOCYTES # BLD AUTO: 0.02 K/UL
IMM GRANULOCYTES NFR BLD: 0 %
KETONES UR STRIP-MCNC: NEGATIVE MG/DL
LDLC SERPL CALC-MCNC: 76 MG/DL
LEUKOCYTE ESTERASE UR QL STRIP: NEGATIVE
LYMPHOCYTES NFR BLD: 2.1 K/UL
LYMPHOCYTES RELATIVE PERCENT: 34 % (ref 24–44)
MCH RBC QN AUTO: 30.1 PG (ref 27–31)
MCHC RBC AUTO-ENTMCNC: 33 G/DL (ref 32–36)
MCV RBC AUTO: 91.4 FL (ref 78–100)
MONOCYTES NFR BLD: 0.57 K/UL
MONOCYTES NFR BLD: 9 % (ref 0–5)
NEUTROPHILS NFR BLD: 54 % (ref 36–66)
NEUTS SEG NFR BLD: 3.34 K/UL
NITRITE UR QL STRIP: NEGATIVE
PH UR STRIP: 6.5 [PH] (ref 5–8)
PLATELET # BLD AUTO: 249 K/UL (ref 140–440)
PMV BLD AUTO: 9.4 FL (ref 7.5–11.1)
POTASSIUM SERPL-SCNC: 4.3 MMOL/L (ref 3.5–5.1)
PROT SERPL-MCNC: 8.1 G/DL (ref 6.4–8.2)
PROT UR STRIP-MCNC: NEGATIVE MG/DL
RBC # BLD AUTO: 4.18 M/UL (ref 4.2–5.4)
SODIUM SERPL-SCNC: 133 MMOL/L (ref 136–145)
SP GR UR STRIP: <1.005 (ref 1–1.03)
T3FREE SERPL-MCNC: 2.89 PG/ML (ref 2.3–4.2)
T4 FREE SERPL-MCNC: 1.1 NG/DL (ref 0.9–1.8)
TRIGL SERPL-MCNC: 189 MG/DL
TSH SERPL DL<=0.05 MIU/L-ACNC: 1.62 UIU/ML (ref 0.27–4.2)
TSH SERPL DL<=0.05 MIU/L-ACNC: 1.64 UIU/ML (ref 0.27–4.2)
UROBILINOGEN UR STRIP-ACNC: 0.2 EU/DL (ref 0–1)
WBC OTHER # BLD: 6.3 K/UL (ref 4–10.5)

## 2025-08-25 PROCEDURE — 80061 LIPID PANEL: CPT

## 2025-08-25 PROCEDURE — 84481 FREE ASSAY (FT-3): CPT

## 2025-08-25 PROCEDURE — 84439 ASSAY OF FREE THYROXINE: CPT

## 2025-08-25 PROCEDURE — 84443 ASSAY THYROID STIM HORMONE: CPT

## 2025-08-25 PROCEDURE — 80053 COMPREHEN METABOLIC PANEL: CPT

## 2025-08-25 PROCEDURE — 83036 HEMOGLOBIN GLYCOSYLATED A1C: CPT

## 2025-08-25 PROCEDURE — 85025 COMPLETE CBC W/AUTO DIFF WBC: CPT

## 2025-09-04 DIAGNOSIS — G58.8 OTHER MONONEUROPATHY: ICD-10-CM

## 2025-09-04 DIAGNOSIS — E11.40 TYPE 2 DIABETES MELLITUS WITH DIABETIC NEUROPATHY, WITHOUT LONG-TERM CURRENT USE OF INSULIN (HCC): ICD-10-CM

## 2025-09-04 RX ORDER — ATORVASTATIN CALCIUM 40 MG/1
40 TABLET, FILM COATED ORAL DAILY
Qty: 90 TABLET | Refills: 1 | Status: SHIPPED | OUTPATIENT
Start: 2025-09-04

## 2025-09-04 RX ORDER — GABAPENTIN 600 MG/1
600 TABLET ORAL 2 TIMES DAILY
Qty: 180 TABLET | Refills: 1 | Status: SHIPPED | OUTPATIENT
Start: 2025-09-04 | End: 2026-03-03

## (undated) DEVICE — GAUZE,SPONGE,8"X4",12PLY,XRAY,STRL,LF: Brand: MEDLINE

## (undated) DEVICE — NEEDLE HYPO 25GA L1.5IN BLU POLYPR HUB S STL REG BVL STR

## (undated) DEVICE — GLOVE SURG SZ 6 CRM LTX FREE POLYISOPRENE POLYMER BEAD ANTI

## (undated) DEVICE — SOLUTION PREP PAINT POV IOD FOR SKIN MUCOUS MEM

## (undated) DEVICE — SPONGE LAP W18XL18IN WHT COT 4 PLY FLD STRUNG RADPQ DISP ST 2 PER PACK

## (undated) DEVICE — CONTAINER,SPECIMEN,OR STERILE,4OZ: Brand: MEDLINE

## (undated) DEVICE — GLOVE SURG SZ 8 L12IN THK75MIL DK GRN LTX FREE

## (undated) DEVICE — 500ML,PRESSURE INFUSER W/STOPCOCK: Brand: MEDLINE

## (undated) DEVICE — SYRINGE MED 3ML NDL 18GA L1.5IN BLNT FILL LUERLOCK TIP DISP

## (undated) DEVICE — CHECK-FLO PERFORMER INTRODUCER: Brand: PERFORMER

## (undated) DEVICE — SYRINGE MED 30ML STD CLR PLAS LUERLOCK TIP N CTRL DISP

## (undated) DEVICE — Device

## (undated) DEVICE — PERCLOSE™ PROSTYLE™ SUTURE-MEDIATED CLOSURE AND REPAIR SYSTEM: Brand: PERCLOSE™ PROSTYLE™

## (undated) DEVICE — GOWN,ECLIPSE,POLYRNF,BRTHSLV,L,30/CS: Brand: MEDLINE

## (undated) DEVICE — SHEET,T,THYROID,STERILE: Brand: MEDLINE

## (undated) DEVICE — DILATOR: Brand: COOK

## (undated) DEVICE — TOWEL,OR,DSP,ST,BLUE,STD,6/PK,12PK/CS: Brand: MEDLINE

## (undated) DEVICE — INTRODUCER SHTH 6FR L12CM DIA0.038IN STD HEMSTAS CLOSE TOL

## (undated) DEVICE — LOOP VES W25MM THK1MM MAXI RED SIL FLD REPELLENT 100 PER

## (undated) DEVICE — GLOVE ORANGE PI 8   MSG9080

## (undated) DEVICE — GOWN,ECLIPSE,POLYRNF,BRTHSLV,XL,30/CS: Brand: MEDLINE

## (undated) DEVICE — SUTURE VCRL SZ 3-0 L27IN ABSRB UD L26MM SH 1/2 CIR J416H

## (undated) DEVICE — SHEET,DRAPE,53X77,STERILE: Brand: MEDLINE

## (undated) DEVICE — ANESTHESIA CIRCUIT ADULT-LF: Brand: MEDLINE INDUSTRIES, INC.

## (undated) DEVICE — PERCUTANEOUS ENTRY THINWALL NEEDLE  ONE-PART: Brand: COOK

## (undated) DEVICE — AGENT HEMOSTATIC SURGIFLOW MATRIX KIT W/THROMBIN

## (undated) DEVICE — BLANKET WRM W40.2XL55.9IN IORT LO BODY + MISTRAL AIR

## (undated) DEVICE — GLOVE SURG SZ 7 CRM LTX FREE POLYISOPRENE POLYMER BEAD ANTI

## (undated) DEVICE — STANDARD HYPODERMIC NEEDLE,POLYPROPYLENE HUB: Brand: MONOJECT

## (undated) DEVICE — PRESSURE MONITORING SET: Brand: TRUWAVE

## (undated) DEVICE — INTENDED FOR TISSUE SEPARATION, AND OTHER PROCEDURES THAT REQUIRE A SHARP SURGICAL BLADE TO PUNCTURE OR CUT.: Brand: BARD-PARKER ® STAINLESS STEEL BLADES

## (undated) DEVICE — 3M™ STERI-DRAPE™ INSTRUMENT POUCH 1018: Brand: STERI-DRAPE™

## (undated) DEVICE — 1 X VERSACROSS LARGE ACCESS TRANSSEPTAL DILATOR (INCLUDING 1 X J-TIP MECHANICAL GUIDEWIRE); 1 X VERSACROSS RF WIRE (INCLUDING 1 X CONNECTOR CABLE (SINGLE USE)); 1 X DISPERSIVE ELECTRODE: Brand: VERSACROSS LARGE ACCESS SOLUTION

## (undated) DEVICE — CANNULA PERF L1.8MM TIP L1MM S STL SHFT BLB SHP TIP FEM

## (undated) DEVICE — SUTURE PERMAHAND SZ 2-0 L17X18IN NONABSORBABLE BLK SILK SA65H

## (undated) DEVICE — 3M™ IOBAN™ 2 ANTIMICROBIAL INCISE DRAPE 6648EZ: Brand: IOBAN™ 2

## (undated) DEVICE — ELECTRODE ES L2.75IN S STL INSUL BLDE W/ SL EDGE

## (undated) DEVICE — AGENT HEMSTAT W4XL8IN OXIDIZED REGENERATED CELOS ABSRB

## (undated) DEVICE — 3M™ IOBAN™ 2 ANTIMICROBIAL INCISE DRAPE 6650EZ: Brand: IOBAN™ 2

## (undated) DEVICE — GUIDEWIRE VASC L260CM DIA0.035IN RAD 3MM J TIP L7CM PTFE

## (undated) DEVICE — VESSEL LOOP MAXI WHITE

## (undated) DEVICE — APPLICATOR PREP 26ML 0.7% IOD POVACRYLEX 74% ISO ALC ST

## (undated) DEVICE — MARKER SURG SKIN UTIL REGULAR/FINE 2 TIP W/ RUL AND 9 LBL

## (undated) DEVICE — GLOVE ORANGE PI 7 1/2   MSG9075

## (undated) DEVICE — TOWEL,OR,DSP,ST,WHITE,DLX,XR,4/PK,20PK/C: Brand: MEDLINE

## (undated) DEVICE — SPONGE,PEANUT,XRAY,ST,SM,3/8",5/CARD: Brand: MEDLINE INDUSTRIES, INC.

## (undated) DEVICE — TUBING, SUCTION, 9/32" X 10', STRAIGHT: Brand: MEDLINE

## (undated) DEVICE — SET CATH 20GA L1.75IN RAD ART POLYUR RADPQ W/ INTEGR

## (undated) DEVICE — YANKAUER,BULB TIP,W/O VENT,RIGID,STERILE: Brand: MEDLINE

## (undated) DEVICE — TUBE ET SZ 7.5MM ORAL NSL CUF MAGILL TIP LO-PRO

## (undated) DEVICE — TUBE SUCT 10FR L76CM INTCARD SFT TIP WAND MAL NONWEIGHTED

## (undated) DEVICE — PACK,BASIC,SIRUS,V: Brand: MEDLINE

## (undated) DEVICE — GEL US 20GM NONIRRITATING OVERWRAPPED FILE PCH TRNSMIT

## (undated) DEVICE — CLEANER,CAUTERY TIP,2X2",STERILE: Brand: MEDLINE

## (undated) DEVICE — TUBING SUCT 9 11FR L475IN RIG SHFT MINI SUC TIP DLP

## (undated) DEVICE — SUTURE VCRL SZ 0 L27IN ABSRB UD L36MM CT-1 1/2 CIR J260H

## (undated) DEVICE — DRESSING TRNSPAR W2XL2.75IN FLM SHT SEMIPERMEABLE WIND

## (undated) DEVICE — ELECTRODE,RADIOTRANSLUCENT,FOAM,5PK: Brand: MEDLINE

## (undated) DEVICE — SUPPORT WRST AD W3.5XL9IN DIA14.5IN ART SFT ADJ HK AND LOOP

## (undated) DEVICE — TUBING, SUCTION, 3/16" X 10', STRAIGHT: Brand: MEDLINE

## (undated) DEVICE — GAUZE,SPONGE,2"X2",8PLY,STERILE,LF,2'S: Brand: MEDLINE

## (undated) DEVICE — SUTURE BOOT ASSORTED COLORS XRAY DETECTABLE

## (undated) DEVICE — AIRWAY OP M AD SZ 90MM WHT DISP BRMN

## (undated) DEVICE — SUTURE ABSORBABLE BRAIDED 2-0 CT-1 27 IN UD VICRYL J259H

## (undated) DEVICE — BALL RAYON ABSRB COT L STRL ST 10EA/PK

## (undated) DEVICE — COUNTER NDL 30 COUNT FOAM STRP SGL MAG

## (undated) DEVICE — ACCESS SHEATH WITH DILATOR: Brand: WATCHMAN FXD CURVE™ ACCESS SYSTEM

## (undated) DEVICE — POSITIONER,HEAD,RING CUSHION,9IN,32CS: Brand: MEDLINE

## (undated) DEVICE — DRAPE,UTILITY,XL,4/PK,STERILE: Brand: MEDLINE

## (undated) DEVICE — GLOVE SURG SZ 65 L12IN FNGR THK79MIL GRN LTX FREE

## (undated) DEVICE — DRESSING TRNSPAR W5XL4.5IN FLM SHT SEMIPERMEABLE WIND

## (undated) DEVICE — PENCIL ES CRD L10FT HND SWCHING ROCK SWCH W/ EDGE COAT BLDE

## (undated) DEVICE — DECANTER FLD 9IN ST BG FOR ASEP TRNSF OF FLD

## (undated) DEVICE — MAGNETIC INSTR DRAPE 20X16: Brand: MEDLINE INDUSTRIES, INC.

## (undated) DEVICE — CLIP LIG M BLU TI HRT SHP WIRE HORZ 600 PER BX

## (undated) DEVICE — PROTECTOR EYE PT SELF ADH NS OPT GRD LF

## (undated) DEVICE — APPLICATOR MEDICATED 3 CC SOLUTION CLR STRL CHLORAPREP

## (undated) DEVICE — SUTURE NONABSORBABLE MONOFILAMENT 6-0 RB-2 4X30 IN PROLENE M8711

## (undated) DEVICE — SUTURE PROL SZ 5-0 L36IN NONABSORBABLE BLU L13MM C-1 3/8 8720H

## (undated) DEVICE — Z INACTIVE USE 2863041 SPONGE GZ W4XL4IN 100% COT 16 PLY RADPQ HIGHLY ABSRB

## (undated) DEVICE — CATHETER ANGIO 6FR L110CM ID0.056IN VENT PIG CRV ROBUST

## (undated) DEVICE — CLIP SM RED INTERN HMOCLP TITAN LIGATING

## (undated) DEVICE — MASK O2 AD TB L7FT HI CONC PART N RBRTH W RESVR BG SFTY

## (undated) DEVICE — SUTURE PROL SZ 6-0 L30IN NONABSORBABLE BLU L13MM RB-2 1/2 8711H

## (undated) DEVICE — FOGARTY - HYDRAGRIP SURGICAL - CLAMP INSERTS: Brand: FOGARTY SOFTJAW

## (undated) DEVICE — TAPE UMBILICAL W1/16XL30IN COTTON ROUND NONRADIOPAQUE

## (undated) DEVICE — SUTURE MCRYL SZ 4-0 L18IN ABSRB UD L19MM PS-2 3/8 CIR PRIM Y496G

## (undated) DEVICE — COVER US PRB W12XL244CM SURGICAL INTRAOPERATIVE PLAS TAPR L

## (undated) DEVICE — GLOVE SURG SZ 65 CRM LTX FREE POLYISOPRENE POLYMER BEAD ANTI

## (undated) DEVICE — SYRINGE IRRIG 60ML SFT PLIABLE BLB EZ TO GRP 1 HND USE W/

## (undated) DEVICE — TOTAL TRAY, DB, 100% SILI FOLEY, 16FR 10: Brand: MEDLINE

## (undated) DEVICE — LOOP,VESSEL,MINI,BLUE,2/PK,STERILE: Brand: MEDLINE

## (undated) DEVICE — SUTURE BOOT: Brand: DEROYAL